# Patient Record
Sex: FEMALE | Race: WHITE | NOT HISPANIC OR LATINO | Employment: OTHER | URBAN - METROPOLITAN AREA
[De-identification: names, ages, dates, MRNs, and addresses within clinical notes are randomized per-mention and may not be internally consistent; named-entity substitution may affect disease eponyms.]

---

## 2017-01-19 DIAGNOSIS — Z23 NEED FOR PROPHYLACTIC VACCINATION WITH COMBINED DIPHTHERIA-TETANUS-PERTUSSIS (DTAP) VACCINE: Primary | ICD-10-CM

## 2017-01-20 ENCOUNTER — DOCUMENTATION ONLY (OUTPATIENT)
Dept: FAMILY MEDICINE | Facility: CLINIC | Age: 77
End: 2017-01-20

## 2017-01-20 ENCOUNTER — PATIENT MESSAGE (OUTPATIENT)
Dept: FAMILY MEDICINE | Facility: CLINIC | Age: 77
End: 2017-01-20

## 2017-01-20 ENCOUNTER — CLINICAL SUPPORT (OUTPATIENT)
Dept: INTERNAL MEDICINE | Facility: CLINIC | Age: 77
End: 2017-01-20
Payer: MEDICARE

## 2017-01-20 PROCEDURE — 90471 IMMUNIZATION ADMIN: CPT | Mod: PBBFAC,PO

## 2017-01-20 PROCEDURE — 90715 TDAP VACCINE 7 YRS/> IM: CPT | Mod: PBBFAC,PO

## 2017-01-20 NOTE — PROGRESS NOTES
Two person identification name, d.o.b with verbal feedback.  Aseptic technique used.  Administration Tdap  vaccine to the  L Deltoid IM given.  Tolerated well.  waited 15 min.  VIS 5/17/07 given./mp

## 2017-06-08 ENCOUNTER — DOCUMENTATION ONLY (OUTPATIENT)
Dept: FAMILY MEDICINE | Facility: CLINIC | Age: 77
End: 2017-06-08

## 2017-06-08 NOTE — PROGRESS NOTES
Pre-Visit Chart Review  For Appointment Scheduled on (date) 6/12/17    Health Maintenance Due   Topic Date Due    DEXA SCAN  03[birth year]1980

## 2017-06-12 ENCOUNTER — OFFICE VISIT (OUTPATIENT)
Dept: FAMILY MEDICINE | Facility: CLINIC | Age: 77
End: 2017-06-12
Payer: MEDICARE

## 2017-06-12 VITALS
HEIGHT: 66 IN | WEIGHT: 138 LBS | TEMPERATURE: 98 F | SYSTOLIC BLOOD PRESSURE: 178 MMHG | BODY MASS INDEX: 22.18 KG/M2 | HEART RATE: 75 BPM | DIASTOLIC BLOOD PRESSURE: 78 MMHG

## 2017-06-12 DIAGNOSIS — K58.0 IRRITABLE BOWEL SYNDROME WITH DIARRHEA: ICD-10-CM

## 2017-06-12 DIAGNOSIS — E78.2 MIXED HYPERLIPIDEMIA: ICD-10-CM

## 2017-06-12 DIAGNOSIS — M48.061 LUMBAR FORAMINAL STENOSIS: ICD-10-CM

## 2017-06-12 DIAGNOSIS — I10 HTN (HYPERTENSION), BENIGN: Primary | ICD-10-CM

## 2017-06-12 DIAGNOSIS — J43.1 PANLOBULAR EMPHYSEMA: ICD-10-CM

## 2017-06-12 DIAGNOSIS — E03.4 HYPOTHYROIDISM DUE TO ACQUIRED ATROPHY OF THYROID: ICD-10-CM

## 2017-06-12 PROCEDURE — 99214 OFFICE O/P EST MOD 30 MIN: CPT | Mod: PBBFAC,PO | Performed by: FAMILY MEDICINE

## 2017-06-12 PROCEDURE — 1159F MED LIST DOCD IN RCRD: CPT | Mod: ,,, | Performed by: FAMILY MEDICINE

## 2017-06-12 PROCEDURE — 99999 PR PBB SHADOW E&M-EST. PATIENT-LVL IV: CPT | Mod: PBBFAC,,, | Performed by: FAMILY MEDICINE

## 2017-06-12 PROCEDURE — 99213 OFFICE O/P EST LOW 20 MIN: CPT | Mod: S$PBB,,, | Performed by: FAMILY MEDICINE

## 2017-06-12 PROCEDURE — 1126F AMNT PAIN NOTED NONE PRSNT: CPT | Mod: ,,, | Performed by: FAMILY MEDICINE

## 2017-06-12 RX ORDER — GUAIFENESIN AND PHENYLEPHRINE HCL 400; 10 MG/1; MG/1
TABLET ORAL
COMMUNITY

## 2017-06-12 RX ORDER — MELOXICAM 7.5 MG/1
TABLET ORAL
COMMUNITY
Start: 2017-05-29 | End: 2018-08-06

## 2017-06-12 RX ORDER — FLUTICASONE PROPIONATE 50 MCG
SPRAY, SUSPENSION (ML) NASAL
COMMUNITY
Start: 2017-04-18 | End: 2018-01-18 | Stop reason: SDUPTHER

## 2017-06-12 RX ORDER — BENAZEPRIL HYDROCHLORIDE 5 MG/1
TABLET ORAL
COMMUNITY
Start: 2017-04-05 | End: 2018-08-06

## 2017-06-12 RX ORDER — PRAVASTATIN SODIUM 20 MG/1
TABLET ORAL
COMMUNITY
Start: 2017-04-17 | End: 2019-03-25 | Stop reason: SDUPTHER

## 2017-06-12 RX ORDER — METHYLPREDNISOLONE 4 MG/1
TABLET ORAL
COMMUNITY
Start: 2017-05-29 | End: 2017-06-12

## 2017-06-12 NOTE — PATIENT INSTRUCTIONS
Controlling High Blood Pressure  High blood pressure (hypertension) is often called the silent killer. This is because many people who have it dont know it. High blood pressure is defined as 140/90 mm Hg or higher. Know your blood pressure and remember to check it regularly. Doing so can save your life. Here are some things you can do to help control your blood pressure.    Choose heart-healthy foods  · Select low-salt, low-fat foods. Limit sodium intake to 2,400 mg per day or the amount suggested by your healthcare provider.  · Limit canned, dried, cured, packaged, and fast foods. These can contain a lot of salt.  · Eat 8 to 10 servings of fruits and vegetables every day.  · Choose lean meats, fish, or chicken.  · Eat whole-grain pasta, brown rice, and beans.  · Eat 2 to 3 servings of low-fat or fat-free dairy products.  · Ask your doctor about the DASH eating plan. This plan helps reduce blood pressure.  · When you go to a restaurant, ask that your meal be prepared with no added salt.  Maintain a healthy weight  · Ask your healthcare provider how many calories to eat a day. Then stick to that number.  · Ask your healthcare provider what weight range is healthiest for you. If you are overweight, a weight loss of only 3% to 5% of your body weight can help lower blood pressure. Generally, a good weight loss goal is to lose 10% of your body weight in a year.  · Limit snacks and sweets.  · Get regular exercise.  Get up and get active  · Choose activities you enjoy. Find ones you can do with friends or family. This includes bicycling, dancing, walking, and jogging.  · Park farther away from building entrances.  · Use stairs instead of the elevator.  · When you can, walk or bike instead of driving.  · [place] leaves, garden, or do household repairs.  · Be active at a moderate to vigorous level of physical activity for at least 40 minutes for a minimum of 3 to 4 days a week.   Manage stress  · Make time to relax and enjoy  life. Find time to laugh.  · Communicate your concerns with your loved ones and your healthcare provider.  · Visit with family and friends, and keep up with hobbies.  Limit alcohol and quit smoking  · Men should have no more than 2 drinks per day.  · Women should have no more than 1 drink per day.  · Talk with your healthcare provider about quitting smoking. Smoking significantly increases your risk for heart disease and stroke. Ask your healthcare provider about community smoking cessation programs and other options.  Medicines  If lifestyle changes arent enough, your healthcare provider may prescribe high blood pressure medicine. Take all medicines as prescribed. If you have any questions about your medicines, ask your healthcare provider before stopping or changing them.   Date Last Reviewed: 4/27/2016 © 2000-2016 [employer]. [address] [place] [address], [place] [address]. All rights reserved. This information is not intended as a substitute for professional medical care. Always follow your healthcare professional's instructions.        Controlling High Blood Pressure  High blood pressure (hypertension) is often called the silent killer. This is because many people who have it dont know it. High blood pressure is defined as 140/90 mm Hg or higher. Know your blood pressure and remember to check it regularly. Doing so can save your life. Here are some things you can do to help control your blood pressure.    Choose heart-healthy foods  · Select low-salt, low-fat foods. Limit sodium intake to 2,400 mg per day or the amount suggested by your healthcare provider.  · Limit canned, dried, cured, packaged, and fast foods. These can contain a lot of salt.  · Eat 8 to 10 servings of fruits and vegetables every day.  · Choose lean meats, fish, or chicken.  · Eat whole-grain pasta, brown rice, and beans.  · Eat 2 to 3 servings of low-fat or fat-free dairy products.  · Ask your doctor about the DASH eating plan.  This plan helps reduce blood pressure.  · When you go to a restaurant, ask that your meal be prepared with no added salt.  Maintain a healthy weight  · Ask your healthcare provider how many calories to eat a day. Then stick to that number.  · Ask your healthcare provider what weight range is healthiest for you. If you are overweight, a weight loss of only 3% to 5% of your body weight can help lower blood pressure. Generally, a good weight loss goal is to lose 10% of your body weight in a year.  · Limit snacks and sweets.  · Get regular exercise.  Get up and get active  · Choose activities you enjoy. Find ones you can do with friends or family. This includes bicycling, dancing, walking, and jogging.  · Park farther away from building entrances.  · Use stairs instead of the elevator.  · When you can, walk or bike instead of driving.  · [place] leaves, garden, or do household repairs.  · Be active at a moderate to vigorous level of physical activity for at least 40 minutes for a minimum of 3 to 4 days a week.   Manage stress  · Make time to relax and enjoy life. Find time to laugh.  · Communicate your concerns with your loved ones and your healthcare provider.  · Visit with family and friends, and keep up with hobbies.  Limit alcohol and quit smoking  · Men should have no more than 2 drinks per day.  · Women should have no more than 1 drink per day.  · Talk with your healthcare provider about quitting smoking. Smoking significantly increases your risk for heart disease and stroke. Ask your healthcare provider about community smoking cessation programs and other options.  Medicines  If lifestyle changes arent enough, your healthcare provider may prescribe high blood pressure medicine. Take all medicines as prescribed. If you have any questions about your medicines, ask your healthcare provider before stopping or changing them.   Date Last Reviewed: 4/27/2016  © 0169-4624 [employer]. [address] [facility],  [place] [address]. All rights reserved. This information is not intended as a substitute for professional medical care. Always follow your healthcare professional's instructions.

## 2017-06-18 PROBLEM — E78.2 MIXED HYPERLIPIDEMIA: Status: ACTIVE | Noted: 2017-06-18

## 2017-06-19 NOTE — PROGRESS NOTES
Subjective:       Patient ID: Jacqueline Mathias is a 77 y.o. female.    Chief Complaint: Hypertension; Hypothyroidism; COPD; and Irritable Bowel Syndrome    Patient presents here for six-month follow-up of hypertension, hypothyroidism, COPD, and IBS.  She relates that she had a fall in May and has had continued back pain since that time.  She initially saw her orthopedist and was prescribed Medrol Dosepak but did not get much relief.  She then had an MRI done of the lumbar spine which revealed no acute fracture or malalignment of the lumbar spine; relatively mild discogenic degenerative changes causing mild bilateral neural foramen narrowing at L4-L5 and L5-S1.  Her pain is mostly with bending or other movements, and there are no radicular pains down either leg.  She was encouraged to see pain management about possible injections or other treatment for her continued pain.  Her cardiologist also recently placed her on pravastatin for elevated cholesterol.  As far as her hypertension it is elevated here today but she did bring her log of blood pressure readings from home and these are all normal and show good control.  Her COPD is stable and she did have recent pulmonary function tests done by her pulmonologist, and her tests are stable without any significant decrease from previous.  Her IBS is also stable at this time.  She is up-to-date with all of her routine screening exams and immunizations.      Hypertension   This is a chronic problem. The problem is unchanged. The problem is controlled. Pertinent negatives include no chest pain, headaches, palpitations or shortness of breath. Risk factors for coronary artery disease include post-menopausal state. Past treatments include diuretics. The current treatment provides moderate improvement. There are no compliance problems.  There is no history of kidney disease, CAD/MI, CVA or heart failure.     Review of Systems   Constitutional: Negative for chills, fatigue, fever  and unexpected weight change.   HENT: Negative for congestion, ear pain, postnasal drip and sore throat.    Respiratory: Negative for cough and shortness of breath.    Cardiovascular: Negative for chest pain and palpitations.   Gastrointestinal: Negative for abdominal pain, diarrhea, nausea and vomiting.   Genitourinary: Negative for difficulty urinating, dysuria, flank pain and pelvic pain.   Musculoskeletal: Positive for back pain. Negative for arthralgias.   Neurological: Negative for dizziness, light-headedness and headaches.       Objective:      Physical Exam   Constitutional: She is oriented to person, place, and time. She appears well-developed and well-nourished.   HENT:   Head: Normocephalic and atraumatic.   Right Ear: External ear normal.   Left Ear: External ear normal.   Nose: Nose normal.   Mouth/Throat: Oropharynx is clear and moist.   Neck: Normal range of motion. Neck supple. No thyromegaly present.   Cardiovascular: Normal rate, regular rhythm, normal heart sounds and intact distal pulses.    No murmur heard.  Pulmonary/Chest: Effort normal and breath sounds normal. She has no wheezes. She has no rales.   Abdominal: Soft. Bowel sounds are normal. She exhibits no mass. There is no tenderness. There is no rebound.   Musculoskeletal: Normal range of motion. She exhibits no edema or tenderness.   Lymphadenopathy:     She has no cervical adenopathy.   Neurological: She is alert and oriented to person, place, and time. She has normal reflexes. No cranial nerve deficit.   Psychiatric: She has a normal mood and affect.       Assessment:       1. HTN (hypertension), benign    2. Lumbar foraminal stenosis    3. Hypothyroidism due to acquired atrophy of thyroid    4. Panlobular emphysema    5. Irritable bowel syndrome with diarrhea    6. Mixed hyperlipidemia        Plan:       1.  Continue present medication as her hypertension, hypothyroidism, COPD, and IBS are stable  2.  Continue pravastatin as recently  instituted by her cardiologist for hyperlipidemia  3.  Referral has been placed to pain management, Dr. Mensah, who presently sees the patient's   4.  Continue low-sodium, low-fat low-cholesterol diet  5.  Follow-up with me in 6 months or when necessary         Patient readiness: acceptance and barriers:none    During the course of the visit the patient was educated and counseled about the following:     Hypertension:   Dietary sodium restriction.  Regular aerobic exercise.  Follow up: 6 months and as needed.    Goals: Hypertension: Reduce Blood Pressure    Did patient meet goals/outcomes: Yes    The following self management tools provided: blood pressure log    Patient Instructions (the written plan) was given to the patient/family.     Time spent with patient: 30 minutes

## 2017-11-26 DIAGNOSIS — E03.4 HYPOTHYROIDISM DUE TO ACQUIRED ATROPHY OF THYROID: ICD-10-CM

## 2017-11-27 RX ORDER — LEVOTHYROXINE SODIUM 25 UG/1
TABLET ORAL
Qty: 90 TABLET | Refills: 3 | Status: SHIPPED | OUTPATIENT
Start: 2017-11-27 | End: 2019-03-25 | Stop reason: SDUPTHER

## 2018-01-15 DIAGNOSIS — Z78.0 ASYMPTOMATIC MENOPAUSAL STATE: Primary | ICD-10-CM

## 2018-01-16 ENCOUNTER — DOCUMENTATION ONLY (OUTPATIENT)
Dept: FAMILY MEDICINE | Facility: CLINIC | Age: 78
End: 2018-01-16

## 2018-01-16 NOTE — PROGRESS NOTES
Pre-Visit Chart Review  For Appointment Scheduled on (date) 1/18/18    Health Maintenance Due   Topic Date Due    DEXA SCAN  03/12/1980    Influenza Vaccine  08/01/2017

## 2018-01-17 ENCOUNTER — TELEPHONE (OUTPATIENT)
Dept: FAMILY MEDICINE | Facility: CLINIC | Age: 78
End: 2018-01-17

## 2018-01-17 NOTE — TELEPHONE ENCOUNTER
----- Message from Anjelica Murphy sent at 1/17/2018  1:35 PM CST -----  Contact: Patient  Patient called to speak with the nurse; she wants to confirm her and her husbands appointments for tomorrow. She can be contacted at [phone].    Thanks,  Anjelica

## 2018-01-18 ENCOUNTER — OFFICE VISIT (OUTPATIENT)
Dept: FAMILY MEDICINE | Facility: CLINIC | Age: 78
End: 2018-01-18
Payer: MEDICARE

## 2018-01-18 VITALS
WEIGHT: 140.19 LBS | HEART RATE: 77 BPM | SYSTOLIC BLOOD PRESSURE: 158 MMHG | BODY MASS INDEX: 22.53 KG/M2 | DIASTOLIC BLOOD PRESSURE: 86 MMHG | HEIGHT: 66 IN | TEMPERATURE: 98 F

## 2018-01-18 DIAGNOSIS — E78.2 MIXED HYPERLIPIDEMIA: ICD-10-CM

## 2018-01-18 DIAGNOSIS — I10 HTN (HYPERTENSION), BENIGN: Primary | ICD-10-CM

## 2018-01-18 DIAGNOSIS — J43.1 PANLOBULAR EMPHYSEMA: ICD-10-CM

## 2018-01-18 DIAGNOSIS — E03.4 HYPOTHYROIDISM DUE TO ACQUIRED ATROPHY OF THYROID: ICD-10-CM

## 2018-01-18 PROCEDURE — 99999 PR PBB SHADOW E&M-EST. PATIENT-LVL III: CPT | Mod: PBBFAC,,, | Performed by: FAMILY MEDICINE

## 2018-01-18 PROCEDURE — 99213 OFFICE O/P EST LOW 20 MIN: CPT | Mod: S$PBB,,, | Performed by: FAMILY MEDICINE

## 2018-01-18 PROCEDURE — 99213 OFFICE O/P EST LOW 20 MIN: CPT | Mod: PBBFAC,PO | Performed by: FAMILY MEDICINE

## 2018-01-18 RX ORDER — FLUTICASONE PROPIONATE 50 MCG
1 SPRAY, SUSPENSION (ML) NASAL 2 TIMES DAILY
Qty: 48 G | Refills: 3 | Status: SHIPPED | OUTPATIENT
Start: 2018-01-18 | End: 2019-03-25 | Stop reason: SDUPTHER

## 2018-01-22 NOTE — PROGRESS NOTES
Subjective:       Patient ID: Jacqueline Mathias is a 77 y.o. female.    Chief Complaint: Hypertension; Hyperlipidemia; Hypothyroidism; and COPD    Patient presents here for six-month follow-up of hypertension, hyperlipidemia, hypothyroidism, and COPD.  Her blood pressure is elevated here today but she states it has been well controlled at home.  She does tend to run an elevated blood pressure in the physician's office but is usually always normal at home.  I have asked her to monitor this and bring a log of her readings in to me.  She is compliant with her medications as well as her low-sodium diet and she is exercising regularly.  As far as her hyperlipidemia, this is well controlled on her present dose of pravastatin and her low-fat low-cholesterol diet.  Her hypothyroidism is also stable on her present dose of levothyroxine.  She did bring in copies of her most recent lab work and this shows a total cholesterol of 156, HDL 62, LDL 75, triglycerides 94; CMP is normal.  Her COPD is stable at the present time with minimal medications.  She denies any significant shortness of breath but does find that she is tired more easily.  Since I last saw her, she had epidural steroid injections in July, August, and October 2017 with fairly good results.  She had her mammogram in March 2017 and has also had a carotid ultrasound.  As far as screening, she is up-to-date with all of her recommended screenings except a flu vaccine and a DEXA scan, both of which she defers at this time.      Hypertension   This is a chronic problem. The problem is unchanged. The problem is controlled. Associated symptoms include shortness of breath. Pertinent negatives include no chest pain, headaches or palpitations. Risk factors for coronary artery disease include dyslipidemia and post-menopausal state. Past treatments include diuretics and ACE inhibitors. The current treatment provides moderate improvement. There are no compliance problems.  There  is no history of kidney disease, CAD/MI, CVA or heart failure.   Hyperlipidemia   This is a chronic problem. The problem is controlled. Recent lipid tests were reviewed and are normal. Associated symptoms include shortness of breath. Pertinent negatives include no chest pain. Current antihyperlipidemic treatment includes statins. The current treatment provides moderate improvement of lipids. There are no compliance problems.  Risk factors for coronary artery disease include dyslipidemia, hypertension and post-menopausal.     Review of Systems   Constitutional: Negative for chills, fatigue, fever and unexpected weight change.   HENT: Negative for congestion, ear pain, postnasal drip and sore throat.    Respiratory: Positive for shortness of breath. Negative for cough and wheezing.    Cardiovascular: Negative for chest pain and palpitations.   Gastrointestinal: Negative for abdominal pain, constipation, diarrhea, nausea and vomiting.   Genitourinary: Negative for difficulty urinating, dysuria, flank pain and pelvic pain.   Musculoskeletal: Negative for arthralgias and back pain.   Neurological: Negative for dizziness, light-headedness and headaches.   Psychiatric/Behavioral: Negative for sleep disturbance. The patient is not nervous/anxious.        Objective:      Physical Exam   Constitutional: She is oriented to person, place, and time. She appears well-developed and well-nourished.   HENT:   Head: Normocephalic and atraumatic.   Right Ear: External ear normal.   Left Ear: External ear normal.   Nose: Nose normal.   Mouth/Throat: Oropharynx is clear and moist.   Neck: Normal range of motion. Neck supple. No thyromegaly present.   Cardiovascular: Normal rate, regular rhythm, normal heart sounds and intact distal pulses.    No murmur heard.  Pulmonary/Chest: Effort normal and breath sounds normal. She has no wheezes. She has no rales.   Musculoskeletal: Normal range of motion. She exhibits no edema or tenderness.    Lymphadenopathy:     She has no cervical adenopathy.   Neurological: She is alert and oriented to person, place, and time. She has normal reflexes. No cranial nerve deficit.   Psychiatric: She has a normal mood and affect. Her behavior is normal.   Vitals reviewed.      Assessment:       1. HTN (hypertension), benign    2. Mixed hyperlipidemia    3. Hypothyroidism due to acquired atrophy of thyroid    4. Panlobular emphysema        Plan:       1.  Monitor blood pressure at home, keep a log of the readings, and return to me in several weeks.  She is also to bring her blood pressure log to her cardiologist's office when she sees him in several weeks  2.  Continue medications as her hypertension, hyperlipidemia, hypothyroidism, and COPD are all stable  3.  Continue low-sodium, low-fat low-cholesterol diet and exercise  4.  Patient is strongly encouraged to get her flu vaccine and DEXA scan; she defers these at this time  5.  Continue follow-up with cardiology  6.  Follow-up with me in 6 months or when necessary        Patient readiness: acceptance and barriers:none    During the course of the visit the patient was educated and counseled about the following:     Hypertension:   Dietary sodium restriction.  Regular aerobic exercise.  Follow up: 6 months and as needed.    Goals: Hypertension: Reduce Blood Pressure    Did patient meet goals/outcomes: Yes    The following self management tools provided: blood pressure log    Patient Instructions (the written plan) was given to the patient/family.     Time spent with patient: 30 minutes    Barriers to medications present (no )    Adverse reactions to current medications (no)    Over the counter medications reviewed (Yes)

## 2018-08-06 ENCOUNTER — OFFICE VISIT (OUTPATIENT)
Dept: FAMILY MEDICINE | Facility: CLINIC | Age: 78
End: 2018-08-06
Payer: MEDICARE

## 2018-08-06 VITALS
DIASTOLIC BLOOD PRESSURE: 71 MMHG | HEIGHT: 66 IN | TEMPERATURE: 98 F | SYSTOLIC BLOOD PRESSURE: 136 MMHG | HEART RATE: 78 BPM | WEIGHT: 132.94 LBS | BODY MASS INDEX: 21.36 KG/M2

## 2018-08-06 DIAGNOSIS — I10 HTN (HYPERTENSION), BENIGN: Primary | ICD-10-CM

## 2018-08-06 DIAGNOSIS — J43.1 PANLOBULAR EMPHYSEMA: ICD-10-CM

## 2018-08-06 DIAGNOSIS — E78.2 MIXED HYPERLIPIDEMIA: ICD-10-CM

## 2018-08-06 DIAGNOSIS — E03.4 HYPOTHYROIDISM DUE TO ACQUIRED ATROPHY OF THYROID: ICD-10-CM

## 2018-08-06 PROCEDURE — 99999 PR PBB SHADOW E&M-EST. PATIENT-LVL III: CPT | Mod: PBBFAC,,, | Performed by: FAMILY MEDICINE

## 2018-08-06 PROCEDURE — 99213 OFFICE O/P EST LOW 20 MIN: CPT | Mod: S$PBB,,, | Performed by: FAMILY MEDICINE

## 2018-08-06 PROCEDURE — 99213 OFFICE O/P EST LOW 20 MIN: CPT | Mod: PBBFAC,PO | Performed by: FAMILY MEDICINE

## 2018-08-06 RX ORDER — BENAZEPRIL HYDROCHLORIDE 10 MG/1
TABLET ORAL
COMMUNITY
Start: 2018-08-03 | End: 2019-03-25 | Stop reason: SDUPTHER

## 2018-08-06 RX ORDER — VITAMIN B COMPLEX
1 CAPSULE ORAL DAILY
COMMUNITY

## 2018-08-07 ENCOUNTER — TELEPHONE (OUTPATIENT)
Dept: FAMILY MEDICINE | Facility: CLINIC | Age: 78
End: 2018-08-07

## 2018-08-07 NOTE — TELEPHONE ENCOUNTER
Envelope placed in Dr. Weaver's basket.     ----- Message from Anna Langley sent at 8/7/2018  2:35 PM CDT -----  Pt dropped off envelope for Dr. Weaver . Envelope left behind   .

## 2019-01-28 LAB
CHOLESTEROL, TOTAL: 151
HDLC SERPL-MCNC: 59 MG/DL
LDLC SERPL CALC-MCNC: 64 MG/DL
TRIGLYCERIDE (LIPID PAN): 142

## 2019-03-11 ENCOUNTER — TELEPHONE (OUTPATIENT)
Dept: ADMINISTRATIVE | Facility: HOSPITAL | Age: 79
End: 2019-03-11

## 2019-03-11 ENCOUNTER — PATIENT OUTREACH (OUTPATIENT)
Dept: ADMINISTRATIVE | Facility: HOSPITAL | Age: 79
End: 2019-03-11

## 2019-03-12 ENCOUNTER — TELEPHONE (OUTPATIENT)
Dept: FAMILY MEDICINE | Facility: CLINIC | Age: 79
End: 2019-03-12

## 2019-03-12 NOTE — TELEPHONE ENCOUNTER
----- Message from Uma Marin sent at 3/12/2019 12:45 PM CDT -----  Contact: Self  Ms. Phillips left some blood work results from a different Doctor at the  for Dr. Weaver to go over.

## 2019-03-13 ENCOUNTER — TELEPHONE (OUTPATIENT)
Dept: ADMINISTRATIVE | Facility: HOSPITAL | Age: 79
End: 2019-03-13

## 2019-03-14 ENCOUNTER — TELEPHONE (OUTPATIENT)
Dept: FAMILY MEDICINE | Facility: CLINIC | Age: 79
End: 2019-03-14

## 2019-03-14 DIAGNOSIS — R73.9 HYPERGLYCEMIA: Primary | ICD-10-CM

## 2019-03-14 NOTE — TELEPHONE ENCOUNTER
Pt dropped off her lab work ordered by Dr. Robbins, done on 01/28/19. She stated that her glucose level was at 127 and advised by Dr. Martinez that he is concerned and would like the pt to talk to Dr. Weaver about it. Pt's next OV with provider is on 03/25/19. She would like to know if provider would like to order any blood test before her visit. Please advise.

## 2019-03-18 NOTE — TELEPHONE ENCOUNTER
Spoke to pt. Scheduled fasting labs. Pt verbalized understanding and confirmed time and date of fabian.

## 2019-03-22 ENCOUNTER — DOCUMENTATION ONLY (OUTPATIENT)
Dept: FAMILY MEDICINE | Facility: CLINIC | Age: 79
End: 2019-03-22

## 2019-03-22 ENCOUNTER — LAB VISIT (OUTPATIENT)
Dept: LAB | Facility: HOSPITAL | Age: 79
End: 2019-03-22
Attending: FAMILY MEDICINE
Payer: MEDICARE

## 2019-03-22 DIAGNOSIS — R73.9 HYPERGLYCEMIA: ICD-10-CM

## 2019-03-22 LAB
ESTIMATED AVG GLUCOSE: 134 MG/DL
GLUCOSE SERPL-MCNC: 121 MG/DL
HBA1C MFR BLD HPLC: 6.3 %

## 2019-03-22 PROCEDURE — 36415 COLL VENOUS BLD VENIPUNCTURE: CPT | Mod: PO

## 2019-03-22 PROCEDURE — 83036 HEMOGLOBIN GLYCOSYLATED A1C: CPT

## 2019-03-22 PROCEDURE — 82947 ASSAY GLUCOSE BLOOD QUANT: CPT

## 2019-03-22 NOTE — PROGRESS NOTES
Pre-Visit Chart Review  For Appointment Scheduled on 3/25/2019.      Health Maintenance Due   Topic Date Due    DEXA SCAN  03/12/1980

## 2019-03-25 ENCOUNTER — OFFICE VISIT (OUTPATIENT)
Dept: FAMILY MEDICINE | Facility: CLINIC | Age: 79
End: 2019-03-25
Payer: MEDICARE

## 2019-03-25 VITALS
HEIGHT: 66 IN | WEIGHT: 136.69 LBS | TEMPERATURE: 98 F | BODY MASS INDEX: 21.97 KG/M2 | DIASTOLIC BLOOD PRESSURE: 85 MMHG | HEART RATE: 87 BPM | SYSTOLIC BLOOD PRESSURE: 169 MMHG

## 2019-03-25 DIAGNOSIS — E03.4 HYPOTHYROIDISM DUE TO ACQUIRED ATROPHY OF THYROID: ICD-10-CM

## 2019-03-25 DIAGNOSIS — I10 HTN (HYPERTENSION), BENIGN: Primary | ICD-10-CM

## 2019-03-25 DIAGNOSIS — J43.1 PANLOBULAR EMPHYSEMA: ICD-10-CM

## 2019-03-25 DIAGNOSIS — E78.2 MIXED HYPERLIPIDEMIA: ICD-10-CM

## 2019-03-25 PROCEDURE — 99213 OFFICE O/P EST LOW 20 MIN: CPT | Mod: PBBFAC,PO | Performed by: FAMILY MEDICINE

## 2019-03-25 PROCEDURE — 99213 PR OFFICE/OUTPT VISIT, EST, LEVL III, 20-29 MIN: ICD-10-PCS | Mod: S$PBB,,, | Performed by: FAMILY MEDICINE

## 2019-03-25 PROCEDURE — 99999 PR PBB SHADOW E&M-EST. PATIENT-LVL III: ICD-10-PCS | Mod: PBBFAC,,, | Performed by: FAMILY MEDICINE

## 2019-03-25 PROCEDURE — 99999 PR PBB SHADOW E&M-EST. PATIENT-LVL III: CPT | Mod: PBBFAC,,, | Performed by: FAMILY MEDICINE

## 2019-03-25 PROCEDURE — 99213 OFFICE O/P EST LOW 20 MIN: CPT | Mod: S$PBB,,, | Performed by: FAMILY MEDICINE

## 2019-03-25 RX ORDER — CETIRIZINE HYDROCHLORIDE 10 MG/1
10 TABLET ORAL DAILY PRN
COMMUNITY

## 2019-03-25 RX ORDER — FLUTICASONE PROPIONATE 50 MCG
1 SPRAY, SUSPENSION (ML) NASAL 2 TIMES DAILY
Qty: 48 G | Refills: 3 | Status: SHIPPED | OUTPATIENT
Start: 2019-03-25 | End: 2020-05-06

## 2019-03-25 RX ORDER — ESTRADIOL 1 MG/1
1 TABLET ORAL DAILY
COMMUNITY

## 2019-03-25 RX ORDER — CHLORTHALIDONE 25 MG/1
25 TABLET ORAL DAILY
Qty: 90 TABLET | Refills: 3 | Status: SHIPPED | OUTPATIENT
Start: 2019-03-25 | End: 2020-01-27

## 2019-03-25 RX ORDER — PRAVASTATIN SODIUM 20 MG/1
20 TABLET ORAL DAILY
Qty: 90 TABLET | Refills: 3 | Status: SHIPPED | OUTPATIENT
Start: 2019-03-25 | End: 2020-01-27

## 2019-03-25 RX ORDER — BENAZEPRIL HYDROCHLORIDE 10 MG/1
10 TABLET ORAL DAILY
Qty: 90 TABLET | Refills: 3 | Status: SHIPPED | OUTPATIENT
Start: 2019-03-25 | End: 2021-05-30 | Stop reason: SDUPTHER

## 2019-03-25 RX ORDER — LEVOTHYROXINE SODIUM 25 UG/1
TABLET ORAL
Qty: 90 TABLET | Refills: 3 | Status: SHIPPED | OUTPATIENT
Start: 2019-03-25 | End: 2020-01-27

## 2019-03-25 RX ORDER — POTASSIUM CHLORIDE 750 MG/1
10 CAPSULE, EXTENDED RELEASE ORAL 2 TIMES DAILY
Qty: 90 CAPSULE | Refills: 3 | Status: SHIPPED | OUTPATIENT
Start: 2019-03-25 | End: 2019-10-16 | Stop reason: SDUPTHER

## 2019-03-29 NOTE — PROGRESS NOTES
Subjective:       Patient ID: Jacqueline Mathias is a 79 y.o. female.    Chief Complaint: Hypertension; Hyperlipidemia; Hypothyroidism; and COPD    Patient presents here for 6 month follow-up of hypertension, hyperlipidemia, hypothyroidism, and COPD.  Her weight has increased 4 lb over her last visit 6 months ago.  She states she has been under increased stress over the last several months since the death of her son in December of 2018.  Her hypertension has been well controlled on her present medication and she is tolerating her medication well.  She is on an ACE-inhibitor and I did discuss with her the recent study which suggested a possible link between long-term ACE-inhibitor use and lung cancer.  She feels that she probably will change from the ACE-inhibitor but she is scheduled to undergo surgery within the next several weeks and we both agree that we would wait until after the surgery to change her medication since her blood pressure is well controlled at this time.  As far as her hyperlipidemia, this is well controlled on her present dose of pravastatin and her low-fat low-cholesterol diet.  Her last lipid profile in January of 2019 will showed a total cholesterol of 151, HDL 59, LDL 64, triglycerides 142.  Her hypothyroidism is stable on her present dose of levothyroxine and her most recent TSH was in the therapeutic range.  She does have mild COPD and this is stable.  She is scheduled to undergo back surgery in several weeks for a disc that is pressing on her nerve in the L3-L4 region.  Should also be noted that she had an elevated glucose at her last appointment with her cardiologist.  Her fasting glucose was 127.  I repeated her fasting glucose and hemoglobin A1c and a fasting glucose was 121 and hemoglobin A1c was 6.3%.  I advised her that she has hyperglycemia and although she does not fit the criteria for diabetes just yet, she is trending that way and I would treated just the same.  I have advised her  on a diabetic diet to go along with her low-sodium, low-fat low-cholesterol diet.  As far as health maintenance, she is up-to-date with all of her routine screening items and immunizations.  Should be noted that she did have a DEXA scan on 03/18/2019 which showed normal bone density in the left hip as well as the lumbar spine.    Hypertension   This is a chronic problem. The current episode started more than 1 year ago. The problem is controlled. Associated symptoms include malaise/fatigue and peripheral edema. Pertinent negatives include no anxiety, blurred vision, chest pain, headaches, orthopnea, palpitations, PND, shortness of breath or sweats. Agents associated with hypertension include estrogens, NSAIDs and thyroid hormones. Risk factors for coronary artery disease include family history, dyslipidemia and post-menopausal state. Past treatments include ACE inhibitors and diuretics. The current treatment provides moderate improvement. There are no compliance problems.  There is no history of kidney disease, CAD/MI, CVA or heart failure.   Hyperlipidemia   This is a chronic problem. The problem is controlled. Recent lipid tests were reviewed and are normal. Exacerbating diseases include hypothyroidism. Factors aggravating her hyperlipidemia include thiazides. Pertinent negatives include no chest pain or shortness of breath. Current antihyperlipidemic treatment includes statins. The current treatment provides moderate improvement of lipids. There are no compliance problems.  Risk factors for coronary artery disease include dyslipidemia, hypertension and post-menopausal.     Review of Systems   Constitutional: Positive for malaise/fatigue. Negative for chills, fatigue, fever and unexpected weight change.   HENT: Negative for congestion, ear pain, postnasal drip and sore throat.    Eyes: Negative for blurred vision.   Respiratory: Negative for cough and shortness of breath.    Cardiovascular: Negative for chest  pain, palpitations, orthopnea and PND.   Gastrointestinal: Negative for abdominal pain, diarrhea, nausea and vomiting.   Genitourinary: Negative for difficulty urinating, dysuria and flank pain.   Musculoskeletal: Positive for back pain. Negative for arthralgias.   Neurological: Negative for dizziness, light-headedness and headaches.   Psychiatric/Behavioral: Negative for sleep disturbance. The patient is not nervous/anxious.        Objective:      Physical Exam   Constitutional: She is oriented to person, place, and time. She appears well-developed and well-nourished.   HENT:   Head: Normocephalic and atraumatic.   Right Ear: External ear normal.   Left Ear: External ear normal.   Nose: Nose normal.   Mouth/Throat: Oropharynx is clear and moist.   Neck: Normal range of motion. Neck supple. No thyromegaly present.   Cardiovascular: Normal rate, regular rhythm, normal heart sounds and intact distal pulses.   No murmur heard.  Pulmonary/Chest: Effort normal and breath sounds normal. She has no wheezes. She has no rales.   Musculoskeletal: Normal range of motion. She exhibits no edema or tenderness.   Lymphadenopathy:     She has no cervical adenopathy.   Neurological: She is alert and oriented to person, place, and time. She has normal reflexes. No cranial nerve deficit.   Psychiatric: She has a normal mood and affect. Her behavior is normal.   Vitals reviewed.      Assessment:       1. HTN (hypertension), benign    2. Hypothyroidism due to acquired atrophy of thyroid    3. Mixed hyperlipidemia    4. Panlobular emphysema        Plan:       1.  Continue present medication as her hypertension, hyperlipidemia, hypothyroidism, and COPD are well controlled  2.  We will wait to change her ACE-inhibitor to another antihypertensive until after her back surgery since she is well controlled now and do not want make any major changes just before surgery  3.  Continue low-sodium, low-fat low-cholesterol diet but also educated on  a diabetic diet also  4.  Patient is cleared from my standpoint for her surgery under general anesthesia but she also needs to get clearance from her cardiologist  5.  Follow up with me in 6 months or p.r.n.        Patient readiness: acceptance and barriers:none    During the course of the visit the patient was educated and counseled about the following:     Hypertension:   Dietary sodium restriction.  Regular aerobic exercise.  Follow up: 6 months and as needed.    Goals: Hypertension: Reduce Blood Pressure    Did patient meet goals/outcomes: Yes    The following self management tools provided: blood pressure log    Patient Instructions (the written plan) was given to the patient/family.     Time spent with patient: 30 minutes    Barriers to medications present (no )    Adverse reactions to current medications (no)    Over the counter medications reviewed (Yes)

## 2019-04-05 ENCOUNTER — PATIENT MESSAGE (OUTPATIENT)
Dept: FAMILY MEDICINE | Facility: CLINIC | Age: 79
End: 2019-04-05

## 2019-04-18 ENCOUNTER — TELEPHONE (OUTPATIENT)
Dept: FAMILY MEDICINE | Facility: CLINIC | Age: 79
End: 2019-04-18

## 2019-04-18 ENCOUNTER — LAB VISIT (OUTPATIENT)
Dept: LAB | Facility: HOSPITAL | Age: 79
End: 2019-04-18
Attending: FAMILY MEDICINE
Payer: MEDICARE

## 2019-04-18 DIAGNOSIS — E83.42 HYPOMAGNESEMIA: ICD-10-CM

## 2019-04-18 DIAGNOSIS — E87.6 HYPOKALEMIA: ICD-10-CM

## 2019-04-18 DIAGNOSIS — E87.6 HYPOKALEMIA: Primary | ICD-10-CM

## 2019-04-18 LAB
ANION GAP SERPL CALC-SCNC: 10 MMOL/L (ref 8–16)
BUN SERPL-MCNC: 17 MG/DL (ref 8–23)
CALCIUM SERPL-MCNC: 10.5 MG/DL (ref 8.7–10.5)
CHLORIDE SERPL-SCNC: 94 MMOL/L (ref 95–110)
CO2 SERPL-SCNC: 31 MMOL/L (ref 23–29)
CREAT SERPL-MCNC: 0.8 MG/DL (ref 0.5–1.4)
EST. GFR  (AFRICAN AMERICAN): >60 ML/MIN/1.73 M^2
EST. GFR  (NON AFRICAN AMERICAN): >60 ML/MIN/1.73 M^2
GLUCOSE SERPL-MCNC: 120 MG/DL (ref 70–110)
MAGNESIUM SERPL-MCNC: 1.9 MG/DL (ref 1.6–2.6)
POTASSIUM SERPL-SCNC: 4.5 MMOL/L (ref 3.5–5.1)
SODIUM SERPL-SCNC: 135 MMOL/L (ref 136–145)

## 2019-04-18 PROCEDURE — 83735 ASSAY OF MAGNESIUM: CPT

## 2019-04-18 PROCEDURE — 80048 BASIC METABOLIC PNL TOTAL CA: CPT

## 2019-04-18 PROCEDURE — 36415 COLL VENOUS BLD VENIPUNCTURE: CPT | Mod: PO

## 2019-04-18 NOTE — TELEPHONE ENCOUNTER
Pt presented to the clinic advised me that she has surgery done on 04/05/19; fusion of L5-S1 at [facility].  She stated her potassium and magnesium dropped during surgery. She went to her 2 weeks f/u with Dr. Bond today and was advised for further lab test will have to come from PCP. Advised Dr. Weaver, order for CMP and Magnesium was ordered. Advised pt to get labs done and we will reach out when result comes in. Understanding verbalized.

## 2019-04-18 NOTE — TELEPHONE ENCOUNTER
----- Message from Anna Langley sent at 4/18/2019 10:41 AM CDT -----  Pt wanted to inform Dr. Weaver that she had a dropped in her potassium and had to given some during a recent surgery. She stated that Dr. Bond wanted her to inform him of this to see what further has has to be done testing etc. Pt would like to be called by a nurse .

## 2019-10-02 ENCOUNTER — HOSPITAL ENCOUNTER (OUTPATIENT)
Dept: RADIOLOGY | Facility: HOSPITAL | Age: 79
Discharge: HOME | End: 2019-10-02
Attending: NEUROLOGICAL SURGERY
Payer: MEDICARE

## 2019-10-02 DIAGNOSIS — M54.16 LUMBAR RADICULOPATHY, CHRONIC: ICD-10-CM

## 2019-10-02 PROCEDURE — 72100 X-RAY EXAM L-S SPINE 2/3 VWS: CPT | Mod: TC

## 2019-10-16 DIAGNOSIS — I10 HTN (HYPERTENSION), BENIGN: ICD-10-CM

## 2019-10-16 RX ORDER — POTASSIUM CHLORIDE 750 MG/1
10 CAPSULE, EXTENDED RELEASE ORAL 2 TIMES DAILY
Qty: 90 CAPSULE | Refills: 3 | Status: SHIPPED | OUTPATIENT
Start: 2019-10-16 | End: 2020-02-27

## 2020-01-27 DIAGNOSIS — E78.2 MIXED HYPERLIPIDEMIA: ICD-10-CM

## 2020-01-27 DIAGNOSIS — I10 HTN (HYPERTENSION), BENIGN: ICD-10-CM

## 2020-01-27 DIAGNOSIS — E03.4 HYPOTHYROIDISM DUE TO ACQUIRED ATROPHY OF THYROID: ICD-10-CM

## 2020-01-27 RX ORDER — PRAVASTATIN SODIUM 20 MG/1
TABLET ORAL
Qty: 90 TABLET | Refills: 3 | Status: SHIPPED | OUTPATIENT
Start: 2020-01-27 | End: 2021-03-10

## 2020-01-27 RX ORDER — CHLORTHALIDONE 25 MG/1
TABLET ORAL
Qty: 90 TABLET | Refills: 3 | Status: SHIPPED | OUTPATIENT
Start: 2020-01-27 | End: 2021-03-10

## 2020-01-27 RX ORDER — LEVOTHYROXINE SODIUM 25 UG/1
TABLET ORAL
Qty: 90 TABLET | Refills: 3 | Status: SHIPPED | OUTPATIENT
Start: 2020-01-27 | End: 2021-03-10

## 2020-02-13 ENCOUNTER — TELEPHONE (OUTPATIENT)
Dept: FAMILY MEDICINE | Facility: CLINIC | Age: 80
End: 2020-02-13

## 2020-02-13 DIAGNOSIS — E78.2 MIXED HYPERLIPIDEMIA: ICD-10-CM

## 2020-02-13 DIAGNOSIS — I10 HTN (HYPERTENSION), BENIGN: Primary | ICD-10-CM

## 2020-02-13 DIAGNOSIS — E03.4 HYPOTHYROIDISM DUE TO ACQUIRED ATROPHY OF THYROID: ICD-10-CM

## 2020-02-13 NOTE — TELEPHONE ENCOUNTER
----- Message from Pilar Harvey sent at 2/13/2020  2:52 PM CST -----  Type: Needs Medical Advice    Who Called:  Patient  Best Call Back Number: [phone] (home)     Additional Information: Has an appointment on 3/2/20. She needs office to put in her orders for Lipid, Metabolic Panel and anything else that the doctor wants her to do. Dr. Arreola needs this done before the 26th of February. Please place orders and call to schedule.

## 2020-02-13 NOTE — TELEPHONE ENCOUNTER
Please see message below.   Please place any lab orders that are needed prior to her appt.   Thanks

## 2020-02-14 NOTE — TELEPHONE ENCOUNTER
The orders are not place. Please could you placed orders. Thank you.    NOTE for  staff : please schedule labs on 2/19. TY

## 2020-02-19 ENCOUNTER — LAB VISIT (OUTPATIENT)
Dept: LAB | Facility: HOSPITAL | Age: 80
End: 2020-02-19
Attending: FAMILY MEDICINE
Payer: MEDICARE

## 2020-02-19 DIAGNOSIS — I10 HTN (HYPERTENSION), BENIGN: ICD-10-CM

## 2020-02-19 DIAGNOSIS — E78.2 MIXED HYPERLIPIDEMIA: ICD-10-CM

## 2020-02-19 DIAGNOSIS — E03.4 HYPOTHYROIDISM DUE TO ACQUIRED ATROPHY OF THYROID: ICD-10-CM

## 2020-02-19 LAB
ALBUMIN SERPL BCP-MCNC: 3.6 G/DL (ref 3.5–5.2)
ALP SERPL-CCNC: 62 U/L (ref 55–135)
ALT SERPL W/O P-5'-P-CCNC: 15 U/L (ref 10–44)
ANION GAP SERPL CALC-SCNC: 9 MMOL/L (ref 8–16)
AST SERPL-CCNC: 18 U/L (ref 10–40)
BILIRUB SERPL-MCNC: 0.4 MG/DL (ref 0.1–1)
BUN SERPL-MCNC: 24 MG/DL (ref 8–23)
CALCIUM SERPL-MCNC: 10.3 MG/DL (ref 8.7–10.5)
CHLORIDE SERPL-SCNC: 99 MMOL/L (ref 95–110)
CHOLEST SERPL-MCNC: 136 MG/DL (ref 120–199)
CHOLEST/HDLC SERPL: 2.3 {RATIO} (ref 2–5)
CO2 SERPL-SCNC: 29 MMOL/L (ref 23–29)
CREAT SERPL-MCNC: 0.9 MG/DL (ref 0.5–1.4)
EST. GFR  (AFRICAN AMERICAN): >60 ML/MIN/1.73 M^2
EST. GFR  (NON AFRICAN AMERICAN): >60 ML/MIN/1.73 M^2
GLUCOSE SERPL-MCNC: 114 MG/DL (ref 70–110)
HDLC SERPL-MCNC: 59 MG/DL (ref 40–75)
HDLC SERPL: 43.4 % (ref 20–50)
LDLC SERPL CALC-MCNC: 50.2 MG/DL (ref 63–159)
NONHDLC SERPL-MCNC: 77 MG/DL
POTASSIUM SERPL-SCNC: 3.7 MMOL/L (ref 3.5–5.1)
PROT SERPL-MCNC: 8.1 G/DL (ref 6–8.4)
SODIUM SERPL-SCNC: 137 MMOL/L (ref 136–145)
TRIGL SERPL-MCNC: 134 MG/DL (ref 30–150)
TSH SERPL DL<=0.005 MIU/L-ACNC: 2.27 UIU/ML (ref 0.4–4)

## 2020-02-19 PROCEDURE — 80053 COMPREHEN METABOLIC PANEL: CPT

## 2020-02-19 PROCEDURE — 84443 ASSAY THYROID STIM HORMONE: CPT

## 2020-02-19 PROCEDURE — 36415 COLL VENOUS BLD VENIPUNCTURE: CPT | Mod: PO

## 2020-02-19 PROCEDURE — 80061 LIPID PANEL: CPT

## 2020-02-24 ENCOUNTER — TELEPHONE (OUTPATIENT)
Dept: FAMILY MEDICINE | Facility: CLINIC | Age: 80
End: 2020-02-24

## 2020-02-24 NOTE — TELEPHONE ENCOUNTER
----- Message from Lashay Cheema sent at 2/24/2020  2:09 PM CST -----  Contact: pt  Type:  Test Results    Who Called:  pt  Name of Test (Lab/Mammo/Etc):  Lab  Date of Test:  02/19  Ordering Provider:  Dr Weaver  Where the test was performed:  [facility]  Best Call Back Number:  [phone] (home)   Additional Information:  Pt states lab result need to be sent to Dr. Arreola, pls call if you have anymore questions

## 2020-02-27 ENCOUNTER — DOCUMENTATION ONLY (OUTPATIENT)
Dept: FAMILY MEDICINE | Facility: CLINIC | Age: 80
End: 2020-02-27

## 2020-02-27 DIAGNOSIS — I10 HTN (HYPERTENSION), BENIGN: ICD-10-CM

## 2020-02-27 RX ORDER — POTASSIUM CHLORIDE 750 MG/1
CAPSULE, EXTENDED RELEASE ORAL
Qty: 90 CAPSULE | Refills: 3 | Status: SHIPPED | OUTPATIENT
Start: 2020-02-27 | End: 2020-03-02 | Stop reason: SDUPTHER

## 2020-02-28 ENCOUNTER — DOCUMENTATION ONLY (OUTPATIENT)
Dept: FAMILY MEDICINE | Facility: CLINIC | Age: 80
End: 2020-02-28

## 2020-02-28 NOTE — PROGRESS NOTES
Pre-Visit Chart Review  For Appointment Scheduled on 3/2/2020.       There are no preventive care reminders to display for this patient.

## 2020-03-02 ENCOUNTER — HOSPITAL ENCOUNTER (OUTPATIENT)
Dept: RADIOLOGY | Facility: CLINIC | Age: 80
Discharge: HOME | End: 2020-03-02
Attending: FAMILY MEDICINE
Payer: MEDICARE

## 2020-03-02 ENCOUNTER — OFFICE VISIT (OUTPATIENT)
Dept: FAMILY MEDICINE | Facility: CLINIC | Age: 80
End: 2020-03-02
Payer: MEDICARE

## 2020-03-02 VITALS
WEIGHT: 134.5 LBS | HEART RATE: 87 BPM | DIASTOLIC BLOOD PRESSURE: 72 MMHG | BODY MASS INDEX: 21.62 KG/M2 | HEIGHT: 66 IN | TEMPERATURE: 98 F | SYSTOLIC BLOOD PRESSURE: 136 MMHG

## 2020-03-02 DIAGNOSIS — I48.19 OTHER PERSISTENT ATRIAL FIBRILLATION: ICD-10-CM

## 2020-03-02 DIAGNOSIS — E03.4 HYPOTHYROIDISM DUE TO ACQUIRED ATROPHY OF THYROID: ICD-10-CM

## 2020-03-02 DIAGNOSIS — E78.2 MIXED HYPERLIPIDEMIA: ICD-10-CM

## 2020-03-02 DIAGNOSIS — I10 HTN (HYPERTENSION), BENIGN: Primary | ICD-10-CM

## 2020-03-02 DIAGNOSIS — I25.10 CORONARY ARTERY DISEASE INVOLVING NATIVE CORONARY ARTERY OF NATIVE HEART WITHOUT ANGINA PECTORIS: ICD-10-CM

## 2020-03-02 DIAGNOSIS — J44.9 CHRONIC OBSTRUCTIVE PULMONARY DISEASE, UNSPECIFIED COPD TYPE: ICD-10-CM

## 2020-03-02 PROCEDURE — 93880 EXTRACRANIAL BILAT STUDY: CPT | Mod: TC,PO

## 2020-03-02 PROCEDURE — 93880 EXTRACRANIAL BILAT STUDY: CPT | Mod: 26,59,, | Performed by: RADIOLOGY

## 2020-03-02 PROCEDURE — 99214 OFFICE O/P EST MOD 30 MIN: CPT | Mod: S$PBB,,, | Performed by: FAMILY MEDICINE

## 2020-03-02 PROCEDURE — 76536 US EXAM OF HEAD AND NECK: CPT | Mod: 26,,, | Performed by: RADIOLOGY

## 2020-03-02 PROCEDURE — 99214 PR OFFICE/OUTPT VISIT, EST, LEVL IV, 30-39 MIN: ICD-10-PCS | Mod: S$PBB,,, | Performed by: FAMILY MEDICINE

## 2020-03-02 PROCEDURE — 93880 US CAROTID BILATERAL: ICD-10-PCS | Mod: 26,59,, | Performed by: RADIOLOGY

## 2020-03-02 PROCEDURE — 99214 OFFICE O/P EST MOD 30 MIN: CPT | Mod: PBBFAC,25,PO | Performed by: FAMILY MEDICINE

## 2020-03-02 PROCEDURE — 99999 PR PBB SHADOW E&M-EST. PATIENT-LVL IV: ICD-10-PCS | Mod: PBBFAC,,, | Performed by: FAMILY MEDICINE

## 2020-03-02 PROCEDURE — 76536 US SOFT TISSUE HEAD NECK THYROID: ICD-10-PCS | Mod: 26,,, | Performed by: RADIOLOGY

## 2020-03-02 PROCEDURE — 99999 PR PBB SHADOW E&M-EST. PATIENT-LVL IV: CPT | Mod: PBBFAC,,, | Performed by: FAMILY MEDICINE

## 2020-03-02 PROCEDURE — 76536 US EXAM OF HEAD AND NECK: CPT | Mod: TC,PO

## 2020-03-02 RX ORDER — POTASSIUM CHLORIDE 750 MG/1
CAPSULE, EXTENDED RELEASE ORAL
Qty: 180 CAPSULE | Refills: 3 | Status: SHIPPED | OUTPATIENT
Start: 2020-03-02 | End: 2021-03-10

## 2020-03-02 RX ORDER — DILTIAZEM HYDROCHLORIDE 120 MG/1
CAPSULE, EXTENDED RELEASE ORAL
COMMUNITY
Start: 2020-02-26 | End: 2020-11-05

## 2020-03-02 NOTE — PROGRESS NOTES
Subjective:       Patient ID: Jacqueline Mathias is a 79 y.o. female.    Chief Complaint: Hypertension; Hyperlipidemia; Hypothyroidism; and COPD    Patient presents here for annual follow-up of hypertension, hyperlipidemia, hypothyroidism, and COPD.  She is usually seen every 6 months but she had to cancel her last appointment in October and rescheduled for now.  She has been followed also by Dr. Martinez, cardiology, for her hypertension but she states she was recently also diagnosed with atrial fibrillation and coronary artery disease. She has been placed on Eliquis as an anticoagulant and also diltiazem to help her blood pressure but also to help decrease her heart rate.  She is tolerating the medication well and her blood pressure is well controlled and her heart rate is controlled.  She did have an echocardiogram and a stress test done in his office but I do not have the results at this time.  Also since I last saw her, she had a fusion of L5-S1 in April of 2019 and is doing well from this standpoint.  Her hypertension is well controlled with her present medication and she is tolerating her medications well.  She states she is also following her low-sodium, low-fat low-cholesterol diet as much as possible.  Her hyperlipidemia is extremely well controlled with her present dose of pravastatin.  She recently had blood work done and this was reviewed with her.  Her CMP was completely within normal limits and her TSH was therapeutic at 2.272.  Her lipid profile showed excellent control with a total cholesterol 136, HDL 59, LDL 50, triglycerides 134.  As far as her COPD, this is stable without any significant shortness of breath and she does not use oxygen.  As far as health maintenance, she is up-to-date with all of her recommended screening exams except for a shingles vaccine.  She did have a right carotid endarterectomy in 2016 and has not had evaluation of her carotid since then.  I think this is necessary since  her new diagnosis of coronary artery disease.    Hypertension   This is a chronic problem. The problem is unchanged. The problem is controlled. Associated symptoms include shortness of breath (Occasional with extreme exertion). Pertinent negatives include no chest pain, headaches or palpitations. Risk factors for coronary artery disease include dyslipidemia and post-menopausal state. Past treatments include ACE inhibitors and diuretics. The current treatment provides moderate improvement. There are no compliance problems.  Hypertensive end-organ damage includes CAD/MI. There is no history of kidney disease, CVA or heart failure.   Hyperlipidemia   This is a chronic problem. The problem is controlled. Recent lipid tests were reviewed and are normal. Associated symptoms include shortness of breath (Occasional with extreme exertion). Pertinent negatives include no chest pain. Current antihyperlipidemic treatment includes statins. The current treatment provides significant improvement of lipids. There are no compliance problems.  Risk factors for coronary artery disease include dyslipidemia, hypertension and post-menopausal.     Review of Systems   Constitutional: Negative for chills, fatigue, fever and unexpected weight change.   HENT: Negative for congestion, ear pain, postnasal drip and sore throat.    Respiratory: Positive for shortness of breath (Occasional with extreme exertion). Negative for cough.    Cardiovascular: Negative for chest pain and palpitations.   Gastrointestinal: Negative for abdominal pain, diarrhea, nausea and vomiting.   Genitourinary: Negative for difficulty urinating, dysuria and flank pain.   Musculoskeletal: Negative for arthralgias and back pain.   Neurological: Negative for dizziness, light-headedness and headaches.   Hematological: Negative for adenopathy. Does not bruise/bleed easily.   Psychiatric/Behavioral: Negative for sleep disturbance. The patient is not nervous/anxious.         Objective:      Physical Exam   Constitutional: She is oriented to person, place, and time. She appears well-developed and well-nourished.   HENT:   Right Ear: External ear normal.   Left Ear: External ear normal.   Nose: Nose normal.   Mouth/Throat: Oropharynx is clear and moist.   Neck: Normal range of motion. Neck supple. No thyromegaly present.   Cardiovascular: Normal rate, regular rhythm, normal heart sounds and intact distal pulses.   No murmur heard.  Pulmonary/Chest: Effort normal and breath sounds normal. She has no wheezes. She has no rales.   Musculoskeletal: Normal range of motion. She exhibits no edema or tenderness.   Lymphadenopathy:     She has no cervical adenopathy.   Neurological: She is alert and oriented to person, place, and time. She has normal reflexes. No cranial nerve deficit.   Psychiatric: She has a normal mood and affect. Her behavior is normal.   Vitals reviewed.      Assessment:       1. HTN (hypertension), benign    2. Mixed hyperlipidemia    3. Other persistent atrial fibrillation    4. Coronary artery disease involving native coronary artery of native heart without angina pectoris    5. Hypothyroidism due to acquired atrophy of thyroid    6. Chronic obstructive pulmonary disease, unspecified COPD type        Plan:       1.  Continue present medication as her hypertension, hyperlipidemia, atrial fibrillation, CAD, hypothyroidism, and COPD are stable and controlled  2.  Continue low-sodium, low-fat low-cholesterol diet and exercise  3.  Carotid ultrasound to evaluate carotid since she had a previous carotid endarterectomy in 2016  4.  Thyroid ultrasound due to the fact that she had a thyroid ultrasound several years ago which showed nodules and has not been evaluated again since then  5.  Continue follow-up with Cardiology  6.  Follow up with me in 6 months or p.r.n.        Patient readiness: acceptance and barriers:none    During the course of the visit the patient was educated  and counseled about the following:     Hypertension:   Dietary sodium restriction.  Regular aerobic exercise.  Follow up: 6 months and as needed.    Goals: Hypertension: Reduce Blood Pressure    Did patient meet goals/outcomes: Yes    The following self management tools provided: blood pressure log    Patient Instructions (the written plan) was given to the patient/family.     Time spent with patient: 30 minutes    Barriers to medications present (no )    Adverse reactions to current medications (no)    Over the counter medications reviewed (Yes)

## 2020-03-04 DIAGNOSIS — Z12.31 ENCOUNTER FOR SCREENING MAMMOGRAM FOR MALIGNANT NEOPLASM OF BREAST: Primary | ICD-10-CM

## 2020-05-05 ENCOUNTER — PATIENT MESSAGE (OUTPATIENT)
Dept: ADMINISTRATIVE | Facility: HOSPITAL | Age: 80
End: 2020-05-05

## 2020-05-06 RX ORDER — FLUTICASONE PROPIONATE 50 MCG
SPRAY, SUSPENSION (ML) NASAL
Qty: 48 G | Refills: 3 | Status: SHIPPED | OUTPATIENT
Start: 2020-05-06 | End: 2022-04-18

## 2020-05-12 ENCOUNTER — HOSPITAL ENCOUNTER (OUTPATIENT)
Dept: RADIOLOGY | Facility: HOSPITAL | Age: 80
Discharge: HOME | End: 2020-05-12
Attending: SPECIALIST
Payer: MEDICARE

## 2020-05-12 ENCOUNTER — HOSPITAL ENCOUNTER (OUTPATIENT)
Dept: RADIOLOGY | Facility: HOSPITAL | Age: 80
Discharge: HOME | End: 2020-05-12
Attending: NEUROLOGICAL SURGERY
Payer: MEDICARE

## 2020-05-12 VITALS — BODY MASS INDEX: 21.62 KG/M2 | HEIGHT: 66 IN | WEIGHT: 134.5 LBS

## 2020-05-12 DIAGNOSIS — Z12.31 ENCOUNTER FOR SCREENING MAMMOGRAM FOR MALIGNANT NEOPLASM OF BREAST: ICD-10-CM

## 2020-05-12 DIAGNOSIS — M54.16 LUMBAR RADICULOPATHY: ICD-10-CM

## 2020-05-12 DIAGNOSIS — M54.16 LUMBAR RADICULOPATHY: Primary | ICD-10-CM

## 2020-05-12 PROCEDURE — 77067 SCR MAMMO BI INCL CAD: CPT | Mod: TC,PO

## 2020-05-12 PROCEDURE — 72100 X-RAY EXAM L-S SPINE 2/3 VWS: CPT | Mod: TC,PO

## 2020-06-01 ENCOUNTER — OFFICE VISIT (OUTPATIENT)
Dept: PRIMARY CARE CLINIC | Facility: CLINIC | Age: 80
End: 2020-06-01
Payer: MEDICARE

## 2020-06-01 VITALS
DIASTOLIC BLOOD PRESSURE: 76 MMHG | SYSTOLIC BLOOD PRESSURE: 191 MMHG | RESPIRATION RATE: 18 BRPM | HEART RATE: 70 BPM | OXYGEN SATURATION: 99 % | TEMPERATURE: 98 F

## 2020-06-01 DIAGNOSIS — I10 HYPERTENSION, UNSPECIFIED TYPE: ICD-10-CM

## 2020-06-01 DIAGNOSIS — U07.1 COVID-19: Primary | ICD-10-CM

## 2020-06-01 PROCEDURE — 99213 PR OFFICE/OUTPT VISIT, EST, LEVL III, 20-29 MIN: ICD-10-PCS | Mod: S$GLB,,, | Performed by: EMERGENCY MEDICINE

## 2020-06-01 PROCEDURE — 99213 OFFICE O/P EST LOW 20 MIN: CPT | Mod: S$GLB,,, | Performed by: EMERGENCY MEDICINE

## 2020-06-01 PROCEDURE — U0003 INFECTIOUS AGENT DETECTION BY NUCLEIC ACID (DNA OR RNA); SEVERE ACUTE RESPIRATORY SYNDROME CORONAVIRUS 2 (SARS-COV-2) (CORONAVIRUS DISEASE [COVID-19]), AMPLIFIED PROBE TECHNIQUE, MAKING USE OF HIGH THROUGHPUT TECHNOLOGIES AS DESCRIBED BY CMS-2020-01-R: HCPCS

## 2020-06-01 NOTE — PROGRESS NOTES
Subjective:        Time seen by provider: 11:39 AM on 06/01/2020    Jacqueline Mathias is a 80 y.o. female with HTN and COPD who presents for an evaluation of possible COVID-19. She is asymptomatic and has not been exposed to sick contacts but would like to be tested. No pertinent PSHx.     Review of Systems   Constitutional: Negative for activity change, appetite change, fatigue and fever.   HENT: Negative for congestion, rhinorrhea and sore throat.    Respiratory: Negative for cough, chest tightness, shortness of breath and wheezing.    Cardiovascular: Negative for chest pain and palpitations.   Gastrointestinal: Negative for diarrhea, nausea and vomiting.   Musculoskeletal: Negative for arthralgias and myalgias.   Skin: Negative for rash.   Neurological: Negative for weakness, light-headedness, numbness and headaches.       Objective:      Physical Exam   Constitutional: She is oriented to person, place, and time. She appears well-developed and well-nourished. No distress.   HENT:   Head: Normocephalic and atraumatic.   Nose: Nose normal.   Mouth/Throat: Oropharynx is clear and moist.   Eyes: Conjunctivae are normal.   Neck: Normal range of motion.   Cardiovascular: Normal rate, regular rhythm, normal heart sounds and intact distal pulses.   No murmur heard.  Pulmonary/Chest: Breath sounds normal. No respiratory distress. She has no wheezes.   Musculoskeletal: Normal range of motion.   Neurological: She is alert and oriented to person, place, and time.   Skin: Skin is warm and dry. She is not diaphoretic.   Nursing note and vitals reviewed.      Assessment and Plan:      Diagnoses and all orders for this visit:    COVID-19  -     COVID-19 Routine Screening  - Discharge home and await results.   - Return to clinic or ED for new or worsening symptoms.   - Follow-up with PCP as needed.     Hypertension, unspecified type    - Your blood pressure is elevated. Take your blood pressure daily and follow up with your primary  care provider. For worsening symptoms, chest pain, shortness of breath, increased abdominal pain, high-grade fever, stroke, or stroke like symptoms, immediately go to the nearest Emergency Room or call 911 as soon as possible.        Scribe Attestation:   I, Yasmin Gomes, am scribing for, and in the presence of, Mariann Desai PA-C. I performed the above scribed service and the documentation accurately describes the services I performed. I attest to the accuracy of the note.    I, Mariann Desai PA-C, personally performed the services described in this documentation. All medical record entries made by the scribe were at my direction and in my presence.  I have reviewed the chart and agree that the record reflects my personal performance and is accurate and complete. Mariann Desai PA-C.  2:34 PM 06/01/2020

## 2020-06-01 NOTE — PATIENT INSTRUCTIONS
Instructions for Patients with Confirmed or Suspected COVID-19    If you are awaiting your test result, you will either be called or it will be released to the patient portal.  If you have any questions about your test, please visit [email or web address] or call our COVID-19 information line at [phone].       Stay home and stay away from family members and friends.  You can leave home after these three things have happened: 1) You have had no fever for at least 72 hours (that is three full days of no fever without the use of medicine that reduces fevers) 2) AND other symptoms have improved (for example, when your cough or shortness of breath have improved) 3) AND at least 10 days have passed since your symptoms first appeared.   Separate yourself from other people and animals in your home.   Call ahead before visiting your doctor.   Wear a facemask.   Cover your coughs and sneezes.   Wash your hands often with soap and water; hand  can be used, too.   Avoid sharing personal household items.   Wipe down surfaces used daily.   Monitor your symptoms. Seek prompt medical attention if your illness is worsening (e.g., difficulty breathing).    Before seeking care, call your healthcare provider.   If you have a medical emergency and need to call 911, notify the dispatch personnel that you have, or are being evaluated for COVID-19. If possible, put on a facemask before emergency medical services arrive.        Recommended precautions for household members, intimate partners, and caregivers in a home setting of a patient with symptomatic laboratory-confirmed COVID-19 or a patient under investigation.  Household members, intimate partners, and caregivers in the home setting awaiting tests results have close contact with a person with symptomatic, laboratory-confirmed COVID-19 or a person under investigation. Close contacts should monitor their health; they should call their provider right away  if they develop symptoms suggestive of COVID-19 (e.g., fever, cough, shortness of breath).    Close contacts should also follow these recommendations:   Make sure that you understand and can help the patient follow their provider's instructions for medication(s) and care. You should help the patient with basic needs in the home and provide support for getting groceries, prescriptions, and other personal needs.   Monitor the patient's symptoms. If the patient is getting sicker, call his or her healthcare provider and tell them that the patient has laboratory-confirmed COVID-19. If the patient has a medical emergency and you need to call 911, notify the dispatch personnel that the patient has, or is being evaluated for COVID-19.   Household members should stay in another room or be  from the patient. Household members should use a separate bedroom and bathroom, if available.   Prohibit visitors.   Household members should care for any pets in the home.   Make sure that shared spaces in the home have good air flow, such as by an air conditioner or an opened window, weather permitting.   Perform hand hygiene frequently. Wash your hands often with soap and water for at least 20 seconds or use an alcohol-based hand  (that contains > 60% alcohol) covering all surfaces of your hands and rubbing them together until they feel dry. Soap and water should be used preferentially.   Avoid touching your eyes, nose, and mouth.   The patient should wear a facemask. If the patient is not able to wear a facemask (for example, because it causes trouble breathing), caregivers should wear a mask when they are in the same room as the patient.   Wear a disposable facemask and gloves when you touch or have contact with the patient's blood, stool, or body fluids, such as saliva, sputum, nasal mucus, vomit, urine.  o Throw out disposable facemasks and gloves after using them. Do not reuse.  o When removing personal  protective equipment, first remove and dispose of gloves. Then, immediately clean your hands with soap and water or alcohol-based hand . Next, remove and dispose of facemask, and immediately clean your hands again with soap and water or alcohol-based hand .   You should not share dishes, drinking glasses, cups, eating utensils, towels, bedding, or other items with the patient. After the patient uses these items, you should wash them thoroughly (see below Wash laundry thoroughly).   Clean all high-touch surfaces, such as counters, tabletops, doorknobs, bathroom fixtures, toilets, phones, keyboards, tablets, and bedside tables, every day. Also, clean any surfaces that may have blood, stool, or body fluids on them.   Use a household cleaning spray or wipe, according to the label instructions. Labels contain instructions for safe and effective use of the cleaning product including precautions you should take when applying the product, such as wearing gloves and making sure you have good ventilation during use of the product.   Wash laundry thoroughly.  o Immediately remove and wash clothes or bedding that have blood, stool, or body fluids on them.  o Wear disposable gloves while handling soiled items and keep soiled items away from your body. Clean your hands (with soap and water or an alcohol-based hand ) immediately after removing your gloves.  o Read and follow directions on labels of laundry or clothing items and detergent. In general, using a normal laundry detergent according to washing machine instructions and dry thoroughly using the warmest temperatures recommended on the clothing label.   Place all used disposable gloves, facemasks, and other contaminated items in a lined container before disposing of them with other household waste. Clean your hands (with soap and water or an alcohol-based hand ) immediately after handling these items. Soap and water should be used  preferentially if hands are visibly dirty.   Discuss any additional questions with your state or local health department or healthcare provider. Check available hours when contacting your local health department.    For more information see CDC link below.      https://www.cdc.gov/coronavirus/2019-ncov/hcp/guidance-prevent-spread.html#precautions        Sources:  [facility], [place] Department of Health and [facility]

## 2020-06-03 LAB — SARS-COV-2 RNA RESP QL NAA+PROBE: NOT DETECTED

## 2020-06-16 ENCOUNTER — PES CALL (OUTPATIENT)
Dept: ADMINISTRATIVE | Facility: CLINIC | Age: 80
End: 2020-06-16

## 2020-07-07 ENCOUNTER — PES CALL (OUTPATIENT)
Dept: ADMINISTRATIVE | Facility: CLINIC | Age: 80
End: 2020-07-07

## 2020-09-22 ENCOUNTER — OFFICE VISIT (OUTPATIENT)
Dept: CARDIOLOGY | Facility: CLINIC | Age: 80
End: 2020-09-22
Payer: MEDICARE

## 2020-09-22 VITALS
RESPIRATION RATE: 16 BRPM | SYSTOLIC BLOOD PRESSURE: 132 MMHG | BODY MASS INDEX: 21.69 KG/M2 | OXYGEN SATURATION: 96 % | WEIGHT: 135 LBS | DIASTOLIC BLOOD PRESSURE: 76 MMHG | HEIGHT: 66 IN | HEART RATE: 88 BPM

## 2020-09-22 DIAGNOSIS — E78.2 MIXED HYPERLIPIDEMIA: ICD-10-CM

## 2020-09-22 DIAGNOSIS — I10 HTN (HYPERTENSION), BENIGN: ICD-10-CM

## 2020-09-22 DIAGNOSIS — I48.92 PAROXYSMAL ATRIAL FLUTTER: Primary | ICD-10-CM

## 2020-09-22 PROCEDURE — 99212 PR OFFICE/OUTPT VISIT, EST, LEVL II, 10-19 MIN: ICD-10-PCS | Mod: S$GLB,,, | Performed by: INTERNAL MEDICINE

## 2020-09-22 PROCEDURE — 99212 OFFICE O/P EST SF 10 MIN: CPT | Mod: S$GLB,,, | Performed by: INTERNAL MEDICINE

## 2020-09-22 NOTE — PROGRESS NOTES
Subjective:    Patient ID:  Jacqueline Mathias is a 80 y.o. female who presents for   Follow-up (no labs, no test,pt needs hand written rx for Eliquis ,added b/p been good feliciano in log )    HPI    Review of patient's allergies indicates:   Allergen Reactions    Latex, natural rubber      rash    Biaxin [clarithromycin] Diarrhea and Nausea Only    Codeine Itching    Oxycodone      Knocks pt out for 3 days       Past Medical History:   Diagnosis Date    Allergy     latex    Allergy     seasonal    Arthritis     Asthma     Blood transfusion     Cataract     removed    COPD (chronic obstructive pulmonary disease)     Hypertension     IBS (irritable bowel syndrome)     Thyroid nodule 09/29/2016    Ulcer      Past Surgical History:   Procedure Laterality Date    CAROTID ARTERY ANGIOPLASTY Right 09/30/2016    CHOLECYSTECTOMY      EYE SURGERY      cataract removal    HYSTERECTOMY       Social History     Tobacco Use    Smoking status: Never Smoker   Substance Use Topics    Alcohol use: Yes     Comment: social    Drug use: No        Review of Systems     Review of Systems   Constitution: Negative for weight gain and weight loss.   HENT: Negative for congestion, nosebleeds and sore throat.    Eyes: Negative for visual disturbance.   Cardiovascular: Negative for chest pain, dyspnea on exertion, palpitations and syncope.   Respiratory: Negative for cough, shortness of breath and wheezing.    Skin: Negative for rash.   Musculoskeletal: Negative for back pain, gout, joint pain and myalgias.   Gastrointestinal: Negative for heartburn, hematochezia and nausea.   Genitourinary: Negative for frequency, hematuria and nocturia.   Neurological: Negative for dizziness and tremors.   Psychiatric/Behavioral: Negative for memory loss.   Allergic/Immunologic: Negative for environmental allergies (Seasonal Allergies).           Objective:        Vitals:    09/22/20 1109   BP: 132/76   Pulse: 88   Resp: 16       Lab  Results   Component Value Date    WBC 6.87 02/21/2011    HGB 13.7 02/21/2011    HCT 39.3 02/21/2011     02/21/2011    CHOL 136 02/19/2020    TRIG 134 02/19/2020    HDL 59 02/19/2020    ALT 15 02/19/2020    AST 18 02/19/2020     02/19/2020    K 3.7 02/19/2020    CL 99 02/19/2020    CREATININE 0.9 02/19/2020    BUN 24 (H) 02/19/2020    CO2 29 02/19/2020    TSH 2.272 02/19/2020    GLUF 121 (H) 03/22/2019    HGBA1C 6.3 (H) 03/22/2019        ECHOCARDIOGRAM RESULTS  No results found for this or any previous visit.    CURRENT/PREVIOUS VISIT EKG  No results found for this or any previous visit.  No results found for this or any previous visit.  No results found for this or any previous visit.    PHYSICAL EXAM    Physical Exam   Constitutional: She is oriented to person, place, and time. She appears well-developed and well-nourished.   HENT:   Head: Normocephalic and atraumatic.   Neck: Normal range of motion. Neck supple.   Cardiovascular: Normal rate, regular rhythm, normal heart sounds and intact distal pulses.   Pulmonary/Chest: Effort normal and breath sounds normal.   Abdominal: Soft.   Neurological: She is alert and oriented to person, place, and time.   Skin: Skin is warm and dry.   Nursing note and vitals reviewed.       Medication List with Changes/Refills   Current Medications    APIXABAN (ELIQUIS) 5 MG TAB    Take 5 mg by mouth 2 (two) times daily.    ASCORBIC ACID (VITAMIN C) 1000 MG TABLET    Take 1,000 mg by mouth once daily.      ASPIRIN (ECOTRIN) 81 MG EC TABLET    Take 81 mg by mouth once daily.    B COMPLEX VITAMINS CAPSULE    Take 1 capsule by mouth once daily.    BENAZEPRIL (LOTENSIN) 10 MG TABLET    Take 1 tablet (10 mg total) by mouth once daily.    CETIRIZINE (ZYRTEC) 10 MG TABLET        CHLORTHALIDONE (HYGROTEN) 25 MG TAB    TAKE 1 TABLET BY MOUTH ONCE DAILY    CHOLECALCIFEROL, VITAMIN D3, (VITAMIN D) 5,000 UNIT TAB    Take 1 tablet by mouth once daily.      CINNAMON BARK (CINNAMON) 500  MG CAPSULE    Take 1,000 mg by mouth once daily.    DILTIAZEM HCL (TIAZAC) 120 MG 24 HR CAPSULE    TK 1 C PO QD    DOCOSAHEXANOIC ACID/EPA (FISH OIL ORAL)    Take 1,240 mg by mouth 2 (two) times daily.    ESTRADIOL (ESTRACE) 1 MG TABLET        FLUTICASONE PROPIONATE (FLONASE) 50 MCG/ACTUATION NASAL SPRAY    USE 1 SPRAY IN EACH NOSTRIL TWO TIMES DAILY    GLUCOSAMINE-CHONDROITIN 500-400 MG TABLET    Take 1 tablet by mouth 2 (two) times daily.    LEVOTHYROXINE (SYNTHROID) 25 MCG TABLET    TAKE 1 TABLET BY MOUTH  EVERY DAY    LOPERAMIDE HCL (IMODIUM A-D ORAL)        MELATONIN 10 MG TAB    Take by mouth.    MULTIVITAMIN CAPSULE    Chew 1 capsule by mouth once daily.      POTASSIUM CHLORIDE (MICRO-K) 10 MEQ CPSR    TAKE 1 CAPSULE BY MOUTH TWO TIMES DAILY    PRAVASTATIN (PRAVACHOL) 20 MG TABLET    TAKE 1 TABLET BY MOUTH ONCE DAILY    TURMERIC ROOT EXTRACT 500 MG CAP    Take by mouth.    VIT A/VIT C/VIT E/ZINC/COPPER (ICAPS AREDS ORAL)    Take by mouth.           Assessment:       1. Paroxysmal atrial flutter    2. HTN (hypertension), benign    3. Mixed hyperlipidemia         Plan:  Hypertension is well controlled.  Symptom wise she is not feeling any atrial arrhythmias.  Her lipid profile is under control.  She was unable to tolerate diltiazem.  She will remain on benazepril.  Will see her back for follow-up in 6 months will check CMP lipid profile prior to next visit       Problem List Items Addressed This Visit        Cardiac/Vascular    HTN (hypertension), benign    Relevant Orders    Comprehensive metabolic panel    Mixed hyperlipidemia    Relevant Orders    Lipid Panel      Other Visit Diagnoses     Paroxysmal atrial flutter    -  Primary    Relevant Orders    CBC Without Differential           Follow up in about 4 months (around 1/22/2021) for Routine follow up with labs.

## 2020-09-25 ENCOUNTER — TELEPHONE (OUTPATIENT)
Dept: CARDIOLOGY | Facility: CLINIC | Age: 80
End: 2020-09-25

## 2020-09-25 NOTE — TELEPHONE ENCOUNTER
----- Message from Leila Huitron sent at 9/24/2020 11:00 AM CDT -----  Regarding: Pharmacy information  Patient said misplaced the phone number  and email address to order medication. Please call patient at  [phone]

## 2020-09-29 ENCOUNTER — TELEPHONE (OUTPATIENT)
Dept: CARDIOLOGY | Facility: CLINIC | Age: 80
End: 2020-09-29

## 2020-09-29 ENCOUNTER — PATIENT MESSAGE (OUTPATIENT)
Dept: OTHER | Facility: OTHER | Age: 80
End: 2020-09-29

## 2020-09-29 NOTE — TELEPHONE ENCOUNTER
----- Message from Alexa Mukherjee sent at 9/29/2020 11:02 AM CDT -----  Regarding: Refill  [facility] is calling because they faxed over a  request for 5 mg Eliquis.   Pharmacy [phone] and Fax [phone]

## 2020-11-05 ENCOUNTER — OFFICE VISIT (OUTPATIENT)
Dept: FAMILY MEDICINE | Facility: CLINIC | Age: 80
End: 2020-11-05
Payer: MEDICARE

## 2020-11-05 VITALS
BODY MASS INDEX: 22.04 KG/M2 | DIASTOLIC BLOOD PRESSURE: 72 MMHG | HEART RATE: 72 BPM | SYSTOLIC BLOOD PRESSURE: 136 MMHG | OXYGEN SATURATION: 97 % | TEMPERATURE: 98 F | HEIGHT: 66 IN | WEIGHT: 137.13 LBS | RESPIRATION RATE: 16 BRPM

## 2020-11-05 DIAGNOSIS — E03.4 HYPOTHYROIDISM DUE TO ACQUIRED ATROPHY OF THYROID: ICD-10-CM

## 2020-11-05 DIAGNOSIS — J43.1 PANLOBULAR EMPHYSEMA: ICD-10-CM

## 2020-11-05 DIAGNOSIS — I48.19 OTHER PERSISTENT ATRIAL FIBRILLATION: ICD-10-CM

## 2020-11-05 DIAGNOSIS — I10 HTN (HYPERTENSION), BENIGN: Primary | ICD-10-CM

## 2020-11-05 DIAGNOSIS — I25.10 CORONARY ARTERY DISEASE INVOLVING NATIVE CORONARY ARTERY OF NATIVE HEART WITHOUT ANGINA PECTORIS: ICD-10-CM

## 2020-11-05 DIAGNOSIS — E78.2 MIXED HYPERLIPIDEMIA: ICD-10-CM

## 2020-11-05 PROCEDURE — 99999 PR PBB SHADOW E&M-EST. PATIENT-LVL V: CPT | Mod: PBBFAC,,, | Performed by: FAMILY MEDICINE

## 2020-11-05 PROCEDURE — 99999 PR PBB SHADOW E&M-EST. PATIENT-LVL V: ICD-10-PCS | Mod: PBBFAC,,, | Performed by: FAMILY MEDICINE

## 2020-11-05 PROCEDURE — 99214 OFFICE O/P EST MOD 30 MIN: CPT | Mod: S$PBB,,, | Performed by: FAMILY MEDICINE

## 2020-11-05 PROCEDURE — 99215 OFFICE O/P EST HI 40 MIN: CPT | Mod: PBBFAC,PO | Performed by: FAMILY MEDICINE

## 2020-11-05 PROCEDURE — 99214 PR OFFICE/OUTPT VISIT, EST, LEVL IV, 30-39 MIN: ICD-10-PCS | Mod: S$PBB,,, | Performed by: FAMILY MEDICINE

## 2020-11-08 PROBLEM — I25.10 CORONARY ARTERY DISEASE INVOLVING NATIVE CORONARY ARTERY OF NATIVE HEART WITHOUT ANGINA PECTORIS: Status: ACTIVE | Noted: 2020-11-08

## 2020-11-08 NOTE — PROGRESS NOTES
Subjective:       Patient ID: Jacqueline Mathias is a 80 y.o. female.    Chief Complaint: Hypertension (f/u), Hyperlipidemia, Hypothyroidism, Coronary Artery Disease, COPD, and Atrial Fibrillation    Patient presents here for 6 month follow-up of hypertension, hyperlipidemia, hypothyroidism, CAD, atrial fibrillation, and COPD.  Her hypertension is well controlled on her present medication and she is tolerating her medications well.  Her hyperlipidemia is extremely well controlled with her present dose of pravastatin 20 mg daily and her low-fat low-cholesterol diet.  Her hypothyroidism is stable with her Synthroid 25 mcg daily as evidence by a TSH of 2.272.  She is followed by Cardiology for her coronary artery disease and atrial fibrillation.  She denies any chest pains or palpitations.  She is on anticoagulation in the form of Eliquis for her atrial fibrillation and denies any significant bleeding or bruising.  As far as her COPD, this remains fairly stable with her present use of inhalers as needed.  She is complaining of fatigue but she is taking care of her  who recently had hip surgery.  He has to be helped out of bed to go to the bathroom at night and he has BPH and urinates every 2 hr.  I feel this is the cause of her fatigue and no further workup at this point.  As far as health maintenance, she is up-to-date with all of her recommended screening exams except for shingles vaccine.  She did have her flu vaccine earlier this year at [facility].    Hypertension  This is a chronic problem. The problem is unchanged. The problem is controlled. Pertinent negatives include no chest pain, headaches, palpitations or shortness of breath. Risk factors for coronary artery disease include dyslipidemia and post-menopausal state. Past treatments include diuretics and ACE inhibitors. The current treatment provides moderate improvement. There are no compliance problems.  Hypertensive end-organ damage includes  CAD/MI. There is no history of kidney disease, CVA or heart failure. There is no history of chronic renal disease.   Hyperlipidemia  This is a chronic problem. The problem is controlled. Recent lipid tests were reviewed and are normal. Exacerbating diseases include hypothyroidism. She has no history of chronic renal disease. Factors aggravating her hyperlipidemia include thiazides. Pertinent negatives include no chest pain or shortness of breath. Current antihyperlipidemic treatment includes statins. The current treatment provides significant improvement of lipids. There are no compliance problems.  Risk factors for coronary artery disease include dyslipidemia, hypertension and post-menopausal.     Review of Systems   Constitutional: Negative for chills, fatigue, fever and unexpected weight change.   HENT: Negative for nasal congestion, ear pain, postnasal drip and sore throat.    Respiratory: Negative for cough, shortness of breath and wheezing.    Cardiovascular: Negative for chest pain and palpitations.   Gastrointestinal: Negative for abdominal pain, diarrhea, nausea and vomiting.   Genitourinary: Negative for difficulty urinating, dysuria, flank pain and pelvic pain.   Musculoskeletal: Positive for arthralgias. Negative for back pain.   Neurological: Negative for dizziness, light-headedness and headaches.   Hematological: Negative for adenopathy. Bruises/bleeds easily.   Psychiatric/Behavioral: Positive for sleep disturbance. The patient is not nervous/anxious.          Objective:      Physical Exam  Vitals signs reviewed.   Constitutional:       General: She is not in acute distress.     Appearance: Normal appearance. She is well-developed.   HENT:      Right Ear: Tympanic membrane and external ear normal.      Left Ear: Tympanic membrane and external ear normal.      Mouth/Throat:      Pharynx: Oropharynx is clear. No posterior oropharyngeal erythema.   Neck:      Musculoskeletal: Normal range of motion and  neck supple.      Thyroid: No thyromegaly.   Cardiovascular:      Rate and Rhythm: Normal rate. Rhythm irregular.      Heart sounds: Normal heart sounds. No murmur.   Pulmonary:      Effort: Pulmonary effort is normal.      Breath sounds: Normal breath sounds. No wheezing or rales.   Musculoskeletal: Normal range of motion.         General: No tenderness.      Right lower leg: No edema.      Left lower leg: No edema.   Lymphadenopathy:      Cervical: No cervical adenopathy.   Neurological:      General: No focal deficit present.      Mental Status: She is alert and oriented to person, place, and time.      Cranial Nerves: No cranial nerve deficit.      Deep Tendon Reflexes: Reflexes are normal and symmetric.   Psychiatric:         Mood and Affect: Mood normal.         Behavior: Behavior normal.         Assessment:       1. HTN (hypertension), benign    2. Mixed hyperlipidemia    3. Hypothyroidism due to acquired atrophy of thyroid    4. Coronary artery disease involving native coronary artery of native heart without angina pectoris    5. Other persistent atrial fibrillation    6. Panlobular emphysema        Plan:       1.  Continue present medication as her hypertension, hyperlipidemia, hypothyroidism, CAD, atrial fibrillation, and COPD are stable and controlled  2.  Continue low-sodium, low-fat low-cholesterol diet as well as exercise  3.  Encourage patient to make sure that she gets enough rest to try to help with her fatigue  4.  Continue follow-up with cardiology as scheduled  5.  Follow up with me in 6 months or p.r.n.        Patient readiness: acceptance and barriers:none    During the course of the visit the patient was educated and counseled about the following:     Hypertension:   Dietary sodium restriction.  Regular aerobic exercise.  Follow up: 6 months and as needed.    Goals: Hypertension: Reduce Blood Pressure    Did patient meet goals/outcomes: Yes    The following self management tools provided:  blood pressure log    Patient Instructions (the written plan) was given to the patient/family.     Time spent with patient: 30 minutes    Barriers to medications present (no )    Adverse reactions to current medications (no)    Over the counter medications reviewed (Yes)

## 2020-12-11 ENCOUNTER — PATIENT MESSAGE (OUTPATIENT)
Dept: OTHER | Facility: OTHER | Age: 80
End: 2020-12-11

## 2021-01-02 ENCOUNTER — PATIENT MESSAGE (OUTPATIENT)
Dept: FAMILY MEDICINE | Facility: CLINIC | Age: 81
End: 2021-01-02

## 2021-01-18 ENCOUNTER — PATIENT MESSAGE (OUTPATIENT)
Dept: CARDIOLOGY | Facility: CLINIC | Age: 81
End: 2021-01-18

## 2021-01-22 ENCOUNTER — LAB VISIT (OUTPATIENT)
Dept: LAB | Facility: HOSPITAL | Age: 81
End: 2021-01-22
Attending: INTERNAL MEDICINE
Payer: MEDICARE

## 2021-01-22 DIAGNOSIS — E78.2 MIXED HYPERLIPIDEMIA: ICD-10-CM

## 2021-01-22 DIAGNOSIS — I48.92 PAROXYSMAL ATRIAL FLUTTER: ICD-10-CM

## 2021-01-22 DIAGNOSIS — I10 HTN (HYPERTENSION), BENIGN: ICD-10-CM

## 2021-01-22 LAB
ALBUMIN SERPL BCP-MCNC: 4 G/DL (ref 3.5–5.2)
ALP SERPL-CCNC: 55 U/L (ref 55–135)
ALT SERPL W/O P-5'-P-CCNC: 24 U/L (ref 10–44)
ANION GAP SERPL CALC-SCNC: 9 MMOL/L (ref 8–16)
AST SERPL-CCNC: 23 U/L (ref 10–40)
BILIRUB SERPL-MCNC: 0.9 MG/DL (ref 0.1–1)
BUN SERPL-MCNC: 22 MG/DL (ref 8–23)
CALCIUM SERPL-MCNC: 9.5 MG/DL (ref 8.7–10.5)
CHLORIDE SERPL-SCNC: 101 MMOL/L (ref 95–110)
CHOLEST SERPL-MCNC: 162 MG/DL (ref 120–199)
CHOLEST/HDLC SERPL: 2.5 {RATIO} (ref 2–5)
CO2 SERPL-SCNC: 29 MMOL/L (ref 23–29)
CREAT SERPL-MCNC: 0.7 MG/DL (ref 0.5–1.4)
ERYTHROCYTE [DISTWIDTH] IN BLOOD BY AUTOMATED COUNT: 14 % (ref 11.5–14.5)
EST. GFR  (AFRICAN AMERICAN): >60 ML/MIN/1.73 M^2
EST. GFR  (NON AFRICAN AMERICAN): >60 ML/MIN/1.73 M^2
GLUCOSE SERPL-MCNC: 120 MG/DL (ref 70–110)
HCT VFR BLD AUTO: 41 % (ref 37–48.5)
HDLC SERPL-MCNC: 64 MG/DL (ref 40–75)
HDLC SERPL: 39.5 % (ref 20–50)
HGB BLD-MCNC: 13.2 G/DL (ref 12–16)
LDLC SERPL CALC-MCNC: 71.6 MG/DL (ref 63–159)
MCH RBC QN AUTO: 28.7 PG (ref 27–31)
MCHC RBC AUTO-ENTMCNC: 32.2 G/DL (ref 32–36)
MCV RBC AUTO: 89 FL (ref 82–98)
NONHDLC SERPL-MCNC: 98 MG/DL
PLATELET # BLD AUTO: 165 K/UL (ref 150–350)
PMV BLD AUTO: 11.6 FL (ref 9.2–12.9)
POTASSIUM SERPL-SCNC: 4 MMOL/L (ref 3.5–5.1)
PROT SERPL-MCNC: 7.8 G/DL (ref 6–8.4)
RBC # BLD AUTO: 4.6 M/UL (ref 4–5.4)
SODIUM SERPL-SCNC: 139 MMOL/L (ref 136–145)
TRIGL SERPL-MCNC: 132 MG/DL (ref 30–150)
WBC # BLD AUTO: 6.51 K/UL (ref 3.9–12.7)

## 2021-01-22 PROCEDURE — 85027 COMPLETE CBC AUTOMATED: CPT

## 2021-01-22 PROCEDURE — 36415 COLL VENOUS BLD VENIPUNCTURE: CPT

## 2021-01-22 PROCEDURE — 80053 COMPREHEN METABOLIC PANEL: CPT

## 2021-01-22 PROCEDURE — 80061 LIPID PANEL: CPT

## 2021-01-26 ENCOUNTER — OFFICE VISIT (OUTPATIENT)
Dept: CARDIOLOGY | Facility: CLINIC | Age: 81
End: 2021-01-26
Payer: MEDICARE

## 2021-01-26 VITALS
RESPIRATION RATE: 18 BRPM | BODY MASS INDEX: 22.34 KG/M2 | DIASTOLIC BLOOD PRESSURE: 82 MMHG | HEIGHT: 66 IN | WEIGHT: 139 LBS | HEART RATE: 75 BPM | OXYGEN SATURATION: 97 % | SYSTOLIC BLOOD PRESSURE: 128 MMHG

## 2021-01-26 DIAGNOSIS — I10 HTN (HYPERTENSION), BENIGN: ICD-10-CM

## 2021-01-26 DIAGNOSIS — I48.0 PAF (PAROXYSMAL ATRIAL FIBRILLATION): Primary | ICD-10-CM

## 2021-01-26 DIAGNOSIS — Z98.890 S/P CAROTID ENDARTERECTOMY: ICD-10-CM

## 2021-01-26 DIAGNOSIS — E03.4 HYPOTHYROIDISM DUE TO ACQUIRED ATROPHY OF THYROID: ICD-10-CM

## 2021-01-26 DIAGNOSIS — E78.2 MIXED HYPERLIPIDEMIA: ICD-10-CM

## 2021-01-26 PROBLEM — I25.10 CORONARY ARTERY DISEASE INVOLVING NATIVE CORONARY ARTERY OF NATIVE HEART WITHOUT ANGINA PECTORIS: Status: RESOLVED | Noted: 2020-11-08 | Resolved: 2021-01-26

## 2021-01-26 PROCEDURE — 93000 ELECTROCARDIOGRAM COMPLETE: CPT | Mod: S$GLB,,, | Performed by: INTERNAL MEDICINE

## 2021-01-26 PROCEDURE — 99499 NO LOS: ICD-10-PCS | Mod: S$GLB,,, | Performed by: INTERNAL MEDICINE

## 2021-01-26 PROCEDURE — 93000 EKG 12-LEAD: ICD-10-PCS | Mod: S$GLB,,, | Performed by: INTERNAL MEDICINE

## 2021-01-26 PROCEDURE — 99499 UNLISTED E&M SERVICE: CPT | Mod: S$GLB,,, | Performed by: INTERNAL MEDICINE

## 2021-01-26 RX ORDER — CARVEDILOL 3.12 MG/1
3.12 TABLET ORAL 2 TIMES DAILY WITH MEALS
Qty: 180 TABLET | Refills: 3 | Status: SHIPPED | OUTPATIENT
Start: 2021-01-26 | End: 2021-07-09 | Stop reason: DRUGHIGH

## 2021-02-05 ENCOUNTER — IMMUNIZATION (OUTPATIENT)
Dept: FAMILY MEDICINE | Facility: CLINIC | Age: 81
End: 2021-02-05
Payer: MEDICARE

## 2021-02-05 DIAGNOSIS — Z23 NEED FOR VACCINATION: Primary | ICD-10-CM

## 2021-02-05 PROCEDURE — 91300 COVID-19, MRNA, LNP-S, PF, 30 MCG/0.3 ML DOSE VACCINE: CPT | Mod: ,,, | Performed by: FAMILY MEDICINE

## 2021-02-05 PROCEDURE — 0001A COVID-19, MRNA, LNP-S, PF, 30 MCG/0.3 ML DOSE VACCINE: CPT | Mod: CV19,,, | Performed by: FAMILY MEDICINE

## 2021-02-05 PROCEDURE — 0001A COVID-19, MRNA, LNP-S, PF, 30 MCG/0.3 ML DOSE VACCINE: ICD-10-PCS | Mod: CV19,,, | Performed by: FAMILY MEDICINE

## 2021-02-05 PROCEDURE — 91300 COVID-19, MRNA, LNP-S, PF, 30 MCG/0.3 ML DOSE VACCINE: ICD-10-PCS | Mod: ,,, | Performed by: FAMILY MEDICINE

## 2021-02-26 ENCOUNTER — IMMUNIZATION (OUTPATIENT)
Dept: FAMILY MEDICINE | Facility: CLINIC | Age: 81
End: 2021-02-26
Payer: MEDICARE

## 2021-02-26 DIAGNOSIS — Z23 NEED FOR VACCINATION: Primary | ICD-10-CM

## 2021-02-26 PROCEDURE — 0002A COVID-19, MRNA, LNP-S, PF, 30 MCG/0.3 ML DOSE VACCINE: CPT | Mod: CV19,,, | Performed by: FAMILY MEDICINE

## 2021-02-26 PROCEDURE — 91300 COVID-19, MRNA, LNP-S, PF, 30 MCG/0.3 ML DOSE VACCINE: CPT | Mod: ,,, | Performed by: FAMILY MEDICINE

## 2021-02-26 PROCEDURE — 91300 COVID-19, MRNA, LNP-S, PF, 30 MCG/0.3 ML DOSE VACCINE: ICD-10-PCS | Mod: ,,, | Performed by: FAMILY MEDICINE

## 2021-02-26 PROCEDURE — 0002A COVID-19, MRNA, LNP-S, PF, 30 MCG/0.3 ML DOSE VACCINE: ICD-10-PCS | Mod: CV19,,, | Performed by: FAMILY MEDICINE

## 2021-03-10 DIAGNOSIS — E03.4 HYPOTHYROIDISM DUE TO ACQUIRED ATROPHY OF THYROID: ICD-10-CM

## 2021-03-10 DIAGNOSIS — I10 HTN (HYPERTENSION), BENIGN: ICD-10-CM

## 2021-03-10 DIAGNOSIS — E78.2 MIXED HYPERLIPIDEMIA: ICD-10-CM

## 2021-03-10 RX ORDER — POTASSIUM CHLORIDE 750 MG/1
CAPSULE, EXTENDED RELEASE ORAL
Qty: 180 CAPSULE | Refills: 3 | Status: SHIPPED | OUTPATIENT
Start: 2021-03-10 | End: 2021-07-28 | Stop reason: SDUPTHER

## 2021-03-10 RX ORDER — CHLORTHALIDONE 25 MG/1
TABLET ORAL
Qty: 90 TABLET | Refills: 3 | Status: SHIPPED | OUTPATIENT
Start: 2021-03-10 | End: 2022-04-27

## 2021-03-10 RX ORDER — LEVOTHYROXINE SODIUM 25 UG/1
TABLET ORAL
Qty: 90 TABLET | Refills: 3 | Status: SHIPPED | OUTPATIENT
Start: 2021-03-10 | End: 2022-04-18

## 2021-03-10 RX ORDER — PRAVASTATIN SODIUM 20 MG/1
TABLET ORAL
Qty: 90 TABLET | Refills: 3 | Status: SHIPPED | OUTPATIENT
Start: 2021-03-10 | End: 2022-02-17

## 2021-03-16 ENCOUNTER — TELEPHONE (OUTPATIENT)
Dept: CARDIOLOGY | Facility: CLINIC | Age: 81
End: 2021-03-16

## 2021-03-22 ENCOUNTER — PATIENT MESSAGE (OUTPATIENT)
Dept: CARDIOLOGY | Facility: CLINIC | Age: 81
End: 2021-03-22

## 2021-05-11 DIAGNOSIS — Z12.31 ENCOUNTER FOR SCREENING MAMMOGRAM FOR MALIGNANT NEOPLASM OF BREAST: Primary | ICD-10-CM

## 2021-05-17 ENCOUNTER — TELEPHONE (OUTPATIENT)
Dept: FAMILY MEDICINE | Facility: CLINIC | Age: 81
End: 2021-05-17

## 2021-05-17 ENCOUNTER — OFFICE VISIT (OUTPATIENT)
Dept: FAMILY MEDICINE | Facility: CLINIC | Age: 81
End: 2021-05-17
Payer: MEDICARE

## 2021-05-17 VITALS
TEMPERATURE: 98 F | HEART RATE: 74 BPM | BODY MASS INDEX: 22.53 KG/M2 | DIASTOLIC BLOOD PRESSURE: 78 MMHG | HEIGHT: 66 IN | SYSTOLIC BLOOD PRESSURE: 130 MMHG | WEIGHT: 140.19 LBS | OXYGEN SATURATION: 93 %

## 2021-05-17 DIAGNOSIS — I48.19 OTHER PERSISTENT ATRIAL FIBRILLATION: ICD-10-CM

## 2021-05-17 DIAGNOSIS — J43.1 PANLOBULAR EMPHYSEMA: ICD-10-CM

## 2021-05-17 DIAGNOSIS — I10 HTN (HYPERTENSION), BENIGN: Primary | ICD-10-CM

## 2021-05-17 DIAGNOSIS — E03.4 HYPOTHYROIDISM DUE TO ACQUIRED ATROPHY OF THYROID: ICD-10-CM

## 2021-05-17 DIAGNOSIS — I25.10 CORONARY ARTERY DISEASE INVOLVING NATIVE CORONARY ARTERY OF NATIVE HEART WITHOUT ANGINA PECTORIS: ICD-10-CM

## 2021-05-17 DIAGNOSIS — E78.2 MIXED HYPERLIPIDEMIA: ICD-10-CM

## 2021-05-17 PROCEDURE — 99214 OFFICE O/P EST MOD 30 MIN: CPT | Mod: S$PBB,,, | Performed by: FAMILY MEDICINE

## 2021-05-17 PROCEDURE — 99214 PR OFFICE/OUTPT VISIT, EST, LEVL IV, 30-39 MIN: ICD-10-PCS | Mod: S$PBB,,, | Performed by: FAMILY MEDICINE

## 2021-05-17 PROCEDURE — 99215 OFFICE O/P EST HI 40 MIN: CPT | Mod: PBBFAC,PN | Performed by: FAMILY MEDICINE

## 2021-05-17 PROCEDURE — 99999 PR PBB SHADOW E&M-EST. PATIENT-LVL V: CPT | Mod: PBBFAC,,, | Performed by: FAMILY MEDICINE

## 2021-05-17 PROCEDURE — 99999 PR PBB SHADOW E&M-EST. PATIENT-LVL V: ICD-10-PCS | Mod: PBBFAC,,, | Performed by: FAMILY MEDICINE

## 2021-05-18 ENCOUNTER — HOSPITAL ENCOUNTER (OUTPATIENT)
Dept: RADIOLOGY | Facility: HOSPITAL | Age: 81
Discharge: HOME | End: 2021-05-18
Attending: SPECIALIST
Payer: MEDICARE

## 2021-05-18 DIAGNOSIS — Z12.31 ENCOUNTER FOR SCREENING MAMMOGRAM FOR MALIGNANT NEOPLASM OF BREAST: ICD-10-CM

## 2021-05-18 PROCEDURE — 77067 SCR MAMMO BI INCL CAD: CPT | Mod: TC,PO

## 2021-05-20 ENCOUNTER — LAB VISIT (OUTPATIENT)
Dept: LAB | Facility: HOSPITAL | Age: 81
End: 2021-05-20
Attending: FAMILY MEDICINE
Payer: MEDICARE

## 2021-05-20 DIAGNOSIS — E03.4 HYPOTHYROIDISM DUE TO ACQUIRED ATROPHY OF THYROID: ICD-10-CM

## 2021-05-20 LAB — TSH SERPL DL<=0.005 MIU/L-ACNC: 3.16 UIU/ML (ref 0.34–5.6)

## 2021-05-20 PROCEDURE — 84443 ASSAY THYROID STIM HORMONE: CPT | Performed by: FAMILY MEDICINE

## 2021-05-20 PROCEDURE — 36415 COLL VENOUS BLD VENIPUNCTURE: CPT | Performed by: FAMILY MEDICINE

## 2021-05-25 ENCOUNTER — OFFICE VISIT (OUTPATIENT)
Dept: CARDIOLOGY | Facility: CLINIC | Age: 81
End: 2021-05-25
Payer: MEDICARE

## 2021-05-25 VITALS
OXYGEN SATURATION: 98 % | RESPIRATION RATE: 16 BRPM | SYSTOLIC BLOOD PRESSURE: 146 MMHG | HEIGHT: 66 IN | BODY MASS INDEX: 22.82 KG/M2 | DIASTOLIC BLOOD PRESSURE: 82 MMHG | HEART RATE: 84 BPM | WEIGHT: 142 LBS

## 2021-05-25 DIAGNOSIS — E78.2 MIXED HYPERLIPIDEMIA: ICD-10-CM

## 2021-05-25 DIAGNOSIS — I48.19 PERSISTENT ATRIAL FIBRILLATION: Primary | ICD-10-CM

## 2021-05-25 DIAGNOSIS — R53.83 FATIGUE, UNSPECIFIED TYPE: ICD-10-CM

## 2021-05-25 DIAGNOSIS — M79.89 SYMPTOM OF LEG SWELLING: ICD-10-CM

## 2021-05-25 DIAGNOSIS — R06.09 DYSPNEA ON EXERTION: ICD-10-CM

## 2021-05-25 DIAGNOSIS — I48.92 PAROXYSMAL ATRIAL FLUTTER: ICD-10-CM

## 2021-05-25 DIAGNOSIS — R00.2 PALPITATIONS: ICD-10-CM

## 2021-05-25 DIAGNOSIS — E03.4 HYPOTHYROIDISM DUE TO ACQUIRED ATROPHY OF THYROID: ICD-10-CM

## 2021-05-25 DIAGNOSIS — I10 HTN (HYPERTENSION), BENIGN: ICD-10-CM

## 2021-05-25 PROCEDURE — 99214 PR OFFICE/OUTPT VISIT, EST, LEVL IV, 30-39 MIN: ICD-10-PCS | Mod: S$GLB,,, | Performed by: INTERNAL MEDICINE

## 2021-05-25 PROCEDURE — 99214 OFFICE O/P EST MOD 30 MIN: CPT | Mod: S$GLB,,, | Performed by: INTERNAL MEDICINE

## 2021-05-25 RX ORDER — BENAZEPRIL HYDROCHLORIDE 10 MG/1
10 TABLET ORAL 2 TIMES DAILY
COMMUNITY
End: 2021-08-12 | Stop reason: SDUPTHER

## 2021-05-25 RX ORDER — CARVEDILOL 6.25 MG/1
6.25 TABLET ORAL 2 TIMES DAILY WITH MEALS
COMMUNITY
End: 2021-05-25 | Stop reason: SDUPTHER

## 2021-05-28 ENCOUNTER — HOSPITAL ENCOUNTER (OUTPATIENT)
Dept: CARDIOLOGY | Facility: CLINIC | Age: 81
Discharge: HOME | End: 2021-05-28
Attending: INTERNAL MEDICINE
Payer: MEDICARE

## 2021-05-28 DIAGNOSIS — E03.4 HYPOTHYROIDISM DUE TO ACQUIRED ATROPHY OF THYROID: ICD-10-CM

## 2021-05-28 DIAGNOSIS — I48.19 PERSISTENT ATRIAL FIBRILLATION: ICD-10-CM

## 2021-05-28 DIAGNOSIS — R53.83 FATIGUE, UNSPECIFIED TYPE: ICD-10-CM

## 2021-05-28 DIAGNOSIS — I10 HTN (HYPERTENSION), BENIGN: ICD-10-CM

## 2021-05-28 DIAGNOSIS — R00.2 PALPITATIONS: ICD-10-CM

## 2021-05-28 DIAGNOSIS — R06.09 DYSPNEA ON EXERTION: ICD-10-CM

## 2021-05-28 PROCEDURE — 93224 HOLTER MONITOR - 24 HOUR (CUPID ONLY): ICD-10-PCS | Mod: S$GLB,,, | Performed by: INTERNAL MEDICINE

## 2021-05-28 PROCEDURE — 93224 XTRNL ECG REC UP TO 48 HRS: CPT | Mod: S$GLB,,, | Performed by: INTERNAL MEDICINE

## 2021-05-31 RX ORDER — CARVEDILOL 6.25 MG/1
6.25 TABLET ORAL 2 TIMES DAILY WITH MEALS
Qty: 180 TABLET | Refills: 3 | Status: SHIPPED | OUTPATIENT
Start: 2021-05-31 | End: 2021-06-29 | Stop reason: SDUPTHER

## 2021-06-28 LAB
OHS CV EVENT MONITOR DAY: 0
OHS CV HOLTER LENGTH DECIMAL HOURS: 24
OHS CV HOLTER LENGTH HOURS: 24
OHS CV HOLTER LENGTH MINUTES: 0

## 2021-06-29 ENCOUNTER — PATIENT MESSAGE (OUTPATIENT)
Dept: FAMILY MEDICINE | Facility: CLINIC | Age: 81
End: 2021-06-29

## 2021-06-29 DIAGNOSIS — R00.2 PALPITATIONS: ICD-10-CM

## 2021-06-29 DIAGNOSIS — R53.83 FATIGUE, UNSPECIFIED TYPE: ICD-10-CM

## 2021-06-29 DIAGNOSIS — R06.09 DYSPNEA ON EXERTION: ICD-10-CM

## 2021-06-29 DIAGNOSIS — I48.19 PERSISTENT ATRIAL FIBRILLATION: ICD-10-CM

## 2021-06-29 DIAGNOSIS — M79.89 SYMPTOM OF LEG SWELLING: ICD-10-CM

## 2021-06-29 RX ORDER — CARVEDILOL 6.25 MG/1
6.25 TABLET ORAL 2 TIMES DAILY WITH MEALS
Qty: 180 TABLET | Refills: 3 | Status: SHIPPED | OUTPATIENT
Start: 2021-06-29 | End: 2021-07-09 | Stop reason: SDUPTHER

## 2021-07-09 ENCOUNTER — PATIENT MESSAGE (OUTPATIENT)
Dept: FAMILY MEDICINE | Facility: CLINIC | Age: 81
End: 2021-07-09

## 2021-07-09 DIAGNOSIS — M79.89 SYMPTOM OF LEG SWELLING: ICD-10-CM

## 2021-07-09 DIAGNOSIS — R06.09 DYSPNEA ON EXERTION: ICD-10-CM

## 2021-07-09 DIAGNOSIS — I48.19 PERSISTENT ATRIAL FIBRILLATION: ICD-10-CM

## 2021-07-09 DIAGNOSIS — R53.83 FATIGUE, UNSPECIFIED TYPE: ICD-10-CM

## 2021-07-09 DIAGNOSIS — R00.2 PALPITATIONS: ICD-10-CM

## 2021-07-09 RX ORDER — CARVEDILOL 6.25 MG/1
6.25 TABLET ORAL 2 TIMES DAILY WITH MEALS
Qty: 180 TABLET | Refills: 3 | Status: SHIPPED | OUTPATIENT
Start: 2021-07-09 | End: 2022-06-08

## 2021-07-09 RX ORDER — FUROSEMIDE 20 MG/1
20 TABLET ORAL DAILY
Qty: 5 TABLET | Refills: 0 | Status: SHIPPED | OUTPATIENT
Start: 2021-07-09 | End: 2021-07-28 | Stop reason: SDUPTHER

## 2021-07-14 ENCOUNTER — TELEPHONE (OUTPATIENT)
Dept: FAMILY MEDICINE | Facility: CLINIC | Age: 81
End: 2021-07-14

## 2021-07-28 DIAGNOSIS — I10 HTN (HYPERTENSION), BENIGN: ICD-10-CM

## 2021-07-28 RX ORDER — FUROSEMIDE 20 MG/1
20 TABLET ORAL DAILY
Qty: 90 TABLET | Refills: 0 | Status: SHIPPED | OUTPATIENT
Start: 2021-07-28 | End: 2021-07-29 | Stop reason: SDUPTHER

## 2021-07-28 RX ORDER — POTASSIUM CHLORIDE 750 MG/1
CAPSULE, EXTENDED RELEASE ORAL
Qty: 90 CAPSULE | Refills: 3 | Status: SHIPPED | OUTPATIENT
Start: 2021-07-28 | End: 2021-11-08 | Stop reason: SDUPTHER

## 2021-08-02 RX ORDER — FUROSEMIDE 20 MG/1
20 TABLET ORAL DAILY
Qty: 90 TABLET | Refills: 3 | Status: SHIPPED | OUTPATIENT
Start: 2021-08-02 | End: 2021-11-22 | Stop reason: SDUPTHER

## 2021-08-12 RX ORDER — BENAZEPRIL HYDROCHLORIDE 10 MG/1
10 TABLET ORAL 2 TIMES DAILY
Qty: 180 TABLET | Refills: 3 | Status: SHIPPED | OUTPATIENT
Start: 2021-08-12 | End: 2022-06-08

## 2021-08-17 ENCOUNTER — IMMUNIZATION (OUTPATIENT)
Dept: PRIMARY CARE CLINIC | Facility: CLINIC | Age: 81
End: 2021-08-17

## 2021-08-17 DIAGNOSIS — Z23 NEED FOR VACCINATION: Primary | ICD-10-CM

## 2021-08-17 PROCEDURE — 91300 COVID-19, MRNA, LNP-S, PF, 30 MCG/0.3 ML DOSE VACCINE: ICD-10-PCS | Mod: S$GLB,,, | Performed by: FAMILY MEDICINE

## 2021-08-17 PROCEDURE — 0003A COVID-19, MRNA, LNP-S, PF, 30 MCG/0.3 ML DOSE VACCINE: CPT | Mod: CV19,S$GLB,, | Performed by: FAMILY MEDICINE

## 2021-08-17 PROCEDURE — 91300 COVID-19, MRNA, LNP-S, PF, 30 MCG/0.3 ML DOSE VACCINE: CPT | Mod: S$GLB,,, | Performed by: FAMILY MEDICINE

## 2021-08-17 PROCEDURE — 0003A COVID-19, MRNA, LNP-S, PF, 30 MCG/0.3 ML DOSE VACCINE: ICD-10-PCS | Mod: CV19,S$GLB,, | Performed by: FAMILY MEDICINE

## 2021-09-27 ENCOUNTER — HOSPITAL ENCOUNTER (OUTPATIENT)
Dept: RADIOLOGY | Facility: HOSPITAL | Age: 81
Discharge: HOME | End: 2021-09-27
Attending: INTERNAL MEDICINE
Payer: MEDICARE

## 2021-09-27 ENCOUNTER — OFFICE VISIT (OUTPATIENT)
Dept: PULMONOLOGY | Facility: CLINIC | Age: 81
End: 2021-09-27
Payer: MEDICARE

## 2021-09-27 VITALS
HEIGHT: 66 IN | DIASTOLIC BLOOD PRESSURE: 90 MMHG | OXYGEN SATURATION: 97 % | BODY MASS INDEX: 23.14 KG/M2 | HEART RATE: 77 BPM | WEIGHT: 144 LBS | SYSTOLIC BLOOD PRESSURE: 163 MMHG

## 2021-09-27 DIAGNOSIS — R06.02 SHORTNESS OF BREATH: Primary | ICD-10-CM

## 2021-09-27 DIAGNOSIS — R06.02 SHORTNESS OF BREATH: ICD-10-CM

## 2021-09-27 PROCEDURE — 71046 X-RAY EXAM CHEST 2 VIEWS: CPT | Mod: TC

## 2021-09-27 PROCEDURE — 99204 PR OFFICE/OUTPT VISIT, NEW, LEVL IV, 45-59 MIN: ICD-10-PCS | Mod: S$GLB,,, | Performed by: INTERNAL MEDICINE

## 2021-09-27 PROCEDURE — 99204 OFFICE O/P NEW MOD 45 MIN: CPT | Mod: S$GLB,,, | Performed by: INTERNAL MEDICINE

## 2021-10-11 ENCOUNTER — HOSPITAL ENCOUNTER (OUTPATIENT)
Dept: PULMONOLOGY | Facility: HOSPITAL | Age: 81
Discharge: HOME | End: 2021-10-11
Attending: INTERNAL MEDICINE
Payer: MEDICARE

## 2021-10-11 VITALS — HEIGHT: 66 IN | WEIGHT: 144 LBS | BODY MASS INDEX: 23.14 KG/M2

## 2021-10-11 DIAGNOSIS — R06.02 SHORTNESS OF BREATH: ICD-10-CM

## 2021-10-11 PROCEDURE — 94727 GAS DIL/WSHOT DETER LNG VOL: CPT

## 2021-10-11 PROCEDURE — 94060 EVALUATION OF WHEEZING: CPT

## 2021-10-11 PROCEDURE — 94010 BREATHING CAPACITY TEST: CPT | Mod: XB

## 2021-10-11 PROCEDURE — 94618 PULMONARY STRESS TESTING: CPT

## 2021-10-11 PROCEDURE — 94729 DIFFUSING CAPACITY: CPT

## 2021-10-12 ENCOUNTER — TELEPHONE (OUTPATIENT)
Dept: PULMONOLOGY | Facility: CLINIC | Age: 81
End: 2021-10-12

## 2021-11-04 ENCOUNTER — OFFICE VISIT (OUTPATIENT)
Dept: PULMONOLOGY | Facility: CLINIC | Age: 81
End: 2021-11-04
Payer: MEDICARE

## 2021-11-04 VITALS
WEIGHT: 146 LBS | OXYGEN SATURATION: 98 % | BODY MASS INDEX: 23.46 KG/M2 | HEIGHT: 66 IN | DIASTOLIC BLOOD PRESSURE: 85 MMHG | HEART RATE: 81 BPM | SYSTOLIC BLOOD PRESSURE: 174 MMHG

## 2021-11-04 DIAGNOSIS — R06.02 SHORTNESS OF BREATH: Primary | ICD-10-CM

## 2021-11-04 PROCEDURE — 99214 OFFICE O/P EST MOD 30 MIN: CPT | Mod: S$GLB,,, | Performed by: INTERNAL MEDICINE

## 2021-11-04 PROCEDURE — 99214 PR OFFICE/OUTPT VISIT, EST, LEVL IV, 30-39 MIN: ICD-10-PCS | Mod: S$GLB,,, | Performed by: INTERNAL MEDICINE

## 2021-11-05 ENCOUNTER — TELEPHONE (OUTPATIENT)
Dept: CARDIOLOGY | Facility: CLINIC | Age: 81
End: 2021-11-05
Payer: MEDICARE

## 2021-11-08 DIAGNOSIS — I10 HTN (HYPERTENSION), BENIGN: ICD-10-CM

## 2021-11-08 RX ORDER — POTASSIUM CHLORIDE 750 MG/1
CAPSULE, EXTENDED RELEASE ORAL
Qty: 90 CAPSULE | Refills: 3 | Status: SHIPPED | OUTPATIENT
Start: 2021-11-08 | End: 2022-02-17 | Stop reason: SDUPTHER

## 2021-11-10 ENCOUNTER — TELEPHONE (OUTPATIENT)
Dept: CARDIOLOGY | Facility: CLINIC | Age: 81
End: 2021-11-10

## 2021-11-10 ENCOUNTER — OFFICE VISIT (OUTPATIENT)
Dept: CARDIOLOGY | Facility: CLINIC | Age: 81
End: 2021-11-10
Payer: MEDICARE

## 2021-11-10 VITALS
SYSTOLIC BLOOD PRESSURE: 146 MMHG | HEIGHT: 66 IN | DIASTOLIC BLOOD PRESSURE: 78 MMHG | OXYGEN SATURATION: 97 % | WEIGHT: 144 LBS | BODY MASS INDEX: 23.14 KG/M2 | HEART RATE: 76 BPM

## 2021-11-10 DIAGNOSIS — E03.9 ACQUIRED HYPOTHYROIDISM: ICD-10-CM

## 2021-11-10 DIAGNOSIS — R07.9 CHEST PAIN, UNSPECIFIED TYPE: ICD-10-CM

## 2021-11-10 DIAGNOSIS — I10 ESSENTIAL HYPERTENSION: ICD-10-CM

## 2021-11-10 DIAGNOSIS — R00.2 PALPITATIONS: ICD-10-CM

## 2021-11-10 DIAGNOSIS — I48.0 PAROXYSMAL ATRIAL FIBRILLATION: ICD-10-CM

## 2021-11-10 DIAGNOSIS — I10 WHITE COAT SYNDROME WITH DIAGNOSIS OF HYPERTENSION: ICD-10-CM

## 2021-11-10 DIAGNOSIS — R06.09 DYSPNEA ON EXERTION: Primary | ICD-10-CM

## 2021-11-10 DIAGNOSIS — M79.18 MUSCULOSKELETAL PAIN: ICD-10-CM

## 2021-11-10 DIAGNOSIS — E78.2 MIXED HYPERLIPIDEMIA: ICD-10-CM

## 2021-11-10 DIAGNOSIS — R94.31 NONSPECIFIC ABNORMAL ELECTROCARDIOGRAM (ECG) (EKG): ICD-10-CM

## 2021-11-10 DIAGNOSIS — I48.0 PAROXYSMAL ATRIAL FIBRILLATION: Primary | ICD-10-CM

## 2021-11-10 DIAGNOSIS — R94.31 ABNORMAL ELECTROCARDIOGRAM (ECG) (EKG): ICD-10-CM

## 2021-11-10 PROCEDURE — 93000 EKG 12-LEAD: ICD-10-PCS | Mod: S$GLB,,, | Performed by: INTERNAL MEDICINE

## 2021-11-10 PROCEDURE — 93000 ELECTROCARDIOGRAM COMPLETE: CPT | Mod: S$GLB,,, | Performed by: INTERNAL MEDICINE

## 2021-11-10 PROCEDURE — 99214 PR OFFICE/OUTPT VISIT, EST, LEVL IV, 30-39 MIN: ICD-10-PCS | Mod: S$GLB,,, | Performed by: INTERNAL MEDICINE

## 2021-11-10 PROCEDURE — 99214 OFFICE O/P EST MOD 30 MIN: CPT | Mod: S$GLB,,, | Performed by: INTERNAL MEDICINE

## 2021-11-11 ENCOUNTER — TELEPHONE (OUTPATIENT)
Dept: PULMONOLOGY | Facility: HOSPITAL | Age: 81
End: 2021-11-11
Payer: MEDICARE

## 2021-11-11 DIAGNOSIS — G47.34 NOCTURNAL HYPOXEMIA: Primary | ICD-10-CM

## 2021-11-12 ENCOUNTER — PATIENT MESSAGE (OUTPATIENT)
Dept: PULMONOLOGY | Facility: CLINIC | Age: 81
End: 2021-11-12
Payer: MEDICARE

## 2021-11-15 ENCOUNTER — TELEPHONE (OUTPATIENT)
Dept: PULMONOLOGY | Facility: CLINIC | Age: 81
End: 2021-11-15
Payer: MEDICARE

## 2021-11-15 DIAGNOSIS — Z01.818 OTHER SPECIFIED PRE-OPERATIVE EXAMINATION: Primary | ICD-10-CM

## 2021-11-18 ENCOUNTER — PATIENT MESSAGE (OUTPATIENT)
Dept: PULMONOLOGY | Facility: CLINIC | Age: 81
End: 2021-11-18
Payer: MEDICARE

## 2021-11-18 ENCOUNTER — TELEPHONE (OUTPATIENT)
Dept: CARDIOLOGY | Facility: HOSPITAL | Age: 81
End: 2021-11-18
Payer: MEDICARE

## 2021-11-18 ENCOUNTER — HOSPITAL ENCOUNTER (OUTPATIENT)
Dept: RADIOLOGY | Facility: HOSPITAL | Age: 81
Discharge: HOME | End: 2021-11-18
Attending: INTERNAL MEDICINE
Payer: MEDICARE

## 2021-11-18 ENCOUNTER — TELEPHONE (OUTPATIENT)
Dept: PULMONOLOGY | Facility: CLINIC | Age: 81
End: 2021-11-18
Payer: MEDICARE

## 2021-11-18 DIAGNOSIS — G47.34 NOCTURNAL HYPOXEMIA: Primary | ICD-10-CM

## 2021-11-18 DIAGNOSIS — R06.02 SHORTNESS OF BREATH: ICD-10-CM

## 2021-11-18 LAB
CREAT SERPL-MCNC: 0.8 MG/DL (ref 0.5–1.4)
SAMPLE: NORMAL

## 2021-11-18 PROCEDURE — 71260 CT THORAX DX C+: CPT | Mod: TC,PO

## 2021-11-18 PROCEDURE — 25500020 PHARM REV CODE 255: Mod: PO | Performed by: INTERNAL MEDICINE

## 2021-11-18 PROCEDURE — 82565 ASSAY OF CREATININE: CPT | Mod: PO

## 2021-11-18 RX ADMIN — IOHEXOL 100 ML: 350 INJECTION, SOLUTION INTRAVENOUS at 08:11

## 2021-11-19 ENCOUNTER — PATIENT MESSAGE (OUTPATIENT)
Dept: CARDIOLOGY | Facility: CLINIC | Age: 81
End: 2021-11-19
Payer: MEDICARE

## 2021-11-19 ENCOUNTER — HOSPITAL ENCOUNTER (OUTPATIENT)
Dept: RADIOLOGY | Facility: HOSPITAL | Age: 81
Discharge: HOME | End: 2021-11-19
Attending: INTERNAL MEDICINE
Payer: MEDICARE

## 2021-11-19 ENCOUNTER — CLINICAL SUPPORT (OUTPATIENT)
Dept: CARDIOLOGY | Facility: HOSPITAL | Age: 81
End: 2021-11-19
Attending: INTERNAL MEDICINE
Payer: MEDICARE

## 2021-11-19 DIAGNOSIS — I48.0 PAROXYSMAL ATRIAL FIBRILLATION: ICD-10-CM

## 2021-11-19 DIAGNOSIS — R07.9 CHEST PAIN, UNSPECIFIED TYPE: ICD-10-CM

## 2021-11-19 DIAGNOSIS — R94.31 ABNORMAL ELECTROCARDIOGRAM (ECG) (EKG): ICD-10-CM

## 2021-11-19 LAB
CV PHARM DOSE: 0.4 MG
CV STRESS BASE HR: 69 BPM
DIASTOLIC BLOOD PRESSURE: 82 MMHG
OHS CV CPX 1 MINUTE RECOVERY HEART RATE: 103 BPM
OHS CV CPX 85 PERCENT MAX PREDICTED HEART RATE MALE: 115
OHS CV CPX MAX PREDICTED HEART RATE: 135
OHS CV CPX PATIENT IS FEMALE: 1
OHS CV CPX PATIENT IS MALE: 0
OHS CV CPX PEAK DIASTOLIC BLOOD PRESSURE: 69 MMHG
OHS CV CPX PEAK HEAR RATE: 104 BPM
OHS CV CPX PEAK RATE PRESSURE PRODUCT: NORMAL
OHS CV CPX PEAK SYSTOLIC BLOOD PRESSURE: 204 MMHG
OHS CV CPX PERCENT MAX PREDICTED HEART RATE ACHIEVED: 77
OHS CV CPX RATE PRESSURE PRODUCT PRESENTING: NORMAL
SYSTOLIC BLOOD PRESSURE: 194 MMHG

## 2021-11-19 PROCEDURE — 93018 NUCLEAR STRESS TEST (CUPID ONLY): ICD-10-PCS | Mod: ,,, | Performed by: INTERNAL MEDICINE

## 2021-11-19 PROCEDURE — A9502 TC99M TETROFOSMIN: HCPCS

## 2021-11-19 PROCEDURE — 93016 NUCLEAR STRESS TEST (CUPID ONLY): ICD-10-PCS | Mod: ,,, | Performed by: INTERNAL MEDICINE

## 2021-11-19 PROCEDURE — 93017 CV STRESS TEST TRACING ONLY: CPT

## 2021-11-19 PROCEDURE — 63600175 PHARM REV CODE 636 W HCPCS: Performed by: INTERNAL MEDICINE

## 2021-11-19 PROCEDURE — 93016 CV STRESS TEST SUPVJ ONLY: CPT | Mod: ,,, | Performed by: INTERNAL MEDICINE

## 2021-11-19 PROCEDURE — 93018 CV STRESS TEST I&R ONLY: CPT | Mod: ,,, | Performed by: INTERNAL MEDICINE

## 2021-11-19 PROCEDURE — 78452 HT MUSCLE IMAGE SPECT MULT: CPT | Mod: TC

## 2021-11-19 RX ORDER — REGADENOSON 0.08 MG/ML
0.4 INJECTION, SOLUTION INTRAVENOUS ONCE
Status: COMPLETED | OUTPATIENT
Start: 2021-11-19 | End: 2021-11-19

## 2021-11-19 RX ADMIN — REGADENOSON 0.4 MG: 0.08 INJECTION, SOLUTION INTRAVENOUS at 09:11

## 2021-11-22 ENCOUNTER — TELEPHONE (OUTPATIENT)
Dept: PULMONOLOGY | Facility: HOSPITAL | Age: 81
End: 2021-11-22
Payer: MEDICARE

## 2021-11-22 ENCOUNTER — OFFICE VISIT (OUTPATIENT)
Dept: CARDIOLOGY | Facility: CLINIC | Age: 81
End: 2021-11-22
Payer: MEDICARE

## 2021-11-22 ENCOUNTER — PATIENT MESSAGE (OUTPATIENT)
Dept: PULMONOLOGY | Facility: CLINIC | Age: 81
End: 2021-11-22
Payer: MEDICARE

## 2021-11-22 ENCOUNTER — PATIENT MESSAGE (OUTPATIENT)
Dept: CARDIOLOGY | Facility: CLINIC | Age: 81
End: 2021-11-22

## 2021-11-22 VITALS
DIASTOLIC BLOOD PRESSURE: 80 MMHG | BODY MASS INDEX: 23.3 KG/M2 | HEIGHT: 66 IN | WEIGHT: 145 LBS | SYSTOLIC BLOOD PRESSURE: 140 MMHG | HEART RATE: 72 BPM

## 2021-11-22 DIAGNOSIS — I48.0 PAROXYSMAL ATRIAL FIBRILLATION: ICD-10-CM

## 2021-11-22 DIAGNOSIS — R79.1 ABNORMAL COAGULATION PROFILE: ICD-10-CM

## 2021-11-22 DIAGNOSIS — R93.1 ABNORMAL NUCLEAR CARDIAC IMAGING TEST: Primary | ICD-10-CM

## 2021-11-22 DIAGNOSIS — R94.5 ABNORMAL RESULTS OF LIVER FUNCTION STUDIES: ICD-10-CM

## 2021-11-22 DIAGNOSIS — J43.1 PANLOBULAR EMPHYSEMA: ICD-10-CM

## 2021-11-22 DIAGNOSIS — I10 HTN (HYPERTENSION), BENIGN: ICD-10-CM

## 2021-11-22 DIAGNOSIS — I25.10 CORONARY ARTERY DISEASE INVOLVING NATIVE CORONARY ARTERY OF NATIVE HEART WITHOUT ANGINA PECTORIS: ICD-10-CM

## 2021-11-22 DIAGNOSIS — E03.9 ACQUIRED HYPOTHYROIDISM: ICD-10-CM

## 2021-11-22 DIAGNOSIS — I10 WHITE COAT SYNDROME WITH DIAGNOSIS OF HYPERTENSION: ICD-10-CM

## 2021-11-22 DIAGNOSIS — E78.2 MIXED HYPERLIPIDEMIA: ICD-10-CM

## 2021-11-22 PROCEDURE — 99214 OFFICE O/P EST MOD 30 MIN: CPT | Mod: S$GLB,,, | Performed by: NURSE PRACTITIONER

## 2021-11-22 PROCEDURE — 99214 PR OFFICE/OUTPT VISIT, EST, LEVL IV, 30-39 MIN: ICD-10-PCS | Mod: S$GLB,,, | Performed by: NURSE PRACTITIONER

## 2021-11-22 RX ORDER — FUROSEMIDE 20 MG/1
20 TABLET ORAL DAILY
Qty: 90 TABLET | Refills: 3 | Status: SHIPPED | OUTPATIENT
Start: 2021-11-22 | End: 2023-01-04

## 2021-11-22 RX ORDER — AMLODIPINE BESYLATE 5 MG/1
5 TABLET ORAL ONCE
Qty: 1 TABLET | Refills: 0 | Status: SHIPPED | OUTPATIENT
Start: 2021-11-22 | End: 2021-12-10 | Stop reason: CLARIF

## 2021-11-23 ENCOUNTER — CLINICAL SUPPORT (OUTPATIENT)
Dept: CARDIOLOGY | Facility: HOSPITAL | Age: 81
End: 2021-11-23
Attending: INTERNAL MEDICINE
Payer: MEDICARE

## 2021-11-23 DIAGNOSIS — E03.9 ACQUIRED HYPOTHYROIDISM: ICD-10-CM

## 2021-11-23 DIAGNOSIS — R94.31 NONSPECIFIC ABNORMAL ELECTROCARDIOGRAM (ECG) (EKG): ICD-10-CM

## 2021-11-23 DIAGNOSIS — I10 ESSENTIAL HYPERTENSION: ICD-10-CM

## 2021-11-23 DIAGNOSIS — E78.2 MIXED HYPERLIPIDEMIA: ICD-10-CM

## 2021-11-23 DIAGNOSIS — M79.18 MUSCULOSKELETAL PAIN: ICD-10-CM

## 2021-11-23 DIAGNOSIS — I48.0 PAROXYSMAL ATRIAL FIBRILLATION: ICD-10-CM

## 2021-11-23 DIAGNOSIS — R00.2 PALPITATIONS: ICD-10-CM

## 2021-11-23 DIAGNOSIS — I10 WHITE COAT SYNDROME WITH DIAGNOSIS OF HYPERTENSION: ICD-10-CM

## 2021-11-23 DIAGNOSIS — R07.9 CHEST PAIN, UNSPECIFIED TYPE: ICD-10-CM

## 2021-11-23 DIAGNOSIS — R06.09 DYSPNEA ON EXERTION: ICD-10-CM

## 2021-11-23 LAB
AORTIC ROOT ANNULUS: 2.6 CM
AORTIC VALVE CUSP SEPERATION: 1.37 CM
AV INDEX (PROSTH): 0.72
AV MEAN GRADIENT: 6 MMHG
AV PEAK GRADIENT: 10 MMHG
AV VALVE AREA: 2.13 CM2
AV VELOCITY RATIO: 65.18
CV ECHO LV RWT: 0.38 CM
DOP CALC AO PEAK VEL: 1.59 M/S
DOP CALC AO VTI: 33.16 CM
DOP CALC LVOT AREA: 3 CM2
DOP CALC LVOT DIAMETER: 1.94 CM
DOP CALC LVOT PEAK VEL: 103.64 M/S
DOP CALC LVOT STROKE VOLUME: 70.49 CM3
DOP CALCLVOT PEAK VEL VTI: 23.86 CM
E WAVE DECELERATION TIME: 219.47 MSEC
E/A RATIO: 1.55
E/E' RATIO: 11.33 M/S
ECHO LV POSTERIOR WALL: 0.97 CM (ref 0.6–1.1)
EJECTION FRACTION: 60 %
FRACTIONAL SHORTENING: 43 % (ref 28–44)
INTERVENTRICULAR SEPTUM: 0.97 CM (ref 0.6–1.1)
LEFT ATRIUM SIZE: 2.9 CM
LEFT INTERNAL DIMENSION IN SYSTOLE: 2.9 CM (ref 2.1–4)
LEFT VENTRICULAR INTERNAL DIMENSION IN DIASTOLE: 5.07 CM (ref 3.5–6)
LEFT VENTRICULAR MASS: 178.77 G
LV LATERAL E/E' RATIO: 9.27 M/S
LV SEPTAL E/E' RATIO: 14.57 M/S
MV PEAK A VEL: 0.66 M/S
MV PEAK E VEL: 1.02 M/S
PISA TR MAX VEL: 3.51 M/S
PV PEAK VELOCITY: 103.64 CM/S
RA PRESSURE: 3 MMHG
TDI LATERAL: 0.11 M/S
TDI SEPTAL: 0.07 M/S
TDI: 0.09 M/S
TR MAX PG: 49 MMHG
TV REST PULMONARY ARTERY PRESSURE: 52 MMHG

## 2021-11-23 PROCEDURE — 93306 ECHO (CUPID ONLY): ICD-10-PCS | Mod: 26,,, | Performed by: INTERNAL MEDICINE

## 2021-11-23 PROCEDURE — 93306 TTE W/DOPPLER COMPLETE: CPT | Mod: 26,,, | Performed by: INTERNAL MEDICINE

## 2021-11-23 PROCEDURE — 93306 TTE W/DOPPLER COMPLETE: CPT

## 2021-11-24 ENCOUNTER — PATIENT MESSAGE (OUTPATIENT)
Dept: CARDIOLOGY | Facility: CLINIC | Age: 81
End: 2021-11-24
Payer: MEDICARE

## 2021-11-26 RX ORDER — SODIUM CHLORIDE 450 MG/100ML
INJECTION, SOLUTION INTRAVENOUS CONTINUOUS
Status: CANCELLED | OUTPATIENT
Start: 2021-11-26

## 2021-11-26 RX ORDER — DIPHENHYDRAMINE HCL 25 MG
50 CAPSULE ORAL
Status: CANCELLED | OUTPATIENT
Start: 2021-11-26

## 2021-11-28 ENCOUNTER — PROCEDURE VISIT (OUTPATIENT)
Dept: SLEEP MEDICINE | Facility: HOSPITAL | Age: 81
End: 2021-11-28
Attending: INTERNAL MEDICINE
Payer: MEDICARE

## 2021-11-28 DIAGNOSIS — G47.34 NOCTURNAL HYPOXEMIA: ICD-10-CM

## 2021-11-28 PROCEDURE — 95810 POLYSOM 6/> YRS 4/> PARAM: CPT

## 2021-11-30 ENCOUNTER — PATIENT MESSAGE (OUTPATIENT)
Dept: CARDIOLOGY | Facility: CLINIC | Age: 81
End: 2021-11-30
Payer: MEDICARE

## 2021-11-30 ENCOUNTER — TELEPHONE (OUTPATIENT)
Dept: CARDIOLOGY | Facility: CLINIC | Age: 81
End: 2021-11-30
Payer: MEDICARE

## 2021-12-01 ENCOUNTER — HOSPITAL ENCOUNTER (EMERGENCY)
Facility: HOSPITAL | Age: 81
Discharge: HOME | End: 2021-12-01
Attending: EMERGENCY MEDICINE
Payer: MEDICARE

## 2021-12-01 ENCOUNTER — OFFICE VISIT (OUTPATIENT)
Dept: URGENT CARE | Facility: CLINIC | Age: 81
End: 2021-12-01
Payer: MEDICARE

## 2021-12-01 ENCOUNTER — TELEPHONE (OUTPATIENT)
Dept: PULMONOLOGY | Facility: HOSPITAL | Age: 81
End: 2021-12-01
Payer: MEDICARE

## 2021-12-01 VITALS
TEMPERATURE: 98 F | BODY MASS INDEX: 23.3 KG/M2 | HEIGHT: 66 IN | SYSTOLIC BLOOD PRESSURE: 168 MMHG | WEIGHT: 145 LBS | DIASTOLIC BLOOD PRESSURE: 72 MMHG | HEART RATE: 67 BPM | OXYGEN SATURATION: 95 % | RESPIRATION RATE: 20 BRPM

## 2021-12-01 VITALS
HEART RATE: 70 BPM | DIASTOLIC BLOOD PRESSURE: 84 MMHG | SYSTOLIC BLOOD PRESSURE: 192 MMHG | RESPIRATION RATE: 16 BRPM | WEIGHT: 145 LBS | OXYGEN SATURATION: 98 % | HEIGHT: 72 IN | TEMPERATURE: 97 F | BODY MASS INDEX: 19.64 KG/M2

## 2021-12-01 DIAGNOSIS — N30.01 ACUTE CYSTITIS WITH HEMATURIA: ICD-10-CM

## 2021-12-01 DIAGNOSIS — R31.9 HEMATURIA, UNSPECIFIED TYPE: Primary | ICD-10-CM

## 2021-12-01 DIAGNOSIS — G47.33 OSA (OBSTRUCTIVE SLEEP APNEA): Primary | ICD-10-CM

## 2021-12-01 LAB
ALBUMIN SERPL BCP-MCNC: 4.3 G/DL (ref 3.5–5.2)
ALP SERPL-CCNC: 50 U/L (ref 55–135)
ALT SERPL W/O P-5'-P-CCNC: 19 U/L (ref 10–44)
ANION GAP SERPL CALC-SCNC: 13 MMOL/L (ref 8–16)
AST SERPL-CCNC: 22 U/L (ref 10–40)
BACTERIA #/AREA URNS HPF: NEGATIVE /HPF
BASOPHILS # BLD AUTO: 0.03 K/UL (ref 0–0.2)
BASOPHILS NFR BLD: 0.4 % (ref 0–1.9)
BILIRUB SERPL-MCNC: 0.9 MG/DL (ref 0.1–1)
BILIRUB UR QL STRIP: NEGATIVE
BILIRUB UR QL STRIP: NEGATIVE
BUN SERPL-MCNC: 22 MG/DL (ref 8–23)
CALCIUM SERPL-MCNC: 9.3 MG/DL (ref 8.7–10.5)
CHLORIDE SERPL-SCNC: 97 MMOL/L (ref 95–110)
CLARITY UR: CLEAR
CO2 SERPL-SCNC: 27 MMOL/L (ref 23–29)
COLOR UR: COLORLESS
CREAT SERPL-MCNC: 0.8 MG/DL (ref 0.5–1.4)
DIFFERENTIAL METHOD: NORMAL
EOSINOPHIL # BLD AUTO: 0.2 K/UL (ref 0–0.5)
EOSINOPHIL NFR BLD: 2 % (ref 0–8)
ERYTHROCYTE [DISTWIDTH] IN BLOOD BY AUTOMATED COUNT: 13.6 % (ref 11.5–14.5)
EST. GFR  (AFRICAN AMERICAN): >60 ML/MIN/1.73 M^2
EST. GFR  (NON AFRICAN AMERICAN): >60 ML/MIN/1.73 M^2
GLUCOSE SERPL-MCNC: 118 MG/DL (ref 70–110)
GLUCOSE UR QL STRIP: NEGATIVE
GLUCOSE UR QL STRIP: NEGATIVE
HCT VFR BLD AUTO: 37.6 % (ref 37–48.5)
HGB BLD-MCNC: 12.2 G/DL (ref 12–16)
HGB UR QL STRIP: ABNORMAL
HYALINE CASTS #/AREA URNS LPF: 0 /LPF
IMM GRANULOCYTES # BLD AUTO: 0.02 K/UL (ref 0–0.04)
IMM GRANULOCYTES NFR BLD AUTO: 0.3 % (ref 0–0.5)
INR PPP: 1.3
KETONES UR QL STRIP: NEGATIVE
KETONES UR QL STRIP: NEGATIVE
LEUKOCYTE ESTERASE UR QL STRIP: ABNORMAL
LEUKOCYTE ESTERASE UR QL STRIP: NEGATIVE
LYMPHOCYTES # BLD AUTO: 2 K/UL (ref 1–4.8)
LYMPHOCYTES NFR BLD: 26.3 % (ref 18–48)
MCH RBC QN AUTO: 28 PG (ref 27–31)
MCHC RBC AUTO-ENTMCNC: 32.4 G/DL (ref 32–36)
MCV RBC AUTO: 86 FL (ref 82–98)
MICROSCOPIC COMMENT: ABNORMAL
MONOCYTES # BLD AUTO: 0.7 K/UL (ref 0.3–1)
MONOCYTES NFR BLD: 8.9 % (ref 4–15)
NEUTROPHILS # BLD AUTO: 4.7 K/UL (ref 1.8–7.7)
NEUTROPHILS NFR BLD: 62.1 % (ref 38–73)
NITRITE UR QL STRIP: NEGATIVE
NRBC BLD-RTO: 0 /100 WBC
PH UR STRIP: 6 [PH] (ref 5–8)
PH, POC UA: 5.5
PLATELET # BLD AUTO: 170 K/UL (ref 150–450)
PMV BLD AUTO: 11.7 FL (ref 9.2–12.9)
POC BLOOD, URINE: POSITIVE
POC NITRATES, URINE: NEGATIVE
POTASSIUM SERPL-SCNC: 3.3 MMOL/L (ref 3.5–5.1)
PROT SERPL-MCNC: 8.3 G/DL (ref 6–8.4)
PROT UR QL STRIP: NEGATIVE
PROT UR QL STRIP: NEGATIVE
PROTHROMBIN TIME: 14.9 SEC (ref 11.4–13.7)
RBC # BLD AUTO: 4.35 M/UL (ref 4–5.4)
RBC #/AREA URNS HPF: 1 /HPF (ref 0–4)
SODIUM SERPL-SCNC: 137 MMOL/L (ref 136–145)
SP GR UR STRIP: 1 (ref 1–1.03)
SP GR UR STRIP: 1 (ref 1–1.03)
SQUAMOUS #/AREA URNS HPF: 2 /HPF
URN SPEC COLLECT METH UR: ABNORMAL
UROBILINOGEN UR STRIP-ACNC: ABNORMAL (ref 0.1–1.1)
UROBILINOGEN UR STRIP-ACNC: NEGATIVE EU/DL
WBC # BLD AUTO: 7.56 K/UL (ref 3.9–12.7)
WBC #/AREA URNS HPF: 13 /HPF (ref 0–5)

## 2021-12-01 PROCEDURE — 36415 COLL VENOUS BLD VENIPUNCTURE: CPT | Performed by: NURSE PRACTITIONER

## 2021-12-01 PROCEDURE — 99283 EMERGENCY DEPT VISIT LOW MDM: CPT

## 2021-12-01 PROCEDURE — 80053 COMPREHEN METABOLIC PANEL: CPT | Performed by: NURSE PRACTITIONER

## 2021-12-01 PROCEDURE — 85025 COMPLETE CBC W/AUTO DIFF WBC: CPT | Performed by: NURSE PRACTITIONER

## 2021-12-01 PROCEDURE — 85610 PROTHROMBIN TIME: CPT | Performed by: NURSE PRACTITIONER

## 2021-12-01 PROCEDURE — 25000003 PHARM REV CODE 250: Performed by: STUDENT IN AN ORGANIZED HEALTH CARE EDUCATION/TRAINING PROGRAM

## 2021-12-01 PROCEDURE — 99499 NO LOS: ICD-10-PCS | Mod: S$GLB,,, | Performed by: NURSE PRACTITIONER

## 2021-12-01 PROCEDURE — 99499 UNLISTED E&M SERVICE: CPT | Mod: S$GLB,,, | Performed by: NURSE PRACTITIONER

## 2021-12-01 PROCEDURE — 81003 URINALYSIS AUTO W/O SCOPE: CPT | Mod: QW,S$GLB,, | Performed by: NURSE PRACTITIONER

## 2021-12-01 PROCEDURE — 81001 URINALYSIS AUTO W/SCOPE: CPT | Performed by: NURSE PRACTITIONER

## 2021-12-01 PROCEDURE — 81003 POCT URINALYSIS, DIPSTICK, AUTOMATED, W/O SCOPE: ICD-10-PCS | Mod: QW,S$GLB,, | Performed by: NURSE PRACTITIONER

## 2021-12-01 PROCEDURE — 87086 URINE CULTURE/COLONY COUNT: CPT | Performed by: NURSE PRACTITIONER

## 2021-12-01 RX ORDER — NITROFURANTOIN 25; 75 MG/1; MG/1
100 CAPSULE ORAL 2 TIMES DAILY
Qty: 10 CAPSULE | Refills: 0 | Status: SHIPPED | OUTPATIENT
Start: 2021-12-01 | End: 2021-12-06

## 2021-12-01 RX ORDER — CARVEDILOL 6.25 MG/1
6.25 TABLET ORAL ONCE
Status: COMPLETED | OUTPATIENT
Start: 2021-12-01 | End: 2021-12-01

## 2021-12-01 RX ADMIN — CARVEDILOL 6.25 MG: 6.25 TABLET, FILM COATED ORAL at 09:12

## 2021-12-01 RX ADMIN — POTASSIUM BICARBONATE 40 MEQ: 391 TABLET, EFFERVESCENT ORAL at 10:12

## 2021-12-02 ENCOUNTER — PATIENT MESSAGE (OUTPATIENT)
Dept: CARDIOLOGY | Facility: CLINIC | Age: 81
End: 2021-12-02
Payer: MEDICARE

## 2021-12-02 RX ORDER — CIPROFLOXACIN 500 MG/1
500 TABLET ORAL 2 TIMES DAILY
Qty: 10 TABLET | Refills: 0 | Status: SHIPPED | OUTPATIENT
Start: 2021-12-02 | End: 2021-12-10 | Stop reason: CLARIF

## 2021-12-02 RX ORDER — CIPROFLOXACIN 500 MG/1
500 TABLET ORAL 2 TIMES DAILY
COMMUNITY
End: 2021-12-02 | Stop reason: SDUPTHER

## 2021-12-03 LAB
BACTERIA UR CULT: NORMAL
BACTERIA UR CULT: NORMAL

## 2021-12-06 ENCOUNTER — TELEPHONE (OUTPATIENT)
Dept: CARDIOLOGY | Facility: CLINIC | Age: 81
End: 2021-12-06
Payer: MEDICARE

## 2021-12-06 ENCOUNTER — PATIENT MESSAGE (OUTPATIENT)
Dept: CARDIOLOGY | Facility: HOSPITAL | Age: 81
End: 2021-12-06
Payer: MEDICARE

## 2021-12-07 ENCOUNTER — TELEPHONE (OUTPATIENT)
Dept: FAMILY MEDICINE | Facility: CLINIC | Age: 81
End: 2021-12-07
Payer: MEDICARE

## 2021-12-07 ENCOUNTER — PATIENT MESSAGE (OUTPATIENT)
Dept: FAMILY MEDICINE | Facility: CLINIC | Age: 81
End: 2021-12-07
Payer: MEDICARE

## 2021-12-10 ENCOUNTER — HOSPITAL ENCOUNTER (OUTPATIENT)
Dept: PREADMISSION TESTING | Facility: HOSPITAL | Age: 81
Discharge: HOME | End: 2021-12-10
Attending: INTERNAL MEDICINE
Payer: MEDICARE

## 2021-12-10 VITALS — BODY MASS INDEX: 23.3 KG/M2 | HEIGHT: 66 IN | WEIGHT: 145 LBS

## 2021-12-10 DIAGNOSIS — R79.1 ABNORMAL COAGULATION PROFILE: ICD-10-CM

## 2021-12-10 DIAGNOSIS — R94.5 ABNORMAL RESULTS OF LIVER FUNCTION STUDIES: ICD-10-CM

## 2021-12-10 DIAGNOSIS — R93.1 ABNORMAL NUCLEAR CARDIAC IMAGING TEST: ICD-10-CM

## 2021-12-10 LAB
ALBUMIN SERPL BCP-MCNC: 4.2 G/DL (ref 3.5–5.2)
ALP SERPL-CCNC: 55 U/L (ref 55–135)
ALT SERPL W/O P-5'-P-CCNC: 18 U/L (ref 10–44)
ANION GAP SERPL CALC-SCNC: 13 MMOL/L (ref 8–16)
APTT PPP: 32.8 SEC (ref 23.3–35.1)
AST SERPL-CCNC: 23 U/L (ref 10–40)
BASOPHILS # BLD AUTO: 0.02 K/UL (ref 0–0.2)
BASOPHILS NFR BLD: 0.3 % (ref 0–1.9)
BILIRUB SERPL-MCNC: 0.8 MG/DL (ref 0.1–1)
BUN SERPL-MCNC: 22 MG/DL (ref 8–23)
CALCIUM SERPL-MCNC: 9.6 MG/DL (ref 8.7–10.5)
CHLORIDE SERPL-SCNC: 93 MMOL/L (ref 95–110)
CO2 SERPL-SCNC: 27 MMOL/L (ref 23–29)
CREAT SERPL-MCNC: 0.8 MG/DL (ref 0.5–1.4)
DIFFERENTIAL METHOD: ABNORMAL
EOSINOPHIL # BLD AUTO: 0.1 K/UL (ref 0–0.5)
EOSINOPHIL NFR BLD: 1.2 % (ref 0–8)
ERYTHROCYTE [DISTWIDTH] IN BLOOD BY AUTOMATED COUNT: 13.6 % (ref 11.5–14.5)
EST. GFR  (AFRICAN AMERICAN): >60 ML/MIN/1.73 M^2
EST. GFR  (NON AFRICAN AMERICAN): >60 ML/MIN/1.73 M^2
GLUCOSE SERPL-MCNC: 127 MG/DL (ref 70–110)
HCT VFR BLD AUTO: 37.8 % (ref 37–48.5)
HGB BLD-MCNC: 12 G/DL (ref 12–16)
IMM GRANULOCYTES # BLD AUTO: 0.03 K/UL (ref 0–0.04)
IMM GRANULOCYTES NFR BLD AUTO: 0.4 % (ref 0–0.5)
INR PPP: 1.4
LYMPHOCYTES # BLD AUTO: 1.4 K/UL (ref 1–4.8)
LYMPHOCYTES NFR BLD: 18.6 % (ref 18–48)
MCH RBC QN AUTO: 27.5 PG (ref 27–31)
MCHC RBC AUTO-ENTMCNC: 31.7 G/DL (ref 32–36)
MCV RBC AUTO: 87 FL (ref 82–98)
MONOCYTES # BLD AUTO: 0.6 K/UL (ref 0.3–1)
MONOCYTES NFR BLD: 8.5 % (ref 4–15)
NEUTROPHILS # BLD AUTO: 5.3 K/UL (ref 1.8–7.7)
NEUTROPHILS NFR BLD: 71 % (ref 38–73)
NRBC BLD-RTO: 0 /100 WBC
PLATELET # BLD AUTO: 155 K/UL (ref 150–450)
PMV BLD AUTO: 12.2 FL (ref 9.2–12.9)
POTASSIUM SERPL-SCNC: 3.7 MMOL/L (ref 3.5–5.1)
PROT SERPL-MCNC: 8 G/DL (ref 6–8.4)
PROTHROMBIN TIME: 15.8 SEC (ref 11.4–13.7)
RBC # BLD AUTO: 4.36 M/UL (ref 4–5.4)
SODIUM SERPL-SCNC: 133 MMOL/L (ref 136–145)
WBC # BLD AUTO: 7.49 K/UL (ref 3.9–12.7)

## 2021-12-10 PROCEDURE — 85610 PROTHROMBIN TIME: CPT | Performed by: NURSE PRACTITIONER

## 2021-12-10 PROCEDURE — 36415 COLL VENOUS BLD VENIPUNCTURE: CPT | Performed by: NURSE PRACTITIONER

## 2021-12-10 PROCEDURE — 93010 ELECTROCARDIOGRAM REPORT: CPT | Mod: ,,, | Performed by: INTERNAL MEDICINE

## 2021-12-10 PROCEDURE — 93010 EKG 12-LEAD: ICD-10-PCS | Mod: ,,, | Performed by: INTERNAL MEDICINE

## 2021-12-10 PROCEDURE — 85730 THROMBOPLASTIN TIME PARTIAL: CPT | Performed by: NURSE PRACTITIONER

## 2021-12-10 PROCEDURE — 93005 ELECTROCARDIOGRAM TRACING: CPT | Performed by: INTERNAL MEDICINE

## 2021-12-10 PROCEDURE — 80053 COMPREHEN METABOLIC PANEL: CPT | Performed by: NURSE PRACTITIONER

## 2021-12-10 PROCEDURE — 85025 COMPLETE CBC W/AUTO DIFF WBC: CPT | Performed by: NURSE PRACTITIONER

## 2021-12-14 ENCOUNTER — HOSPITAL ENCOUNTER (OUTPATIENT)
Facility: HOSPITAL | Age: 81
Discharge: HOME | End: 2021-12-14
Attending: INTERNAL MEDICINE | Admitting: INTERNAL MEDICINE
Payer: MEDICARE

## 2021-12-14 VITALS
SYSTOLIC BLOOD PRESSURE: 125 MMHG | RESPIRATION RATE: 20 BRPM | OXYGEN SATURATION: 96 % | HEART RATE: 60 BPM | DIASTOLIC BLOOD PRESSURE: 61 MMHG

## 2021-12-14 DIAGNOSIS — R93.1 ABNORMAL NUCLEAR CARDIAC IMAGING TEST: ICD-10-CM

## 2021-12-14 DIAGNOSIS — Z01.818 PRE-OP TESTING: ICD-10-CM

## 2021-12-14 DIAGNOSIS — R07.9 CHEST PAIN: Primary | ICD-10-CM

## 2021-12-14 LAB — SARS-COV-2 RDRP RESP QL NAA+PROBE: NEGATIVE

## 2021-12-14 PROCEDURE — U0002 COVID-19 LAB TEST NON-CDC: HCPCS | Performed by: INTERNAL MEDICINE

## 2021-12-14 PROCEDURE — 25000003 PHARM REV CODE 250: Performed by: NURSE PRACTITIONER

## 2021-12-14 PROCEDURE — C1894 INTRO/SHEATH, NON-LASER: HCPCS | Performed by: INTERNAL MEDICINE

## 2021-12-14 PROCEDURE — 93458 L HRT ARTERY/VENTRICLE ANGIO: CPT | Mod: 26,,, | Performed by: INTERNAL MEDICINE

## 2021-12-14 PROCEDURE — 25500020 PHARM REV CODE 255: Performed by: INTERNAL MEDICINE

## 2021-12-14 PROCEDURE — 93458 L HRT ARTERY/VENTRICLE ANGIO: CPT | Performed by: INTERNAL MEDICINE

## 2021-12-14 PROCEDURE — 63600175 PHARM REV CODE 636 W HCPCS: Performed by: INTERNAL MEDICINE

## 2021-12-14 PROCEDURE — C1760 CLOSURE DEV, VASC: HCPCS | Performed by: INTERNAL MEDICINE

## 2021-12-14 PROCEDURE — 99152 PR MOD CONSCIOUS SEDATION, SAME PHYS, 5+ YRS, FIRST 15 MIN: ICD-10-PCS | Mod: ,,, | Performed by: INTERNAL MEDICINE

## 2021-12-14 PROCEDURE — C1887 CATHETER, GUIDING: HCPCS | Performed by: INTERNAL MEDICINE

## 2021-12-14 PROCEDURE — 99152 MOD SED SAME PHYS/QHP 5/>YRS: CPT | Performed by: INTERNAL MEDICINE

## 2021-12-14 PROCEDURE — 93458 PR CATH PLACE/CORON ANGIO, IMG SUPER/INTERP,W LEFT HEART VENTRICULOGRAPHY: ICD-10-PCS | Mod: 26,,, | Performed by: INTERNAL MEDICINE

## 2021-12-14 PROCEDURE — C1769 GUIDE WIRE: HCPCS | Performed by: INTERNAL MEDICINE

## 2021-12-14 PROCEDURE — 99152 MOD SED SAME PHYS/QHP 5/>YRS: CPT | Mod: ,,, | Performed by: INTERNAL MEDICINE

## 2021-12-14 PROCEDURE — 25000003 PHARM REV CODE 250: Performed by: INTERNAL MEDICINE

## 2021-12-14 DEVICE — DEVICE CLOSURE VASCADE 5FR: Type: IMPLANTABLE DEVICE | Site: GROIN | Status: FUNCTIONAL

## 2021-12-14 RX ORDER — FENTANYL CITRATE 50 UG/ML
INJECTION, SOLUTION INTRAMUSCULAR; INTRAVENOUS
Status: DISCONTINUED | OUTPATIENT
Start: 2021-12-14 | End: 2021-12-14 | Stop reason: HOSPADM

## 2021-12-14 RX ORDER — AMLODIPINE BESYLATE 5 MG/1
5 TABLET ORAL ONCE
Status: DISCONTINUED | OUTPATIENT
Start: 2021-12-14 | End: 2021-12-14 | Stop reason: HOSPADM

## 2021-12-14 RX ORDER — DIPHENHYDRAMINE HCL 25 MG
50 CAPSULE ORAL
Status: DISCONTINUED | OUTPATIENT
Start: 2021-12-14 | End: 2021-12-14 | Stop reason: HOSPADM

## 2021-12-14 RX ORDER — LIDOCAINE HYDROCHLORIDE 10 MG/ML
INJECTION INFILTRATION; PERINEURAL
Status: DISCONTINUED | OUTPATIENT
Start: 2021-12-14 | End: 2021-12-14 | Stop reason: HOSPADM

## 2021-12-14 RX ORDER — ACETAMINOPHEN 325 MG/1
650 TABLET ORAL ONCE
Status: COMPLETED | OUTPATIENT
Start: 2021-12-14 | End: 2021-12-14

## 2021-12-14 RX ORDER — MIDAZOLAM HYDROCHLORIDE 1 MG/ML
INJECTION INTRAMUSCULAR; INTRAVENOUS
Status: DISCONTINUED | OUTPATIENT
Start: 2021-12-14 | End: 2021-12-14 | Stop reason: HOSPADM

## 2021-12-14 RX ORDER — SODIUM CHLORIDE 450 MG/100ML
INJECTION, SOLUTION INTRAVENOUS CONTINUOUS
Status: DISCONTINUED | OUTPATIENT
Start: 2021-12-14 | End: 2021-12-14 | Stop reason: HOSPADM

## 2021-12-14 RX ADMIN — ACETAMINOPHEN 650 MG: 325 TABLET, FILM COATED ORAL at 02:12

## 2021-12-14 RX ADMIN — SODIUM CHLORIDE: 0.45 INJECTION, SOLUTION INTRAVENOUS at 07:12

## 2021-12-14 RX ADMIN — DIPHENHYDRAMINE HYDROCHLORIDE 50 MG: 25 CAPSULE ORAL at 07:12

## 2021-12-15 LAB — CATH EF QUANTITATIVE: 60 %

## 2021-12-22 ENCOUNTER — OFFICE VISIT (OUTPATIENT)
Dept: PULMONOLOGY | Facility: CLINIC | Age: 81
End: 2021-12-22
Payer: MEDICARE

## 2021-12-22 VITALS
HEART RATE: 78 BPM | HEIGHT: 66 IN | BODY MASS INDEX: 22.98 KG/M2 | WEIGHT: 143 LBS | OXYGEN SATURATION: 95 % | DIASTOLIC BLOOD PRESSURE: 85 MMHG | SYSTOLIC BLOOD PRESSURE: 181 MMHG

## 2021-12-22 DIAGNOSIS — R06.02 SHORTNESS OF BREATH: ICD-10-CM

## 2021-12-22 DIAGNOSIS — G47.34 NOCTURNAL HYPOXEMIA: ICD-10-CM

## 2021-12-22 DIAGNOSIS — G47.33 OSA (OBSTRUCTIVE SLEEP APNEA): Primary | ICD-10-CM

## 2021-12-22 PROCEDURE — G0008 FLU VACCINE - QUADRIVALENT - HIGH DOSE (65+) PRESERVATIVE FREE IM: ICD-10-PCS | Mod: S$GLB,,, | Performed by: INTERNAL MEDICINE

## 2021-12-22 PROCEDURE — 90662 FLU VACCINE - QUADRIVALENT - HIGH DOSE (65+) PRESERVATIVE FREE IM: ICD-10-PCS | Mod: S$GLB,,, | Performed by: INTERNAL MEDICINE

## 2021-12-22 PROCEDURE — 99214 PR OFFICE/OUTPT VISIT, EST, LEVL IV, 30-39 MIN: ICD-10-PCS | Mod: 25,S$GLB,, | Performed by: INTERNAL MEDICINE

## 2021-12-22 PROCEDURE — 99214 OFFICE O/P EST MOD 30 MIN: CPT | Mod: 25,S$GLB,, | Performed by: INTERNAL MEDICINE

## 2021-12-22 PROCEDURE — 90662 IIV NO PRSV INCREASED AG IM: CPT | Mod: S$GLB,,, | Performed by: INTERNAL MEDICINE

## 2021-12-22 PROCEDURE — G0008 ADMIN INFLUENZA VIRUS VAC: HCPCS | Mod: S$GLB,,, | Performed by: INTERNAL MEDICINE

## 2021-12-28 ENCOUNTER — OFFICE VISIT (OUTPATIENT)
Dept: CARDIOLOGY | Facility: CLINIC | Age: 81
End: 2021-12-28
Payer: MEDICARE

## 2021-12-28 VITALS
SYSTOLIC BLOOD PRESSURE: 120 MMHG | HEART RATE: 73 BPM | WEIGHT: 142.81 LBS | DIASTOLIC BLOOD PRESSURE: 80 MMHG | RESPIRATION RATE: 16 BRPM | BODY MASS INDEX: 22.95 KG/M2 | HEIGHT: 66 IN | OXYGEN SATURATION: 96 %

## 2021-12-28 DIAGNOSIS — I25.10 CORONARY ARTERY DISEASE INVOLVING NATIVE CORONARY ARTERY OF NATIVE HEART WITHOUT ANGINA PECTORIS: Primary | ICD-10-CM

## 2021-12-28 DIAGNOSIS — G47.33 OSA (OBSTRUCTIVE SLEEP APNEA): ICD-10-CM

## 2021-12-28 DIAGNOSIS — E78.2 MIXED HYPERLIPIDEMIA: ICD-10-CM

## 2021-12-28 DIAGNOSIS — I48.0 PAROXYSMAL ATRIAL FIBRILLATION: ICD-10-CM

## 2021-12-28 DIAGNOSIS — I10 WHITE COAT SYNDROME WITH DIAGNOSIS OF HYPERTENSION: ICD-10-CM

## 2021-12-28 DIAGNOSIS — E03.9 ACQUIRED HYPOTHYROIDISM: ICD-10-CM

## 2021-12-28 DIAGNOSIS — R93.1 ABNORMAL NUCLEAR CARDIAC IMAGING TEST: ICD-10-CM

## 2021-12-28 DIAGNOSIS — I10 ESSENTIAL HYPERTENSION: ICD-10-CM

## 2021-12-28 DIAGNOSIS — K27.9 PEPTIC ULCER DISEASE: ICD-10-CM

## 2021-12-28 DIAGNOSIS — J43.1 PANLOBULAR EMPHYSEMA: ICD-10-CM

## 2021-12-28 PROCEDURE — 99214 PR OFFICE/OUTPT VISIT, EST, LEVL IV, 30-39 MIN: ICD-10-PCS | Mod: S$GLB,,, | Performed by: NURSE PRACTITIONER

## 2021-12-28 PROCEDURE — 99214 OFFICE O/P EST MOD 30 MIN: CPT | Mod: S$GLB,,, | Performed by: NURSE PRACTITIONER

## 2021-12-28 RX ORDER — AMLODIPINE BESYLATE 2.5 MG/1
2.5 TABLET ORAL DAILY
Qty: 30 TABLET | Refills: 11 | Status: SHIPPED | OUTPATIENT
Start: 2021-12-28 | End: 2022-05-25

## 2022-01-04 ENCOUNTER — HOSPITAL ENCOUNTER (OUTPATIENT)
Dept: PREADMISSION TESTING | Facility: HOSPITAL | Age: 82
Discharge: HOME | End: 2022-01-04
Attending: NURSE PRACTITIONER
Payer: MEDICARE

## 2022-01-04 DIAGNOSIS — Z01.818 OTHER SPECIFIED PRE-OPERATIVE EXAMINATION: ICD-10-CM

## 2022-01-04 LAB — SARS-COV-2 RDRP RESP QL NAA+PROBE: NEGATIVE

## 2022-01-04 PROCEDURE — U0002 COVID-19 LAB TEST NON-CDC: HCPCS | Performed by: NURSE PRACTITIONER

## 2022-01-05 ENCOUNTER — PROCEDURE VISIT (OUTPATIENT)
Dept: SLEEP MEDICINE | Facility: HOSPITAL | Age: 82
End: 2022-01-05
Attending: INTERNAL MEDICINE
Payer: MEDICARE

## 2022-01-05 DIAGNOSIS — G47.33 OSA (OBSTRUCTIVE SLEEP APNEA): ICD-10-CM

## 2022-01-05 PROCEDURE — 95811 POLYSOM 6/>YRS CPAP 4/> PARM: CPT

## 2022-01-06 ENCOUNTER — TELEPHONE (OUTPATIENT)
Dept: PULMONOLOGY | Facility: HOSPITAL | Age: 82
End: 2022-01-06
Payer: MEDICARE

## 2022-01-06 DIAGNOSIS — G47.33 OSA (OBSTRUCTIVE SLEEP APNEA): Primary | ICD-10-CM

## 2022-01-06 NOTE — TELEPHONE ENCOUNTER
Patient needs 6cm CPAP with 1 liter of oxygen bled in with an Airfit N30i mask.  Told her and ordered it.

## 2022-01-10 ENCOUNTER — OFFICE VISIT (OUTPATIENT)
Dept: FAMILY MEDICINE | Facility: CLINIC | Age: 82
End: 2022-01-10
Payer: MEDICARE

## 2022-01-10 VITALS
WEIGHT: 144.63 LBS | DIASTOLIC BLOOD PRESSURE: 60 MMHG | SYSTOLIC BLOOD PRESSURE: 130 MMHG | HEART RATE: 64 BPM | TEMPERATURE: 98 F | HEIGHT: 66 IN | BODY MASS INDEX: 23.24 KG/M2 | OXYGEN SATURATION: 98 %

## 2022-01-10 DIAGNOSIS — G47.33 OSA (OBSTRUCTIVE SLEEP APNEA): ICD-10-CM

## 2022-01-10 DIAGNOSIS — I25.10 CORONARY ARTERY DISEASE INVOLVING NATIVE CORONARY ARTERY OF NATIVE HEART WITHOUT ANGINA PECTORIS: ICD-10-CM

## 2022-01-10 DIAGNOSIS — E03.4 HYPOTHYROIDISM DUE TO ACQUIRED ATROPHY OF THYROID: ICD-10-CM

## 2022-01-10 DIAGNOSIS — R73.9 HYPERGLYCEMIA: ICD-10-CM

## 2022-01-10 DIAGNOSIS — I48.19 OTHER PERSISTENT ATRIAL FIBRILLATION: ICD-10-CM

## 2022-01-10 DIAGNOSIS — E78.2 MIXED HYPERLIPIDEMIA: ICD-10-CM

## 2022-01-10 DIAGNOSIS — J43.1 PANLOBULAR EMPHYSEMA: ICD-10-CM

## 2022-01-10 DIAGNOSIS — I10 HTN (HYPERTENSION), BENIGN: Primary | ICD-10-CM

## 2022-01-10 DIAGNOSIS — I27.20 PULMONARY HYPERTENSION: ICD-10-CM

## 2022-01-10 DIAGNOSIS — I34.0 NONRHEUMATIC MITRAL VALVE REGURGITATION: ICD-10-CM

## 2022-01-10 PROCEDURE — 99214 PR OFFICE/OUTPT VISIT, EST, LEVL IV, 30-39 MIN: ICD-10-PCS | Mod: S$PBB,,, | Performed by: FAMILY MEDICINE

## 2022-01-10 PROCEDURE — 99999 PR PBB SHADOW E&M-EST. PATIENT-LVL IV: ICD-10-PCS | Mod: PBBFAC,,, | Performed by: FAMILY MEDICINE

## 2022-01-10 PROCEDURE — 99214 OFFICE O/P EST MOD 30 MIN: CPT | Mod: S$PBB,,, | Performed by: FAMILY MEDICINE

## 2022-01-10 PROCEDURE — 99999 PR PBB SHADOW E&M-EST. PATIENT-LVL IV: CPT | Mod: PBBFAC,,, | Performed by: FAMILY MEDICINE

## 2022-01-10 PROCEDURE — 99214 OFFICE O/P EST MOD 30 MIN: CPT | Mod: PBBFAC,PN | Performed by: FAMILY MEDICINE

## 2022-01-13 PROBLEM — I27.20 PULMONARY HYPERTENSION: Status: ACTIVE | Noted: 2022-01-13

## 2022-01-13 PROBLEM — I34.0 NONRHEUMATIC MITRAL VALVE REGURGITATION: Status: ACTIVE | Noted: 2022-01-13

## 2022-01-13 NOTE — PROGRESS NOTES
Subjective:       Patient ID: Jacqueline Mathias is a 81 y.o. female.    Chief Complaint: Hypertension, Hyperlipidemia, Hypothyroidism, Atrial Fibrillation, Coronary Artery Disease, and COPD    Patient presents here for 6 month follow-up of hypertension, hyperlipidemia, hypothyroidism, atrial fibrillation, CAD, and COPD.  She was recently diagnosed with severe obstructive sleep apnea and need CPAP.  She was having shortness of breath and she was evaluated from both cardiac and pulmonary standpoint.  On the cardiac side, she underwent echocardiogram which showed pulmonary hypertension, ejection fraction of 60%, moderate to severe mitral regurgitation, and moderate tricuspid regurgitation.  She also underwent cardiac catheterization which was reviewed in the chart.  She had several nonobstructing lesions in the 35-40% range but none that were causing her symptoms.  Her hypertension is well controlled with her present medication as well as her low-sodium diet.  Her hyperlipidemia is also well controlled with her present use of pravastatin and her low-fat low-cholesterol diet.  Her hypothyroidism is being treated with levothyroxine 25 mcg daily and her most recent TSH was on 05/21/2020 and was 3.16.  Her atrial fibrillation shows a controlled heart rate and she is presently taking Eliquis as an anticoagulant.  She is not having any significant bruising or bleeding with the Eliquis.  Pulmonary hypertension is also being treated with her present blood pressure medications which includes an ACE-inhibitor.  Her coronary artery disease is nonobstructive as noted above and she is not having any chest pains or palpitations.  She does have COPD and this is also stable and she is followed by Pulmonary.  As far as health maintenance, she is up-to-date with all of her recommended screening exams and immunizations with exception of shingles vaccine.    Hypertension  This is a chronic problem. The problem is unchanged. The problem is  controlled. Associated symptoms include shortness of breath. Pertinent negatives include no chest pain, headaches or palpitations. Risk factors for coronary artery disease include dyslipidemia and post-menopausal state. Past treatments include calcium channel blockers, ACE inhibitors, beta blockers and diuretics. The current treatment provides moderate improvement. Compliance problems include exercise.  Hypertensive end-organ damage includes CAD/MI. There is no history of kidney disease, CVA or heart failure.   Hyperlipidemia  This is a chronic problem. The problem is controlled. Recent lipid tests were reviewed and are normal. Exacerbating diseases include hypothyroidism. Factors aggravating her hyperlipidemia include thiazides and beta blockers. Associated symptoms include shortness of breath. Pertinent negatives include no chest pain. Current antihyperlipidemic treatment includes statins. The current treatment provides significant improvement of lipids. Compliance problems include adherence to exercise.  Risk factors for coronary artery disease include dyslipidemia, hypertension and post-menopausal.     Review of Systems   Constitutional: Positive for fatigue (Due to JACQUELINE). Negative for chills, fever and unexpected weight change.   HENT: Negative for nasal congestion, ear pain, postnasal drip and sore throat.    Respiratory: Positive for shortness of breath. Negative for cough.    Cardiovascular: Negative for chest pain and palpitations.   Gastrointestinal: Negative for abdominal pain, diarrhea, nausea and vomiting.   Musculoskeletal: Negative for arthralgias and back pain.   Neurological: Negative for dizziness, light-headedness and headaches.   Hematological: Negative for adenopathy. Does not bruise/bleed easily.   Psychiatric/Behavioral: Negative for sleep disturbance. The patient is not nervous/anxious.          Objective:      Physical Exam  Vitals reviewed.   Constitutional:       General: She is not in  acute distress.     Appearance: Normal appearance. She is well-developed and well-nourished.   HENT:      Right Ear: Tympanic membrane and external ear normal.      Left Ear: Tympanic membrane and external ear normal.      Mouth/Throat:      Mouth: Oropharynx is clear and moist.      Pharynx: Oropharynx is clear. No posterior oropharyngeal erythema.   Eyes:      Extraocular Movements: EOM normal.   Neck:      Thyroid: No thyromegaly.   Cardiovascular:      Rate and Rhythm: Normal rate. Rhythm irregular.      Pulses: Normal pulses and intact distal pulses.      Heart sounds: Normal heart sounds. No murmur heard.      Pulmonary:      Effort: Pulmonary effort is normal.      Breath sounds: Normal breath sounds. No wheezing or rales.   Musculoskeletal:         General: No tenderness or edema. Normal range of motion.      Cervical back: Normal range of motion and neck supple.      Right lower leg: No edema.      Left lower leg: No edema.   Lymphadenopathy:      Cervical: No cervical adenopathy.   Neurological:      General: No focal deficit present.      Mental Status: She is alert and oriented to person, place, and time.      Cranial Nerves: No cranial nerve deficit.      Deep Tendon Reflexes: Reflexes are normal and symmetric.   Psychiatric:         Mood and Affect: Mood and affect normal.         Assessment:       Problem List Items Addressed This Visit     HTN (hypertension), benign - Primary    Relevant Orders    Hypertension [facility] ([facility]) Enrollment Order (Completed)    Hypertension [facility] ([facility]): Assign Onboarding Questionnaires (Completed)    COPD (chronic obstructive pulmonary disease)    Hypothyroidism due to acquired atrophy of thyroid    Relevant Orders    TSH    Mixed hyperlipidemia    Relevant Orders    Lipid Panel    Other persistent atrial fibrillation    Relevant Medications    apixaban (ELIQUIS) 5 mg Tab    Coronary artery disease involving native coronary artery of native heart  without angina pectoris    JACQUELINE (obstructive sleep apnea)    Pulmonary hypertension    Nonrheumatic mitral valve regurgitation      Other Visit Diagnoses     Hyperglycemia        Relevant Orders    Hemoglobin A1C          Plan:       1. Continue present medication as her hypertension, hyperlipidemia, hypothyroidism, atrial fibrillation, CAD, and COPD are all stable and controlled  2. Continue low-sodium, low-fat low-cholesterol diet.  Her BMI is 23.34  3. Begin use of CPAP when she gets this and follow-up with Pulmonary as needed  4. Follow up with Cardiology as scheduled  5. CMP, lipid profile, TSH, CBC, hemoglobin A1c in March of 2022 (prior to her Cardiology appointment)  6. Follow up with me in 6 months or p.r.n.

## 2022-01-18 NOTE — TELEPHONE ENCOUNTER
Spoke to pt this morning and her grand daughter is sending her a CPAP machine which will arrive 1/20/22.  So I called and canceled her cpap order with [facility] and send the orders to [facility] to adjust the settings on pts machine which she will bring in to them and to supply her with all supplies needed.

## 2022-02-17 DIAGNOSIS — I10 HTN (HYPERTENSION), BENIGN: ICD-10-CM

## 2022-02-17 DIAGNOSIS — E78.2 MIXED HYPERLIPIDEMIA: ICD-10-CM

## 2022-02-17 RX ORDER — PRAVASTATIN SODIUM 20 MG/1
20 TABLET ORAL NIGHTLY
Qty: 90 TABLET | Refills: 3 | Status: SHIPPED | OUTPATIENT
Start: 2022-02-17 | End: 2022-12-29

## 2022-02-17 RX ORDER — POTASSIUM CHLORIDE 750 MG/1
CAPSULE, EXTENDED RELEASE ORAL
Qty: 90 CAPSULE | Refills: 3 | Status: SHIPPED | OUTPATIENT
Start: 2022-02-17 | End: 2022-06-07

## 2022-02-17 NOTE — TELEPHONE ENCOUNTER
Care Due:                  Date            Visit Type   Department     Provider  --------------------------------------------------------------------------------                                EP -                              PRIMARY      [facility]  Last Visit: 01-      CARE ([facility])   PRACTICE       Brice Weaver                              EP -                              PRIMARY      [facility]  Next Visit: 07-      CARE ([facility])   PRACTICE       Brice Weaver                                                            Last  Test          Frequency    Reason                     Performed    Due Date  --------------------------------------------------------------------------------    Lipid Panel.  12 months..  pravastatin..............  01- 01-    Powered by [email or web address]. Reference number: [id number].   2/17/2022 1:06:19 PM CST

## 2022-03-16 ENCOUNTER — OFFICE VISIT (OUTPATIENT)
Dept: PULMONOLOGY | Facility: CLINIC | Age: 82
End: 2022-03-16
Payer: MEDICARE

## 2022-03-16 VITALS
SYSTOLIC BLOOD PRESSURE: 140 MMHG | DIASTOLIC BLOOD PRESSURE: 82 MMHG | WEIGHT: 146 LBS | OXYGEN SATURATION: 98 % | BODY MASS INDEX: 23.57 KG/M2 | HEART RATE: 70 BPM

## 2022-03-16 DIAGNOSIS — G47.33 OSA (OBSTRUCTIVE SLEEP APNEA): Primary | ICD-10-CM

## 2022-03-16 DIAGNOSIS — R06.02 SHORTNESS OF BREATH: ICD-10-CM

## 2022-03-16 DIAGNOSIS — Q76.49: ICD-10-CM

## 2022-03-16 DIAGNOSIS — G47.34 NOCTURNAL HYPOXEMIA: ICD-10-CM

## 2022-03-16 PROCEDURE — 99214 PR OFFICE/OUTPT VISIT, EST, LEVL IV, 30-39 MIN: ICD-10-PCS | Mod: S$GLB,,, | Performed by: INTERNAL MEDICINE

## 2022-03-16 PROCEDURE — 99214 OFFICE O/P EST MOD 30 MIN: CPT | Mod: S$GLB,,, | Performed by: INTERNAL MEDICINE

## 2022-03-16 RX ORDER — ASPIRIN 81 MG/1
81 TABLET ORAL
Status: CANCELLED | OUTPATIENT
Start: 2022-03-16

## 2022-03-16 NOTE — PROGRESS NOTES
SUBJECTIVE:    Patient ID: Jacqueline Mathias is a 82 y.o. female.    Chief Complaint: Apnea (Follow up sarah )    HPI   The patient is here with her CPAP and oxygen to sleep  She is still feeling exhausted. She is still not used to her machine.  She is very compliant with 28/30 days and an average of 7 hrs, 30 minutes.  AHI is 0.8.  She is asking about Inspire.  She feels very constricted with the nocturnal oxygen and CPAP.  She feels she cannot travel.    Past Medical History:   Diagnosis Date    Allergy     latex    Allergy     seasonal    Arthritis     Asthma     Blood transfusion     Cataract     removed    COPD (chronic obstructive pulmonary disease)     Hypertension     IBS (irritable bowel syndrome)     Thyroid nodule 09/29/2016    Ulcer      Past Surgical History:   Procedure Laterality Date    ANGIOGRAM, CORONARY, WITH LEFT HEART CATHETERIZATION N/A 12/14/2021    Procedure: Angiogram, Coronary, with Left Heart Cath;  Surgeon: Napoleon Dumas MD;  Location: [facility] CATH/EP LAB;  Service: Cardiology;  Laterality: N/A;    CAROTID ARTERY ANGIOPLASTY Right 09/30/2016    CHOLECYSTECTOMY      EYE SURGERY      cataract removal    HYSTERECTOMY       Family History   Problem Relation Age of Onset    Stroke Mother     Heart disease Mother     Heart disease Sister     Alzheimer's disease Sister     Cancer Brother     Cancer Brother     Kidney disease Brother     Breast cancer Maternal Aunt         Social History:   Marital Status:   Occupation: Data Unavailable  Alcohol History:  reports current alcohol use.  Tobacco History:  reports that she has never smoked. She has never used smokeless tobacco.  Drug History:  reports no history of drug use.    Review of patient's allergies indicates:   Allergen Reactions    Latex, natural rubber      rash    Biaxin [clarithromycin] Diarrhea and Nausea Only    Codeine Itching    Oxycodone      Knocks pt out for 3 days       Current Outpatient  Medications   Medication Sig Dispense Refill    amLODIPine (NORVASC) 2.5 MG tablet Take 1 tablet (2.5 mg total) by mouth once daily. 30 tablet 11    apixaban (ELIQUIS) 5 mg Tab Take 1 tablet (5 mg total) by mouth 2 (two) times daily. 180 tablet 3    ascorbic acid, vitamin C, (VITAMIN C) 1000 MG tablet Take 1,000 mg by mouth once daily.      aspirin (ECOTRIN) 81 MG EC tablet Take 81 mg by mouth. Every other day      b complex vitamins capsule Take 1 capsule by mouth once daily.      benazepriL (LOTENSIN) 10 MG tablet Take 1 tablet (10 mg total) by mouth 2 (two) times daily. 180 tablet 3    carvediloL (COREG) 6.25 MG tablet Take 1 tablet (6.25 mg total) by mouth 2 (two) times daily with meals. 180 tablet 3    cetirizine (ZYRTEC) 10 MG tablet Take 10 mg by mouth daily as needed. As needed      chlorthalidone (HYGROTEN) 25 MG Tab TAKE 1 TABLET BY MOUTH ONCE DAILY (Patient taking differently: Take 25 mg by mouth once daily.) 90 tablet 3    cholecalciferol, vitamin D3, 125 mcg (5,000 unit) Tab Take 1 tablet by mouth once daily.      cinnamon bark 500 mg capsule Take 1,000 mg by mouth once daily.      DOCOSAHEXANOIC ACID/EPA (FISH OIL ORAL) Take 1,240 mg by mouth 2 (two) times daily.      estradiol (ESTRACE) 1 MG tablet Take 1 mg by mouth once daily.      fluticasone propionate (FLONASE) 50 mcg/actuation nasal spray USE 1 SPRAY IN EACH NOSTRIL TWO TIMES DAILY 48 g 3    furosemide (LASIX) 20 MG tablet Take 1 tablet (20 mg total) by mouth once daily. (Patient taking differently: Take 20 mg by mouth daily as needed (fluid).) 90 tablet 3    glucosamine-chondroitin 500-400 mg tablet Take 1 tablet by mouth 2 (two) times daily.      levothyroxine (SYNTHROID) 25 MCG tablet TAKE 1 TABLET BY MOUTH  EVERY DAY (Patient taking differently: Take 25 mcg by mouth before breakfast. TAKE 1 TABLET BY MOUTH  EVERY DAY) 90 tablet 3    loperamide HCl (IMODIUM A-D ORAL) As needed      melatonin 10 mg Tab Take by mouth. As  needed      multivitamin capsule Take 1 capsule by mouth once daily.      potassium chloride (MICRO-K) 10 MEQ CpSR TAKE 1 CAPSULE BY MOUTH  TWICE DAILY 90 capsule 3    pravastatin (PRAVACHOL) 20 MG tablet Take 1 tablet (20 mg total) by mouth every evening. 90 tablet 3    turmeric root extract 500 mg Cap Take by mouth.      vit A/vit C/vit E/zinc/copper (ICAPS AREDS ORAL) Take by mouth.       No current facility-administered medications for this visit.       Alpha-1 Antitrypsin:  Last PFT: 10/11/21 restriction and a diffusion defect, (straight back)  Last CT: 11/18/21   No acute cardiac or pulmonary process, with additional, and incidental findings as noted above.    Review of Systems  General: Feeling like she fatigues quickly.  Eyes: Vision is good.  ENT:  Seasonal allergies.  Heart:: Occasional palpatations  Lungs: shortness of breath with exertion  GI: IBS sometimes  : No dysuria, hesitancy, or nocturia.  Musculoskeletal: occasional back ache  Skin: No lesions or rashes.  Neuro: No headaches or neuropathy.  Lymph: ankles swell a lot  Psych: worries about everything  Endo: weight gain with Covid    OBJECTIVE:      BP (!) 140/82 (BP Location: Right arm, Patient Position: Sitting)   Pulse 70   Wt 66.2 kg (146 lb)   SpO2 98%   BMI 23.57 kg/m²     Physical Exam  GENERAL: Older patient in no distress.  HEENT: Pupils equal and reactive. Extraocular movements intact. Nose intact.  Pharynx moist.  NECK: Supple.   HEART: Regular rate and rhythm. No murmur or gallop auscultated.  LUNGS: Clear to auscultation and percussion. Lung excursion symmetrical. No change in fremitus. No adventitial noises.  ABDOMEN: Bowel sounds present. Non-tender, no masses palpated.  EXTREMITIES: Normal muscle tone and joint movement, no cyanosis or clubbing.   LYMPHATICS: No adenopathy palpated, no edema.  SKIN: Dry, intact, no lesions.   NEURO: Cranial nerves II-XII intact. Motor strength 5/5 bilaterally, upper and lower  extremities.  PSYCH: Appropriate affect.    Assessment:       1. JACQUELINE (obstructive sleep apnea)    2. Nocturnal hypoxemia    3. Shortness of breath    4. Straight back syndrome       Discussed the possibility that her ongoing fatigue and exhaustion may be a function of her age as well as her comorbidities.  Plan:       JACQUELINE (obstructive sleep apnea)    Nocturnal hypoxemia  -     PULSE OXIMETRY OVERNIGHT; Future; Expected date: 03/16/2022    Shortness of breath  Comments:  Secondary to restrictive lung disease secondary to straight back    Straight back syndrome         Follow up in about 6 months (around 9/16/2022).  Given information for Inspire  Continue CPAP with oxygen  Do overnight pulse ox on CPAP but without oxygen to see if the CPAP will be sufficient alone.

## 2022-03-18 ENCOUNTER — TELEPHONE (OUTPATIENT)
Dept: PULMONOLOGY | Facility: CLINIC | Age: 82
End: 2022-03-18
Payer: MEDICARE

## 2022-03-21 ENCOUNTER — LAB VISIT (OUTPATIENT)
Dept: LAB | Facility: HOSPITAL | Age: 82
End: 2022-03-21
Attending: FAMILY MEDICINE
Payer: MEDICARE

## 2022-03-21 DIAGNOSIS — R73.9 HYPERGLYCEMIA: ICD-10-CM

## 2022-03-21 DIAGNOSIS — E78.2 MIXED HYPERLIPIDEMIA: ICD-10-CM

## 2022-03-21 DIAGNOSIS — E03.4 HYPOTHYROIDISM DUE TO ACQUIRED ATROPHY OF THYROID: ICD-10-CM

## 2022-03-21 LAB
CHOLEST SERPL-MCNC: 139 MG/DL (ref 120–199)
CHOLEST/HDLC SERPL: 2.1 {RATIO} (ref 2–5)
ESTIMATED AVG GLUCOSE: 131 MG/DL (ref 68–131)
HBA1C MFR BLD: 6.2 % (ref 4.5–6.2)
HDLC SERPL-MCNC: 66 MG/DL (ref 40–75)
HDLC SERPL: 47.5 % (ref 20–50)
LDLC SERPL CALC-MCNC: 47.4 MG/DL (ref 63–159)
NONHDLC SERPL-MCNC: 73 MG/DL
TRIGL SERPL-MCNC: 128 MG/DL (ref 30–150)
TSH SERPL DL<=0.005 MIU/L-ACNC: 3.54 UIU/ML (ref 0.34–5.6)

## 2022-03-21 PROCEDURE — 84443 ASSAY THYROID STIM HORMONE: CPT | Performed by: FAMILY MEDICINE

## 2022-03-21 PROCEDURE — 36415 COLL VENOUS BLD VENIPUNCTURE: CPT | Performed by: FAMILY MEDICINE

## 2022-03-21 PROCEDURE — 83036 HEMOGLOBIN GLYCOSYLATED A1C: CPT | Performed by: FAMILY MEDICINE

## 2022-03-21 PROCEDURE — 80061 LIPID PANEL: CPT | Performed by: FAMILY MEDICINE

## 2022-03-22 PROBLEM — K57.30 DIVERTICULAR DISEASE OF COLON: Status: ACTIVE | Noted: 2022-03-22

## 2022-03-28 ENCOUNTER — PATIENT MESSAGE (OUTPATIENT)
Dept: PULMONOLOGY | Facility: CLINIC | Age: 82
End: 2022-03-28

## 2022-03-28 ENCOUNTER — OFFICE VISIT (OUTPATIENT)
Dept: CARDIOLOGY | Facility: CLINIC | Age: 82
End: 2022-03-28
Payer: MEDICARE

## 2022-03-28 VITALS
HEART RATE: 65 BPM | DIASTOLIC BLOOD PRESSURE: 82 MMHG | BODY MASS INDEX: 23.46 KG/M2 | SYSTOLIC BLOOD PRESSURE: 142 MMHG | OXYGEN SATURATION: 97 % | WEIGHT: 146 LBS | HEIGHT: 66 IN

## 2022-03-28 DIAGNOSIS — I25.10 CORONARY ARTERY DISEASE INVOLVING NATIVE CORONARY ARTERY OF NATIVE HEART WITHOUT ANGINA PECTORIS: ICD-10-CM

## 2022-03-28 DIAGNOSIS — R00.2 PALPITATIONS: ICD-10-CM

## 2022-03-28 DIAGNOSIS — I34.0 NONRHEUMATIC MITRAL VALVE REGURGITATION: Primary | ICD-10-CM

## 2022-03-28 DIAGNOSIS — E78.2 MIXED HYPERLIPIDEMIA: ICD-10-CM

## 2022-03-28 DIAGNOSIS — I10 WHITE COAT SYNDROME WITH DIAGNOSIS OF HYPERTENSION: ICD-10-CM

## 2022-03-28 DIAGNOSIS — I27.20 PULMONARY HYPERTENSION: ICD-10-CM

## 2022-03-28 DIAGNOSIS — I34.0 MR (MITRAL REGURGITATION): ICD-10-CM

## 2022-03-28 DIAGNOSIS — J43.1 PANLOBULAR EMPHYSEMA: ICD-10-CM

## 2022-03-28 DIAGNOSIS — R06.09 DYSPNEA ON EXERTION: ICD-10-CM

## 2022-03-28 DIAGNOSIS — I10 ESSENTIAL HYPERTENSION: ICD-10-CM

## 2022-03-28 PROCEDURE — 99214 PR OFFICE/OUTPT VISIT, EST, LEVL IV, 30-39 MIN: ICD-10-PCS | Mod: S$GLB,,, | Performed by: NURSE PRACTITIONER

## 2022-03-28 PROCEDURE — 99214 OFFICE O/P EST MOD 30 MIN: CPT | Mod: S$GLB,,, | Performed by: NURSE PRACTITIONER

## 2022-03-28 RX ORDER — SODIUM CHLORIDE 9 MG/ML
INJECTION, SOLUTION INTRAVENOUS CONTINUOUS
Status: CANCELLED | OUTPATIENT
Start: 2022-03-28

## 2022-03-28 NOTE — PROGRESS NOTES
Subjective:    Patient ID:  Jacqueline Mathias is a 82 y.o. female   Chief Complaint   Patient presents with    Coronary Artery Disease    Fatigue    Foot Swelling       HPI:  Patient presents today for follow-up appointment.  She continues to have shortness of breath with exertion as well as some lower extremity edema.  She admits she has not been taking her furosemide on a daily basis due to being busy and helping to care for others.  She states that the compression stockings do not work well for her because it pushes the fluid up and causes an indentation.      Review of patient's allergies indicates:   Allergen Reactions    Latex, natural rubber      rash    Biaxin [clarithromycin] Diarrhea and Nausea Only    Codeine Itching    Oxycodone      Knocks pt out for 3 days       Past Medical History:   Diagnosis Date    Allergy     latex    Allergy     seasonal    Arthritis     Asthma     Blood transfusion     Cataract     removed    COPD (chronic obstructive pulmonary disease)     Hypertension     IBS (irritable bowel syndrome)     Thyroid nodule 09/29/2016    Ulcer      Past Surgical History:   Procedure Laterality Date    ANGIOGRAM, CORONARY, WITH LEFT HEART CATHETERIZATION N/A 12/14/2021    Procedure: Angiogram, Coronary, with Left Heart Cath;  Surgeon: Napoleon Dumas MD;  Location: [facility] CATH/EP LAB;  Service: Cardiology;  Laterality: N/A;    CAROTID ARTERY ANGIOPLASTY Right 09/30/2016    CHOLECYSTECTOMY      EYE SURGERY      cataract removal    HYSTERECTOMY       Social History     Tobacco Use    Smoking status: Never Smoker    Smokeless tobacco: Never Used   Substance Use Topics    Alcohol use: Yes     Comment: social    Drug use: No     Family History   Problem Relation Age of Onset    Stroke Mother     Heart disease Mother     Heart disease Sister     Alzheimer's disease Sister     Cancer Brother     Cancer Brother     Kidney disease Brother     Breast cancer Maternal Aunt          Review of Systems:   Constitution: Negative for diaphoresis and fever.   HEENT: Negative for nosebleeds.    Cardiovascular: Negative for chest pain      +dyspnea on exertion       No leg swelling        + palpitations  Respiratory: Negative for shortness of breath and wheezing.    Hematologic/Lymphatic: Negative for bleeding problem. Does not bruise/bleed easily.   Skin: Negative for color change and rash.   Musculoskeletal: Negative for falls and myalgias.   Gastrointestinal: Negative for hematemesis and hematochezia.   Genitourinary: Negative for hematuria.   Neurological: Negative for dizziness and light-headedness.   Psychiatric/Behavioral: Negative for altered mental status and memory loss.          Objective:        Vitals:    03/28/22 1251   BP: (!) 142/82   Pulse: 65       Lab Results   Component Value Date    WBC 7.49 12/10/2021    HGB 12.0 12/10/2021    HCT 37.8 12/10/2021     12/10/2021    CHOL 139 03/21/2022    TRIG 128 03/21/2022    HDL 66 03/21/2022    ALT 18 12/10/2021    AST 23 12/10/2021     (L) 12/10/2021    K 3.7 12/10/2021    CL 93 (L) 12/10/2021    CREATININE 0.8 12/10/2021    BUN 22 12/10/2021    CO2 27 12/10/2021    TSH 3.540 03/21/2022    INR 1.4 12/10/2021    GLUF 121 (H) 03/22/2019    HGBA1C 6.2 03/21/2022        ECHOCARDIOGRAM RESULTS  Results for orders placed in visit on 11/23/21    Echo    Interpretation Summary  · The left ventricle is normal in size with eccentric hypertrophy and normal systolic function.  · The estimated PA systolic pressure is 52 mmHg.  · There is moderate pulmonary hypertension.  · The estimated ejection fraction is 60%.  · Normal left ventricular diastolic function.  · Normal right ventricular size with normal right ventricular systolic function.  · Moderate-to-severe mitral regurgitation.  · Moderate tricuspid regurgitation.  · Mild pulmonic regurgitation.        CURRENT/PREVIOUS VISIT EKG  Results for orders placed or performed during the  hospital encounter of 12/10/21   EKG 12-lead    Collection Time: 12/10/21 12:17 PM    Narrative    Test Reason : R93.1,    Vent. Rate : 069 BPM     Atrial Rate : 069 BPM     P-R Int : 130 ms          QRS Dur : 112 ms      QT Int : 432 ms       P-R-T Axes : 071 057 036 degrees     QTc Int : 462 ms    Normal sinus rhythm  Incomplete right bundle branch block  Borderline Abnormal ECG  When compared with ECG of 10-NOV-2021 14:47,  No significant change was found  Confirmed by Mario Dumas MD ([id number]) on 12/12/2021 8:57:09 PM    Referred By:             Confirmed By:Mario Dumas MD     No valid procedures specified.   Results for orders placed in visit on 11/19/21    Nuclear Stress Test    Interpretation Summary    The EKG portion of this study is negative for ischemia.    The patient reported no chest pain during the stress test.    The nuclear portion of this study will be reported separately.      Physical Exam:  CONSTITUTIONAL: No fever, no chills  HEENT: Normocephalic, atraumatic,pupils reactive to light                 NECK:  No JVD no carotid bruit  CVS: S1S2+, RRR  LUNGS: Clear  ABDOMEN: Soft, NT, BS+  EXTREMITIES: No cyanosis, 1+ bilateral ankle edema  : No vargas catheter  NEURO: AAO X 3  PSY: Normal affect      Medication List with Changes/Refills   Current Medications    AMLODIPINE (NORVASC) 2.5 MG TABLET    Take 1 tablet (2.5 mg total) by mouth once daily.    APIXABAN (ELIQUIS) 5 MG TAB    Take 1 tablet (5 mg total) by mouth 2 (two) times daily.    ASCORBIC ACID, VITAMIN C, (VITAMIN C) 1000 MG TABLET    Take 1,000 mg by mouth once daily.    ASPIRIN (ECOTRIN) 81 MG EC TABLET    Take 81 mg by mouth. Every other day    B COMPLEX VITAMINS CAPSULE    Take 1 capsule by mouth once daily.    BENAZEPRIL (LOTENSIN) 10 MG TABLET    Take 1 tablet (10 mg total) by mouth 2 (two) times daily.    CARVEDILOL (COREG) 6.25 MG TABLET    Take 1 tablet (6.25 mg total) by mouth 2 (two) times daily with meals.     CETIRIZINE (ZYRTEC) 10 MG TABLET    Take 10 mg by mouth daily as needed. As needed    CHLORTHALIDONE (HYGROTEN) 25 MG TAB    TAKE 1 TABLET BY MOUTH ONCE DAILY    CHOLECALCIFEROL, VITAMIN D3, 125 MCG (5,000 UNIT) TAB    Take 1 tablet by mouth once daily.    CINNAMON BARK 500 MG CAPSULE    Take 1,000 mg by mouth once daily.    DOCOSAHEXANOIC ACID/EPA (FISH OIL ORAL)    Take 1,240 mg by mouth 2 (two) times daily.    ESTRADIOL (ESTRACE) 1 MG TABLET    Take 1 mg by mouth once daily.    FLUTICASONE PROPIONATE (FLONASE) 50 MCG/ACTUATION NASAL SPRAY    USE 1 SPRAY IN EACH NOSTRIL TWO TIMES DAILY    FUROSEMIDE (LASIX) 20 MG TABLET    Take 1 tablet (20 mg total) by mouth once daily.    GLUCOSAMINE-CHONDROITIN 500-400 MG TABLET    Take 1 tablet by mouth 2 (two) times daily.    LEVOTHYROXINE (SYNTHROID) 25 MCG TABLET    TAKE 1 TABLET BY MOUTH  EVERY DAY    LOPERAMIDE HCL (IMODIUM A-D ORAL)    As needed    MELATONIN 10 MG TAB    Take by mouth. As needed    MULTIVITAMIN CAPSULE    Take 1 capsule by mouth once daily.    POTASSIUM CHLORIDE (MICRO-K) 10 MEQ CPSR    TAKE 1 CAPSULE BY MOUTH  TWICE DAILY    PRAVASTATIN (PRAVACHOL) 20 MG TABLET    Take 1 tablet (20 mg total) by mouth every evening.    TURMERIC ROOT EXTRACT 500 MG CAP    Take by mouth.    VIT A/VIT C/VIT E/ZINC/COPPER (ICAPS AREDS ORAL)    Take by mouth.             Assessment:       1. Nonrheumatic mitral valve regurgitation    2. Coronary artery disease involving native coronary artery of native heart without angina pectoris    3. Essential hypertension    4. Mixed hyperlipidemia    5. Palpitations    6. White coat syndrome with diagnosis of hypertension    7. Pulmonary hypertension    8. Panlobular emphysema    9. Dyspnea on exertion         Plan:     1. Of note the patient did have moderate to severe MR on her echocardiogram from November.  Would like to do a PAT to evaluate the mitral regurgitation.  Had a discussion with the patient regarding this.  2. Advised her  to take her furosemide daily in the morning as early as possible in order to keep the fluid off.  3. Advised her on the risk of the PAT including bleeding, reaction to the anesthesia, and dental trauma.  4. Would like the patient to come back to see Dr. Dumas for her follow-up to discuss further plans and options in light of the mitral regurgitation.    Problem List Items Addressed This Visit        Unprioritized    COPD (chronic obstructive pulmonary disease)    Mixed hyperlipidemia    Coronary artery disease involving native coronary artery of native heart without angina pectoris    Palpitations    White coat syndrome with diagnosis of hypertension    Essential hypertension    Dyspnea on exertion    Pulmonary hypertension    Nonrheumatic mitral valve regurgitation - Primary          No follow-ups on file.

## 2022-03-29 ENCOUNTER — TELEPHONE (OUTPATIENT)
Dept: PULMONOLOGY | Facility: CLINIC | Age: 82
End: 2022-03-29
Payer: MEDICARE

## 2022-03-29 NOTE — TELEPHONE ENCOUNTER
----- Message from Uhce Frost MA sent at 3/28/2022 12:00 PM CDT -----  Regarding: overnight pox results  Pt called looking for overnight pox resuls

## 2022-03-29 NOTE — TELEPHONE ENCOUNTER
Called and LVM for pt to sleep with 2LPM of o2 bled into her cpap and to call or message us with any questions.

## 2022-03-29 NOTE — TELEPHONE ENCOUNTER
Overnight pulse ox on APAP showed sats less then 88% for 0.6 mins however she was less then 89% for 18 minutes. Per group 2 she should bleed oxygen into the APAP

## 2022-03-30 ENCOUNTER — PATIENT MESSAGE (OUTPATIENT)
Dept: CARDIOLOGY | Facility: CLINIC | Age: 82
End: 2022-03-30
Payer: MEDICARE

## 2022-04-12 ENCOUNTER — PES CALL (OUTPATIENT)
Dept: ADMINISTRATIVE | Facility: CLINIC | Age: 82
End: 2022-04-12
Payer: MEDICARE

## 2022-04-21 ENCOUNTER — PATIENT MESSAGE (OUTPATIENT)
Dept: CARDIOLOGY | Facility: HOSPITAL | Age: 82
End: 2022-04-21

## 2022-04-21 ENCOUNTER — PATIENT MESSAGE (OUTPATIENT)
Dept: CARDIOLOGY | Facility: CLINIC | Age: 82
End: 2022-04-21
Payer: MEDICARE

## 2022-04-25 ENCOUNTER — HOSPITAL ENCOUNTER (OUTPATIENT)
Dept: PREADMISSION TESTING | Facility: HOSPITAL | Age: 82
Discharge: HOME | End: 2022-04-25
Attending: INTERNAL MEDICINE
Payer: MEDICARE

## 2022-04-25 ENCOUNTER — HOSPITAL ENCOUNTER (OUTPATIENT)
Dept: RADIOLOGY | Facility: HOSPITAL | Age: 82
Discharge: HOME | End: 2022-04-25
Attending: NURSE PRACTITIONER
Payer: MEDICARE

## 2022-04-25 VITALS — WEIGHT: 142 LBS | HEIGHT: 66 IN | BODY MASS INDEX: 22.82 KG/M2

## 2022-04-25 DIAGNOSIS — I48.92 ATRIAL FIBRILLATION AND FLUTTER: ICD-10-CM

## 2022-04-25 DIAGNOSIS — I48.91 ATRIAL FIBRILLATION AND FLUTTER: ICD-10-CM

## 2022-04-25 DIAGNOSIS — I34.0 NONRHEUMATIC MITRAL VALVE REGURGITATION: ICD-10-CM

## 2022-04-25 LAB
ANION GAP SERPL CALC-SCNC: 17 MMOL/L (ref 8–16)
BASOPHILS # BLD AUTO: 0.02 K/UL (ref 0–0.2)
BASOPHILS NFR BLD: 0.3 % (ref 0–1.9)
BUN SERPL-MCNC: 24 MG/DL (ref 8–23)
CALCIUM SERPL-MCNC: 10.1 MG/DL (ref 8.7–10.5)
CHLORIDE SERPL-SCNC: 99 MMOL/L (ref 95–110)
CO2 SERPL-SCNC: 25 MMOL/L (ref 23–29)
CREAT SERPL-MCNC: 0.8 MG/DL (ref 0.5–1.4)
DIFFERENTIAL METHOD: ABNORMAL
EOSINOPHIL # BLD AUTO: 0.1 K/UL (ref 0–0.5)
EOSINOPHIL NFR BLD: 1.7 % (ref 0–8)
ERYTHROCYTE [DISTWIDTH] IN BLOOD BY AUTOMATED COUNT: 14.6 % (ref 11.5–14.5)
EST. GFR  (AFRICAN AMERICAN): >60 ML/MIN/1.73 M^2
EST. GFR  (NON AFRICAN AMERICAN): >60 ML/MIN/1.73 M^2
GLUCOSE SERPL-MCNC: 130 MG/DL (ref 70–110)
HCT VFR BLD AUTO: 39.5 % (ref 37–48.5)
HGB BLD-MCNC: 12.4 G/DL (ref 12–16)
IMM GRANULOCYTES # BLD AUTO: 0.05 K/UL (ref 0–0.04)
IMM GRANULOCYTES NFR BLD AUTO: 0.7 % (ref 0–0.5)
LYMPHOCYTES # BLD AUTO: 1.6 K/UL (ref 1–4.8)
LYMPHOCYTES NFR BLD: 23.1 % (ref 18–48)
MAGNESIUM SERPL-MCNC: 1.8 MG/DL (ref 1.6–2.6)
MCH RBC QN AUTO: 27.4 PG (ref 27–31)
MCHC RBC AUTO-ENTMCNC: 31.4 G/DL (ref 32–36)
MCV RBC AUTO: 87 FL (ref 82–98)
MONOCYTES # BLD AUTO: 0.7 K/UL (ref 0.3–1)
MONOCYTES NFR BLD: 9.5 % (ref 4–15)
NEUTROPHILS # BLD AUTO: 4.5 K/UL (ref 1.8–7.7)
NEUTROPHILS NFR BLD: 64.7 % (ref 38–73)
NRBC BLD-RTO: 0 /100 WBC
PLATELET # BLD AUTO: 167 K/UL (ref 150–450)
PMV BLD AUTO: 11.8 FL (ref 9.2–12.9)
POTASSIUM SERPL-SCNC: 3.8 MMOL/L (ref 3.5–5.1)
RBC # BLD AUTO: 4.52 M/UL (ref 4–5.4)
SODIUM SERPL-SCNC: 141 MMOL/L (ref 136–145)
WBC # BLD AUTO: 6.92 K/UL (ref 3.9–12.7)

## 2022-04-25 PROCEDURE — 93010 ELECTROCARDIOGRAM REPORT: CPT | Mod: ,,, | Performed by: GENERAL PRACTICE

## 2022-04-25 PROCEDURE — 85025 COMPLETE CBC W/AUTO DIFF WBC: CPT | Performed by: NURSE PRACTITIONER

## 2022-04-25 PROCEDURE — 93005 ELECTROCARDIOGRAM TRACING: CPT | Performed by: GENERAL PRACTICE

## 2022-04-25 PROCEDURE — 93010 EKG 12-LEAD: ICD-10-PCS | Mod: ,,, | Performed by: GENERAL PRACTICE

## 2022-04-25 PROCEDURE — 83735 ASSAY OF MAGNESIUM: CPT | Performed by: NURSE PRACTITIONER

## 2022-04-25 PROCEDURE — 71046 X-RAY EXAM CHEST 2 VIEWS: CPT | Mod: TC

## 2022-04-25 PROCEDURE — 80048 BASIC METABOLIC PNL TOTAL CA: CPT | Performed by: NURSE PRACTITIONER

## 2022-04-25 PROCEDURE — 36415 COLL VENOUS BLD VENIPUNCTURE: CPT | Performed by: NURSE PRACTITIONER

## 2022-04-26 DIAGNOSIS — I10 HTN (HYPERTENSION), BENIGN: ICD-10-CM

## 2022-04-26 NOTE — PROGRESS NOTES
Subjective:       Patient ID: Jacqueline Mathias is a 78 y.o. female.    Chief Complaint: Hypertension; Hyperlipidemia; Hypothyroidism; and COPD    Patient presents here for 6 month follow-up of hypertension, hyperlipidemia, hypothyroidism, and COPD.  Her weight has decreased 8 lb since her last visit and she states she is following her diet well.  Her hypertension is well controlled on her present medications as well as her low-sodium diet and she is tolerating her medications well.  Her hyperlipidemia is also well controlled on her present dose of pravastatin as well as her low-fat low-cholesterol diet.  Her hypothyroidism has been stable on her present dose of levothyroxine.  As far as her COPD, she is on no inhalers at the present time due to the fact that several inhalers were tried and these did not benefit her at all.  She is not on oxygen at this time.  She recently had lab work done by her cardiologist and I will obtain a copy of these lab results.  She had her mammogram in March of 2018.  She is up-to-date with all of her recommended screening exams.      Hypertension   This is a chronic problem. The problem is unchanged. The problem is controlled. Pertinent negatives include no chest pain, headaches, palpitations or shortness of breath. Risk factors for coronary artery disease include dyslipidemia and post-menopausal state. Past treatments include ACE inhibitors and diuretics. The current treatment provides moderate improvement. There are no compliance problems.  There is no history of kidney disease, CAD/MI, CVA or heart failure.   Hyperlipidemia   This is a chronic problem. The problem is controlled. Recent lipid tests were reviewed and are normal. Pertinent negatives include no chest pain or shortness of breath. Current antihyperlipidemic treatment includes statins. The current treatment provides moderate improvement of lipids. There are no compliance problems.  Risk factors for coronary artery disease  include dyslipidemia, hypertension and post-menopausal.     Review of Systems   Constitutional: Negative for chills, fatigue, fever and unexpected weight change.        Weight is decreased 8 lb in 6 months   HENT: Negative for congestion, ear pain, postnasal drip and sore throat.    Respiratory: Negative for cough and shortness of breath.    Cardiovascular: Negative for chest pain and palpitations.   Gastrointestinal: Negative for abdominal pain, diarrhea, nausea and vomiting.   Genitourinary: Negative for difficulty urinating, dysuria and flank pain.   Musculoskeletal: Negative for arthralgias and back pain.   Neurological: Negative for dizziness, light-headedness and headaches.   Psychiatric/Behavioral: Negative for sleep disturbance. The patient is not nervous/anxious.        Objective:      Physical Exam   Constitutional: She is oriented to person, place, and time. She appears well-developed and well-nourished.   HENT:   Head: Normocephalic and atraumatic.   Right Ear: External ear normal.   Left Ear: External ear normal.   Nose: Nose normal.   Mouth/Throat: Oropharynx is clear and moist.   Neck: Normal range of motion. Neck supple. No thyromegaly present.   Cardiovascular: Normal rate, regular rhythm, normal heart sounds and intact distal pulses.    No murmur heard.  Pulmonary/Chest: Effort normal and breath sounds normal. She has no wheezes. She has no rales.   Musculoskeletal: Normal range of motion. She exhibits edema (Trace edema bilaterally). She exhibits no tenderness.   Lymphadenopathy:     She has no cervical adenopathy.   Neurological: She is alert and oriented to person, place, and time. She has normal reflexes. No cranial nerve deficit.   Psychiatric: She has a normal mood and affect. Her behavior is normal.   Vitals reviewed.      Assessment:       1. HTN (hypertension), benign    2. Mixed hyperlipidemia    3. Hypothyroidism due to acquired atrophy of thyroid    4. Panlobular emphysema        Plan:        1.  Continue present medications as her hypertension, hyperlipidemia, hypothyroidism, and COPD are all stable and controlled  2.  Continue low-sodium, low-fat low-cholesterol diet and regular exercise  3.  Continue follow-up with Cardiology and pulmonology as per their instructions  4.  Follow up with me in 6 months or p.r.n.        Patient readiness: acceptance and barriers:none    During the course of the visit the patient was educated and counseled about the following:     Hypertension:   Dietary sodium restriction.  Regular aerobic exercise.  Follow up: 6 months and as needed.    Goals: Hypertension: Reduce Blood Pressure    Did patient meet goals/outcomes: Yes    The following self management tools provided: blood pressure log    Patient Instructions (the written plan) was given to the patient/family.     Time spent with patient: 30 minutes    Barriers to medications present (no )    Adverse reactions to current medications (no)    Over the counter medications reviewed (Yes)           Posterior Auricular Interpolation Flap Division And Inset Text: Division and inset of the posterior auricular interpolation flap was performed to achieve optimal aesthetic result, restore normal anatomic appearance and avoid distortion of normal anatomy, expedite and facilitate wound healing, achieve optimal functional result and because linear closure either not possible or would produce suboptimal result. The patient was prepped and draped in the usual manner. The pedicle was infiltrated with local anesthesia. The pedicle was sectioned with a #15 blade. The pedicle was de-bulked and trimmed to match the shape of the defect. Hemostasis was achieved. The flap donor site and free margin of the flap were secured with deep buried sutures and the wound edges were re-approximated.

## 2022-04-27 ENCOUNTER — CLINICAL SUPPORT (OUTPATIENT)
Dept: CARDIOLOGY | Facility: HOSPITAL | Age: 82
End: 2022-04-27
Attending: INTERNAL MEDICINE
Payer: MEDICARE

## 2022-04-27 ENCOUNTER — ANESTHESIA EVENT (OUTPATIENT)
Dept: CARDIOLOGY | Facility: HOSPITAL | Age: 82
End: 2022-04-27
Payer: MEDICARE

## 2022-04-27 ENCOUNTER — ANESTHESIA (OUTPATIENT)
Dept: CARDIOLOGY | Facility: HOSPITAL | Age: 82
End: 2022-04-27
Payer: MEDICARE

## 2022-04-27 ENCOUNTER — HOSPITAL ENCOUNTER (OUTPATIENT)
Facility: HOSPITAL | Age: 82
Discharge: HOME | End: 2022-04-27
Attending: INTERNAL MEDICINE | Admitting: INTERNAL MEDICINE
Payer: MEDICARE

## 2022-04-27 VITALS
SYSTOLIC BLOOD PRESSURE: 145 MMHG | OXYGEN SATURATION: 93 % | HEART RATE: 61 BPM | RESPIRATION RATE: 20 BRPM | DIASTOLIC BLOOD PRESSURE: 67 MMHG

## 2022-04-27 DIAGNOSIS — I34.0 MR (MITRAL REGURGITATION): ICD-10-CM

## 2022-04-27 DIAGNOSIS — I34.0 NONRHEUMATIC MITRAL VALVE REGURGITATION: ICD-10-CM

## 2022-04-27 PROCEDURE — 63600175 PHARM REV CODE 636 W HCPCS: Performed by: NURSE ANESTHETIST, CERTIFIED REGISTERED

## 2022-04-27 PROCEDURE — 93312 ECHO TRANSESOPHAGEAL: CPT

## 2022-04-27 PROCEDURE — 93321 TRANSESOPHAGEAL ECHO (TEE) (CUPID ONLY): ICD-10-PCS | Mod: 26,,, | Performed by: INTERNAL MEDICINE

## 2022-04-27 PROCEDURE — 93321 DOPPLER ECHO F-UP/LMTD STD: CPT | Mod: 26,,, | Performed by: INTERNAL MEDICINE

## 2022-04-27 PROCEDURE — 37000008 HC ANESTHESIA 1ST 15 MINUTES: Performed by: INTERNAL MEDICINE

## 2022-04-27 PROCEDURE — 25000003 PHARM REV CODE 250: Performed by: NURSE ANESTHETIST, CERTIFIED REGISTERED

## 2022-04-27 PROCEDURE — 93325 TRANSESOPHAGEAL ECHO (TEE) (CUPID ONLY): ICD-10-PCS | Mod: 26,,, | Performed by: INTERNAL MEDICINE

## 2022-04-27 PROCEDURE — 93312 TRANSESOPHAGEAL ECHO (TEE) (CUPID ONLY): ICD-10-PCS | Mod: 26,,, | Performed by: INTERNAL MEDICINE

## 2022-04-27 PROCEDURE — 93325 DOPPLER ECHO COLOR FLOW MAPG: CPT

## 2022-04-27 PROCEDURE — 93312 ECHO TRANSESOPHAGEAL: CPT | Mod: 26,,, | Performed by: INTERNAL MEDICINE

## 2022-04-27 PROCEDURE — 93325 DOPPLER ECHO COLOR FLOW MAPG: CPT | Mod: 26,,, | Performed by: INTERNAL MEDICINE

## 2022-04-27 RX ORDER — CHLORTHALIDONE 25 MG/1
TABLET ORAL
Qty: 90 TABLET | Refills: 3 | Status: SHIPPED | OUTPATIENT
Start: 2022-04-27 | End: 2023-02-24

## 2022-04-27 RX ORDER — SODIUM CHLORIDE 9 MG/ML
INJECTION, SOLUTION INTRAVENOUS CONTINUOUS
Status: DISCONTINUED | OUTPATIENT
Start: 2022-04-27 | End: 2022-04-27 | Stop reason: HOSPADM

## 2022-04-27 RX ORDER — PROPOFOL 10 MG/ML
VIAL (ML) INTRAVENOUS
Status: DISCONTINUED | OUTPATIENT
Start: 2022-04-27 | End: 2022-04-27

## 2022-04-27 RX ADMIN — SODIUM CHLORIDE: 0.9 INJECTION, SOLUTION INTRAVENOUS at 10:04

## 2022-04-27 RX ADMIN — PROPOFOL 130 MG: 10 INJECTION, EMULSION INTRAVENOUS at 10:04

## 2022-04-27 NOTE — ANESTHESIA POSTPROCEDURE EVALUATION
Anesthesia Post Evaluation    Patient: Jacqueline Mathias    Procedure(s) Performed: Procedure(s) (LRB):  Transesophageal echo (PAT) intra-procedure log documentation (N/A)    Final Anesthesia Type: MAC      Patient location during evaluation: Cath Lab  Patient participation: Yes- Able to Participate  Level of consciousness: awake and alert and oriented  Post-procedure vital signs: reviewed and stable  Pain management: adequate  Airway patency: patent    PONV status at discharge: No PONV  Anesthetic complications: no      Cardiovascular status: blood pressure returned to baseline and hemodynamically stable  Respiratory status: unassisted, spontaneous ventilation and room air  Hydration status: euvolemic  Follow-up not needed.          Vitals Value Taken Time   /85 04/27/22 1101   Temp 36.4 04/27/22 1104   Pulse 78 04/27/22 1102   Resp 36 04/27/22 1102   SpO2 94 % 04/27/22 1102   Vitals shown include unvalidated device data.      No case tracking events are documented in the log.      Pain/Aliyah Score: Aliyah Score: 10 (4/27/2022 10:41 AM)

## 2022-04-27 NOTE — DISCHARGE INSTRUCTIONS
PAT Discharge Instructions:  Start with soft foods, once you can tolerate soft foods easily, you may begin eating solid foods.   You can not drive for 24 hours    Call your doctor immediately if:  You have fever or chills  You taste blood in your mouth  You have severe sore throat  You have difficulty swallowing  You have questions or concerns about your condition or care.

## 2022-04-27 NOTE — TELEPHONE ENCOUNTER
No new care gaps identified.  Powered by [employer] Reference number: [id number].   4/26/2022 8:05:23 PM CDT

## 2022-04-27 NOTE — TELEPHONE ENCOUNTER
Refill Routing Note   Medication(s) are not appropriate for processing by [facility] for the following reason(s):      - Required vitals are abnormal    [facility] action(s):  Defer          Medication reconciliation completed: No     Appointments  past 12m or future 3m with PCP    Date Provider   Last Visit   1/10/2022 Brice Weaver Jr., MD   Next Visit   7/18/2022 Brice Weaver Jr., MD   ED visits in past 90 days: 0        Note composed:9:49 AM 04/27/2022

## 2022-04-27 NOTE — TRANSFER OF CARE
Anesthesia Transfer of Care Note    Patient: Jacqueline Mathias    Procedure(s) Performed: Procedure(s) (LRB):  Transesophageal echo (PAT) intra-procedure log documentation (N/A)    Patient location: Other: heart center    Anesthesia Type: MAC    Transport from OR: Transported from OR on 6-10 L/min O2 by face mask with adequate spontaneous ventilation    Post pain: adequate analgesia    Post assessment: no apparent anesthetic complications    Post vital signs: stable    Level of consciousness: awake    Nausea/Vomiting: no nausea/vomiting    Complications: none    Transfer of care protocol was followed      Last vitals:   Visit Vitals  BP (!) 194/84   Pulse 74   Resp 18   SpO2 96%

## 2022-04-27 NOTE — ANESTHESIA PREPROCEDURE EVALUATION
04/27/2022  Jacqueline Mathias is a 82 y.o., female.      Patient Active Problem List   Diagnosis    IBS (irritable bowel syndrome)    HTN (hypertension), benign    Peptic ulcer disease    Asthma    ALLERGIC RHINITIS    Arthritis    COPD (chronic obstructive pulmonary disease)    Hypothyroidism due to acquired atrophy of thyroid    Mixed hyperlipidemia    Other persistent atrial fibrillation    Coronary artery disease involving native coronary artery of native heart without angina pectoris    Musculoskeletal pain    Palpitations    White coat syndrome with diagnosis of hypertension    Essential hypertension    Chest pain    Dyspnea on exertion    Abnormal nuclear cardiac imaging test    JACQUELINE (obstructive sleep apnea)    Pulmonary hypertension    Nonrheumatic mitral valve regurgitation    Straight back syndrome    Diverticular disease of colon       Past Surgical History:   Procedure Laterality Date    ANGIOGRAM, CORONARY, WITH LEFT HEART CATHETERIZATION N/A 12/14/2021    Procedure: Angiogram, Coronary, with Left Heart Cath;  Surgeon: Napoleon Dumas MD;  Location: [facility] CATH/EP LAB;  Service: Cardiology;  Laterality: N/A;    CAROTID ARTERY ANGIOPLASTY Right 09/30/2016    CHOLECYSTECTOMY      EYE SURGERY      cataract removal    HYSTERECTOMY          Tobacco Use:  The patient  reports that she has never smoked. She has never used smokeless tobacco.     Results for orders placed or performed during the hospital encounter of 04/25/22   EKG 12-LEAD    Collection Time: 04/25/22  9:48 AM    Narrative    Test Reason : I34.0,    Vent. Rate : 058 BPM     Atrial Rate : 058 BPM     P-R Int : 134 ms          QRS Dur : 116 ms      QT Int : 474 ms       P-R-T Axes : 041 036 017 degrees     QTc Int : 465 ms    Sinus bradycardia  Incomplete right bundle branch block  Borderline Abnormal ECG  When  compared with ECG of 10-DEC-2021 12:17,  T wave inversion now evident in Anterior leads  Confirmed by Shandra PERRY, Gio POPE ([id number]) on 4/25/2022 1:44:01 PM    Referred By:             Confirmed By:Gio Devi MD             Lab Results   Component Value Date    WBC 6.92 04/25/2022    HGB 12.4 04/25/2022    HCT 39.5 04/25/2022    MCV 87 04/25/2022     04/25/2022     BMP  Lab Results   Component Value Date     04/25/2022    K 3.8 04/25/2022    CL 99 04/25/2022    CO2 25 04/25/2022    BUN 24 (H) 04/25/2022    CREATININE 0.8 04/25/2022    CALCIUM 10.1 04/25/2022    ANIONGAP 17 (H) 04/25/2022     (H) 04/25/2022     (H) 12/10/2021     (H) 12/01/2021       Results for orders placed in visit on 11/23/21    Echo    Interpretation Summary  · The left ventricle is normal in size with eccentric hypertrophy and normal systolic function.  · The estimated PA systolic pressure is 52 mmHg.  · There is moderate pulmonary hypertension.  · The estimated ejection fraction is 60%.  · Normal left ventricular diastolic function.  · Normal right ventricular size with normal right ventricular systolic function.  · Moderate-to-severe mitral regurgitation.  · Moderate tricuspid regurgitation.  · Mild pulmonic regurgitation.        Pre-op Assessment    I have reviewed the Patient Summary Reports.     I have reviewed the Nursing Notes. I have reviewed the NPO Status.   I have reviewed the Medications.     Review of Systems  Anesthesia Hx:  No problems with previous Anesthesia Denies Hx of Anesthetic complications  Denies Family Hx of Anesthesia complications.   Denies Personal Hx of Anesthesia complications.   Social:  Alcohol Use, Non-Smoker    Hematology/Oncology:  Hematology Normal   Oncology Normal     EENT/Dental:   chronic allergic rhinitis Eyes: Visual Impairment Has Bilateral and S/P Extraction - Bilateral Catarract    Cardiovascular:   Hypertension, poorly controlled Valvular problems/Murmurs, MR CAD  asymptomatic Dysrhythmias atrial fibrillation hyperlipidemia ECG has been reviewed. Pulmonary hypertension  Nonrheumatic mitral valve regurgitation     Pulmonary:   COPD, moderate Asthma moderate Shortness of breath Sleep Apnea, CPAP Patient without inhaler use  Home oxygen use at night 2 L  Patient followed with Dr. García seen 6 months ago   Education provided regarding risk of obstructive sleep apnea     Renal/:  Renal/ Normal     Hepatic/GI:   PUD, Patient with no active nausea vomiting at this time  Patient with no intestinal obstruction at this time   Musculoskeletal:   Arthritis  Musculoskeletal pain  Straight back syndrome Spine Disorders:    Neurological:  Neurology Normal    Endocrine:  Endocrine Normal Thyroid nodule   Dermatological:  Skin Normal    Psych:  Psychiatric Normal           Physical Exam  General: Cooperative, Well nourished, Alert and Oriented    Airway:  Mallampati: III   Mouth Opening: Normal  TM Distance: Normal  Tongue: Normal  Neck ROM: Normal ROM    Dental:  Dentures    Chest/Lungs:  Clear to auscultation, Normal Respiratory Rate    Heart:  Rate: Normal  Rhythm: Regular Rhythm  Sounds: Normal    Abdomen:  Normal, Soft, Nontender        Anesthesia Plan  Type of Anesthesia, risks & benefits discussed:    Anesthesia Type: MAC  Intra-op Monitoring Plan: Standard ASA Monitors  Induction:  IV  Informed Consent: Informed consent signed with the Patient and all parties understand the risks and agree with anesthesia plan.  All questions answered.   ASA Score: 3  Anesthesia Plan Notes:       MAC with Propofol  POM Mask    Ready For Surgery From Anesthesia Perspective.     .

## 2022-04-28 ENCOUNTER — PATIENT MESSAGE (OUTPATIENT)
Dept: CARDIOLOGY | Facility: HOSPITAL | Age: 82
End: 2022-04-28

## 2022-04-29 LAB — EJECTION FRACTION: 60 %

## 2022-05-02 ENCOUNTER — TELEPHONE (OUTPATIENT)
Dept: CARDIOLOGY | Facility: CLINIC | Age: 82
End: 2022-05-02

## 2022-05-02 NOTE — TELEPHONE ENCOUNTER
----- Message from Lori Whitley sent at 5/2/2022 12:00 PM CDT -----  Regarding: appt request  Contact: pt  Type:   Appointment Request      Name of Caller:  pt  When is the first available appointment?  N/a  Symptoms:  n/a  Best Call Back Number:  [phone]    Additional Information:  need to schedule follow up appt, please call        Principal Discharge DX:	Tongue laceration, initial encounter

## 2022-05-09 DIAGNOSIS — Z12.31 ENCOUNTER FOR SCREENING MAMMOGRAM FOR BREAST CANCER: Primary | ICD-10-CM

## 2022-05-24 ENCOUNTER — HOSPITAL ENCOUNTER (OUTPATIENT)
Dept: RADIOLOGY | Facility: HOSPITAL | Age: 82
Discharge: HOME | End: 2022-05-24
Attending: SPECIALIST
Payer: MEDICARE

## 2022-05-24 VITALS — HEIGHT: 66 IN | BODY MASS INDEX: 22.82 KG/M2 | WEIGHT: 142 LBS

## 2022-05-24 DIAGNOSIS — Z12.31 ENCOUNTER FOR SCREENING MAMMOGRAM FOR BREAST CANCER: ICD-10-CM

## 2022-05-24 PROCEDURE — 77063 BREAST TOMOSYNTHESIS BI: CPT | Mod: TC,PO

## 2022-05-25 ENCOUNTER — LAB VISIT (OUTPATIENT)
Dept: LAB | Facility: HOSPITAL | Age: 82
End: 2022-05-25
Attending: NURSE PRACTITIONER
Payer: MEDICARE

## 2022-05-25 ENCOUNTER — OFFICE VISIT (OUTPATIENT)
Dept: CARDIOLOGY | Facility: CLINIC | Age: 82
End: 2022-05-25
Payer: MEDICARE

## 2022-05-25 VITALS
DIASTOLIC BLOOD PRESSURE: 70 MMHG | BODY MASS INDEX: 23.3 KG/M2 | WEIGHT: 145 LBS | SYSTOLIC BLOOD PRESSURE: 134 MMHG | OXYGEN SATURATION: 97 % | HEIGHT: 66 IN | HEART RATE: 67 BPM

## 2022-05-25 DIAGNOSIS — G47.33 OSA (OBSTRUCTIVE SLEEP APNEA): ICD-10-CM

## 2022-05-25 DIAGNOSIS — E78.2 MIXED HYPERLIPIDEMIA: ICD-10-CM

## 2022-05-25 DIAGNOSIS — I10 WHITE COAT SYNDROME WITH DIAGNOSIS OF HYPERTENSION: ICD-10-CM

## 2022-05-25 DIAGNOSIS — I48.19 OTHER PERSISTENT ATRIAL FIBRILLATION: ICD-10-CM

## 2022-05-25 DIAGNOSIS — Q24.5 MYOCARDIAL BRIDGE: ICD-10-CM

## 2022-05-25 DIAGNOSIS — J43.1 PANLOBULAR EMPHYSEMA: ICD-10-CM

## 2022-05-25 DIAGNOSIS — R06.02 SOB (SHORTNESS OF BREATH) ON EXERTION: ICD-10-CM

## 2022-05-25 DIAGNOSIS — R07.89 ATYPICAL CHEST PAIN: ICD-10-CM

## 2022-05-25 DIAGNOSIS — I27.20 PULMONARY HYPERTENSION: ICD-10-CM

## 2022-05-25 DIAGNOSIS — I25.10 CORONARY ARTERY DISEASE INVOLVING NATIVE CORONARY ARTERY OF NATIVE HEART WITHOUT ANGINA PECTORIS: ICD-10-CM

## 2022-05-25 DIAGNOSIS — I10 HTN (HYPERTENSION), BENIGN: Primary | ICD-10-CM

## 2022-05-25 DIAGNOSIS — R53.83 FATIGUE, UNSPECIFIED TYPE: ICD-10-CM

## 2022-05-25 DIAGNOSIS — F41.9 ANXIETY: ICD-10-CM

## 2022-05-25 DIAGNOSIS — I34.0 NONRHEUMATIC MITRAL VALVE REGURGITATION: ICD-10-CM

## 2022-05-25 LAB
BNP SERPL-MCNC: 157 PG/ML (ref 0–99)
T4 FREE SERPL-MCNC: 1.05 NG/DL (ref 0.71–1.51)

## 2022-05-25 PROCEDURE — 36415 COLL VENOUS BLD VENIPUNCTURE: CPT | Performed by: NURSE PRACTITIONER

## 2022-05-25 PROCEDURE — 84439 ASSAY OF FREE THYROXINE: CPT | Performed by: NURSE PRACTITIONER

## 2022-05-25 PROCEDURE — 99214 OFFICE O/P EST MOD 30 MIN: CPT | Mod: S$GLB,,, | Performed by: NURSE PRACTITIONER

## 2022-05-25 PROCEDURE — 83880 ASSAY OF NATRIURETIC PEPTIDE: CPT | Performed by: NURSE PRACTITIONER

## 2022-05-25 PROCEDURE — 84480 ASSAY TRIIODOTHYRONINE (T3): CPT | Performed by: NURSE PRACTITIONER

## 2022-05-25 PROCEDURE — 99214 PR OFFICE/OUTPT VISIT, EST, LEVL IV, 30-39 MIN: ICD-10-PCS | Mod: S$GLB,,, | Performed by: NURSE PRACTITIONER

## 2022-05-25 RX ORDER — ISOSORBIDE MONONITRATE 30 MG/1
30 TABLET, EXTENDED RELEASE ORAL DAILY
Qty: 30 TABLET | Refills: 11 | Status: SHIPPED | OUTPATIENT
Start: 2022-05-25 | End: 2022-06-07

## 2022-05-25 NOTE — PROGRESS NOTES
Subjective:    Patient ID:  Jacqueline Mathias is a 82 y.o. female   HPI:  Patient presents today for follow-up post PAT.  Patient reports that she has been very anxious and concerned about her health because she continues to feel fatigued at times as well as have some shortness of breath and heaviness on her chest intermittently.  Patient also has swelling in her lower extremities and feels some palpitations at times.  She admittedly is quite anxious about the state of her health.    Review of patient's allergies indicates:   Allergen Reactions    Latex, natural rubber      rash    Biaxin [clarithromycin] Diarrhea and Nausea Only    Codeine Itching    Oxycodone      Knocks pt out for 3 days       Past Medical History:   Diagnosis Date    Allergy     latex    Allergy     seasonal    Arthritis     Asthma     Blood transfusion     Cataract     removed    COPD (chronic obstructive pulmonary disease)     Hypertension     IBS (irritable bowel syndrome)     Obstructive sleep apnea     Thyroid nodule 09/29/2016    Ulcer      Past Surgical History:   Procedure Laterality Date    ANGIOGRAM, CORONARY, WITH LEFT HEART CATHETERIZATION N/A 12/14/2021    Procedure: Angiogram, Coronary, with Left Heart Cath;  Surgeon: Napoleon Dumas MD;  Location: [facility] CATH/EP LAB;  Service: Cardiology;  Laterality: N/A;    CAROTID ARTERY ANGIOPLASTY Right 09/30/2016    CHOLECYSTECTOMY      EYE SURGERY      cataract removal    HYSTERECTOMY       Social History     Tobacco Use    Smoking status: Never Smoker    Smokeless tobacco: Never Used   Substance Use Topics    Alcohol use: Yes     Comment: social    Drug use: No     Family History   Problem Relation Age of Onset    Stroke Mother     Heart disease Mother     Heart disease Sister     Alzheimer's disease Sister     Cancer Brother     Cancer Brother     Kidney disease Brother     Breast cancer Maternal Aunt         Review of Systems:   Constitution: Negative for  diaphoresis and fever.  Positive for easily fatigued  HEENT: Negative for nosebleeds.    Cardiovascular:  Positive for chest pain       Positive dyspnea on exertion       Positive leg swelling       Positive palpitations  Respiratory: Negative for shortness of breath and wheezing.    Hematologic/Lymphatic: Negative for bleeding problem. Does not bruise/bleed easily.   Skin: Negative for color change and rash.   Musculoskeletal: Negative for falls and myalgias.   Gastrointestinal: Negative for hematemesis and hematochezia.   Genitourinary: Negative for hematuria.   Neurological: Negative for dizziness and light-headedness.   Psychiatric/Behavioral: Negative for altered mental status and memory loss.          Objective:        Vitals:    05/25/22 1303   BP: 134/70   Pulse: 67       Lab Results   Component Value Date    WBC 6.92 04/25/2022    HGB 12.4 04/25/2022    HCT 39.5 04/25/2022     04/25/2022    CHOL 139 03/21/2022    TRIG 128 03/21/2022    HDL 66 03/21/2022    ALT 18 12/10/2021    AST 23 12/10/2021     04/25/2022    K 3.8 04/25/2022    CL 99 04/25/2022    CREATININE 0.8 04/25/2022    BUN 24 (H) 04/25/2022    CO2 25 04/25/2022    TSH 3.540 03/21/2022    INR 1.4 12/10/2021    GLUF 121 (H) 03/22/2019    HGBA1C 6.2 03/21/2022        ECHOCARDIOGRAM RESULTS  Results for orders placed during the hospital encounter of 04/27/22    Transesophageal echo (PAT)    Interpretation Summary  · The left ventricle is normal in size with normal systolic function.  · The estimated ejection fraction is 60%.  · Normal left ventricular diastolic function.  · Normal right ventricular size with normal right ventricular systolic function.  · No interatrial septal defect present.  · No ASD or PFO closure device in interatrial septum.  · Normal appearing left atrial appendage. No thrombus is present in the appendage. Normal appendage velocities.  · Mild-to-moderate mitral regurgitation.        CURRENT/PREVIOUS VISIT  EKG  Results for orders placed or performed during the hospital encounter of 04/25/22   EKG 12-LEAD    Collection Time: 04/25/22  9:48 AM    Narrative    Test Reason : I34.0,    Vent. Rate : 058 BPM     Atrial Rate : 058 BPM     P-R Int : 134 ms          QRS Dur : 116 ms      QT Int : 474 ms       P-R-T Axes : 041 036 017 degrees     QTc Int : 465 ms    Sinus bradycardia  Incomplete right bundle branch block  Borderline Abnormal ECG  When compared with ECG of 10-DEC-2021 12:17,  T wave inversion now evident in Anterior leads  Confirmed by Shandra PERRY, Gio POPE ([id number]) on 4/25/2022 1:44:01 PM    Referred By:             Confirmed By:Gio Devi MD     No valid procedures specified.   Results for orders placed in visit on 11/19/21    Nuclear Stress Test    Interpretation Summary    The EKG portion of this study is negative for ischemia.    The patient reported no chest pain during the stress test.    The nuclear portion of this study will be reported separately.      Physical Exam:  CONSTITUTIONAL: No fever, no chills  HEENT: Normocephalic, atraumatic,pupils reactive to light                 NECK:  No JVD no carotid bruit  CVS: S1S2+, RRR   LUNGS: Clear  ABDOMEN: Soft, NT, BS+  EXTREMITIES: No cyanosis, trace BLE edema  : No vargas catheter  NEURO: AAO X 3  PSY: Normal affect      Medication List with Changes/Refills   New Medications    ISOSORBIDE MONONITRATE (IMDUR) 30 MG 24 HR TABLET    Take 1 tablet (30 mg total) by mouth once daily.   Current Medications    APIXABAN (ELIQUIS) 5 MG TAB    Take 1 tablet (5 mg total) by mouth 2 (two) times daily.    ASCORBIC ACID, VITAMIN C, (VITAMIN C) 1000 MG TABLET    Take 1,000 mg by mouth once daily.    ASPIRIN (ECOTRIN) 81 MG EC TABLET    Take 81 mg by mouth. Every other day    B COMPLEX VITAMINS CAPSULE    Take 1 capsule by mouth once daily.    BENAZEPRIL (LOTENSIN) 10 MG TABLET    Take 1 tablet (10 mg total) by mouth 2 (two) times daily.    CARVEDILOL (COREG) 6.25 MG  TABLET    Take 1 tablet (6.25 mg total) by mouth 2 (two) times daily with meals.    CETIRIZINE (ZYRTEC) 10 MG TABLET    Take 10 mg by mouth daily as needed. As needed    CHLORTHALIDONE (HYGROTEN) 25 MG TAB    TAKE 1 TABLET BY MOUTH ONCE DAILY    CHOLECALCIFEROL, VITAMIN D3, 125 MCG (5,000 UNIT) TAB    Take 1 tablet by mouth once daily.    CINNAMON BARK 500 MG CAPSULE    Take 1,000 mg by mouth once daily.    DOCOSAHEXANOIC ACID/EPA (FISH OIL ORAL)    Take 1,240 mg by mouth 2 (two) times daily.    ESTRADIOL (ESTRACE) 1 MG TABLET    Take 1 mg by mouth once daily.    FLUTICASONE PROPIONATE (FLONASE) 50 MCG/ACTUATION NASAL SPRAY    USE 1 SPRAY IN BOTH  NOSTRILS TWICE DAILY    FUROSEMIDE (LASIX) 20 MG TABLET    Take 1 tablet (20 mg total) by mouth once daily.    GLUCOSAMINE-CHONDROITIN 500-400 MG TABLET    Take 1 tablet by mouth 2 (two) times daily.    LEVOTHYROXINE (SYNTHROID) 25 MCG TABLET    TAKE 1 TABLET BY MOUTH  DAILY    LOPERAMIDE HCL (IMODIUM A-D ORAL)    As needed    MELATONIN 10 MG TAB    Take by mouth. As needed    MULTIVITAMIN CAPSULE    Take 1 capsule by mouth once daily.    POTASSIUM CHLORIDE (MICRO-K) 10 MEQ CPSR    TAKE 1 CAPSULE BY MOUTH  TWICE DAILY    PRAVASTATIN (PRAVACHOL) 20 MG TABLET    Take 1 tablet (20 mg total) by mouth every evening.    TURMERIC ROOT EXTRACT 500 MG CAP    Take by mouth.    VIT A/VIT C/VIT E/ZINC/COPPER (ICAPS AREDS ORAL)    Take by mouth.   Discontinued Medications    AMLODIPINE (NORVASC) 2.5 MG TABLET    Take 1 tablet (2.5 mg total) by mouth once daily.             Assessment:       1. HTN (hypertension), benign    2. White coat syndrome with diagnosis of hypertension    3. Anxiety    4. Other persistent atrial fibrillation    5. Coronary artery disease involving native coronary artery of native heart without angina pectoris    6. Mixed hyperlipidemia    7. Panlobular emphysema    8. JACQUELINE (obstructive sleep apnea)    9. Pulmonary hypertension    10. Nonrheumatic mitral valve  regurgitation    11. Myocardial bridge    12. Fatigue, unspecified type    13. Atypical chest pain    14. SOB (shortness of breath) on exertion         Plan:     1. Had a lengthy discussion and discussed in detail the results of the patient's transthoracic echocardiogram, transesophageal echocardiogram, and left heart catheterization.  Also discussed in detail her medications in the purposes of them in regards to her heart.  2. The patient appears to be quite anxious and I believe that the anxiety is driving some of her symptoms at times.  However of note she did have a myocardial bridge with about 35% narrowing.  Will start her on Imdur 30 mg p.o. daily.  3. Due to the lower extremity edema, will stop the amlodipine in hopes that this will improve.  Continue with chlorthalidone and furosemide.  4. Will also check a BNP and T3 and T4.  Her TSH had slightly trended up but was still within normal range on recent labs.  5. The patient also has some pulmonary hypertension as well as obstructive sleep apnea.  She is being treated with a CPAP.  Unclear how much these issues are also contributing to her symptoms.  6. The PAT revealed mild to moderate mitral regurgitation.  At the present time intervention is not recommended.  7.  Recent labs reviewed. LDL was around 47. Continue pravastatin 20 mg po daily.   Recommend low fat low cholesterol diet and regular exercise.   8. Will see her back for follow-up appointment in about 3 months.  May call or return sooner if problems arise.  Problem List Items Addressed This Visit        Unprioritized    HTN (hypertension), benign - Primary    COPD (chronic obstructive pulmonary disease)    Mixed hyperlipidemia    Other persistent atrial fibrillation    Coronary artery disease involving native coronary artery of native heart without angina pectoris    White coat syndrome with diagnosis of hypertension    JACQUELINE (obstructive sleep apnea)    Pulmonary hypertension    Nonrheumatic mitral  valve regurgitation    Myocardial bridge      Other Visit Diagnoses     Anxiety        Fatigue, unspecified type        Relevant Orders    BNP    T4, Free (Completed)    T3    Atypical chest pain        SOB (shortness of breath) on exertion        Relevant Orders    BNP    T4, Free (Completed)    T3          Follow up in about 3 months (around 8/25/2022).

## 2022-05-26 LAB — T3 SERPL-MCNC: 113 NG/DL (ref 71–180)

## 2022-05-30 ENCOUNTER — TELEPHONE (OUTPATIENT)
Dept: CARDIOLOGY | Facility: CLINIC | Age: 82
End: 2022-05-30
Payer: MEDICARE

## 2022-05-30 NOTE — TELEPHONE ENCOUNTER
----- Message from Olga Sesay NP sent at 5/25/2022  3:46 PM CDT -----  Very mild elevation of BNP. Can increase furosemide to twice daily for 3 days.   Limit salt intake.   Continue with other changes made in today's visit.

## 2022-06-06 ENCOUNTER — PATIENT MESSAGE (OUTPATIENT)
Dept: CARDIOLOGY | Facility: CLINIC | Age: 82
End: 2022-06-06
Payer: MEDICARE

## 2022-06-06 DIAGNOSIS — I10 HTN (HYPERTENSION), BENIGN: ICD-10-CM

## 2022-06-07 RX ORDER — ISOSORBIDE MONONITRATE 10 MG/1
10 TABLET ORAL 2 TIMES DAILY
Qty: 60 TABLET | Refills: 11 | Status: SHIPPED | OUTPATIENT
Start: 2022-06-07 | End: 2023-02-16 | Stop reason: SDUPTHER

## 2022-06-07 RX ORDER — POTASSIUM CHLORIDE 750 MG/1
CAPSULE, EXTENDED RELEASE ORAL
Qty: 90 CAPSULE | Refills: 7 | Status: SHIPPED | OUTPATIENT
Start: 2022-06-07 | End: 2023-03-30

## 2022-06-07 NOTE — TELEPHONE ENCOUNTER
Refill Routing Note   Medication(s) are not appropriate for processing by [facility] for the following reason(s):      - Patient has been seen in the ED/Hospital since the last PCP visit    [facility] action(s):  Defer          Medication reconciliation completed: No     Appointments  past 12m or future 3m with PCP    Date Provider   Last Visit   1/10/2022 Brice Weaver Jr., MD   Next Visit   7/18/2022 Brice Weaver Jr., MD   ED visits in past 90 days: 0        Note composed:10:14 AM 06/07/2022

## 2022-06-07 NOTE — TELEPHONE ENCOUNTER
No new care gaps identified.  [facility] Embedded Care Gaps. Reference number: [id number]. 6/06/2022   8:02:28 PM CDT

## 2022-07-07 ENCOUNTER — TELEPHONE (OUTPATIENT)
Dept: CARDIOLOGY | Facility: CLINIC | Age: 82
End: 2022-07-07
Payer: MEDICARE

## 2022-07-07 NOTE — TELEPHONE ENCOUNTER
----- Message from Leena Calles sent at 7/7/2022 10:19 AM CDT -----  Please call with lab results

## 2022-07-12 ENCOUNTER — TELEPHONE (OUTPATIENT)
Dept: CARDIOLOGY | Facility: CLINIC | Age: 82
End: 2022-07-12
Payer: MEDICARE

## 2022-07-13 ENCOUNTER — PATIENT MESSAGE (OUTPATIENT)
Dept: FAMILY MEDICINE | Facility: CLINIC | Age: 82
End: 2022-07-13
Payer: MEDICARE

## 2022-07-18 ENCOUNTER — OFFICE VISIT (OUTPATIENT)
Dept: FAMILY MEDICINE | Facility: CLINIC | Age: 82
End: 2022-07-18
Payer: MEDICARE

## 2022-07-18 VITALS
SYSTOLIC BLOOD PRESSURE: 120 MMHG | WEIGHT: 146.63 LBS | TEMPERATURE: 98 F | BODY MASS INDEX: 23.57 KG/M2 | HEART RATE: 63 BPM | DIASTOLIC BLOOD PRESSURE: 60 MMHG | OXYGEN SATURATION: 96 % | HEIGHT: 66 IN

## 2022-07-18 DIAGNOSIS — I25.10 CORONARY ARTERY DISEASE INVOLVING NATIVE CORONARY ARTERY OF NATIVE HEART WITHOUT ANGINA PECTORIS: ICD-10-CM

## 2022-07-18 DIAGNOSIS — E03.4 HYPOTHYROIDISM DUE TO ACQUIRED ATROPHY OF THYROID: ICD-10-CM

## 2022-07-18 DIAGNOSIS — E78.2 MIXED HYPERLIPIDEMIA: ICD-10-CM

## 2022-07-18 DIAGNOSIS — J43.1 PANLOBULAR EMPHYSEMA: ICD-10-CM

## 2022-07-18 DIAGNOSIS — I27.20 PULMONARY HYPERTENSION: ICD-10-CM

## 2022-07-18 DIAGNOSIS — I10 ESSENTIAL HYPERTENSION: Primary | ICD-10-CM

## 2022-07-18 DIAGNOSIS — I48.19 OTHER PERSISTENT ATRIAL FIBRILLATION: ICD-10-CM

## 2022-07-18 PROCEDURE — 99999 PR PBB SHADOW E&M-EST. PATIENT-LVL III: CPT | Mod: PBBFAC,,, | Performed by: FAMILY MEDICINE

## 2022-07-18 PROCEDURE — 99214 PR OFFICE/OUTPT VISIT, EST, LEVL IV, 30-39 MIN: ICD-10-PCS | Mod: S$PBB,,, | Performed by: FAMILY MEDICINE

## 2022-07-18 PROCEDURE — 99999 PR PBB SHADOW E&M-EST. PATIENT-LVL III: ICD-10-PCS | Mod: PBBFAC,,, | Performed by: FAMILY MEDICINE

## 2022-07-18 PROCEDURE — 99214 OFFICE O/P EST MOD 30 MIN: CPT | Mod: S$PBB,,, | Performed by: FAMILY MEDICINE

## 2022-07-18 PROCEDURE — 99213 OFFICE O/P EST LOW 20 MIN: CPT | Mod: PBBFAC,PN | Performed by: FAMILY MEDICINE

## 2022-07-24 NOTE — PROGRESS NOTES
Subjective:       Patient ID: Jacqueline Mathias is a 82 y.o. female.    Chief Complaint: Hypertension, Hyperlipidemia, Hypothyroidism, Atrial Fibrillation, Coronary Artery Disease, and COPD    Patient presents here for 6 month follow-up of hypertension, hyperlipidemia, hypothyroidism, atrial fibrillation, CAD, and COPD.  Her weight is stable over the previous 6 months.  She states she is following her low-sodium, low-fat low-cholesterol diet but does not get a whole lot of regular exercise.  Her main complaint is continued problems with shortness of breath which is due to her COPD.  She has been worked up for other causes an although she does have nonobstructing CAD, this is not causing her symptoms.  She is on oxygen at night.  She is compliant with her inhalers and is followed by Pulmonary.  Her hypertension is well controlled with her present medication and she is tolerating her medications well.  Her hyperlipidemia is also very well controlled with her present use of pravastatin 20 mg daily.  Her hypothyroidism is also stable with her present use of levothyroxine 25 mcg daily.  She does have atrial fibrillation but denies any palpitations at this time and no chest pain.  She is on medication both her rate control as well as anticoagulation with Eliquis.  As mentioned above, she does have coronary artery disease but denies any chest pains.  She is followed on a regular basis by Cardiology also.  As far as health maintenance, she is up-to-date with all of her recommended screening exams and immunizations with exception of shingles vaccine, 2nd COVID booster, and DEXA scan.  Her last DEXA scan was 3 years ago in 2019 and at that time it was completely normal so her follow-up is 5 years instead of 3 years.    Hypertension  This is a chronic problem. The problem is unchanged. The problem is controlled. Associated symptoms include shortness of breath. Pertinent negatives include no chest pain, headaches or palpitations.  Risk factors for coronary artery disease include dyslipidemia and post-menopausal state. Past treatments include diuretics, ACE inhibitors and beta blockers. The current treatment provides significant improvement. Compliance problems include exercise.  Hypertensive end-organ damage includes CAD/MI. There is no history of kidney disease, CVA or heart failure.   Hyperlipidemia  This is a chronic problem. The problem is controlled. Recent lipid tests were reviewed and are normal. Exacerbating diseases include hypothyroidism. Factors aggravating her hyperlipidemia include thiazides, beta blockers and estrogen. Associated symptoms include shortness of breath. Pertinent negatives include no chest pain. Current antihyperlipidemic treatment includes statins. The current treatment provides significant improvement of lipids. Compliance problems include adherence to exercise.  Risk factors for coronary artery disease include dyslipidemia, hypertension and post-menopausal.     Review of Systems   Constitutional: Positive for fatigue. Negative for chills, fever and unexpected weight change.   HENT: Negative for nasal congestion, postnasal drip and sore throat.    Respiratory: Positive for shortness of breath. Negative for cough and wheezing.    Cardiovascular: Positive for leg swelling (Occasional mild swelling). Negative for chest pain and palpitations.   Gastrointestinal: Negative for abdominal pain, diarrhea, nausea and vomiting.   Musculoskeletal: Negative for arthralgias and back pain.   Neurological: Negative for dizziness, light-headedness and headaches.   Psychiatric/Behavioral: Negative for sleep disturbance. The patient is not nervous/anxious.          Objective:      Physical Exam  Vitals reviewed.   Constitutional:       General: She is not in acute distress.     Appearance: Normal appearance. She is well-developed.   HENT:      Right Ear: Tympanic membrane and external ear normal.      Left Ear: Tympanic membrane  and external ear normal.      Mouth/Throat:      Pharynx: Oropharynx is clear. No posterior oropharyngeal erythema.   Neck:      Thyroid: No thyromegaly.   Cardiovascular:      Rate and Rhythm: Normal rate and regular rhythm.      Pulses: Normal pulses.      Heart sounds: Normal heart sounds. No murmur heard.  Pulmonary:      Effort: Pulmonary effort is normal.      Breath sounds: Normal breath sounds. No wheezing or rales.   Musculoskeletal:         General: No tenderness. Normal range of motion.      Cervical back: Normal range of motion and neck supple.      Right lower leg: No edema.      Left lower leg: No edema.   Lymphadenopathy:      Cervical: No cervical adenopathy.   Neurological:      General: No focal deficit present.      Mental Status: She is alert and oriented to person, place, and time.      Cranial Nerves: No cranial nerve deficit.      Deep Tendon Reflexes: Reflexes are normal and symmetric.         Assessment:       Problem List Items Addressed This Visit     COPD (chronic obstructive pulmonary disease)    Hypothyroidism due to acquired atrophy of thyroid    Mixed hyperlipidemia    Other persistent atrial fibrillation    Coronary artery disease involving native coronary artery of native heart without angina pectoris    Essential hypertension - Primary    Pulmonary hypertension          Plan:       1.  Continue present medication as her hypertension, hyperlipidemia, hypothyroidism, atrial fibrillation, CAD, and COPD are stable and controlled  2. Continue low-sodium, low-fat low-cholesterol diet but try to get some aerobic exercise.  BMI today is 23.66  3. Continue oxygen at night  4. Continue follow-up with Cardiology and Pulmonary  5. Follow up with me in 6 months or p.r.n.

## 2022-07-26 ENCOUNTER — TELEPHONE (OUTPATIENT)
Dept: PULMONOLOGY | Facility: CLINIC | Age: 82
End: 2022-07-26

## 2022-07-26 NOTE — TELEPHONE ENCOUNTER
The patient's download shows that she used her CPAP 90/90 days for an average of over 7 hr.  Her median pressure is 9.1, her maximum is 12.9, her AHI is 0.3.

## 2022-07-27 ENCOUNTER — TELEPHONE (OUTPATIENT)
Dept: PULMONOLOGY | Facility: CLINIC | Age: 82
End: 2022-07-27

## 2022-07-27 ENCOUNTER — PATIENT MESSAGE (OUTPATIENT)
Dept: PULMONOLOGY | Facility: CLINIC | Age: 82
End: 2022-07-27

## 2022-07-27 NOTE — TELEPHONE ENCOUNTER
The patient states she is waking up short of breath and panting for hours on her oxygen and CPAP.  She feels it is because we lower the oxygen concentrator to 2 L from 4 L. this is contrary to what our notes a but she is fairly certain that this is the case.  I have suggested a repeat CPAP titration in the lab.  She would prefer to try increasing her oxygen to 4 L to bleed into the CPAP and see if that fix is things.  She had a recent PAT which showed mild to moderate mitral regurg.  The patient will increase the oxygen she bleeds into her CPAP and let me know in a few days if she is doing better.

## 2022-08-31 DIAGNOSIS — Z78.0 MENOPAUSE: ICD-10-CM

## 2022-09-13 ENCOUNTER — TELEPHONE (OUTPATIENT)
Dept: PULMONOLOGY | Facility: CLINIC | Age: 82
End: 2022-09-13

## 2022-09-14 ENCOUNTER — OFFICE VISIT (OUTPATIENT)
Dept: CARDIOLOGY | Facility: CLINIC | Age: 82
End: 2022-09-14
Payer: MEDICARE

## 2022-09-14 ENCOUNTER — OFFICE VISIT (OUTPATIENT)
Dept: PULMONOLOGY | Facility: CLINIC | Age: 82
End: 2022-09-14
Payer: MEDICARE

## 2022-09-14 VITALS
WEIGHT: 143 LBS | DIASTOLIC BLOOD PRESSURE: 70 MMHG | HEART RATE: 66 BPM | OXYGEN SATURATION: 96 % | HEIGHT: 66 IN | BODY MASS INDEX: 22.98 KG/M2 | SYSTOLIC BLOOD PRESSURE: 130 MMHG

## 2022-09-14 VITALS
OXYGEN SATURATION: 98 % | HEART RATE: 59 BPM | SYSTOLIC BLOOD PRESSURE: 132 MMHG | WEIGHT: 142 LBS | BODY MASS INDEX: 22.82 KG/M2 | DIASTOLIC BLOOD PRESSURE: 74 MMHG | RESPIRATION RATE: 16 BRPM | HEIGHT: 66 IN

## 2022-09-14 DIAGNOSIS — G47.33 OSA (OBSTRUCTIVE SLEEP APNEA): ICD-10-CM

## 2022-09-14 DIAGNOSIS — R06.02 SOB (SHORTNESS OF BREATH) ON EXERTION: ICD-10-CM

## 2022-09-14 DIAGNOSIS — I25.10 CORONARY ARTERY DISEASE INVOLVING NATIVE CORONARY ARTERY OF NATIVE HEART WITHOUT ANGINA PECTORIS: ICD-10-CM

## 2022-09-14 DIAGNOSIS — I10 ESSENTIAL HYPERTENSION: ICD-10-CM

## 2022-09-14 DIAGNOSIS — R06.02 SHORTNESS OF BREATH: Primary | ICD-10-CM

## 2022-09-14 DIAGNOSIS — J43.1 PANLOBULAR EMPHYSEMA: ICD-10-CM

## 2022-09-14 DIAGNOSIS — Q76.49: ICD-10-CM

## 2022-09-14 DIAGNOSIS — R94.2 DECREASED DIFFUSION CAPACITY: ICD-10-CM

## 2022-09-14 DIAGNOSIS — I34.0 NONRHEUMATIC MITRAL VALVE REGURGITATION: ICD-10-CM

## 2022-09-14 DIAGNOSIS — I27.20 PULMONARY HYPERTENSION: Primary | ICD-10-CM

## 2022-09-14 DIAGNOSIS — E78.2 MIXED HYPERLIPIDEMIA: ICD-10-CM

## 2022-09-14 DIAGNOSIS — I10 WHITE COAT SYNDROME WITH DIAGNOSIS OF HYPERTENSION: ICD-10-CM

## 2022-09-14 DIAGNOSIS — I10 HTN (HYPERTENSION), BENIGN: ICD-10-CM

## 2022-09-14 PROCEDURE — 99214 PR OFFICE/OUTPT VISIT, EST, LEVL IV, 30-39 MIN: ICD-10-PCS | Mod: S$GLB,,, | Performed by: INTERNAL MEDICINE

## 2022-09-14 PROCEDURE — 93000 ELECTROCARDIOGRAM COMPLETE: CPT | Mod: S$GLB,,, | Performed by: INTERNAL MEDICINE

## 2022-09-14 PROCEDURE — 93000 EKG 12-LEAD: ICD-10-PCS | Mod: S$GLB,,, | Performed by: INTERNAL MEDICINE

## 2022-09-14 PROCEDURE — 99214 OFFICE O/P EST MOD 30 MIN: CPT | Mod: S$GLB,,, | Performed by: INTERNAL MEDICINE

## 2022-09-14 NOTE — PROGRESS NOTES
SUBJECTIVE:    Patient ID: Jacqueline Mathias is a 82 y.o. female.    Chief Complaint: Apnea    Apnea     The patient is here with excellent compliance on her CPAP in an AHI of 0.3.  She still feels very tired.  She still gets short of breath with exertion.  Her last PFTs were last October which showed moderate restriction and a moderate diffusion defect.  The 6 minute walk at that time showed sats dropping to 89%.  The patient had a recent cardiac catheterization and was told her heart is doing fine.  She still gets pedal edema.  The only cause for her restriction appears to be a straight back.  We have worked this up before.    Past Medical History:   Diagnosis Date    Allergy     latex    Allergy     seasonal    Arthritis     Asthma     Blood transfusion     Cataract     removed    COPD (chronic obstructive pulmonary disease)     Hypertension     IBS (irritable bowel syndrome)     Obstructive sleep apnea     Thyroid nodule 09/29/2016    Ulcer      Past Surgical History:   Procedure Laterality Date    ANGIOGRAM, CORONARY, WITH LEFT HEART CATHETERIZATION N/A 12/14/2021    Procedure: Angiogram, Coronary, with Left Heart Cath;  Surgeon: Napoleon Dumas MD;  Location: [facility] CATH/EP LAB;  Service: Cardiology;  Laterality: N/A;    CAROTID ARTERY ANGIOPLASTY Right 09/30/2016    CHOLECYSTECTOMY      EYE SURGERY      cataract removal    HYSTERECTOMY       Family History   Problem Relation Age of Onset    Stroke Mother     Heart disease Mother     Heart disease Sister     Alzheimer's disease Sister     Cancer Brother     Cancer Brother     Kidney disease Brother     Breast cancer Maternal Aunt         Social History:   Marital Status:   Occupation: Data Unavailable  Alcohol History:  reports current alcohol use.  Tobacco History:  reports that she has never smoked. She has never used smokeless tobacco.  Drug History:  reports no history of drug use.    Review of patient's allergies indicates:   Allergen Reactions     Latex, natural rubber      rash    Biaxin [clarithromycin] Diarrhea and Nausea Only    Codeine Itching    Oxycodone      Knocks pt out for 3 days       Current Outpatient Medications   Medication Sig Dispense Refill    apixaban (ELIQUIS) 5 mg Tab Take 1 tablet (5 mg total) by mouth 2 (two) times daily. 180 tablet 3    ascorbic acid, vitamin C, (VITAMIN C) 1000 MG tablet Take 1,000 mg by mouth once daily.      aspirin (ECOTRIN) 81 MG EC tablet Take 81 mg by mouth. Every other day      b complex vitamins capsule Take 1 capsule by mouth once daily.      benazepriL (LOTENSIN) 10 MG tablet TAKE 1 TABLET BY MOUTH  TWICE DAILY 180 tablet 3    carvediloL (COREG) 6.25 MG tablet TAKE 1 TABLET BY MOUTH  TWICE DAILY WITH MEALS 180 tablet 3    cetirizine (ZYRTEC) 10 MG tablet Take 10 mg by mouth daily as needed. As needed      chlorthalidone (HYGROTEN) 25 MG Tab TAKE 1 TABLET BY MOUTH ONCE DAILY 90 tablet 3    cholecalciferol, vitamin D3, 125 mcg (5,000 unit) Tab Take 1 tablet by mouth once daily.      cinnamon bark 500 mg capsule Take 1,000 mg by mouth once daily.      DOCOSAHEXANOIC ACID/EPA (FISH OIL ORAL) Take 1,240 mg by mouth 2 (two) times daily.      estradiol (ESTRACE) 1 MG tablet Take 1 mg by mouth once daily.      fluticasone propionate (FLONASE) 50 mcg/actuation nasal spray USE 1 SPRAY IN BOTH  NOSTRILS TWICE DAILY 48 g 2    furosemide (LASIX) 20 MG tablet Take 1 tablet (20 mg total) by mouth once daily. (Patient taking differently: Take 20 mg by mouth daily as needed (fluid).) 90 tablet 3    glucosamine-chondroitin 500-400 mg tablet Take 1 tablet by mouth 2 (two) times daily.      isosorbide mononitrate (ISMO,MONOKET) 10 mg tablet Take 1 tablet (10 mg total) by mouth 2 (two) times daily. 60 tablet 11    levothyroxine (SYNTHROID) 25 MCG tablet TAKE 1 TABLET BY MOUTH  DAILY 90 tablet 2    melatonin 10 mg Tab Take by mouth. As needed      multivitamin capsule Take 1 capsule by mouth once daily.      potassium chloride  "(MICRO-K) 10 MEQ CpSR TAKE 1 CAPSULE BY MOUTH  TWICE DAILY 90 capsule 7    pravastatin (PRAVACHOL) 20 MG tablet Take 1 tablet (20 mg total) by mouth every evening. 90 tablet 3    turmeric root extract 500 mg Cap Take by mouth.      vit A/vit C/vit E/zinc/copper (ICAPS AREDS ORAL) Take by mouth.      loperamide HCl (IMODIUM A-D ORAL) As needed       No current facility-administered medications for this visit.       Alpha-1 Antitrypsin:  Last PFT: 10/11/21 restriction and a diffusion defect, (straight back)  Last CT: 11/18/21   No acute cardiac or pulmonary process, with additional, and incidental findings as noted above.    Her overnight pulse ox with CPAP no oxygen showed significant drops during the night without the oxygen.    Review of Systems  General: Feeling like she fatigues quickly.  Eyes: Vision is good.  ENT:  Intermittent epistaxis  Heart:: Occasional palpatations  Lungs: shortness of breath with exertion  GI: IBS sometimes  : No dysuria, hesitancy, or nocturia.  Musculoskeletal: occasional back ache  Skin: No lesions or rashes.  Neuro: No headaches or neuropathy.  Lymph: ankles swell a lot  Psych: worries about everything  Endo: weight stable    OBJECTIVE:      /70 (BP Location: Left arm, Patient Position: Sitting, BP Method: Medium (Manual))   Pulse 66   Ht 5' 6" (1.676 m)   Wt 64.9 kg (143 lb)   SpO2 96%   BMI 23.08 kg/m²     Physical Exam  GENERAL: Older patient in no distress.  HEENT: Pupils equal and reactive. Extraocular movements intact. Nose intact.  Pharynx moist.  NECK: Supple.   HEART: Regular rate and rhythm. No murmur or gallop auscultated.  LUNGS: Clear to auscultation and percussion. Lung excursion symmetrical. No change in fremitus. No adventitial noises.  ABDOMEN: Bowel sounds present. Non-tender, no masses palpated.  EXTREMITIES: Normal muscle tone and joint movement, no cyanosis or clubbing.   LYMPHATICS: No adenopathy palpated, no edema.  SKIN: Dry, intact, no lesions. "   NEURO: Cranial nerves II-XII intact. Motor strength 5/5 bilaterally, upper and lower extremities.  PSYCH: Appropriate affect.    Assessment:       No diagnosis found.     Discussed the possibility that her ongoing fatigue and exhaustion may be a function of her age as well as her comorbidities.  Some epistaxis with her CPAP.  She had stopped using her Ayr gel.  Plan:       There are no diagnoses linked to this encounter.       No follow-ups on file.    Continue CPAP with oxygen  Resume using Ayr gel  PFT's  6 minute walk

## 2022-09-14 NOTE — PROGRESS NOTES
Subjective:    Patient ID:  Jacqueline Mathias is a 82 y.o. female     Chief Complaint   Patient presents with    Atrial Fibrillation       HPI:  Ms Jacqueline Mathias is a 82 y.o. female is here for follow-up.    Patient has been doing well except that her feet are more swollen intermittently.  Especially when she is standing for periods of time.    She is currently on CPAP and she is also on concentrator pain  She is scheduled to see her pulmonologist today.    Denies any chest pain or tightness or heaviness and breathing is been getting more winded she does have increased shortness of breath on exertion.    Review of patient's allergies indicates:   Allergen Reactions    Latex, natural rubber      rash    Biaxin [clarithromycin] Diarrhea and Nausea Only    Codeine Itching    Oxycodone      Knocks pt out for 3 days       Past Medical History:   Diagnosis Date    Allergy     latex    Allergy     seasonal    Arthritis     Asthma     Blood transfusion     Cataract     removed    COPD (chronic obstructive pulmonary disease)     Hypertension     IBS (irritable bowel syndrome)     Obstructive sleep apnea     Thyroid nodule 09/29/2016    Ulcer      Past Surgical History:   Procedure Laterality Date    ANGIOGRAM, CORONARY, WITH LEFT HEART CATHETERIZATION N/A 12/14/2021    Procedure: Angiogram, Coronary, with Left Heart Cath;  Surgeon: Napoleon Dumas MD;  Location: [facility] CATH/EP LAB;  Service: Cardiology;  Laterality: N/A;    CAROTID ARTERY ANGIOPLASTY Right 09/30/2016    CHOLECYSTECTOMY      EYE SURGERY      cataract removal    HYSTERECTOMY       Social History     Tobacco Use    Smoking status: Never    Smokeless tobacco: Never   Substance Use Topics    Alcohol use: Yes     Comment: social    Drug use: No     Family History   Problem Relation Age of Onset    Stroke Mother     Heart disease Mother     Heart disease Sister     Alzheimer's disease Sister     Cancer Brother     Cancer Brother     Kidney disease Brother      Breast cancer Maternal Aunt         Review of Systems:   Constitution: Negative for diaphoresis and fever.   HEENT: Negative for nosebleeds.    Cardiovascular: Negative for chest pain       Progressive dyspnea on exertion       Bilateral leg swelling        No palpitations  Respiratory:  Positive for shortness of breath and wheezing.    Hematologic/Lymphatic: Negative for bleeding problem. Does not bruise/bleed easily.   Skin: Negative for color change and rash.   Musculoskeletal: Negative for falls and myalgias.   Gastrointestinal: Negative for hematemesis and hematochezia.   Genitourinary: Negative for hematuria.   Neurological: Negative for dizziness and light-headedness.   Psychiatric/Behavioral: Negative for altered mental status and memory loss.          Objective:        Vitals:    09/14/22 0936   BP: 132/74   Pulse: (!) 59   Resp: 16       Lab Results   Component Value Date    WBC 6.92 04/25/2022    HGB 12.4 04/25/2022    HCT 39.5 04/25/2022     04/25/2022    CHOL 139 03/21/2022    TRIG 128 03/21/2022    HDL 66 03/21/2022    ALT 18 12/10/2021    AST 23 12/10/2021     04/25/2022    K 3.8 04/25/2022    CL 99 04/25/2022    CREATININE 0.8 04/25/2022    BUN 24 (H) 04/25/2022    CO2 25 04/25/2022    TSH 3.540 03/21/2022    INR 1.4 12/10/2021    GLUF 121 (H) 03/22/2019    HGBA1C 6.2 03/21/2022        ECHOCARDIOGRAM RESULTS  Results for orders placed during the hospital encounter of 04/27/22    Transesophageal echo (PAT)    Interpretation Summary  · The left ventricle is normal in size with normal systolic function.  · The estimated ejection fraction is 60%.  · Normal left ventricular diastolic function.  · Normal right ventricular size with normal right ventricular systolic function.  · No interatrial septal defect present.  · No ASD or PFO closure device in interatrial septum.  · Normal appearing left atrial appendage. No thrombus is present in the appendage. Normal appendage velocities.  ·  Mild-to-moderate mitral regurgitation.        CURRENT/PREVIOUS VISIT EKG  Results for orders placed or performed during the hospital encounter of 04/25/22   EKG 12-LEAD    Collection Time: 04/25/22  9:48 AM    Narrative    Test Reason : I34.0,    Vent. Rate : 058 BPM     Atrial Rate : 058 BPM     P-R Int : 134 ms          QRS Dur : 116 ms      QT Int : 474 ms       P-R-T Axes : 041 036 017 degrees     QTc Int : 465 ms    Sinus bradycardia  Incomplete right bundle branch block  Borderline Abnormal ECG  When compared with ECG of 10-DEC-2021 12:17,  T wave inversion now evident in Anterior leads  Confirmed by Shandra PERRY, Gio POPE ([id number]) on 4/25/2022 1:44:01 PM    Referred By:             Confirmed By:Gio Devi MD     No valid procedures specified.   Results for orders placed in visit on 11/19/21    Nuclear Stress Test    Interpretation Summary    The EKG portion of this study is negative for ischemia.    The patient reported no chest pain during the stress test.    The nuclear portion of this study will be reported separately.      Physical Exam:  CONSTITUTIONAL: No fever, no chills  HEENT: Normocephalic, atraumatic,pupils reactive to light                 NECK:  No JVD no carotid bruit  CVS: S1S2+, RRR, systolic murmurs,   LUNGS: Clear  ABDOMEN: Soft, NT, BS+  EXTREMITIES: No cyanosis, bilateral ankle edema  : No vargas catheter  NEURO: AAO X 3  PSY: Normal affect      Medication List with Changes/Refills   Current Medications    APIXABAN (ELIQUIS) 5 MG TAB    Take 1 tablet (5 mg total) by mouth 2 (two) times daily.    ASCORBIC ACID, VITAMIN C, (VITAMIN C) 1000 MG TABLET    Take 1,000 mg by mouth once daily.    ASPIRIN (ECOTRIN) 81 MG EC TABLET    Take 81 mg by mouth. Every other day    B COMPLEX VITAMINS CAPSULE    Take 1 capsule by mouth once daily.    BENAZEPRIL (LOTENSIN) 10 MG TABLET    TAKE 1 TABLET BY MOUTH  TWICE DAILY    CARVEDILOL (COREG) 6.25 MG TABLET    TAKE 1 TABLET BY MOUTH  TWICE DAILY WITH  MEALS    CETIRIZINE (ZYRTEC) 10 MG TABLET    Take 10 mg by mouth daily as needed. As needed    CHLORTHALIDONE (HYGROTEN) 25 MG TAB    TAKE 1 TABLET BY MOUTH ONCE DAILY    CHOLECALCIFEROL, VITAMIN D3, 125 MCG (5,000 UNIT) TAB    Take 1 tablet by mouth once daily.    CINNAMON BARK 500 MG CAPSULE    Take 1,000 mg by mouth once daily.    DOCOSAHEXANOIC ACID/EPA (FISH OIL ORAL)    Take 1,240 mg by mouth 2 (two) times daily.    ESTRADIOL (ESTRACE) 1 MG TABLET    Take 1 mg by mouth once daily.    FLUTICASONE PROPIONATE (FLONASE) 50 MCG/ACTUATION NASAL SPRAY    USE 1 SPRAY IN BOTH  NOSTRILS TWICE DAILY    FUROSEMIDE (LASIX) 20 MG TABLET    Take 1 tablet (20 mg total) by mouth once daily.    GLUCOSAMINE-CHONDROITIN 500-400 MG TABLET    Take 1 tablet by mouth 2 (two) times daily.    ISOSORBIDE MONONITRATE (ISMO,MONOKET) 10 MG TABLET    Take 1 tablet (10 mg total) by mouth 2 (two) times daily.    LEVOTHYROXINE (SYNTHROID) 25 MCG TABLET    TAKE 1 TABLET BY MOUTH  DAILY    LOPERAMIDE HCL (IMODIUM A-D ORAL)    As needed    MELATONIN 10 MG TAB    Take by mouth. As needed    MULTIVITAMIN CAPSULE    Take 1 capsule by mouth once daily.    POTASSIUM CHLORIDE (MICRO-K) 10 MEQ CPSR    TAKE 1 CAPSULE BY MOUTH  TWICE DAILY    PRAVASTATIN (PRAVACHOL) 20 MG TABLET    Take 1 tablet (20 mg total) by mouth every evening.    TURMERIC ROOT EXTRACT 500 MG CAP    Take by mouth.    VIT A/VIT C/VIT E/ZINC/COPPER (ICAPS AREDS ORAL)    Take by mouth.             Assessment:       1. Pulmonary hypertension    2. Coronary artery disease involving native coronary artery of native heart without angina pectoris    3. JACQUELINE (obstructive sleep apnea)    4. HTN (hypertension), benign    5. White coat syndrome with diagnosis of hypertension    6. Panlobular emphysema    7. Nonrheumatic mitral valve regurgitation    8. Mixed hyperlipidemia    9. SOB (shortness of breath) on exertion    10. Essential hypertension         Plan:   1. Pulmonary hypertension   Patient  has mild-to-moderate pulmonary hypertension with right ventricle systolic pressure at 52 mmHg.  And some of it is also causing her shortness of breath and dyspnea on exertion.    2. Lower extremity edema   She has bilateral ankle edema discussed with patient in regards with lower extremity edema and multiple factorial causes including pulmonary hypertension as well as venous stasis and venous reflux in the lower extremities.  3. Obstructive sleep apnea   Patient is currently on CPAP she is also using a concentrator to help keep her oxygen well into the night.    4. Panlobular emphysema   Patient has panlobular and for Zulma and she does follow-up with the pulmonologist and she is scheduled to see his soon.  And she has been on concentrator to keep her oxygen levels up during the night.  5. Essential hypertension   Her blood pressure is adequately controlled is 132/70 and she is currently on chlorthalidone 25 mg p.o. q.day carvedilol 25 mg p.o. b.i.d.  She is also on benazepril 10 mg p.o. q.day continue the same.  She is also on isosorbide mononitrate 10 mg p.o. b.i.d. continue the same.  6. Mixed hyperlipidemia   She is currently on Pravachol 20 mg p.o. q.h.s. continue the same and a total cholesterol is 139 HDL is 66 LDL is 47 and triglycerides of 128.  7. EKG  Reviewed EKG independently patient is in sinus bradycardia sinus rhythm at rate of 59 beats per minute with right bundle branch block and no acute ST T-wave changes.  Borderline QT with QT corrected QT interval of 477 milliseconds.    8. Discussed with patient at length and detail in regards with swelling of her ankles and the feet.  She has multifactorial causes including pulmonary hypertension essential hypertension and venous reflux.  And also part of diastolic dysfunction due to mitral regurgitation.    9. Continue current management and I will see her back in the office in 6 months time.      Problem List Items Addressed This Visit          Pulmonary     COPD (chronic obstructive pulmonary disease)    Pulmonary hypertension - Primary       Cardiac/Vascular    Mixed hyperlipidemia    Coronary artery disease involving native coronary artery of native heart without angina pectoris    Essential hypertension    Nonrheumatic mitral valve regurgitation       Other    JACQUELINE (obstructive sleep apnea)     Other Visit Diagnoses       HTN (hypertension), benign        White coat syndrome with diagnosis of hypertension        SOB (shortness of breath) on exertion                No follow-ups on file.

## 2022-09-15 ENCOUNTER — TELEPHONE (OUTPATIENT)
Dept: CARDIOLOGY | Facility: CLINIC | Age: 82
End: 2022-09-15
Payer: MEDICARE

## 2022-09-15 DIAGNOSIS — I27.20 PULMONARY HYPERTENSION: Primary | ICD-10-CM

## 2022-09-15 DIAGNOSIS — I34.0 NONRHEUMATIC MITRAL VALVE REGURGITATION: ICD-10-CM

## 2022-09-21 ENCOUNTER — HOSPITAL ENCOUNTER (OUTPATIENT)
Dept: PULMONOLOGY | Facility: HOSPITAL | Age: 82
Discharge: HOME | End: 2022-09-21
Attending: INTERNAL MEDICINE
Payer: MEDICARE

## 2022-09-21 VITALS — BODY MASS INDEX: 22.98 KG/M2 | HEIGHT: 66 IN | WEIGHT: 143 LBS

## 2022-09-21 DIAGNOSIS — R06.02 SHORTNESS OF BREATH: ICD-10-CM

## 2022-09-21 PROCEDURE — 94618 PULMONARY STRESS TESTING: CPT

## 2022-09-21 PROCEDURE — 94010 BREATHING CAPACITY TEST: CPT

## 2022-09-21 PROCEDURE — 94729 DIFFUSING CAPACITY: CPT

## 2022-09-21 PROCEDURE — 94727 GAS DIL/WSHOT DETER LNG VOL: CPT

## 2022-09-21 NOTE — CARE UPDATE
"   09/21/22 1110   6MW Test   Ordering Provider Ashia García MD   Diagnosis Shortness of Breath   Height 5' 6" (1.676 m)   Weight 64.9 kg (143 lb)   BMI (Calculated) 23.1   Predicted Distance Meters (Calculated) 398.14 meters   Patient Race    6MWT Status completed without stopping   Patient Reported No complaints   6MW Distance walked (feet) 1600 feet   Distance walked (meters) 487.68 meters   Type of assistive device(s) used? no assistive devices   Is extra documentation required for this patient? Yes   Pre-Exercise   Oxygen Saturation 96 %   Supplemental Oxygen Room Air   Heart Rate 89 bpm   Adela Dyspnea Rating  nothing at all   Post Exercise   Oxygen Saturation 94 %   Supplemental Oxygen Room Air   Heart Rate 101 bpm   Adela Dyspnea Rating  very, very light (just noticeable)   Recovery   Oxygen Saturation 95 %   Supplemental Oxygen Room Air   Heart Rate 98 bpm   Adela Dyspnea Rating  very, very light (just noticeable)   Interpretation   Is procedure ready for interpretation? Yes   Did the patient stop or pause? No   Total Laps Walked 8   Final Partial Lap Distance (feet) 0 feet   Total Distance Feet (Calculated) 1600 feet   Total Distance Meters (Calculated) 487.68 meters   Percentage of Predicted (Calculated) 122.49   Peak VO2 (Calculated) 18.61   Mets 5.32   Comments This is a Non-Hypoxemic 6 min. walk.     "

## 2022-09-22 ENCOUNTER — TELEPHONE (OUTPATIENT)
Dept: PULMONOLOGY | Facility: CLINIC | Age: 82
End: 2022-09-22

## 2022-10-03 ENCOUNTER — TELEPHONE (OUTPATIENT)
Dept: PULMONOLOGY | Facility: CLINIC | Age: 82
End: 2022-10-03

## 2022-10-03 ENCOUNTER — PATIENT MESSAGE (OUTPATIENT)
Dept: PULMONOLOGY | Facility: CLINIC | Age: 82
End: 2022-10-03

## 2022-10-03 ENCOUNTER — OFFICE VISIT (OUTPATIENT)
Dept: PODIATRY | Facility: CLINIC | Age: 82
End: 2022-10-03
Payer: MEDICARE

## 2022-10-03 ENCOUNTER — HOSPITAL ENCOUNTER (OUTPATIENT)
Dept: RADIOLOGY | Facility: CLINIC | Age: 82
Discharge: HOME | End: 2022-10-03
Attending: PODIATRIST
Payer: MEDICARE

## 2022-10-03 VITALS — BODY MASS INDEX: 23.3 KG/M2 | HEIGHT: 66 IN | WEIGHT: 145 LBS

## 2022-10-03 DIAGNOSIS — M79.671 RIGHT FOOT PAIN: ICD-10-CM

## 2022-10-03 DIAGNOSIS — M79.671 PAIN OF RIGHT HEEL: ICD-10-CM

## 2022-10-03 DIAGNOSIS — M72.2 PLANTAR FASCIITIS: Primary | ICD-10-CM

## 2022-10-03 PROCEDURE — 73630 XR FOOT COMPLETE 3 VIEW RIGHT: ICD-10-PCS | Mod: RT,S$GLB,, | Performed by: RADIOLOGY

## 2022-10-03 PROCEDURE — 20550 NJX 1 TENDON SHEATH/LIGAMENT: CPT | Mod: RT,S$GLB,, | Performed by: PODIATRIST

## 2022-10-03 PROCEDURE — 99203 OFFICE O/P NEW LOW 30 MIN: CPT | Mod: 25,S$GLB,, | Performed by: PODIATRIST

## 2022-10-03 PROCEDURE — 20550 PR INJECT TENDON SHEATH/LIGAMENT: ICD-10-PCS | Mod: RT,S$GLB,, | Performed by: PODIATRIST

## 2022-10-03 PROCEDURE — 73630 X-RAY EXAM OF FOOT: CPT | Mod: RT,S$GLB,, | Performed by: RADIOLOGY

## 2022-10-03 PROCEDURE — 99203 PR OFFICE/OUTPT VISIT, NEW, LEVL III, 30-44 MIN: ICD-10-PCS | Mod: 25,S$GLB,, | Performed by: PODIATRIST

## 2022-10-03 RX ORDER — BUPIVACAINE HYDROCHLORIDE 5 MG/ML
1.5 INJECTION, SOLUTION PERINEURAL
Status: COMPLETED | OUTPATIENT
Start: 2022-10-03 | End: 2022-10-03

## 2022-10-03 RX ORDER — DEXAMETHASONE SODIUM PHOSPHATE 4 MG/ML
4 INJECTION, SOLUTION INTRA-ARTICULAR; INTRALESIONAL; INTRAMUSCULAR; INTRAVENOUS; SOFT TISSUE
Status: COMPLETED | OUTPATIENT
Start: 2022-10-03 | End: 2022-10-03

## 2022-10-03 RX ORDER — METHYLPREDNISOLONE ACETATE 40 MG/ML
20 INJECTION, SUSPENSION INTRA-ARTICULAR; INTRALESIONAL; INTRAMUSCULAR; SOFT TISSUE
Status: COMPLETED | OUTPATIENT
Start: 2022-10-03 | End: 2022-10-03

## 2022-10-03 RX ADMIN — METHYLPREDNISOLONE ACETATE 20 MG: 40 INJECTION, SUSPENSION INTRA-ARTICULAR; INTRALESIONAL; INTRAMUSCULAR; SOFT TISSUE at 04:10

## 2022-10-03 RX ADMIN — BUPIVACAINE HYDROCHLORIDE 7.5 MG: 5 INJECTION, SOLUTION PERINEURAL at 04:10

## 2022-10-03 RX ADMIN — DEXAMETHASONE SODIUM PHOSPHATE 4 MG: 4 INJECTION, SOLUTION INTRA-ARTICULAR; INTRALESIONAL; INTRAMUSCULAR; INTRAVENOUS; SOFT TISSUE at 04:10

## 2022-10-03 NOTE — PATIENT INSTRUCTIONS

## 2022-10-03 NOTE — TELEPHONE ENCOUNTER
Call the patient about her PFTs which are improved from last year.  Her 6 minute walk is non hypoxemic.  Told her it was time to get her flu shot.

## 2022-10-03 NOTE — PROGRESS NOTES
"  [address] [facility] [address]  [place] [address]  Phone: [phone]   [phone]    Patient's PCP:Brice Weaver Jr, MD  Referring Provider: Aaareferral Self    Subjective:      Chief Complaint:: Heel Pain (Right heel pain)    HPI  Jacqueline Mathias is a 82 y.o. female who presents today with a complaint of right heel pain lasting for about six weeks and reports no trauma.  Current symptoms include pain when walking and standing for long periods of time. Patient rates pain 9/10 at the end of the day. Symptoms have progressed. Treatment to date have included Tylenol and elevation.      Vitals:    10/03/22 1546   Weight: 65.8 kg (145 lb)   Height: 5' 6" (1.676 m)   PainSc:   7      Shoe Size: 8.5    Past Surgical History:   Procedure Laterality Date    ANGIOGRAM, CORONARY, WITH LEFT HEART CATHETERIZATION N/A 12/14/2021    Procedure: Angiogram, Coronary, with Left Heart Cath;  Surgeon: Napoleon Dumas MD;  Location: [facility] CATH/EP LAB;  Service: Cardiology;  Laterality: N/A;    CAROTID ARTERY ANGIOPLASTY Right 09/30/2016    CHOLECYSTECTOMY      EYE SURGERY      cataract removal    HYSTERECTOMY       Past Medical History:   Diagnosis Date    Allergy     latex    Allergy     seasonal    Arthritis     Asthma     Blood transfusion     Cataract     removed    COPD (chronic obstructive pulmonary disease)     Hypertension     IBS (irritable bowel syndrome)     Obstructive sleep apnea     Thyroid nodule 09/29/2016    Ulcer      Family History   Problem Relation Age of Onset    Stroke Mother     Heart disease Mother     Heart disease Sister     Alzheimer's disease Sister     Cancer Brother     Cancer Brother     Kidney disease Brother     Breast cancer Maternal Aunt         Social History:   Marital Status:   Alcohol History:  reports current alcohol use.  Tobacco History:  reports that she has never smoked. She has never used smokeless tobacco.  Drug History:  reports no history of drug use.    Review of " patient's allergies indicates:   Allergen Reactions    Latex, natural rubber      rash    Biaxin [clarithromycin] Diarrhea and Nausea Only    Codeine Itching    Oxycodone      Knocks pt out for 3 days       Current Outpatient Medications   Medication Sig Dispense Refill    apixaban (ELIQUIS) 5 mg Tab Take 1 tablet (5 mg total) by mouth 2 (two) times daily. 180 tablet 3    ascorbic acid, vitamin C, (VITAMIN C) 1000 MG tablet Take 1,000 mg by mouth once daily.      aspirin (ECOTRIN) 81 MG EC tablet Take 81 mg by mouth. Every other day      b complex vitamins capsule Take 1 capsule by mouth once daily.      benazepriL (LOTENSIN) 10 MG tablet TAKE 1 TABLET BY MOUTH  TWICE DAILY 180 tablet 3    carvediloL (COREG) 6.25 MG tablet TAKE 1 TABLET BY MOUTH  TWICE DAILY WITH MEALS 180 tablet 3    cetirizine (ZYRTEC) 10 MG tablet Take 10 mg by mouth daily as needed. As needed      chlorthalidone (HYGROTEN) 25 MG Tab TAKE 1 TABLET BY MOUTH ONCE DAILY 90 tablet 3    cholecalciferol, vitamin D3, 125 mcg (5,000 unit) Tab Take 1 tablet by mouth once daily.      cinnamon bark 500 mg capsule Take 1,000 mg by mouth once daily.      DOCOSAHEXANOIC ACID/EPA (FISH OIL ORAL) Take 1,240 mg by mouth 2 (two) times daily.      estradiol (ESTRACE) 1 MG tablet Take 1 mg by mouth once daily.      fluticasone propionate (FLONASE) 50 mcg/actuation nasal spray USE 1 SPRAY IN BOTH  NOSTRILS TWICE DAILY 48 g 2    furosemide (LASIX) 20 MG tablet Take 1 tablet (20 mg total) by mouth once daily. (Patient taking differently: Take 20 mg by mouth daily as needed (fluid).) 90 tablet 3    glucosamine-chondroitin 500-400 mg tablet Take 1 tablet by mouth 2 (two) times daily.      isosorbide mononitrate (ISMO,MONOKET) 10 mg tablet Take 1 tablet (10 mg total) by mouth 2 (two) times daily. 60 tablet 11    levothyroxine (SYNTHROID) 25 MCG tablet TAKE 1 TABLET BY MOUTH  DAILY 90 tablet 2    loperamide HCl (IMODIUM A-D ORAL) As needed      melatonin 10 mg Tab Take by  mouth. As needed      multivitamin capsule Take 1 capsule by mouth once daily.      potassium chloride (MICRO-K) 10 MEQ CpSR TAKE 1 CAPSULE BY MOUTH  TWICE DAILY 90 capsule 7    pravastatin (PRAVACHOL) 20 MG tablet Take 1 tablet (20 mg total) by mouth every evening. 90 tablet 3    turmeric root extract 500 mg Cap Take by mouth.      vit A/vit C/vit E/zinc/copper (ICAPS AREDS ORAL) Take by mouth.       No current facility-administered medications for this visit.       Review of Systems   Constitutional: Negative.    HENT: Negative.     Eyes: Negative.    Respiratory: Negative.     Cardiovascular:  Positive for leg swelling.   Gastrointestinal: Negative.    Endocrine: Negative.    Genitourinary: Negative.    Musculoskeletal: Negative.    Skin: Negative.    Allergic/Immunologic: Negative.    Neurological: Negative.    Hematological: Negative.    Psychiatric/Behavioral: Negative.         Objective:        Physical Exam:   Foot Exam    General  General Appearance: appears stated age and healthy   Orientation: alert and oriented to person, place, and time   Affect: appropriate   Gait: unimpaired       Right Foot/Ankle     Inspection and Palpation  Ecchymosis: none  Tenderness: plantar fascia   Swelling: (Mild lower extremity edema)  Arch: normal  Hallux valgus: yes  Skin Exam: skin intact; no drainage, no ulcer and no erythema   Neurovascular  Dorsalis pedis: 2+  Posterior tibial: 2+  Capillary Refill: 2+  Varicose veins: present  Saphenous nerve sensation: normal  Tibial nerve sensation: normal  Superficial peroneal nerve sensation: normal  Deep peroneal nerve sensation: normal  Sural nerve sensation: normal    Edema  Type of edema: non-pitting    Muscle Strength  Ankle dorsiflexion: 5  Ankle plantar flexion: 5  Ankle inversion: 5  Ankle eversion: 5  Great toe extension: 5  Great toe flexion: 5    Range of Motion    Normal right ankle ROM    Tests  Anterior drawer: negative   Talar tilt: negative   PT Tinel's sign:  negative    Paresthesia: negative  Comments  Pain on palpation of the infeior medial heel and central plantar heel. No pain present  with side to side compression of the calcaneus. Negative tinnel's sign  at the tarsal tunnel. Negative Alarcon's nerve pain. Negative Calcaneal nerve pain. No soft tissue masses. Pain absent  with dorsiflexion of the ankle. No edema, erythema, or ecchymosis noted.         Physical Exam  Cardiovascular:      Pulses:           Dorsalis pedis pulses are 2+ on the right side.        Posterior tibial pulses are 2+ on the right side.   Musculoskeletal:      Right foot: Bunion present.   Feet:      Right foot:      Skin integrity: No ulcer or erythema.             Right Ankle/Foot Exam     Range of Motion   The patient has normal right ankle ROM.    Comments:  Pain on palpation of the infeior medial heel and central plantar heel. No pain present  with side to side compression of the calcaneus. Negative tinnel's sign  at the tarsal tunnel. Negative Alarcon's nerve pain. Negative Calcaneal nerve pain. No soft tissue masses. Pain absent  with dorsiflexion of the ankle. No edema, erythema, or ecchymosis noted.           Muscle Strength   Right Lower Extremity   Ankle Dorsiflexion:  5   Plantar flexion:  5/5    Vascular Exam     Right Pulses  Dorsalis Pedis:      2+  Posterior Tibial:      2+         Imaging: X-Ray Foot Complete Right  Narrative: EXAMINATION:  XR FOOT COMPLETE 3 VIEW RIGHT    CLINICAL HISTORY:  . Pain in right foot    TECHNIQUE:  AP, lateral, and oblique views of the right foot were performed.    COMPARISON:  None    FINDINGS:  No fracture or dislocation.  There is moderate 1st metatarsal bunion formation.  Small dorsal and plantar heel spurs are present.  Impression: As above    Electronically signed by: Mino Dutton MD  Date:    10/03/2022  Time:    16:07             Assessment:       1. Plantar fasciitis    2. Pain of right heel    3. Right foot pain      Plan:   Plantar  fasciitis    Pain of right heel  -     X-Ray Foot Complete Right  -     methylPREDNISolone acetate injection 20 mg  -     BUPivacaine injection 7.5 mg  -     dexamethasone injection 4 mg    Right foot pain    Follow up if symptoms worsen or fail to improve.    Procedures Informed consent was obtained.  Time-out was called.  The area was prepped with alcohol, and a steroid injection was performed at right plantar fascia using 1.5 cc of 0.5% Marcaine w/out epi, 1 cc Dexamethasone, 0.5 cc Methylprednisolone. Patient tolerated the procedure well.           Plantar Fasciitis Level I Treatment:   Anti-inflammatory  No bare feet  Over the counter insert  Restrict activity level   Use running shoes at full time  No impact exercise and stretch gastrocnemius muscle      Counseling:     I provided patient education verbally regarding:   Patient diagnosis, treatment options, as well as alternatives, risks, and benefits.     Discussed different treatment options for heel pain. I gave written and verbal instructions on heel cord stretching and this was demonstrated for the patient. Patient expressed understanding. Discussed wearing appropriate shoe gear and avoiding flats, slippers, sandals, barefoot, and sockfeet. Recommended arch supports. My recommendation for OTC supports is Spenco polysorb replacement insoles or patient may elect more aggressive treatment with prescription arch supports. We also discussed cortisone injections and NSAID therapy.       This note was created using Dragon voice recognition software that occasionally misinterpreted phrases or words.

## 2022-10-06 ENCOUNTER — CLINICAL SUPPORT (OUTPATIENT)
Dept: PULMONOLOGY | Facility: CLINIC | Age: 82
End: 2022-10-06
Payer: MEDICARE

## 2022-10-06 ENCOUNTER — TELEPHONE (OUTPATIENT)
Dept: PULMONOLOGY | Facility: CLINIC | Age: 82
End: 2022-10-06

## 2022-10-06 PROCEDURE — G0008 FLU VACCINE - QUADRIVALENT - HIGH DOSE (65+) PRESERVATIVE FREE IM: ICD-10-PCS | Mod: S$GLB,,, | Performed by: NURSE PRACTITIONER

## 2022-10-06 PROCEDURE — G0008 ADMIN INFLUENZA VIRUS VAC: HCPCS | Mod: S$GLB,,, | Performed by: NURSE PRACTITIONER

## 2022-10-06 PROCEDURE — 90662 FLU VACCINE - QUADRIVALENT - HIGH DOSE (65+) PRESERVATIVE FREE IM: ICD-10-PCS | Mod: S$GLB,,, | Performed by: NURSE PRACTITIONER

## 2022-10-06 PROCEDURE — 90662 IIV NO PRSV INCREASED AG IM: CPT | Mod: S$GLB,,, | Performed by: NURSE PRACTITIONER

## 2022-12-29 DIAGNOSIS — E78.2 MIXED HYPERLIPIDEMIA: ICD-10-CM

## 2022-12-29 RX ORDER — PRAVASTATIN SODIUM 20 MG/1
TABLET ORAL
Qty: 90 TABLET | Refills: 3 | Status: SHIPPED | OUTPATIENT
Start: 2022-12-29 | End: 2023-11-11

## 2022-12-30 NOTE — TELEPHONE ENCOUNTER
Refill Routing Note   Medication(s) are not appropriate for processing by [facility] for the following reason(s):      - Required laboratory values are outdated    [facility] action(s):  Defer Medication-related problems identified: Requires labs        Medication reconciliation completed: No     Appointments  past 12m or future 3m with PCP    Date Provider   Last Visit   7/18/2022 Brice Weaver Jr., MD   Next Visit   2/16/2023 Brice Weaver Jr., MD   ED visits in past 90 days: 0        Note composed:11:13 PM 12/29/2022

## 2022-12-30 NOTE — TELEPHONE ENCOUNTER
Care Due:                  Date            Visit Type   Department     Provider  --------------------------------------------------------------------------------                                EP -                              PRIMARY      [facility]  Last Visit: 07-      CARE ([facility])   PRACTICE       Brice Weaver                              EP -                              PRIMARY      [facility]  Next Visit: 02-      CARE ([facility])   PRACTICE       rBice Weaver                                                            Last  Test          Frequency    Reason                     Performed    Due Date  --------------------------------------------------------------------------------    Lipid Panel.  12 months..  pravastatin..............  03- 03-    [facility] Embedded Care Gaps. Reference number: [id number]. 12/29/2022   10:36:06 PM CST

## 2023-01-11 ENCOUNTER — TELEPHONE (OUTPATIENT)
Dept: CARDIOLOGY | Facility: CLINIC | Age: 83
End: 2023-01-11
Payer: MEDICARE

## 2023-01-11 NOTE — TELEPHONE ENCOUNTER
----- Message from Monica Narvaez sent at 1/11/2023 10:15 AM CST -----  Contact: Edward  Type: Needs Medical Advice  Who Called: Pt  Edward   Symptoms (please be specific):   How long has patient had these symptoms:    Pharmacy name and phone #:    Best Call Back Number: [phone]  Additional Information: Requesting a call back for scheduling appt, nothing showed available.

## 2023-01-23 ENCOUNTER — TELEPHONE (OUTPATIENT)
Dept: CARDIOLOGY | Facility: CLINIC | Age: 83
End: 2023-01-23
Payer: MEDICARE

## 2023-01-23 DIAGNOSIS — I48.19 OTHER PERSISTENT ATRIAL FIBRILLATION: ICD-10-CM

## 2023-01-23 DIAGNOSIS — E78.2 MIXED HYPERLIPIDEMIA: ICD-10-CM

## 2023-01-23 DIAGNOSIS — I10 ESSENTIAL HYPERTENSION: Primary | ICD-10-CM

## 2023-01-23 DIAGNOSIS — I27.20 PULMONARY HYPERTENSION: ICD-10-CM

## 2023-01-23 NOTE — TELEPHONE ENCOUNTER
----- Message from Claus Schofield sent at 1/23/2023  8:27 AM CST -----  Type: Needs Medical Advice  Who Called:  pt  Symptoms (please be specific):  pt said she need orders for her blood work--said she went to have them done and they was not there--said she there now and she fasting--please have to the orders put in so she can have them done this morning while she there--please call and advise  Best Call Back Number: [phone] (home)     Additional Information: thank you

## 2023-01-24 ENCOUNTER — LAB VISIT (OUTPATIENT)
Dept: LAB | Facility: HOSPITAL | Age: 83
End: 2023-01-24
Attending: FAMILY MEDICINE
Payer: MEDICARE

## 2023-01-24 DIAGNOSIS — I48.19 OTHER PERSISTENT ATRIAL FIBRILLATION: ICD-10-CM

## 2023-01-24 DIAGNOSIS — I10 ESSENTIAL HYPERTENSION: ICD-10-CM

## 2023-01-24 DIAGNOSIS — E78.2 MIXED HYPERLIPIDEMIA: ICD-10-CM

## 2023-01-24 DIAGNOSIS — I27.20 PULMONARY HYPERTENSION: ICD-10-CM

## 2023-01-24 LAB
ANION GAP SERPL CALC-SCNC: 10 MMOL/L (ref 8–16)
BASOPHILS # BLD AUTO: 0.02 K/UL (ref 0–0.2)
BASOPHILS NFR BLD: 0.4 % (ref 0–1.9)
BUN SERPL-MCNC: 22 MG/DL (ref 8–23)
CALCIUM SERPL-MCNC: 10.2 MG/DL (ref 8.7–10.5)
CHLORIDE SERPL-SCNC: 99 MMOL/L (ref 95–110)
CO2 SERPL-SCNC: 30 MMOL/L (ref 23–29)
CREAT SERPL-MCNC: 0.9 MG/DL (ref 0.5–1.4)
DIFFERENTIAL METHOD: ABNORMAL
EOSINOPHIL # BLD AUTO: 0.1 K/UL (ref 0–0.5)
EOSINOPHIL NFR BLD: 1.4 % (ref 0–8)
ERYTHROCYTE [DISTWIDTH] IN BLOOD BY AUTOMATED COUNT: 14.7 % (ref 11.5–14.5)
EST. GFR  (NO RACE VARIABLE): >60 ML/MIN/1.73 M^2
GLUCOSE SERPL-MCNC: 130 MG/DL (ref 70–110)
HCT VFR BLD AUTO: 35.2 % (ref 37–48.5)
HGB BLD-MCNC: 11.3 G/DL (ref 12–16)
IMM GRANULOCYTES # BLD AUTO: 0.03 K/UL (ref 0–0.04)
IMM GRANULOCYTES NFR BLD AUTO: 0.5 % (ref 0–0.5)
LYMPHOCYTES # BLD AUTO: 1.4 K/UL (ref 1–4.8)
LYMPHOCYTES NFR BLD: 24.8 % (ref 18–48)
MAGNESIUM SERPL-MCNC: 1.8 MG/DL (ref 1.6–2.6)
MCH RBC QN AUTO: 28.1 PG (ref 27–31)
MCHC RBC AUTO-ENTMCNC: 32.1 G/DL (ref 32–36)
MCV RBC AUTO: 88 FL (ref 82–98)
MONOCYTES # BLD AUTO: 0.6 K/UL (ref 0.3–1)
MONOCYTES NFR BLD: 10.5 % (ref 4–15)
NEUTROPHILS # BLD AUTO: 3.5 K/UL (ref 1.8–7.7)
NEUTROPHILS NFR BLD: 62.4 % (ref 38–73)
NRBC BLD-RTO: 0 /100 WBC
PLATELET # BLD AUTO: 174 K/UL (ref 150–450)
PMV BLD AUTO: 12.9 FL (ref 9.2–12.9)
POTASSIUM SERPL-SCNC: 3.8 MMOL/L (ref 3.5–5.1)
RBC # BLD AUTO: 4.02 M/UL (ref 4–5.4)
SODIUM SERPL-SCNC: 139 MMOL/L (ref 136–145)
WBC # BLD AUTO: 5.64 K/UL (ref 3.9–12.7)

## 2023-01-24 PROCEDURE — 83880 ASSAY OF NATRIURETIC PEPTIDE: CPT | Performed by: NURSE PRACTITIONER

## 2023-01-24 PROCEDURE — 36415 COLL VENOUS BLD VENIPUNCTURE: CPT | Mod: PO | Performed by: NURSE PRACTITIONER

## 2023-01-24 PROCEDURE — 80048 BASIC METABOLIC PNL TOTAL CA: CPT | Performed by: NURSE PRACTITIONER

## 2023-01-24 PROCEDURE — 85025 COMPLETE CBC W/AUTO DIFF WBC: CPT | Performed by: NURSE PRACTITIONER

## 2023-01-24 PROCEDURE — 83735 ASSAY OF MAGNESIUM: CPT | Performed by: NURSE PRACTITIONER

## 2023-01-25 ENCOUNTER — PATIENT MESSAGE (OUTPATIENT)
Dept: CARDIOLOGY | Facility: CLINIC | Age: 83
End: 2023-01-25
Payer: MEDICARE

## 2023-01-25 ENCOUNTER — TELEPHONE (OUTPATIENT)
Dept: CARDIOLOGY | Facility: CLINIC | Age: 83
End: 2023-01-25
Payer: MEDICARE

## 2023-01-25 LAB — BNP SERPL-MCNC: 176 PG/ML (ref 0–99)

## 2023-01-25 NOTE — TELEPHONE ENCOUNTER
----- Message from Claus Schofield sent at 1/25/2023  3:28 PM CST -----  Type:  Test Results    Who Called:  pt  Name of Test (Lab/Mammo/Etc):  Labs  Date of Test:  1/24  Ordering Provider:  Shama  Where the test was performed:  [facility]  Best Call Back Number [phone] (home)       Additional Information:  please call and advise--thank you

## 2023-01-25 NOTE — TELEPHONE ENCOUNTER
----- Message from Junior Horton sent at 1/25/2023  3:50 PM CST -----  Contact: pt  Type: Requesting to speak with nurse        Who Called: PT  Regarding: discuss results   Would the patient rather a call back or a response via [facility]? Call back  Best Call Back Number: [phone]  Additional Information:

## 2023-01-26 ENCOUNTER — PATIENT MESSAGE (OUTPATIENT)
Dept: CARDIOLOGY | Facility: CLINIC | Age: 83
End: 2023-01-26
Payer: MEDICARE

## 2023-01-30 ENCOUNTER — OFFICE VISIT (OUTPATIENT)
Dept: CARDIOLOGY | Facility: CLINIC | Age: 83
End: 2023-01-30
Payer: MEDICARE

## 2023-01-30 VITALS
OXYGEN SATURATION: 98 % | HEART RATE: 68 BPM | DIASTOLIC BLOOD PRESSURE: 78 MMHG | BODY MASS INDEX: 22.98 KG/M2 | RESPIRATION RATE: 16 BRPM | SYSTOLIC BLOOD PRESSURE: 138 MMHG | HEIGHT: 66 IN | WEIGHT: 143 LBS

## 2023-01-30 DIAGNOSIS — I27.20 PULMONARY HYPERTENSION: ICD-10-CM

## 2023-01-30 DIAGNOSIS — I25.10 CORONARY ARTERY DISEASE INVOLVING NATIVE CORONARY ARTERY OF NATIVE HEART WITHOUT ANGINA PECTORIS: ICD-10-CM

## 2023-01-30 DIAGNOSIS — E03.4 HYPOTHYROIDISM DUE TO ACQUIRED ATROPHY OF THYROID: ICD-10-CM

## 2023-01-30 DIAGNOSIS — E78.2 MIXED HYPERLIPIDEMIA: ICD-10-CM

## 2023-01-30 DIAGNOSIS — I10 WHITE COAT SYNDROME WITH DIAGNOSIS OF HYPERTENSION: ICD-10-CM

## 2023-01-30 DIAGNOSIS — R06.09 DYSPNEA ON EXERTION: ICD-10-CM

## 2023-01-30 DIAGNOSIS — I10 ESSENTIAL HYPERTENSION: ICD-10-CM

## 2023-01-30 DIAGNOSIS — G47.33 OSA (OBSTRUCTIVE SLEEP APNEA): ICD-10-CM

## 2023-01-30 DIAGNOSIS — I48.0 PAROXYSMAL ATRIAL FIBRILLATION: Primary | ICD-10-CM

## 2023-01-30 PROCEDURE — 99999 PR PBB SHADOW E&M-EST. PATIENT-LVL V: CPT | Mod: PBBFAC,,, | Performed by: INTERNAL MEDICINE

## 2023-01-30 PROCEDURE — 93010 EKG 12-LEAD: ICD-10-PCS | Mod: S$PBB,,, | Performed by: INTERNAL MEDICINE

## 2023-01-30 PROCEDURE — 99214 OFFICE O/P EST MOD 30 MIN: CPT | Mod: S$PBB,,, | Performed by: INTERNAL MEDICINE

## 2023-01-30 PROCEDURE — 99214 PR OFFICE/OUTPT VISIT, EST, LEVL IV, 30-39 MIN: ICD-10-PCS | Mod: S$PBB,,, | Performed by: INTERNAL MEDICINE

## 2023-01-30 PROCEDURE — 99999 PR PBB SHADOW E&M-EST. PATIENT-LVL V: ICD-10-PCS | Mod: PBBFAC,,, | Performed by: INTERNAL MEDICINE

## 2023-01-30 PROCEDURE — 99215 OFFICE O/P EST HI 40 MIN: CPT | Mod: PBBFAC,PN | Performed by: INTERNAL MEDICINE

## 2023-01-30 PROCEDURE — 93005 ELECTROCARDIOGRAM TRACING: CPT | Mod: PBBFAC,PN | Performed by: INTERNAL MEDICINE

## 2023-01-30 PROCEDURE — 93010 ELECTROCARDIOGRAM REPORT: CPT | Mod: S$PBB,,, | Performed by: INTERNAL MEDICINE

## 2023-01-30 NOTE — PROGRESS NOTES
Subjective:    Patient ID:  Jacqueline Mathias is a 82 y.o. female     Chief Complaint   Patient presents with    Hyperlipidemia    Hypertension    Atrial Fibrillation       HPI:  Ms Jacqueline Mathias is a 82 y.o. female is here for follow-up.    Patient has been doing well she has been concerned about anemia.  She is been having intermittent bloody nose with the CPAP and the concentrated that she has.  And she is also using the CPAP because she was recently diagnosed with obstructive sleep apnea.    Denies any chest pain or tightness or heaviness denies any loss of consciousness falls or head injury.        Review of patient's allergies indicates:   Allergen Reactions    Latex, natural rubber      rash    Biaxin [clarithromycin] Diarrhea and Nausea Only    Codeine Itching    Oxycodone      Knocks pt out for 3 days       Past Medical History:   Diagnosis Date    Allergy     latex    Allergy     seasonal    Arthritis     Asthma     Blood transfusion     Cataract     removed    COPD (chronic obstructive pulmonary disease)     Hypertension     IBS (irritable bowel syndrome)     Obstructive sleep apnea     Thyroid nodule 09/29/2016    Ulcer      Past Surgical History:   Procedure Laterality Date    ANGIOGRAM, CORONARY, WITH LEFT HEART CATHETERIZATION N/A 12/14/2021    Procedure: Angiogram, Coronary, with Left Heart Cath;  Surgeon: Napoleon Dumas MD;  Location: [facility] CATH/EP LAB;  Service: Cardiology;  Laterality: N/A;    CAROTID ARTERY ANGIOPLASTY Right 09/30/2016    CHOLECYSTECTOMY      EYE SURGERY      cataract removal    HYSTERECTOMY       Social History     Tobacco Use    Smoking status: Never    Smokeless tobacco: Never   Substance Use Topics    Alcohol use: Yes     Comment: social    Drug use: No     Family History   Problem Relation Age of Onset    Stroke Mother     Heart disease Mother     Heart disease Sister     Alzheimer's disease Sister     Cancer Brother     Cancer Brother     Kidney disease Brother     Breast  cancer Maternal Aunt         Review of Systems:   Constitution: Negative for diaphoresis and fever.   HEENT:  Positive for nosebleeds.    Cardiovascular: Negative for chest pain       No dyspnea on exertion       No leg swelling        No palpitations  Respiratory: Negative for shortness of breath and wheezing.    Hematologic/Lymphatic: Negative for bleeding problem. Does not bruise/bleed easily.   Skin: Negative for color change and rash.   Musculoskeletal: Negative for falls and myalgias.   Gastrointestinal: Negative for hematemesis and hematochezia.   Genitourinary: Negative for hematuria.   Neurological: Negative for dizziness and light-headedness.   Psychiatric/Behavioral: Negative for altered mental status and memory loss.          Objective:        Vitals:    01/30/23 1153   BP: 138/78   Pulse: 68   Resp: 16       Lab Results   Component Value Date    WBC 5.64 01/24/2023    HGB 11.3 (L) 01/24/2023    HCT 35.2 (L) 01/24/2023     01/24/2023    CHOL 139 03/21/2022    TRIG 128 03/21/2022    HDL 66 03/21/2022    ALT 18 12/10/2021    AST 23 12/10/2021     01/24/2023    K 3.8 01/24/2023    CL 99 01/24/2023    CREATININE 0.9 01/24/2023    BUN 22 01/24/2023    CO2 30 (H) 01/24/2023    TSH 3.540 03/21/2022    INR 1.4 12/10/2021    GLUF 121 (H) 03/22/2019    HGBA1C 6.2 03/21/2022        ECHOCARDIOGRAM RESULTS  Results for orders placed during the hospital encounter of 04/27/22    Transesophageal echo (PAT)    Interpretation Summary  · The left ventricle is normal in size with normal systolic function.  · The estimated ejection fraction is 60%.  · Normal left ventricular diastolic function.  · Normal right ventricular size with normal right ventricular systolic function.  · No interatrial septal defect present.  · No ASD or PFO closure device in interatrial septum.  · Normal appearing left atrial appendage. No thrombus is present in the appendage. Normal appendage velocities.  · Mild-to-moderate mitral  regurgitation.        CURRENT/PREVIOUS VISIT EKG  Results for orders placed or performed in visit on 09/14/22   IN OFFICE EKG 12-LEAD (to [place])    Collection Time: 09/14/22  9:42 AM    Narrative    Test Reason : I27.20,I34.0,    Vent. Rate : 059 BPM     Atrial Rate : 059 BPM     P-R Int : 136 ms          QRS Dur : 122 ms      QT Int : 482 ms       P-R-T Axes : 056 009 016 degrees     QTc Int : 477 ms    Sinus bradycardia  Right bundle branch block  Abnormal ECG  When compared with ECG of 25-APR-2022 09:48,  No significant change was found  Confirmed by Napoleon Dumas MD ([id number]) on 9/27/2022 6:36:52 PM    Referred By:             Confirmed By:Napoleon Dumas MD     No valid procedures specified.   Results for orders placed in visit on 11/19/21    Nuclear Stress Test    Interpretation Summary    The EKG portion of this study is negative for ischemia.    The patient reported no chest pain during the stress test.    The nuclear portion of this study will be reported separately.      Physical Exam:  CONSTITUTIONAL: No fever, no chills  HEENT: Normocephalic, atraumatic,pupils reactive to light                 NECK:  No JVD no carotid bruit  CVS: S1S2+, RRR, systolic murmurs,   LUNGS: Clear  ABDOMEN: Soft, NT, BS+  EXTREMITIES: No cyanosis, edema  : No vargas catheter  NEURO: AAO X 3  PSY: Normal affect      Medication List with Changes/Refills   Current Medications    APIXABAN (ELIQUIS) 5 MG TAB    Take 1 tablet (5 mg total) by mouth 2 (two) times daily.    ASCORBIC ACID, VITAMIN C, (VITAMIN C) 1000 MG TABLET    Take 1,000 mg by mouth once daily.    ASPIRIN (ECOTRIN) 81 MG EC TABLET    Take 81 mg by mouth. Every other day    B COMPLEX VITAMINS CAPSULE    Take 1 capsule by mouth once daily.    BENAZEPRIL (LOTENSIN) 10 MG TABLET    TAKE 1 TABLET BY MOUTH  TWICE DAILY    CARVEDILOL (COREG) 6.25 MG TABLET    TAKE 1 TABLET BY MOUTH  TWICE DAILY WITH MEALS    CETIRIZINE (ZYRTEC) 10 MG TABLET    Take 10 mg by mouth daily as  needed. As needed    CHLORTHALIDONE (HYGROTEN) 25 MG TAB    TAKE 1 TABLET BY MOUTH ONCE DAILY    CHOLECALCIFEROL, VITAMIN D3, 125 MCG (5,000 UNIT) TAB    Take 1 tablet by mouth once daily.    CINNAMON BARK 500 MG CAPSULE    Take 1,000 mg by mouth once daily.    DOCOSAHEXANOIC ACID/EPA (FISH OIL ORAL)    Take 1,240 mg by mouth 2 (two) times daily.    ESTRADIOL (ESTRACE) 1 MG TABLET    Take 1 mg by mouth once daily.    FLUTICASONE PROPIONATE (FLONASE) 50 MCG/ACTUATION NASAL SPRAY    USE 1 SPRAY IN BOTH  NOSTRILS TWICE DAILY    FUROSEMIDE (LASIX) 20 MG TABLET    Take 1 tablet (20 mg total) by mouth daily as needed (fluid).    GLUCOSAMINE-CHONDROITIN 500-400 MG TABLET    Take 1 tablet by mouth 2 (two) times daily.    ISOSORBIDE MONONITRATE (ISMO,MONOKET) 10 MG TABLET    Take 1 tablet (10 mg total) by mouth 2 (two) times daily.    LEVOTHYROXINE (SYNTHROID) 25 MCG TABLET    TAKE 1 TABLET BY MOUTH  DAILY    LOPERAMIDE HCL (IMODIUM A-D ORAL)    As needed    MELATONIN 10 MG TAB    Take by mouth. As needed    MULTIVITAMIN CAPSULE    Take 1 capsule by mouth once daily.    POTASSIUM CHLORIDE (MICRO-K) 10 MEQ CPSR    TAKE 1 CAPSULE BY MOUTH  TWICE DAILY    PRAVASTATIN (PRAVACHOL) 20 MG TABLET    TAKE 1 TABLET BY MOUTH IN  THE EVENING    TURMERIC ROOT EXTRACT 500 MG CAP    Take by mouth.    VIT A/VIT C/VIT E/ZINC/COPPER (ICAPS AREDS ORAL)    Take by mouth.     ummary       The left ventricular systolic function was normal.  The left ventricular end diastolic pressure was normal.  The post-procedure left ventricular end diastolic pressure was 12.  The ejection fraction was calculated to be 60%.  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  Proximal and mid RCA has luminal narrowing about 35% to 40%.     The procedure log was documented by Documenter: RT Madison and verified by Napoleon Dumas MD.        Assessment:       1. Paroxysmal atrial  fibrillation    2. Dyspnea on exertion    3. Pulmonary hypertension    4. Hypothyroidism due to acquired atrophy of thyroid    5. JACQUELINE (obstructive sleep apnea)    6. Essential hypertension    7. Mixed hyperlipidemia    8. Coronary artery disease involving native coronary artery of native heart without angina pectoris    9. White coat syndrome with diagnosis of hypertension         Plan:   1. Paroxysmal atrial fibrillation   Patient is currently on Eliquis 5 mg p.o. b.i.d. continue the same.  She is on aspirin 81 mg every other day discussed with patient to cut back on aspirin to twice a week.  And if it still causes bleeding she probably can do it once a week.  2. Epistaxis   Patient has sleep apnea and uses CPAP which causes dry mouth and dry nose.  Along with that she has an oxygen concentrator and discussed with patient to continue to use her nasal spray and keep a watch on it.  3. Coronary artery disease   Patient has mild coronary artery disease continue medical management at the present time.  4. Essential hypertension   Patient's blood pressure is stable at 130 8/78 she is currently on furosemide 20 mg p.o. daily continue the same and she is also on isosorbide mononitrate 10 mg twice daily and that is controlling her blood pressure well she is also on benazepril 10 mg p.o. daily.  Continue the same.    5. Mixed hyperlipidemia   She is currently on pravastatin 20 mg p.o. daily continue the same on her last blood work her total cholesterol was 139 HDL was 66 LDL was 47 and triglycerides of 128.    6. EKG  Reviewed EKG independently patient is in normal sinus rhythm sinus bradycardia with a heart rate of 57 beats per minute right bundle branch block left axis deviation bifascicular block.  No significant change from the previous EKG.    7. Patient to continue current management and I will see her back in the office in 6 months time.          Problem List Items Addressed This Visit          Pulmonary    Pulmonary  hypertension       Cardiac/Vascular    Mixed hyperlipidemia    Other persistent atrial fibrillation - Primary    Coronary artery disease involving native coronary artery of native heart without angina pectoris    Essential hypertension    Dyspnea on exertion       Endocrine    Hypothyroidism due to acquired atrophy of thyroid       Other    JACQUELINE (obstructive sleep apnea)     Other Visit Diagnoses       White coat syndrome with diagnosis of hypertension                No follow-ups on file.

## 2023-02-03 ENCOUNTER — HOSPITAL ENCOUNTER (OUTPATIENT)
Dept: RADIOLOGY | Facility: CLINIC | Age: 83
Discharge: HOME | End: 2023-02-03
Attending: FAMILY MEDICINE
Payer: MEDICARE

## 2023-02-03 DIAGNOSIS — Z78.0 MENOPAUSE: ICD-10-CM

## 2023-02-03 PROCEDURE — 77080 DXA BONE DENSITY AXIAL: CPT | Mod: 26,,, | Performed by: RADIOLOGY

## 2023-02-03 PROCEDURE — 77080 DEXA BONE DENSITY SPINE HIP: ICD-10-PCS | Mod: 26,,, | Performed by: RADIOLOGY

## 2023-02-03 PROCEDURE — 77080 DXA BONE DENSITY AXIAL: CPT | Mod: TC,PO

## 2023-02-16 ENCOUNTER — OFFICE VISIT (OUTPATIENT)
Dept: FAMILY MEDICINE | Facility: CLINIC | Age: 83
End: 2023-02-16
Payer: MEDICARE

## 2023-02-16 VITALS
HEART RATE: 62 BPM | WEIGHT: 143.63 LBS | DIASTOLIC BLOOD PRESSURE: 60 MMHG | TEMPERATURE: 98 F | HEIGHT: 66 IN | BODY MASS INDEX: 23.08 KG/M2 | SYSTOLIC BLOOD PRESSURE: 126 MMHG

## 2023-02-16 DIAGNOSIS — I27.20 PULMONARY HYPERTENSION: ICD-10-CM

## 2023-02-16 DIAGNOSIS — I10 ESSENTIAL HYPERTENSION: Primary | ICD-10-CM

## 2023-02-16 DIAGNOSIS — I48.19 OTHER PERSISTENT ATRIAL FIBRILLATION: ICD-10-CM

## 2023-02-16 DIAGNOSIS — I70.0 AORTIC ATHEROSCLEROSIS: ICD-10-CM

## 2023-02-16 DIAGNOSIS — J43.1 PANLOBULAR EMPHYSEMA: ICD-10-CM

## 2023-02-16 DIAGNOSIS — E03.4 HYPOTHYROIDISM DUE TO ACQUIRED ATROPHY OF THYROID: ICD-10-CM

## 2023-02-16 DIAGNOSIS — R73.9 HYPERGLYCEMIA: ICD-10-CM

## 2023-02-16 DIAGNOSIS — I25.10 CORONARY ARTERY DISEASE INVOLVING NATIVE CORONARY ARTERY OF NATIVE HEART WITHOUT ANGINA PECTORIS: ICD-10-CM

## 2023-02-16 DIAGNOSIS — E78.2 MIXED HYPERLIPIDEMIA: ICD-10-CM

## 2023-02-16 DIAGNOSIS — G47.33 OSA (OBSTRUCTIVE SLEEP APNEA): ICD-10-CM

## 2023-02-16 PROCEDURE — 99214 OFFICE O/P EST MOD 30 MIN: CPT | Mod: S$PBB,,, | Performed by: FAMILY MEDICINE

## 2023-02-16 PROCEDURE — 99214 PR OFFICE/OUTPT VISIT, EST, LEVL IV, 30-39 MIN: ICD-10-PCS | Mod: S$PBB,,, | Performed by: FAMILY MEDICINE

## 2023-02-16 PROCEDURE — 99999 PR PBB SHADOW E&M-EST. PATIENT-LVL III: ICD-10-PCS | Mod: PBBFAC,,, | Performed by: FAMILY MEDICINE

## 2023-02-16 PROCEDURE — 99213 OFFICE O/P EST LOW 20 MIN: CPT | Mod: PBBFAC,PN | Performed by: FAMILY MEDICINE

## 2023-02-16 PROCEDURE — 99999 PR PBB SHADOW E&M-EST. PATIENT-LVL III: CPT | Mod: PBBFAC,,, | Performed by: FAMILY MEDICINE

## 2023-02-16 RX ORDER — ISOSORBIDE MONONITRATE 10 MG/1
10 TABLET ORAL 2 TIMES DAILY
Qty: 180 TABLET | Refills: 3 | Status: SHIPPED | OUTPATIENT
Start: 2023-02-16 | End: 2023-11-21

## 2023-02-16 RX ORDER — FUROSEMIDE 20 MG/1
20 TABLET ORAL DAILY PRN
Qty: 90 TABLET | Refills: 3 | Status: SHIPPED | OUTPATIENT
Start: 2023-02-16 | End: 2023-11-21

## 2023-02-17 PROBLEM — I70.0 AORTIC ATHEROSCLEROSIS: Status: ACTIVE | Noted: 2023-02-17

## 2023-02-18 NOTE — PROGRESS NOTES
Subjective:       Patient ID: Jacqueline Mathias is a 82 y.o. female.    Chief Complaint: Hypertension, Hyperlipidemia, Hypothyroidism, Coronary Artery Disease, Atrial Fibrillation, and COPD    Patient presents here for 6 month follow-up of hypertension, hyperlipidemia, hypothyroidism, CAD, atrial fibrillation, and COPD.  She has been doing fairly well since her last visit and her weight has decreased 3 lb in the last 6 months.  Her BMI today is 23.18.  She recently saw her cardiologist and was concerned about some of the things that she understood from him about the seriousness of her diseases.  I did reassure her that she does have multiple medical issues but all of these are under fairly good control at this time.  Her hypertension is well controlled with her present medication and her low-sodium diet.  Her hyperlipidemia is also very well controlled with her present low-fat low-cholesterol diet as well as use of pravastatin 20 mg daily.  Her hypothyroidism is also stable with her present use of levothyroxine 25 mcg daily.  Her TSH has been in the therapeutic range.  She does have coronary artery disease but denies any chest pains or palpitations.  She also does have atrial fibrillation but her rate is well controlled with carvedilol and she is on Eliquis as an anticoagulant.  She denies any significant bruising or bleeding.  She does have COPD and uses oxygen at night when she sleeps.  She also has obstructive sleep apnea and uses CPAP.  On her most recent labs, she did have an elevated blood sugar of 130 and she does need a hemoglobin A1c to rule out diabetes.  She has decided to wait on that blood test at this time but I emphasized that we need to get this done no later than her next visit.  As far as health maintenance, she is up-to-date with all of her recommended screening exams and immunizations with the exception of shingles vaccine and 2nd COVID booster.    Review of Systems   Constitutional:  Negative for  chills, fatigue, fever and unexpected weight change.   HENT:  Negative for nasal congestion, postnasal drip and sore throat.    Respiratory:  Negative for cough and shortness of breath.    Cardiovascular:  Positive for leg swelling (Mild pedal edema occasionally). Negative for chest pain and palpitations.   Gastrointestinal:  Negative for abdominal pain, diarrhea and nausea.   Genitourinary:  Negative for difficulty urinating, dysuria and flank pain.   Musculoskeletal:  Negative for arthralgias and back pain.   Neurological:  Negative for dizziness, light-headedness and headaches.   Hematological:  Negative for adenopathy. Bruises/bleeds easily.   Psychiatric/Behavioral:  Negative for sleep disturbance. The patient is not nervous/anxious.        Objective:      Physical Exam  Vitals reviewed.   Constitutional:       General: She is not in acute distress.     Appearance: Normal appearance. She is well-developed and normal weight.   HENT:      Right Ear: Tympanic membrane and external ear normal.      Left Ear: Tympanic membrane and external ear normal.      Mouth/Throat:      Pharynx: Oropharynx is clear. No posterior oropharyngeal erythema.   Neck:      Thyroid: No thyromegaly.      Vascular: No carotid bruit.   Cardiovascular:      Rate and Rhythm: Normal rate. Rhythm irregular.      Pulses: Normal pulses.      Heart sounds: Normal heart sounds. No murmur heard.  Pulmonary:      Effort: Pulmonary effort is normal.      Breath sounds: Normal breath sounds. No wheezing or rales.   Musculoskeletal:         General: No tenderness. Normal range of motion.      Cervical back: Normal range of motion and neck supple.      Right lower leg: No edema.      Left lower leg: No edema.   Lymphadenopathy:      Cervical: No cervical adenopathy.   Skin:     General: Skin is dry.   Neurological:      General: No focal deficit present.      Mental Status: She is alert and oriented to person, place, and time.      Cranial Nerves: No  cranial nerve deficit.      Deep Tendon Reflexes: Reflexes are normal and symmetric.       Assessment:       Problem List Items Addressed This Visit       COPD (chronic obstructive pulmonary disease)    Hypothyroidism due to acquired atrophy of thyroid    Mixed hyperlipidemia    Other persistent atrial fibrillation    Coronary artery disease involving native coronary artery of native heart without angina pectoris    Essential hypertension - Primary    Relevant Medications    furosemide (LASIX) 20 MG tablet    isosorbide mononitrate (ISMO,MONOKET) 10 mg tablet    JACQUELINE (obstructive sleep apnea)    Pulmonary hypertension    Aortic atherosclerosis     Other Visit Diagnoses       Hyperglycemia        Relevant Orders    Hemoglobin A1C              Plan:       1. Continue present medication as her hypertension, hyperlipidemia, hypothyroidism, CAD, atrial fibrillation, COPD, and obstructive sleep apnea are stable and controlled  2.  Continue low-sodium, low-fat low-cholesterol diet and exercise.  BMI is in the normal range at 23.18   3. She does need a hemoglobin A1c in the near future.  She is putting this off for now   4. Continue follow-up with cardiology and pulmonology as scheduled  5. Follow up with me in 6 months or p.r.n.

## 2023-02-20 ENCOUNTER — OFFICE VISIT (OUTPATIENT)
Dept: PULMONOLOGY | Facility: CLINIC | Age: 83
End: 2023-02-20
Payer: MEDICARE

## 2023-02-20 VITALS
DIASTOLIC BLOOD PRESSURE: 72 MMHG | WEIGHT: 142 LBS | HEART RATE: 65 BPM | BODY MASS INDEX: 22.92 KG/M2 | SYSTOLIC BLOOD PRESSURE: 128 MMHG | OXYGEN SATURATION: 97 %

## 2023-02-20 DIAGNOSIS — G47.33 OSA (OBSTRUCTIVE SLEEP APNEA): Primary | ICD-10-CM

## 2023-02-20 PROCEDURE — 99213 OFFICE O/P EST LOW 20 MIN: CPT | Mod: S$GLB,,, | Performed by: NURSE PRACTITIONER

## 2023-02-20 PROCEDURE — 99213 PR OFFICE/OUTPT VISIT, EST, LEVL III, 20-29 MIN: ICD-10-PCS | Mod: S$GLB,,, | Performed by: NURSE PRACTITIONER

## 2023-02-20 NOTE — PROGRESS NOTES
SUBJECTIVE:    Patient ID: Jacqueline Mathias is a 82 y.o. female.    Chief Complaint: Follow-up, Shortness of Breath, and Apnea (6 month follow up JACQUELINE/Patient has SOB on exertion)      Patient here today feeling well. She is sleeping on her autopap every night with 4 liters of oxygen bled in.  Her compliance report shows that she is 100% compliant sleeps on her autopap 7 hours a night. Her AHI is 0.3.  She is in need of new supplies. She was with [facility] [employer] but they have been bought out by [employer] and no one has contacted her about new supplies yet.  Her  PFTs which are improved from last year.  Her 6 minute walk is non hypoxemic.    Past Medical History:   Diagnosis Date    Allergy     latex    Allergy     seasonal    Arthritis     Asthma     Blood transfusion     Cataract     removed    COPD (chronic obstructive pulmonary disease)     Hypertension     IBS (irritable bowel syndrome)     Obstructive sleep apnea     Thyroid nodule 09/29/2016    Ulcer      Past Surgical History:   Procedure Laterality Date    ANGIOGRAM, CORONARY, WITH LEFT HEART CATHETERIZATION N/A 12/14/2021    Procedure: Angiogram, Coronary, with Left Heart Cath;  Surgeon: Napoleon Dumas MD;  Location: [facility] CATH/EP LAB;  Service: Cardiology;  Laterality: N/A;    CAROTID ARTERY ANGIOPLASTY Right 09/30/2016    CHOLECYSTECTOMY      EYE SURGERY      cataract removal    HYSTERECTOMY       Family History   Problem Relation Age of Onset    Stroke Mother     Heart disease Mother     Heart disease Sister     Alzheimer's disease Sister     Cancer Brother     Cancer Brother     Kidney disease Brother     Breast cancer Maternal Aunt         Social History:   Marital Status:   Occupation: Data Unavailable  Alcohol History:  reports current alcohol use.  Tobacco History:  reports that she has never smoked. She has never used smokeless tobacco.  Drug History:  reports no history of drug use.    Review of patient's allergies indicates:   Allergen Reactions     Latex, natural rubber      rash    Biaxin [clarithromycin] Diarrhea and Nausea Only    Codeine Itching    Oxycodone      Knocks pt out for 3 days       Current Outpatient Medications   Medication Sig Dispense Refill    apixaban (ELIQUIS) 5 mg Tab Take 1 tablet (5 mg total) by mouth 2 (two) times daily. 180 tablet 3    ascorbic acid, vitamin C, (VITAMIN C) 1000 MG tablet Take 1,000 mg by mouth once daily.      aspirin (ECOTRIN) 81 MG EC tablet Take 81 mg by mouth. Every other day      b complex vitamins capsule Take 1 capsule by mouth once daily.      benazepriL (LOTENSIN) 10 MG tablet TAKE 1 TABLET BY MOUTH  TWICE DAILY 180 tablet 3    carvediloL (COREG) 6.25 MG tablet TAKE 1 TABLET BY MOUTH  TWICE DAILY WITH MEALS 180 tablet 3    cetirizine (ZYRTEC) 10 MG tablet Take 10 mg by mouth daily as needed. As needed      chlorthalidone (HYGROTEN) 25 MG Tab TAKE 1 TABLET BY MOUTH ONCE DAILY 90 tablet 3    cholecalciferol, vitamin D3, 125 mcg (5,000 unit) Tab Take 1 tablet by mouth once daily.      cinnamon bark 500 mg capsule Take 1,000 mg by mouth once daily.      DOCOSAHEXANOIC ACID/EPA (FISH OIL ORAL) Take 1,240 mg by mouth 2 (two) times daily.      estradiol (ESTRACE) 1 MG tablet Take 1 mg by mouth once daily.      fluticasone propionate (FLONASE) 50 mcg/actuation nasal spray USE 1 SPRAY IN BOTH  NOSTRILS TWICE DAILY 48 g 2    furosemide (LASIX) 20 MG tablet Take 1 tablet (20 mg total) by mouth daily as needed (fluid). 90 tablet 3    glucosamine-chondroitin 500-400 mg tablet Take 1 tablet by mouth 2 (two) times daily.      isosorbide mononitrate (ISMO,MONOKET) 10 mg tablet Take 1 tablet (10 mg total) by mouth 2 (two) times daily. 180 tablet 3    levothyroxine (SYNTHROID) 25 MCG tablet TAKE 1 TABLET BY MOUTH  DAILY 90 tablet 1    loperamide HCl (IMODIUM A-D ORAL) As needed      melatonin 10 mg Tab Take by mouth. As needed      multivitamin capsule Take 1 capsule by mouth once daily.      potassium chloride (MICRO-K)  10 MEQ CpSR TAKE 1 CAPSULE BY MOUTH  TWICE DAILY 90 capsule 7    pravastatin (PRAVACHOL) 20 MG tablet TAKE 1 TABLET BY MOUTH IN  THE EVENING 90 tablet 3    turmeric root extract 500 mg Cap Take by mouth.      vit A/vit C/vit E/zinc/copper (ICAPS AREDS ORAL) Take by mouth.       No current facility-administered medications for this visit.       Last PFT: No obstruction, mild restriction, moderate diffusion defect.   6 minute walk non hypoxemic   Last CT: 11/18/21   No acute cardiac or pulmonary process, with additional, and incidental findings as noted above.    Her overnight pulse ox with CPAP no oxygen showed significant drops during the night without the oxygen.      General: Feeling well  Eyes: Vision is good.  ENT:  Intermittent epistaxis  Heart:: Occasional palpatations  Lungs: shortness of breath with exertion  GI: IBS sometimes  : No dysuria, hesitancy, or nocturia.  Musculoskeletal: occasional back ache  Skin: No lesions or rashes.  Neuro: No headaches or neuropathy.  Lymph: ankles swell a lot  Psych: worries about everything  Endo: weight stable    OBJECTIVE:      /72 (BP Location: Left arm, Patient Position: Sitting, BP Method: Medium (Manual))   Pulse 65   Wt 64.4 kg (142 lb)   SpO2 97%   BMI 22.92 kg/m²     Physical Exam  GENERAL: Older patient in no distress.  HEENT: Pupils equal and reactive. Extraocular movements intact. Nose intact.  Pharynx moist.  NECK: Supple.   HEART: Regular rate and rhythm. No murmur or gallop auscultated.  LUNGS: Clear to auscultation and percussion. Lung excursion symmetrical. No change in fremitus. No adventitial noises.  ABDOMEN: Bowel sounds present. Non-tender, no masses palpated.  EXTREMITIES: Normal muscle tone and joint movement, no cyanosis or clubbing.   LYMPHATICS: No adenopathy palpated, no edema.  SKIN: Dry, intact, no lesions.   NEURO: Cranial nerves II-XII intact. Motor strength 5/5 bilaterally, upper and lower extremities.  PSYCH: Appropriate  affect.    Assessment:       1. JACQUELINE (obstructive sleep apnea)           Plan:       JACQUELINE (obstructive sleep apnea)  -     CPAP/BIPAP SUPPLIES           Follow up in about 6 months (around 8/20/2023).    Keep sleeping on your autopap with oxygen bled in  Needs new CPAP supplies from [employer] N30 nasal cushing small

## 2023-02-23 DIAGNOSIS — I10 HTN (HYPERTENSION), BENIGN: ICD-10-CM

## 2023-02-24 RX ORDER — CHLORTHALIDONE 25 MG/1
TABLET ORAL
Qty: 90 TABLET | Refills: 3 | Status: SHIPPED | OUTPATIENT
Start: 2023-02-24 | End: 2024-01-16

## 2023-02-24 NOTE — TELEPHONE ENCOUNTER
Care Due:                  Date            Visit Type   Department     Provider  --------------------------------------------------------------------------------                                EP -                              PRIMARY      [facility]  Last Visit: 02-      CARE ([facility])   PRACTICE       Brice Weaver                              EP -                              PRIMARY      [facility]  Next Visit: 08-      CARE ([facility])   PRACTICE       Brice Weaver                                                            Last  Test          Frequency    Reason                     Performed    Due Date  --------------------------------------------------------------------------------    CMP.........  12 months..  pravastatin..............  12-   12-    [facility] Embedded Care Gaps. Reference number: [id number]. 2/23/2023   9:38:04 PM CST

## 2023-02-24 NOTE — TELEPHONE ENCOUNTER
Refill Routing Note   Medication(s) are not appropriate for processing by [facility] for the following reason(s):       Drug-drug interaction  Duplicate Therapy: chlorthalidone, furosemide    [facility] action(s):  Defer   Requires labs : Yes         Appointments  past 12m or future 3m with PCP    Date Provider   Last Visit   2/16/2023 Brice Weaver Jr., MD   Next Visit   8/21/2023 Brice Weaver Jr., MD   ED visits in past 90 days: 0        Note composed:11:06 PM 02/23/2023

## 2023-04-04 ENCOUNTER — LAB VISIT (OUTPATIENT)
Dept: LAB | Facility: HOSPITAL | Age: 83
End: 2023-04-04
Attending: FAMILY MEDICINE
Payer: MEDICARE

## 2023-04-04 DIAGNOSIS — E11.9 TYPE 2 DIABETES MELLITUS WITHOUT COMPLICATION, WITHOUT LONG-TERM CURRENT USE OF INSULIN: ICD-10-CM

## 2023-04-04 DIAGNOSIS — R73.9 HYPERGLYCEMIA: ICD-10-CM

## 2023-04-04 LAB
ESTIMATED AVG GLUCOSE: 148 MG/DL (ref 68–131)
HBA1C MFR BLD: 6.8 % (ref 4–5.6)

## 2023-04-04 PROCEDURE — 83036 HEMOGLOBIN GLYCOSYLATED A1C: CPT | Performed by: FAMILY MEDICINE

## 2023-04-04 PROCEDURE — 36415 COLL VENOUS BLD VENIPUNCTURE: CPT | Mod: PO | Performed by: FAMILY MEDICINE

## 2023-04-04 RX ORDER — METFORMIN HYDROCHLORIDE 500 MG/1
500 TABLET, EXTENDED RELEASE ORAL
Qty: 90 TABLET | Refills: 3 | Status: SHIPPED | OUTPATIENT
Start: 2023-04-04 | End: 2023-05-08

## 2023-04-13 ENCOUNTER — TELEPHONE (OUTPATIENT)
Dept: FAMILY MEDICINE | Facility: CLINIC | Age: 83
End: 2023-04-13
Payer: MEDICARE

## 2023-04-13 ENCOUNTER — PATIENT MESSAGE (OUTPATIENT)
Dept: FAMILY MEDICINE | Facility: CLINIC | Age: 83
End: 2023-04-13
Payer: MEDICARE

## 2023-04-13 DIAGNOSIS — E11.9 TYPE 2 DIABETES MELLITUS WITHOUT COMPLICATION, WITHOUT LONG-TERM CURRENT USE OF INSULIN: Primary | ICD-10-CM

## 2023-04-13 NOTE — TELEPHONE ENCOUNTER
I would suggest that we stop the metformin and not add anything else at this time. I worry about other meds dropping her blood sugar too much at this time. Metformin does not cause hypoglycemia, and I started it to help prevent the progression of the diabetes. I would hold off on any medications unless the A1C gets to 7.5% or higher.

## 2023-04-13 NOTE — TELEPHONE ENCOUNTER
Spoke to patient instruction given Will stop the Metformin and repeat A1C on 3-4 months.   Instructed to watch her carbohydrate in take   Verbalizes understanding

## 2023-04-13 NOTE — TELEPHONE ENCOUNTER
----- Message from Jessica Joiner sent at 4/13/2023 10:28 AM CDT -----  Contact: self  Type: Needs Medical Advice  Who Called:  pt  Symptoms (please be specific):  severe diarrhea gas pains  How long has patient had these symptoms:  4/5  Pharmacy name and phone #:    [id number] - [place], [place] - [address] [facility] [address]  [address]  [place] [address]  Phone: [phone] Fax: [phone]        Best Call Back Number: [phone]    Additional Information: pt states she is having side effects to metFORMIN (GLUCOPHAGE-XR) 500 MG ER 24hr tablet  Thank you

## 2023-04-13 NOTE — TELEPHONE ENCOUNTER
Spoke with patient she states that the metformin is causing severe diarrhea.  She is taking the metformin after eating.  She states that she is also having bad gas pains.  She states that sometimes she can't make it to the bathroom the diarrhea is so bad.  Wants to be changed to a different medication.

## 2023-04-14 ENCOUNTER — PATIENT OUTREACH (OUTPATIENT)
Dept: DIABETES | Facility: CLINIC | Age: 83
End: 2023-04-14
Payer: MEDICARE

## 2023-04-14 ENCOUNTER — PATIENT MESSAGE (OUTPATIENT)
Dept: FAMILY MEDICINE | Facility: CLINIC | Age: 83
End: 2023-04-14
Payer: MEDICARE

## 2023-04-14 DIAGNOSIS — E11.9 DIABETES MELLITUS, NEW ONSET: Primary | ICD-10-CM

## 2023-04-14 NOTE — PATIENT INSTRUCTIONS
Called pt about appointment for DM Education.  Pt is a newly diagnosed DM.  Would prefer appointment on a Mon or Wed.  Made appointment for 4/19/23 at 3:30.  Provided pt Educator's direct phone number and location of appt.

## 2023-04-19 ENCOUNTER — CLINICAL SUPPORT (OUTPATIENT)
Dept: DIABETES | Facility: CLINIC | Age: 83
End: 2023-04-19
Payer: MEDICARE

## 2023-04-19 DIAGNOSIS — E11.9 TYPE 2 DIABETES MELLITUS WITHOUT COMPLICATION, WITHOUT LONG-TERM CURRENT USE OF INSULIN: ICD-10-CM

## 2023-04-19 PROCEDURE — 99999 PR PBB SHADOW E&M-EST. PATIENT-LVL III: ICD-10-PCS | Mod: PBBFAC,,, | Performed by: NUTRITIONIST

## 2023-04-19 PROCEDURE — 99213 OFFICE O/P EST LOW 20 MIN: CPT | Mod: PBBFAC,PO | Performed by: NUTRITIONIST

## 2023-04-19 PROCEDURE — G0108 DIAB MANAGE TRN  PER INDIV: HCPCS | Mod: PBBFAC,PO | Performed by: NUTRITIONIST

## 2023-04-19 PROCEDURE — 99999 PR PBB SHADOW E&M-EST. PATIENT-LVL III: CPT | Mod: PBBFAC,,, | Performed by: NUTRITIONIST

## 2023-04-19 NOTE — PROGRESS NOTES
Diabetes Care Specialist Progress Note  Author: Lucia Garcia RD  Date: 4/19/2023    Referral:  4/14/23    Program Intake  Reason for Diabetes Program Visit:: Initial Diabetes Assessment  Current diabetes risk level:: low  Permission to speak with others about care:: no    Lab Results   Component Value Date    HGBA1C 6.8 (H) 04/04/2023       Clinical    Patient Health Rating  Compared to other people your age, how would you rate your health?: Good    Problem Review  Reviewed Problem List with Patient: yes  Active comorbidities affecting diabetes self-care.: yes  Comorbidities: Hypertension, Cardiovascular Disease, Other (comment)  Reviewed health maintenance: yes    Clinical Assessment  Current Diabetes Treatment:  (No DM meds)         Labs  Do you have regular lab work to monitor your medications?: Yes  Type of Regular Lab Work: A1c, Cholesterol, CBC  Where do you get your labs drawn?: [facility]  Lab Compliance Barriers: No    Nutritional Status  Diet: Regular  Meal Plan 24 Hour Recall: Breakfast, Lunch, Dinner, Snack  Meal Plan 24 Hour Recall - Breakfast:  (Activia yogurt OR Premeir PRO; 8 oz nonfat milk; hot tea w/Stevia or Truvia)  Meal Plan 24 Hour Recall - Lunch:  (sandwich on white wheat--turjey/ham/tuna, light maher; Barq[employer] Zero)  Meal Plan 24 Hour Recall - Dinner:  (Beef stew (potatoes, carrots, onions); brown rice, green beans; 8 oz milk)  Meal Plan 24 Hour Recall - Snack:  (mid-afternoon:  banana nut bread   HS: not often; cheese and pickles)  Change in appetite?: No  Dentation:: Wears Dentures (top dentures do not interfere w/oral intake)  Recent Changes in Weight: No Recent Weight Change  Current nutritional status an area of need that is impacting patient's ability to self-manage diabetes?: No    Additional Social History    Support  Does anyone support you with your diabetes care?: yes  Who supports you?: spouse, self  Who takes you to your medical appointments?: self  Does the current support meet the  patient's needs?: Yes  Is Support an area impacting ability to self-manage diabetes?: No    Access to Mass Media & Technology  Does the patient have access to any of the following devices or technologies?: Smart phone  Media or technology needs impacting ability to self-manage diabetes?: No    Cognitive/Behavioral Health  Alert and Oriented: Yes  Difficulty Thinking: No  Requires Prompting: No  Requires assistance for routine expression?: No  Cognitive or behavioral barriers impacting ability to self-manage diabetes?: No    Culture/Muslim  Culture or Adventist beliefs that may impact ability to access healthcare: No    Communication  Language preference: English  Hearing Problems: No  Vision Problems: No  Communication needs impacting ability to self-manage diabetes?: No    Health Literacy  Preferred Learning Method: Face to Face  How often do you need to have someone help you read instructions, pamphlets, or written material from your doctor or pharmacy?: Never  Health literacy needs impacting ability to self-manage diabetes?: No      Diabetes Self-Management Skills Assessment    Diabetes Disease Process/Treatment Options  Patient/caregiver able to state what happens when someone has diabetes.: yes  Patient/caregiver knows what type of diabetes they have.: yes  Diabetes Type : Type II  Patient/caregiver able to identify at least three signs and symptoms of diabetes.: yes  Identified signs and symptoms:: frequent urination, increased thirst, frequent infections, fatigue, blurred vision  Patient able to identify at least three risk factors for diabetes.: yes  Identified risk factors:: age over 40, family history, reduced activity, being overweight  Diabetes Disease Process/Treatment Options: Skills Assessment Completed: Yes  Assessment indicates:: Adequate understanding  Area of need?: No    Nutrition/Healthy Eating  Challenges to healthy eating:: portion control  Method of carbohydrate measurement:: no  method  Patient can identify foods that impact blood sugar.: yes  Patient-identified foods:: starchy vegetables (corn, peas, beans), starches (bread, pasta, rice, cereal), sweets, soda, fruit/fruit juice  Nutrition/Healthy Eating Skills Assessment Completed:: Yes  Assessment indicates:: Instruction Needed  Area of need?: Yes    Physical Activity/Exercise  Patient's daily activity level:: lightly active (busy doing chores at home or shopping; does not sit down often)  Patient formally exercises outside of work.: no  Patient can identify forms of physical activity.: yes  Stated forms of physical activity:: any movement performed by muscles that uses energy  Patient can identify reasons why exercise/physical activity is important in diabetes management.: yes  Identified reasons:: strengthens heart, muscles, and bones, tones muscles, lowers blood glucose, blood pressure, and cholesterol, lowers risk of heart disease and stroke, relieves stress  Physical Activity/Exercise Skills Assessment Completed: : Yes  Assessment indicates:: Adequate understanding  Area of need?: No         Home Blood Glucose Monitoring  Patient states that blood sugar is checked at home daily.: no  Reasons for not monitoring:: new diabetes diagnosis  Home Blood Glucose Monitoring Skills Assessment Completed: : Yes  Assessment indicates:: Adequate understanding  Area of need?: No    Acute Complications  Acute Complications Skills Assessment Completed: : No  Deffered due to:: Time  Area of need?: No    Chronic Complications  Patient can identify major chronic complications of diabetes.: yes  Stated chronic complications:: heart disease/heart attack, kidney disease, neuropathy/nerve damage, retinopathy, stroke  Patient can identify ways to prevent or delay diabetes complications.: yes  Stated ways to prevent complications:: healthy eating and regular activity, controlling blood sugar  Patient is aware that having diabetes increases risk of heart  disease?: Yes  Patient is aware that heart disease is the leading cause of death and disability in people with diabetes?: Yes  Patient able to state risk factors for heart disease?: Yes  Patient stated risk factors for heart disease:: High blood pressure, High cholesterol, Diet, Limited activity, Medication non-adherance, Having diabetes  Patient is taking statin?: Yes  Chronic Complications Skills Assessment Completed: : Yes  Assessment indicates:: Adequate understanding  Area of need?: No    Psychosocial/Coping  Patient can identify ways of coping with chronic disease.: yes  Patient-stated ways of coping with chronic disease:: spiritual/Uatsdin practices, support from loved ones  Psychosocial/Coping Skills Assessment Completed: : Yes  Assessment indicates:: Adequate understanding  Area of need?: No      Diabetes Self Support Plan    Assessment Summary and Plan    Based on today's diabetes care assessment, the following areas of need were identified:      Social 4/19/2023   Support No   Access to Mass Media/Tech No   Cognitive/Behavioral Health No   Culture/Lutheran No   Communication No   Health Literacy No        Clinical 4/19/2023   Lab Compliance No   Nutritional Status No        Diabetes Self-Management Skills 4/19/2023   Diabetes Disease Process/Treatment Options No   Nutrition/Healthy Eating Yes--see Care Plan   Physical Activity/Exercise No   Home Blood Glucose Monitoring No   Acute Complications No   Chronic Complications No   Psychosocial/Coping No          Today's interventions were provided through individual discussion, instruction, and written materials were provided.      Patient verbalized understanding of instruction and written materials.  Pt was able to return back demonstration of instructions today. Patient understood key points, needs reinforcement and further instruction.     Diabetes Self-Management Care Plan:    Today's Diabetes Self-Management Care Plan was developed with Jacqueline's input.  Jacqueline has agreed to work toward the following goal(s) to improve his/her overall diabetes control.      Care Plan: Diabetes Management   Updates made since 3/20/2023 12:00 AM        Problem: Healthy Eating         Goal: Eat 3 meals daily with 30 g/2 servings of Carbohydrate per meal.    Start Date: 4/19/2023   Expected End Date: 7/20/2023   Priority: High   Barriers: No Barriers Identified   Note:    Pt states her HA1C is elevated due to holiday eating.  Her biggest issue is having people over to eat weekly when she prepares meal and has dessert; and wanting a little bit of chocolate which is around the house mostly for her .  REC [employer] Carb Smart Ice Cream; pt wrote info down.  Reviewed all aspects of diet, giving pt different ways to determine appropriate portions of carbs; emphasizing importance of combining carbs w/PRO; and increasing non-starchy veggies.  Discussed how to eat out healthier since she has lunch with friends every week.  Pt will look at labels for her yogurt; will check portions for cereal and milk.        Task: Reviewed the sources and role of Carbohydrate, Protein, and Fat and how each nutrient impacts blood sugar. Completed 4/19/2023        Task: Provided visual examples using dry measuring cups, food models, and other familiar objects such as computer mouse, deck or cards, tennis ball etc. to help with visualization of portions. Completed 4/19/2023        Task: Discussed strategies for choosing healthier menu options when dining out. Completed 4/19/2023        Task: Review the importance of balancing carbohydrates with each meal using portion control techniques to count servings of carbohydrate and label reading to identify serving size and amount of total carbs per serving. Completed 4/19/2023        Task: Provided Sample plate method and reviewed the use of the plate to estimate amounts of carbohydrate per meal. Completed 4/19/2023          Follow Up Plan     Follow up Pt knows how  to reach Educator by phone or My Chart.    Today's care plan and follow up schedule was discussed with patient.  Jacqueline verbalized understanding of the care plan, goals, and agrees to follow up plan.        The patient was encouraged to communicate with his/her health care provider/physician and care team regarding his/her condition(s) and treatment.  I provided the patient with my contact information today and encouraged to contact me via phone or [facility] as needed.     Length of Visit   Total Time: 60 Minutes

## 2023-04-30 DIAGNOSIS — E03.4 HYPOTHYROIDISM DUE TO ACQUIRED ATROPHY OF THYROID: ICD-10-CM

## 2023-05-01 RX ORDER — LEVOTHYROXINE SODIUM 25 UG/1
TABLET ORAL
Qty: 90 TABLET | Refills: 3 | Status: SHIPPED | OUTPATIENT
Start: 2023-05-01 | End: 2023-08-29 | Stop reason: DRUGHIGH

## 2023-05-01 NOTE — TELEPHONE ENCOUNTER
Care Due:                  Date            Visit Type   Department     Provider  --------------------------------------------------------------------------------                                EP -                              PRIMARY      [facility]  Last Visit: 02-      CARE ([facility])   PRACTICE       Brice Weaver                              EP -                              PRIMARY      [facility]  Next Visit: 08-      CARE ([facility])   PRACTICE       Brice Weaver                                                            Last  Test          Frequency    Reason                     Performed    Due Date  --------------------------------------------------------------------------------    CMP.........  12 months..  pravastatin..............  12-   12-    [facility] Embedded Care Due Messages. Reference number: [id number].   4/30/2023 10:57:44 PM CDT  
Refill Routing Note   Medication(s) are not appropriate for processing by [facility] for the following reason(s):      Required labs outdated    [facility] action(s):  Defer Labs due        Medication reconciliation completed: No     Appointments  past 12m or future 3m with PCP    Date Provider   Last Visit   2/16/2023 Brice Weaver Jr., MD   Next Visit   8/21/2023 Brice Weaver Jr., MD   ED visits in past 90 days: 0        Note composed:1:04 PM 05/01/2023           
Abdomen soft, non-tender, no guarding.

## 2023-05-02 NOTE — PATIENT INSTRUCTIONS
Understanding Plantar Fasciitis    Plantar fasciitis is a condition that causes foot and heel pain. The plantar fascia is a tough band of tissue that runs across the bottom of the foot from the heel to the toes. This tissue pulls on the heel bone. It supports the arch of the foot as it pushes off the ground. If the tissue becomes irritated or red and swollen (inflamed), it is called plantar fasciitis.  How to say it  PLAN-tuhr fa-see-IY-tis     What causes plantar fasciitis?  Plantar fasciitis most often occurs from overusing the plantar fascia. The tissue may become damaged from activities that put repeated stress on the heel and foot. Or it may wear down over time with age and ankle stiffness. You are more likely to have plantar fasciitis if you:  Do activities that require a lot of running, jumping, or dancing  Have a job that requires being on your feet for long periods  Are overweight or obese  Have certain foot problems, such as a tight Achilles tendon, flat feet, or high arches  Often wear poorly fitting shoes    Symptoms of plantar fasciitis  The condition most often causes pain in the heel and the bottom of the foot. The pain may occur when you take your first steps in the morning. It may get better as you walk throughout the day. But as you continue to put weight on the foot, the pain often returns. Pain may also occur after standing or sitting for long periods.    Treating plantar fasciitis  Treatments for plantar fasciitis include:  Resting the foot. This involves limiting movements that make your foot hurt. You may also need to avoid certain sports and types of work for a time.  Using cold packs. Put an ice pack on the heel and foot to help reduce pain and swelling.  Taking pain medicines. Prescription and over-the-counter pain medicines can help relieve pain and swelling.  Using heel cups or foot inserts (orthotics). These are placed in the shoes to help support the heel or arch and cushion the heel.  You may also be told to buy proper-fitting shoes with good arch support and cushioned soles.  Taping the foot. This supports the arch and limits the movement of the plantar fascia to help relieve symptoms.  Wearing a night splint. This stretches the plantar fascia and leg muscles while you sleep. This may help relieve pain.  Doing exercises and physical therapy. These stretch and strengthen the plantar fascia and the muscles in the leg that support the heel and foot.  Getting shots of medicine into the foot. These may help relieve symptoms for a time.  Having surgery. This may be needed if other treatments fail to relieve symptoms. During surgery, the surgeon may partially cut the plantar fascia to release tension.    Possible complications of plantar fasciitis  Without proper care and treatment, healing may take longer than normal. Also, symptoms may continue or get worse. Over time, the plantar fascia may be damaged. This can make it hard to walk or even stand without pain.    When to call your healthcare provider  Call your healthcare provider right away if you have any of these:  Fever of 100.4°F (38°C) or higher, or as directed  Symptoms that dont get better with treatment, or get worse  New symptoms, such as numbness, tingling, or weakness in the foot     Date Last Reviewed: 3/10/2016  © 6107-2870 [employer]. [address] [place] [address], [place] [address]. All rights reserved. This information is not intended as a substitute for professional medical care. Always follow your healthcare professional's instructions.       Your Diabetes Foot Care Program    Every day you depend on your feet to keep you moving. But when you have diabetes, your feet need special care. Even a small foot problem can become very serious. So dont take your feet for granted. By working with your diabetes healthcare team, you can learn how to protect your feet and keep them healthy.  Evaluating your feet  An evaluation helps  your healthcare provider check the condition of your feet. The evaluation includes a review of your diabetes history and overall health. It may also include a foot exam, X-rays, or other tests. These can help show problems beneath the skin that you cant see or feel.  Medical history  You will be asked about your overall health and any history of foot problems. Youll also discuss your diabetes history, such as whether your blood sugar level has changed over time. It also includes questions about sensations of pain, tingling, pins and needles, or numbness. Your healthcare provider will also want to know if you have high blood pressure and heart disease, or if you smoke. Be sure to mention any medicines (including over-the-counter), supplements, or herbal remedies you take.  Foot exam  A foot exam checks the condition of different parts of your foot. First, your skin and nails are examined for any signs of infection. Blood flow is checked by feeling for the pulses in each foot. You may also have tests to study the nerves in the foot. These include using a small filament (wire) to see how sensitive your feet are. In certain cases, you will be asked to walk a short distance to check for bone, joint, and muscle problems.  Diagnostic tests  If needed, your healthcare provider will suggest certain tests to learn more about your feet. These include:  Doppler tests to measure blood flow in the feet and lower leg.  X-rays, which can show bone or joint problems.  Other imaging tests, such as an MRI (magnetic resonance imaging), bone scan, and CT (computed tomography) scan. These can help show bone infections.  Other tests, such as vascular tests, which study the blood flow in your feet and legs. You may also have nerve studies to learn how sensitive your feet are.  Creating a foot care program  Based on the evaluation, your healthcare provider will create a foot care program for you. Your program may be as simple as starting  a daily self-care routine and changing the types of shoes your wear. It may also involve treating minor foot problems, such as a corn or blister. In some cases, surgery will be needed to treat an infection or mechanical problems, such as hammer toes.  Preventing problems  When you have diabetes, its easier to prevent problems than to treat them later on. So see your healthcare team for regular checkups and foot care. Your healthcare team can also help you learn more about caring for your feet at home. For example, you may be told to avoid walking barefoot. Or you may be told that special footwear is needed to protect your feet.  Have regular checkups  Foot problems can develop quickly. So be sure to follow your healthcare teams schedule for regular checkups. During office visits, take off your shoes and socks as soon as you get in the exam room. Ask your healthcare provider to examine your feet for problems. This will make it easier to find and treat small skin irritations before they get worse. Regular checkups can also help keep track of the blood flow and feeling in your feet. If you have neuropathy (lack of feeling in your feet), you will need to have checkups more often.  Learn about self-care  The more you know about diabetes and your feet, the easier it will be to prevent problems. Members of your healthcare team can teach you how to inspect your feet and teach you to look for warning signs. They can also give you other foot care tips. During office visits, be sure to ask any questions you have.  Date Last Reviewed: 7/1/2016  © 0600-6666 [employer]. [address] [place] [address], [place] [address]. All rights reserved. This information is not intended as a substitute for professional medical care. Always follow your healthcare professional's instructions.

## 2023-05-08 ENCOUNTER — OFFICE VISIT (OUTPATIENT)
Dept: PODIATRY | Facility: CLINIC | Age: 83
End: 2023-05-08
Payer: MEDICARE

## 2023-05-08 VITALS — BODY MASS INDEX: 22.82 KG/M2 | HEIGHT: 66 IN | WEIGHT: 142 LBS

## 2023-05-08 DIAGNOSIS — M79.671 PAIN OF RIGHT HEEL: ICD-10-CM

## 2023-05-08 DIAGNOSIS — M72.2 PLANTAR FASCIITIS: Primary | ICD-10-CM

## 2023-05-08 DIAGNOSIS — M79.671 RIGHT FOOT PAIN: ICD-10-CM

## 2023-05-08 PROCEDURE — 99213 OFFICE O/P EST LOW 20 MIN: CPT | Mod: S$PBB,25,, | Performed by: PODIATRIST

## 2023-05-08 PROCEDURE — 20550 PR INJECT TENDON SHEATH/LIGAMENT: ICD-10-PCS | Mod: S$PBB,RT,, | Performed by: PODIATRIST

## 2023-05-08 PROCEDURE — 20550 NJX 1 TENDON SHEATH/LIGAMENT: CPT | Mod: S$PBB,RT,, | Performed by: PODIATRIST

## 2023-05-08 PROCEDURE — 20550 NJX 1 TENDON SHEATH/LIGAMENT: CPT | Mod: PBBFAC,PN | Performed by: PODIATRIST

## 2023-05-08 PROCEDURE — 99213 PR OFFICE/OUTPT VISIT, EST, LEVL III, 20-29 MIN: ICD-10-PCS | Mod: S$PBB,25,, | Performed by: PODIATRIST

## 2023-05-08 RX ORDER — DEXAMETHASONE SODIUM PHOSPHATE 4 MG/ML
4 INJECTION, SOLUTION INTRA-ARTICULAR; INTRALESIONAL; INTRAMUSCULAR; INTRAVENOUS; SOFT TISSUE
Status: COMPLETED | OUTPATIENT
Start: 2023-05-08 | End: 2023-05-08

## 2023-05-08 RX ORDER — BUPIVACAINE HYDROCHLORIDE 5 MG/ML
1.5 INJECTION, SOLUTION PERINEURAL
Status: COMPLETED | OUTPATIENT
Start: 2023-05-08 | End: 2023-05-08

## 2023-05-08 RX ORDER — METHYLPREDNISOLONE ACETATE 40 MG/ML
20 INJECTION, SUSPENSION INTRA-ARTICULAR; INTRALESIONAL; INTRAMUSCULAR; SOFT TISSUE
Status: COMPLETED | OUTPATIENT
Start: 2023-05-08 | End: 2023-05-08

## 2023-05-08 RX ADMIN — METHYLPREDNISOLONE ACETATE 20 MG: 40 INJECTION, SUSPENSION INTRA-ARTICULAR; INTRALESIONAL; INTRAMUSCULAR; INTRASYNOVIAL; SOFT TISSUE at 01:05

## 2023-05-08 RX ADMIN — BUPIVACAINE HYDROCHLORIDE 7.5 MG: 5 INJECTION, SOLUTION PERINEURAL at 01:05

## 2023-05-08 RX ADMIN — DEXAMETHASONE SODIUM PHOSPHATE 4 MG: 4 INJECTION INTRA-ARTICULAR; INTRALESIONAL; INTRAMUSCULAR; INTRAVENOUS; SOFT TISSUE at 01:05

## 2023-05-08 NOTE — PROGRESS NOTES
"  [address] [facility] [address]  [place] [address]  Phone: [phone]   [phone]    Patient's PCP:Brice Weaver Jr, MD  Referring Provider: No ref. provider found    Subjective:      Chief Complaint:: Heel Pain (Right heel, wore a high-wedged shoe x3 weeks ago)    ADAM Mathias is a 83 y.o. female who presents today with a complaint of right heel pain lasting for about 3 weeks. Onset of symptoms patient wore a pair of high-wedged shoes and reports no trauma.  Current symptoms include pain.  Aggravating factors are prolong walking/standing. Symptoms have varied. Treatment to date have included soaking, ice, elevation, different shoes.    Systemic Doctor: Dr. Brice Weaver  Date Last Seen: 02/16/2023  Blood Sugar: does not have to check  Hemoglobin A1c: 6.8 (04/04/2023)    Vitals:    05/08/23 1112   Weight: 64.4 kg (142 lb)   Height: 5' 6" (1.676 m)   PainSc:   5      Shoe Size: 8.5 - 9    Past Surgical History:   Procedure Laterality Date    ANGIOGRAM, CORONARY, WITH LEFT HEART CATHETERIZATION N/A 12/14/2021    Procedure: Angiogram, Coronary, with Left Heart Cath;  Surgeon: Napoleon Dumas MD;  Location: [facility] CATH/EP LAB;  Service: Cardiology;  Laterality: N/A;    CAROTID ARTERY ANGIOPLASTY Right 09/30/2016    CHOLECYSTECTOMY      EYE SURGERY      cataract removal    HYSTERECTOMY       Past Medical History:   Diagnosis Date    Allergy     latex    Allergy     seasonal    Arthritis     Blood transfusion     Cataract     removed    Hypertension     IBS (irritable bowel syndrome)     Obstructive sleep apnea     Thyroid nodule 09/29/2016    Ulcer      Family History   Problem Relation Age of Onset    Stroke Mother     Heart disease Mother     Heart disease Sister     Alzheimer's disease Sister     Cancer Brother     Cancer Brother     Kidney disease Brother     Breast cancer Maternal Aunt         Social History:   Marital Status:   Alcohol History:  reports current alcohol use.  Tobacco " History:  reports that she has never smoked. She has never used smokeless tobacco.  Drug History:  reports no history of drug use.    Review of patient's allergies indicates:   Allergen Reactions    Latex, natural rubber      rash    Biaxin [clarithromycin] Diarrhea and Nausea Only    Codeine Itching    Oxycodone      Knocks pt out for 3 days    Metformin Diarrhea       Current Outpatient Medications   Medication Sig Dispense Refill    apixaban (ELIQUIS) 5 mg Tab Take 1 tablet (5 mg total) by mouth 2 (two) times daily. 180 tablet 3    ascorbic acid, vitamin C, (VITAMIN C) 1000 MG tablet Take 1,000 mg by mouth once daily.      aspirin (ECOTRIN) 81 MG EC tablet Take 81 mg by mouth. Every other day      b complex vitamins capsule Take 1 capsule by mouth once daily.      benazepriL (LOTENSIN) 10 MG tablet TAKE 1 TABLET BY MOUTH  TWICE DAILY 180 tablet 3    carvediloL (COREG) 6.25 MG tablet TAKE 1 TABLET BY MOUTH  TWICE DAILY WITH MEALS 180 tablet 3    cetirizine (ZYRTEC) 10 MG tablet Take 10 mg by mouth daily as needed. As needed      chlorthalidone (HYGROTEN) 25 MG Tab TAKE 1 TABLET BY MOUTH ONCE DAILY 90 tablet 3    cholecalciferol, vitamin D3, 125 mcg (5,000 unit) Tab Take 1 tablet by mouth once daily.      cinnamon bark 500 mg capsule Take 1,000 mg by mouth once daily.      DOCOSAHEXANOIC ACID/EPA (FISH OIL ORAL) Take 1,240 mg by mouth 2 (two) times daily.      estradiol (ESTRACE) 1 MG tablet Take 1 mg by mouth once daily.      fluticasone propionate (FLONASE) 50 mcg/actuation nasal spray USE 1 SPRAY IN BOTH  NOSTRILS TWICE DAILY 48 g 2    furosemide (LASIX) 20 MG tablet Take 1 tablet (20 mg total) by mouth daily as needed (fluid). 90 tablet 3    glucosamine-chondroitin 500-400 mg tablet Take 1 tablet by mouth 2 (two) times daily.      isosorbide mononitrate (ISMO,MONOKET) 10 mg tablet Take 1 tablet (10 mg total) by mouth 2 (two) times daily. 180 tablet 3    levothyroxine (SYNTHROID) 25 MCG tablet TAKE 1 TABLET BY  MOUTH DAILY 90 tablet 3    loperamide HCl (IMODIUM A-D ORAL) As needed      melatonin 10 mg Tab Take by mouth. As needed      multivitamin capsule Take 1 capsule by mouth once daily.      potassium chloride (MICRO-K) 10 MEQ CpSR TAKE 1 CAPSULE BY MOUTH  TWICE DAILY 180 capsule 3    pravastatin (PRAVACHOL) 20 MG tablet TAKE 1 TABLET BY MOUTH IN  THE EVENING 90 tablet 3    turmeric root extract 500 mg Cap Take by mouth.      vit A/vit C/vit E/zinc/copper (ICAPS AREDS ORAL) Take by mouth.       No current facility-administered medications for this visit.       Review of Systems   Constitutional:  Negative for chills, fatigue, fever and unexpected weight change.   HENT:  Negative for hearing loss and trouble swallowing.    Eyes:  Negative for photophobia and visual disturbance.   Respiratory:  Negative for cough, shortness of breath and wheezing.    Cardiovascular:  Positive for leg swelling. Negative for chest pain and palpitations.   Gastrointestinal:  Negative for abdominal pain and nausea.   Genitourinary:  Negative for dysuria and frequency.   Musculoskeletal:  Negative for arthralgias, back pain, gait problem, joint swelling and myalgias.   Skin:  Negative for rash and wound.   Neurological:  Negative for tremors, seizures, weakness, numbness and headaches.   Hematological:  Does not bruise/bleed easily.       Objective:        Physical Exam:   Foot Exam    General  General Appearance: appears stated age and healthy   Orientation: alert and oriented to person, place, and time   Affect: appropriate   Gait: unimpaired       Right Foot/Ankle     Inspection and Palpation  Ecchymosis: none  Tenderness: plantar fascia   Swelling: (Mild lower extremity edema)  Arch: normal  Hallux valgus: yes  Skin Exam: skin intact; no drainage, no ulcer and no erythema   Neurovascular  Dorsalis pedis: 2+  Posterior tibial: 2+  Capillary Refill: 2+  Varicose veins: present  Saphenous nerve sensation: normal  Tibial nerve sensation:  normal  Superficial peroneal nerve sensation: normal  Deep peroneal nerve sensation: normal  Sural nerve sensation: normal    Edema  Type of edema: non-pitting    Muscle Strength  Ankle dorsiflexion: 5  Ankle plantar flexion: 5  Ankle inversion: 5  Ankle eversion: 5  Great toe extension: 5  Great toe flexion: 5    Range of Motion    Normal right ankle ROM    Tests  Anterior drawer: negative   Talar tilt: negative   PT Tinel's sign: negative    Paresthesia: negative  Comments  Pain on palpation of the infeior medial heel and central plantar heel. No pain present  with side to side compression of the calcaneus. Negative tinnel's sign  at the tarsal tunnel. Negative Alarcon's nerve pain. Negative Calcaneal nerve pain. No soft tissue masses. Pain absent  with dorsiflexion of the ankle. No edema, erythema, or ecchymosis noted.         Physical Exam  Cardiovascular:      Pulses:           Dorsalis pedis pulses are 2+ on the right side.        Posterior tibial pulses are 2+ on the right side.   Musculoskeletal:      Right foot: Bunion present.   Feet:      Right foot:      Skin integrity: No ulcer or erythema.             Right Ankle/Foot Exam     Range of Motion   The patient has normal right ankle ROM.    Comments:  Pain on palpation of the infeior medial heel and central plantar heel. No pain present  with side to side compression of the calcaneus. Negative tinnel's sign  at the tarsal tunnel. Negative Alarcon's nerve pain. Negative Calcaneal nerve pain. No soft tissue masses. Pain absent  with dorsiflexion of the ankle. No edema, erythema, or ecchymosis noted.           Muscle Strength   Right Lower Extremity   Ankle Dorsiflexion:  5   Plantar flexion:  5/5    Vascular Exam     Right Pulses  Dorsalis Pedis:      2+  Posterior Tibial:      2+         Imaging: none            Assessment:       1. Plantar fasciitis    2. Pain of right heel    3. Right foot pain      Plan:   Plantar fasciitis  -     methylPREDNISolone  acetate injection 20 mg  -     BUPivacaine injection 7.5 mg  -     dexAMETHasone injection 4 mg    Pain of right heel  -     methylPREDNISolone acetate injection 20 mg  -     BUPivacaine injection 7.5 mg  -     dexAMETHasone injection 4 mg    Right foot pain      Follow up if symptoms worsen or fail to improve.    Procedures Informed consent was obtained.  Time-out was called.  The area was prepped with alcohol, and a steroid injection was performed at right plantar fascia using 1.5 cc of 0.5% Marcaine w/out epi, 1 cc Dexamethasone, 0.5 cc Methylprednisolone. Patient tolerated the procedure well.           Plantar Fasciitis Level I Treatment:   Anti-inflammatory  No bare feet  Over the counter insert  Restrict activity level   Use running shoes at full time  No impact exercise and stretch gastrocnemius muscle      Counseling:     I provided patient education verbally regarding:   Patient diagnosis, treatment options, as well as alternatives, risks, and benefits.     Discussed different treatment options for heel pain. I gave written and verbal instructions on heel cord stretching and this was demonstrated for the patient. Patient expressed understanding. Discussed wearing appropriate shoe gear and avoiding flats, slippers, sandals, barefoot, and sockfeet. Recommended arch supports. My recommendation for OTC supports is Spenco polysorb replacement insoles or patient may elect more aggressive treatment with prescription arch supports. We also discussed cortisone injections and NSAID therapy.       This note was created using Dragon voice recognition software that occasionally misinterpreted phrases or words.

## 2023-05-11 DIAGNOSIS — R00.2 PALPITATIONS: ICD-10-CM

## 2023-05-11 DIAGNOSIS — I48.19 PERSISTENT ATRIAL FIBRILLATION: ICD-10-CM

## 2023-05-11 DIAGNOSIS — M79.89 SYMPTOM OF LEG SWELLING: ICD-10-CM

## 2023-05-11 DIAGNOSIS — R06.09 DYSPNEA ON EXERTION: ICD-10-CM

## 2023-05-11 DIAGNOSIS — R53.83 FATIGUE, UNSPECIFIED TYPE: ICD-10-CM

## 2023-05-16 RX ORDER — CARVEDILOL 6.25 MG/1
TABLET ORAL
Qty: 180 TABLET | Refills: 3 | Status: SHIPPED | OUTPATIENT
Start: 2023-05-16

## 2023-05-16 RX ORDER — BENAZEPRIL HYDROCHLORIDE 10 MG/1
TABLET ORAL
Qty: 180 TABLET | Refills: 3 | Status: SHIPPED | OUTPATIENT
Start: 2023-05-16

## 2023-06-27 DIAGNOSIS — Z12.31 ENCOUNTER FOR SCREENING MAMMOGRAM FOR MALIGNANT NEOPLASM OF BREAST: Primary | ICD-10-CM

## 2023-07-10 ENCOUNTER — HOSPITAL ENCOUNTER (OUTPATIENT)
Dept: RADIOLOGY | Facility: HOSPITAL | Age: 83
Discharge: HOME | End: 2023-07-10
Attending: SPECIALIST
Payer: MEDICARE

## 2023-07-10 VITALS — BODY MASS INDEX: 22.82 KG/M2 | WEIGHT: 142 LBS | HEIGHT: 66 IN

## 2023-07-10 DIAGNOSIS — Z12.31 ENCOUNTER FOR SCREENING MAMMOGRAM FOR MALIGNANT NEOPLASM OF BREAST: ICD-10-CM

## 2023-07-10 PROCEDURE — 77067 SCR MAMMO BI INCL CAD: CPT | Mod: TC,PO

## 2023-08-03 DIAGNOSIS — I48.19 OTHER PERSISTENT ATRIAL FIBRILLATION: ICD-10-CM

## 2023-08-09 ENCOUNTER — LAB VISIT (OUTPATIENT)
Dept: LAB | Facility: HOSPITAL | Age: 83
End: 2023-08-09
Attending: FAMILY MEDICINE
Payer: MEDICARE

## 2023-08-09 DIAGNOSIS — E11.9 TYPE 2 DIABETES MELLITUS WITHOUT COMPLICATION, WITHOUT LONG-TERM CURRENT USE OF INSULIN: ICD-10-CM

## 2023-08-09 LAB
ESTIMATED AVG GLUCOSE: 134 MG/DL (ref 68–131)
HBA1C MFR BLD: 6.3 % (ref 4.5–6.2)

## 2023-08-09 PROCEDURE — 83036 HEMOGLOBIN GLYCOSYLATED A1C: CPT | Performed by: FAMILY MEDICINE

## 2023-08-09 PROCEDURE — 36415 COLL VENOUS BLD VENIPUNCTURE: CPT | Performed by: FAMILY MEDICINE

## 2023-08-14 ENCOUNTER — PATIENT MESSAGE (OUTPATIENT)
Dept: ADMINISTRATIVE | Facility: HOSPITAL | Age: 83
End: 2023-08-14
Payer: MEDICARE

## 2023-08-22 ENCOUNTER — OFFICE VISIT (OUTPATIENT)
Dept: PULMONOLOGY | Facility: CLINIC | Age: 83
End: 2023-08-22
Payer: MEDICARE

## 2023-08-22 VITALS
HEART RATE: 65 BPM | SYSTOLIC BLOOD PRESSURE: 130 MMHG | BODY MASS INDEX: 22.92 KG/M2 | WEIGHT: 142 LBS | OXYGEN SATURATION: 97 % | DIASTOLIC BLOOD PRESSURE: 64 MMHG

## 2023-08-22 DIAGNOSIS — G47.33 OBSTRUCTIVE SLEEP APNEA (ADULT) (PEDIATRIC): ICD-10-CM

## 2023-08-22 DIAGNOSIS — R06.02 SHORTNESS OF BREATH: Primary | ICD-10-CM

## 2023-08-22 DIAGNOSIS — G47.34 NOCTURNAL HYPOXEMIA: ICD-10-CM

## 2023-08-22 DIAGNOSIS — Q76.49: ICD-10-CM

## 2023-08-22 DIAGNOSIS — R94.2 DECREASED DIFFUSION CAPACITY: ICD-10-CM

## 2023-08-22 PROCEDURE — 99214 PR OFFICE/OUTPT VISIT, EST, LEVL IV, 30-39 MIN: ICD-10-PCS | Mod: S$GLB,,, | Performed by: INTERNAL MEDICINE

## 2023-08-22 PROCEDURE — 99214 OFFICE O/P EST MOD 30 MIN: CPT | Mod: S$GLB,,, | Performed by: INTERNAL MEDICINE

## 2023-08-22 NOTE — PROGRESS NOTES
SUBJECTIVE:    Patient ID: Jacqueline Mathias is a 83 y.o. female.    Chief Complaint: Shortness of Breath and Follow-up (6 month follow up JACQUELINE/Patient has SOB)      Apnea    Shortness of Breath    Follow-up       The patient is here with complaints of awakening dyspneic at night.  She gets chest tightness and then feels she can't breathe.  Dyspnea on exertion.    Her CPAP compliance is 100%.  Her AHI is 0.3.     The patient has known restrictive lung disease from her straight back.  She perceives her dyspnea is getting worse.    Past Medical History:   Diagnosis Date    Allergy     latex    Allergy     seasonal    Arthritis     Blood transfusion     Cataract     removed    Hypertension     IBS (irritable bowel syndrome)     Obstructive sleep apnea     Thyroid nodule 09/29/2016    Ulcer      Past Surgical History:   Procedure Laterality Date    ANGIOGRAM, CORONARY, WITH LEFT HEART CATHETERIZATION N/A 12/14/2021    Procedure: Angiogram, Coronary, with Left Heart Cath;  Surgeon: Napoleon Dumas MD;  Location: [facility] CATH/EP LAB;  Service: Cardiology;  Laterality: N/A;    CAROTID ARTERY ANGIOPLASTY Right 09/30/2016    CHOLECYSTECTOMY      EYE SURGERY      cataract removal    HYSTERECTOMY       Family History   Problem Relation Age of Onset    Stroke Mother     Heart disease Mother     Heart disease Sister     Alzheimer's disease Sister     Cancer Brother     Cancer Brother     Kidney disease Brother     Breast cancer Maternal Aunt         Social History:   Marital Status:   Occupation: Data Unavailable  Alcohol History:  reports current alcohol use.  Tobacco History:  reports that she has never smoked. She has never used smokeless tobacco.  Drug History:  reports no history of drug use.    Review of patient's allergies indicates:   Allergen Reactions    Latex, natural rubber      rash    Biaxin [clarithromycin] Diarrhea and Nausea Only    Codeine Itching    Oxycodone      Knocks pt out for 3 days    Metformin  Diarrhea       Current Outpatient Medications   Medication Sig Dispense Refill    apixaban (ELIQUIS) 5 mg Tab Take 1 tablet (5 mg total) by mouth 2 (two) times daily. 180 tablet 3    ascorbic acid, vitamin C, (VITAMIN C) 1000 MG tablet Take 1,000 mg by mouth once daily.      aspirin (ECOTRIN) 81 MG EC tablet Take 81 mg by mouth. Every other day      b complex vitamins capsule Take 1 capsule by mouth once daily.      benazepriL (LOTENSIN) 10 MG tablet TAKE 1 TABLET BY MOUTH  TWICE DAILY 180 tablet 3    carvediloL (COREG) 6.25 MG tablet TAKE 1 TABLET BY MOUTH  TWICE DAILY WITH MEALS 180 tablet 3    chlorthalidone (HYGROTEN) 25 MG Tab TAKE 1 TABLET BY MOUTH ONCE DAILY 90 tablet 3    cholecalciferol, vitamin D3, 125 mcg (5,000 unit) Tab Take 1 tablet by mouth once daily.      cinnamon bark 500 mg capsule Take 1,000 mg by mouth once daily.      DOCOSAHEXANOIC ACID/EPA (FISH OIL ORAL) Take 1,240 mg by mouth 2 (two) times daily.      estradiol (ESTRACE) 1 MG tablet Take 1 mg by mouth once daily.      fluticasone propionate (FLONASE) 50 mcg/actuation nasal spray USE 1 SPRAY IN BOTH  NOSTRILS TWICE DAILY 48 g 2    furosemide (LASIX) 20 MG tablet Take 1 tablet (20 mg total) by mouth daily as needed (fluid). 90 tablet 3    glucosamine-chondroitin 500-400 mg tablet Take 1 tablet by mouth 2 (two) times daily.      isosorbide mononitrate (ISMO,MONOKET) 10 mg tablet Take 1 tablet (10 mg total) by mouth 2 (two) times daily. 180 tablet 3    levothyroxine (SYNTHROID) 25 MCG tablet TAKE 1 TABLET BY MOUTH DAILY 90 tablet 3    loperamide HCl (IMODIUM A-D ORAL) As needed      multivitamin capsule Take 1 capsule by mouth once daily.      potassium chloride (MICRO-K) 10 MEQ CpSR TAKE 1 CAPSULE BY MOUTH  TWICE DAILY 180 capsule 3    pravastatin (PRAVACHOL) 20 MG tablet TAKE 1 TABLET BY MOUTH IN  THE EVENING 90 tablet 3    turmeric root extract 500 mg Cap Take by mouth.      vit A/vit C/vit E/zinc/copper (ICAPS AREDS ORAL) Take by mouth.       cetirizine (ZYRTEC) 10 MG tablet Take 10 mg by mouth daily as needed. As needed      melatonin 10 mg Tab Take by mouth. As needed       No current facility-administered medications for this visit.       Alpha-1 Antitrypsin:  Last PFT: 10/11/21 restriction and a diffusion defect, (straight back)  Last CT: 11/18/21   No acute cardiac or pulmonary process, with additional, and incidental findings as noted above.    Her overnight pulse ox with CPAP no oxygen showed significant drops during the night without the oxygen.    Review of Systems   Respiratory:  Positive for shortness of breath.      General: Feeling tired all the time.    Eyes: Vision is good.  ENT:  No rhinitis or sinusitis. She sneezes a  lot.  Heart:: Occasional palpatations, has A fib, chest tightness substernal  Lungs: shortness of breath with exertion, shortness of breath in the middle of the night, and shortness of breath with chest tightness  GI: IBS sometimes  : No dysuria, hesitancy, or nocturia.  Musculoskeletal: some arthritis  Skin: No lesions or rashes.  Neuro: No headaches or neuropathy.  Lymph: ankles swell a lot  Psych: nervousness, anxious  Endo: weight stable    OBJECTIVE:      /64 (BP Location: Right arm, Patient Position: Sitting, BP Method: Medium (Manual))   Pulse 65   Wt 64.4 kg (142 lb)   SpO2 97%   BMI 22.92 kg/m²     Physical Exam  GENERAL: Older patient in no distress.  HEENT: Pupils equal and reactive. Extraocular movements intact. Nose intact.  Pharynx moist.  NECK: Supple.   HEART: Regular rate and rhythm. No murmur or gallop auscultated.  LUNGS: Clear to auscultation and percussion. Lung excursion symmetrical. No change in fremitus. No adventitial noises.  ABDOMEN: Bowel sounds present. Non-tender, no masses palpated.  EXTREMITIES: Normal muscle tone and joint movement, no cyanosis or clubbing.   LYMPHATICS: No adenopathy palpated, no edema.  SKIN: Dry, intact, no lesions.   NEURO: Cranial nerves II-XII intact.  Motor strength 5/5 bilaterally, upper and lower extremities.  PSYCH: Appropriate affect.    Assessment:       1. Shortness of breath    2. Obstructive sleep apnea (adult) (pediatric)    3. Decreased diffusion capacity    4. Nocturnal hypoxemia    5. Straight back syndrome         Patient is very concerned that her dyspnea is worsening.  Plan:       Shortness of breath  -     CT Chest Without Contrast; Future; Expected date: 08/22/2023  -     Complete PFT with bronchodilator; Future  -     Stress test, pulmonary; Future; Expected date: 08/22/2023  -     PULSE OXIMETRY OVERNIGHT; Future; Expected date: 08/22/2023    Obstructive sleep apnea (adult) (pediatric)  -     PULSE OXIMETRY OVERNIGHT; Future; Expected date: 08/22/2023    Decreased diffusion capacity    Nocturnal hypoxemia    Straight back syndrome           Follow up in about 1 month (around 9/22/2023).    Continue CPAP with oxygen 4 liters

## 2023-08-23 ENCOUNTER — PATIENT OUTREACH (OUTPATIENT)
Dept: ADMINISTRATIVE | Facility: HOSPITAL | Age: 83
End: 2023-08-23
Payer: MEDICARE

## 2023-08-23 DIAGNOSIS — E78.2 MIXED HYPERLIPIDEMIA: Primary | ICD-10-CM

## 2023-08-23 NOTE — PROGRESS NOTES
[employer] [facility]-Portal message sent out regarding pt's overdue Lipid Panel.     [facility] Lipid Panel order placed.

## 2023-08-24 ENCOUNTER — OFFICE VISIT (OUTPATIENT)
Dept: FAMILY MEDICINE | Facility: CLINIC | Age: 83
End: 2023-08-24
Payer: MEDICARE

## 2023-08-24 VITALS
BODY MASS INDEX: 22.57 KG/M2 | HEART RATE: 58 BPM | DIASTOLIC BLOOD PRESSURE: 60 MMHG | OXYGEN SATURATION: 97 % | TEMPERATURE: 98 F | SYSTOLIC BLOOD PRESSURE: 126 MMHG | WEIGHT: 140.44 LBS | HEIGHT: 66 IN

## 2023-08-24 DIAGNOSIS — I27.20 PULMONARY HYPERTENSION: ICD-10-CM

## 2023-08-24 DIAGNOSIS — E78.2 MIXED HYPERLIPIDEMIA: ICD-10-CM

## 2023-08-24 DIAGNOSIS — I10 ESSENTIAL HYPERTENSION: ICD-10-CM

## 2023-08-24 DIAGNOSIS — I48.19 OTHER PERSISTENT ATRIAL FIBRILLATION: ICD-10-CM

## 2023-08-24 DIAGNOSIS — E03.4 HYPOTHYROIDISM DUE TO ACQUIRED ATROPHY OF THYROID: ICD-10-CM

## 2023-08-24 DIAGNOSIS — I25.10 CORONARY ARTERY DISEASE INVOLVING NATIVE CORONARY ARTERY OF NATIVE HEART WITHOUT ANGINA PECTORIS: ICD-10-CM

## 2023-08-24 DIAGNOSIS — J43.1 PANLOBULAR EMPHYSEMA: ICD-10-CM

## 2023-08-24 DIAGNOSIS — E11.9 TYPE 2 DIABETES MELLITUS WITHOUT COMPLICATION, WITHOUT LONG-TERM CURRENT USE OF INSULIN: Primary | ICD-10-CM

## 2023-08-24 PROCEDURE — 99999 PR PBB SHADOW E&M-EST. PATIENT-LVL III: CPT | Mod: PBBFAC,,, | Performed by: FAMILY MEDICINE

## 2023-08-24 PROCEDURE — 99999 PR PBB SHADOW E&M-EST. PATIENT-LVL III: ICD-10-PCS | Mod: PBBFAC,,, | Performed by: FAMILY MEDICINE

## 2023-08-24 PROCEDURE — 99213 OFFICE O/P EST LOW 20 MIN: CPT | Mod: PBBFAC,PN | Performed by: FAMILY MEDICINE

## 2023-08-24 PROCEDURE — 99214 OFFICE O/P EST MOD 30 MIN: CPT | Mod: S$PBB,,, | Performed by: FAMILY MEDICINE

## 2023-08-24 PROCEDURE — 99214 PR OFFICE/OUTPT VISIT, EST, LEVL IV, 30-39 MIN: ICD-10-PCS | Mod: S$PBB,,, | Performed by: FAMILY MEDICINE

## 2023-08-26 NOTE — PROGRESS NOTES
Subjective     Patient ID: Jacqueline Mathias is a 83 y.o. female.    Chief Complaint: Diabetes,Hypertension, Hyperlipidemia, Hypothyroidism, Coronary Artery Disease, Atrial Fibrillation, and COPD    Patient presents here for 6 month follow-up of hypertension, hyperlipidemia, hypothyroidism, CAD, atrial fibrillation, and COPD.  Her weight has decreased 3 lb over the last 6 months and her BMI today is 22.67.  Her hypertension is well controlled with her present medication and her blood pressure today is 126/60.  Her hyperlipidemia is well controlled with her present use of pravastatin 20 mg daily and her low-fat low-cholesterol diet.  Is stable with her present use of levothyroxine 25 mcg daily.  Last TSH was 1 year ago therapeutic range.  Her coronary artery disease is stable without chest pains or palpitations.  She did have a coronary angiography in 2021 which showed nonobstructive CAD.  She does have atrial fibrillation and her rate is controlled with carvedilol and she is on Eliquis as an anticoagulant.  Her main problem is increasing problems with her COPD.  She is followed by pulmonology, Dr. Galvez, and was recently seen by her.  That she has feelings of stopping breathing at night while she was using her CPAP.  This wakes her up from her sleep.  She also does have some increased shortness of breath with exertion.  Dr. García has ordered CT of the chest, PFTs with bronchodilator, pulmonary stress test, and overnight pulse oximetry.  As far as health maintenance, she is up-to-date with all of her recommended screening exams and immunizations with exception of shingles vaccine, 2nd COVID booster, and lipid profile.  I did neglect to mention that she does also have diabetes which is controlled with diet.  Her hemoglobin A1c in April was 6.8% but it has come down to 6.3% when done earlier this month.    Review of Systems   Constitutional:  Negative for chills, fatigue, fever and unexpected weight change.   HENT:   Negative for nasal congestion, postnasal drip and sore throat.    Respiratory:  Positive for shortness of breath. Negative for cough and wheezing.    Cardiovascular:  Positive for palpitations (Occasional). Negative for chest pain and leg swelling.   Gastrointestinal:  Negative for abdominal pain, diarrhea, nausea and vomiting.   Musculoskeletal:  Positive for arthralgias. Negative for back pain.   Neurological:  Negative for dizziness, light-headedness and headaches.   Psychiatric/Behavioral:  Positive for sleep disturbance. The patient is not nervous/anxious.           Objective     Physical Exam  Vitals reviewed.   Constitutional:       General: She is not in acute distress.     Appearance: Normal appearance. She is well-developed and normal weight.   HENT:      Right Ear: Tympanic membrane and external ear normal.      Left Ear: Tympanic membrane and external ear normal.      Mouth/Throat:      Pharynx: Oropharynx is clear. No posterior oropharyngeal erythema.   Neck:      Thyroid: No thyromegaly.      Vascular: No carotid bruit.   Cardiovascular:      Rate and Rhythm: Bradycardia present. Rhythm irregular.      Pulses: Normal pulses.      Heart sounds: Normal heart sounds. No murmur heard.  Pulmonary:      Effort: Pulmonary effort is normal.      Breath sounds: Normal breath sounds. No wheezing or rales.   Musculoskeletal:         General: No tenderness. Normal range of motion.      Cervical back: Normal range of motion and neck supple.      Right lower leg: No edema.      Left lower leg: No edema.   Lymphadenopathy:      Cervical: No cervical adenopathy.   Neurological:      General: No focal deficit present.      Mental Status: She is alert and oriented to person, place, and time.      Cranial Nerves: No cranial nerve deficit.      Deep Tendon Reflexes: Reflexes are normal and symmetric.          Assessment and Plan     1. Type 2 diabetes mellitus without complication, without long-term current use of  insulin    2. Essential hypertension  -     Comprehensive Metabolic Panel; Future; Expected date: 09/07/2023    3. Mixed hyperlipidemia  -     Cancel: Lipid Panel; Future; Expected date: 09/07/2023    4. Hypothyroidism due to acquired atrophy of thyroid  -     TSH; Future; Expected date: 08/26/2023    5. Coronary artery disease involving native coronary artery of native heart without angina pectoris    6. Other persistent atrial fibrillation    7. Panlobular emphysema    8. Pulmonary hypertension        1. Continue present medication as her hypertension, hyperlipidemia, hypothyroidism, CAD, and atrial fibrillation are stable  2.  Continue follow-up with pulmonary as she is having some issues with her COPD  3.  Continue low-sodium, low-fat low-cholesterol diet and exercise.  BMI today is 22.67  4.  CMP, lipid profile, TSH  5. Follow up with me in 6 months or p.r.n.          Follow up in about 6 months (around 2/24/2024).

## 2023-08-28 ENCOUNTER — LAB VISIT (OUTPATIENT)
Dept: LAB | Facility: HOSPITAL | Age: 83
End: 2023-08-28
Attending: FAMILY MEDICINE
Payer: MEDICARE

## 2023-08-28 DIAGNOSIS — I10 ESSENTIAL HYPERTENSION: ICD-10-CM

## 2023-08-28 DIAGNOSIS — E78.2 MIXED HYPERLIPIDEMIA: ICD-10-CM

## 2023-08-28 DIAGNOSIS — E03.4 HYPOTHYROIDISM DUE TO ACQUIRED ATROPHY OF THYROID: ICD-10-CM

## 2023-08-28 LAB
ALBUMIN SERPL BCP-MCNC: 3.9 G/DL (ref 3.5–5.2)
ALP SERPL-CCNC: 47 U/L (ref 55–135)
ALT SERPL W/O P-5'-P-CCNC: 17 U/L (ref 10–44)
ANION GAP SERPL CALC-SCNC: 8 MMOL/L (ref 8–16)
AST SERPL-CCNC: 22 U/L (ref 10–40)
BILIRUB SERPL-MCNC: 0.6 MG/DL (ref 0.1–1)
BUN SERPL-MCNC: 27 MG/DL (ref 8–23)
CALCIUM SERPL-MCNC: 9 MG/DL (ref 8.7–10.5)
CHLORIDE SERPL-SCNC: 101 MMOL/L (ref 95–110)
CHOLEST SERPL-MCNC: 140 MG/DL (ref 120–199)
CHOLEST/HDLC SERPL: 2.2 {RATIO} (ref 2–5)
CO2 SERPL-SCNC: 28 MMOL/L (ref 23–29)
CREAT SERPL-MCNC: 0.8 MG/DL (ref 0.5–1.4)
EST. GFR  (NO RACE VARIABLE): >60 ML/MIN/1.73 M^2
GLUCOSE SERPL-MCNC: 115 MG/DL (ref 70–110)
HDLC SERPL-MCNC: 64 MG/DL (ref 40–75)
HDLC SERPL: 45.7 % (ref 20–50)
LDLC SERPL CALC-MCNC: 49.8 MG/DL (ref 63–159)
NONHDLC SERPL-MCNC: 76 MG/DL
POTASSIUM SERPL-SCNC: 4 MMOL/L (ref 3.5–5.1)
PROT SERPL-MCNC: 7.7 G/DL (ref 6–8.4)
SODIUM SERPL-SCNC: 137 MMOL/L (ref 136–145)
T4 FREE SERPL-MCNC: 0.81 NG/DL (ref 0.71–1.51)
TRIGL SERPL-MCNC: 131 MG/DL (ref 30–150)
TSH SERPL DL<=0.005 MIU/L-ACNC: 5.64 UIU/ML (ref 0.34–5.6)

## 2023-08-28 PROCEDURE — 84439 ASSAY OF FREE THYROXINE: CPT | Performed by: FAMILY MEDICINE

## 2023-08-28 PROCEDURE — 80053 COMPREHEN METABOLIC PANEL: CPT | Performed by: FAMILY MEDICINE

## 2023-08-28 PROCEDURE — 36415 COLL VENOUS BLD VENIPUNCTURE: CPT | Performed by: FAMILY MEDICINE

## 2023-08-28 PROCEDURE — 84443 ASSAY THYROID STIM HORMONE: CPT | Performed by: FAMILY MEDICINE

## 2023-08-28 PROCEDURE — 80061 LIPID PANEL: CPT | Performed by: FAMILY MEDICINE

## 2023-08-29 DIAGNOSIS — E03.4 HYPOTHYROIDISM DUE TO ACQUIRED ATROPHY OF THYROID: Primary | ICD-10-CM

## 2023-08-29 RX ORDER — LEVOTHYROXINE SODIUM 50 UG/1
50 TABLET ORAL
Qty: 90 TABLET | Refills: 3 | Status: SHIPPED | OUTPATIENT
Start: 2023-08-29 | End: 2024-08-28

## 2023-09-05 NOTE — PATIENT INSTRUCTIONS

## 2023-09-06 ENCOUNTER — OFFICE VISIT (OUTPATIENT)
Dept: PODIATRY | Facility: CLINIC | Age: 83
End: 2023-09-06
Payer: MEDICARE

## 2023-09-06 VITALS — OXYGEN SATURATION: 97 % | BODY MASS INDEX: 22.92 KG/M2 | HEART RATE: 64 BPM | WEIGHT: 142 LBS

## 2023-09-06 DIAGNOSIS — M72.2 PLANTAR FASCIITIS: Primary | ICD-10-CM

## 2023-09-06 DIAGNOSIS — M79.671 RIGHT FOOT PAIN: ICD-10-CM

## 2023-09-06 PROCEDURE — 99999 PR PBB SHADOW E&M-EST. PATIENT-LVL IV: CPT | Mod: PBBFAC,,, | Performed by: PODIATRIST

## 2023-09-06 PROCEDURE — 99213 OFFICE O/P EST LOW 20 MIN: CPT | Mod: 25,S$PBB,, | Performed by: PODIATRIST

## 2023-09-06 PROCEDURE — 99999 PR PBB SHADOW E&M-EST. PATIENT-LVL IV: ICD-10-PCS | Mod: PBBFAC,,, | Performed by: PODIATRIST

## 2023-09-06 PROCEDURE — 20550 NJX 1 TENDON SHEATH/LIGAMENT: CPT | Mod: PBBFAC,PN,RT | Performed by: PODIATRIST

## 2023-09-06 PROCEDURE — 20550 NJX 1 TENDON SHEATH/LIGAMENT: CPT | Mod: S$PBB,RT,, | Performed by: PODIATRIST

## 2023-09-06 PROCEDURE — 99214 OFFICE O/P EST MOD 30 MIN: CPT | Mod: PBBFAC,PN,25 | Performed by: PODIATRIST

## 2023-09-06 PROCEDURE — 20550 PR INJECT TENDON SHEATH/LIGAMENT: ICD-10-PCS | Mod: S$PBB,RT,, | Performed by: PODIATRIST

## 2023-09-06 PROCEDURE — 99213 PR OFFICE/OUTPT VISIT, EST, LEVL III, 20-29 MIN: ICD-10-PCS | Mod: 25,S$PBB,, | Performed by: PODIATRIST

## 2023-09-06 NOTE — PROGRESS NOTES
[address] [facility] [address]  [place] [address]  Phone: [phone]   [phone]    Patient's PCP:Brice Weaver Jr., MD  Referring Provider: No ref. provider found    Subjective:      Chief Complaint:: Foot Pain (Plantar fasciitis right foot  )    Foot Pain  Pertinent negatives include no abdominal pain, arthralgias, chest pain, chills, coughing, fatigue, fever, headaches, joint swelling, myalgias, nausea, numbness, rash or weakness.     Jacqueline Mathias is a 83 y.o. female who presents today with a complaint of pain right foot . The current episode started Monday 9/4/23.  The symptoms include sharp pain in foot. Probable cause of complaint unknown.  The symptoms are aggravated by prolonged walking and standing. The problem has worsened. Treatment to date have included ice bottle and soaking which provided some relief.         Vitals:    09/06/23 1342   Pulse: 64   SpO2: 97%   Weight: 64.4 kg (141 lb 15.6 oz)   PainSc:   8      Shoe Size: 8.5-9    Past Surgical History:   Procedure Laterality Date    ANGIOGRAM, CORONARY, WITH LEFT HEART CATHETERIZATION N/A 12/14/2021    Procedure: Angiogram, Coronary, with Left Heart Cath;  Surgeon: Napoleon Dumas MD;  Location: [facility] CATH/EP LAB;  Service: Cardiology;  Laterality: N/A;    CAROTID ARTERY ANGIOPLASTY Right 09/30/2016    CHOLECYSTECTOMY      EYE SURGERY      cataract removal    HYSTERECTOMY       Past Medical History:   Diagnosis Date    Allergy     latex    Allergy     seasonal    Arthritis     Blood transfusion     Cataract     removed    Hypertension     IBS (irritable bowel syndrome)     Obstructive sleep apnea     Thyroid nodule 09/29/2016    Ulcer      Family History   Problem Relation Age of Onset    Stroke Mother     Heart disease Mother     Heart disease Sister     Alzheimer's disease Sister     Cancer Brother     Cancer Brother     Kidney disease Brother     Breast cancer Maternal Aunt         Social History:   Marital Status:   Alcohol  History:  reports current alcohol use.  Tobacco History:  reports that she has never smoked. She has never used smokeless tobacco.  Drug History:  reports no history of drug use.    Review of patient's allergies indicates:   Allergen Reactions    Latex, natural rubber      rash    Biaxin [clarithromycin] Diarrhea and Nausea Only    Codeine Itching    Oxycodone      Knocks pt out for 3 days    Metformin Diarrhea       Current Outpatient Medications   Medication Sig Dispense Refill    apixaban (ELIQUIS) 5 mg Tab Take 1 tablet (5 mg total) by mouth 2 (two) times daily. 180 tablet 3    ascorbic acid, vitamin C, (VITAMIN C) 1000 MG tablet Take 1,000 mg by mouth once daily.      aspirin (ECOTRIN) 81 MG EC tablet Take 81 mg by mouth. Every other day      b complex vitamins capsule Take 1 capsule by mouth once daily.      benazepriL (LOTENSIN) 10 MG tablet TAKE 1 TABLET BY MOUTH  TWICE DAILY 180 tablet 3    carvediloL (COREG) 6.25 MG tablet TAKE 1 TABLET BY MOUTH  TWICE DAILY WITH MEALS 180 tablet 3    cetirizine (ZYRTEC) 10 MG tablet Take 10 mg by mouth daily as needed. As needed      chlorthalidone (HYGROTEN) 25 MG Tab TAKE 1 TABLET BY MOUTH ONCE DAILY 90 tablet 3    cholecalciferol, vitamin D3, 125 mcg (5,000 unit) Tab Take 1 tablet by mouth once daily.      cinnamon bark 500 mg capsule Take 1,000 mg by mouth once daily.      DOCOSAHEXANOIC ACID/EPA (FISH OIL ORAL) Take 1,240 mg by mouth 2 (two) times daily.      estradiol (ESTRACE) 1 MG tablet Take 1 mg by mouth once daily.      fluticasone propionate (FLONASE) 50 mcg/actuation nasal spray USE 1 SPRAY IN BOTH  NOSTRILS TWICE DAILY 48 g 2    furosemide (LASIX) 20 MG tablet Take 1 tablet (20 mg total) by mouth daily as needed (fluid). 90 tablet 3    glucosamine-chondroitin 500-400 mg tablet Take 1 tablet by mouth 2 (two) times daily.      isosorbide mononitrate (ISMO,MONOKET) 10 mg tablet Take 1 tablet (10 mg total) by mouth 2 (two) times daily. 180 tablet 3     levothyroxine (SYNTHROID) 50 MCG tablet Take 1 tablet (50 mcg total) by mouth before breakfast. 90 tablet 3    loperamide HCl (IMODIUM A-D ORAL) As needed      melatonin 10 mg Tab Take by mouth. As needed      multivitamin capsule Take 1 capsule by mouth once daily.      potassium chloride (MICRO-K) 10 MEQ CpSR TAKE 1 CAPSULE BY MOUTH  TWICE DAILY 180 capsule 3    pravastatin (PRAVACHOL) 20 MG tablet TAKE 1 TABLET BY MOUTH IN  THE EVENING 90 tablet 3    turmeric root extract 500 mg Cap Take by mouth.      vit A/vit C/vit E/zinc/copper (ICAPS AREDS ORAL) Take by mouth.       No current facility-administered medications for this visit.       Review of Systems   Constitutional:  Negative for chills, fatigue, fever and unexpected weight change.   HENT:  Negative for hearing loss and trouble swallowing.    Eyes:  Negative for photophobia and visual disturbance.   Respiratory:  Negative for cough, shortness of breath and wheezing.    Cardiovascular:  Positive for leg swelling. Negative for chest pain and palpitations.   Gastrointestinal:  Negative for abdominal pain and nausea.   Genitourinary:  Negative for dysuria and frequency.   Musculoskeletal:  Negative for arthralgias, back pain, gait problem, joint swelling and myalgias.   Skin:  Negative for rash and wound.   Neurological:  Negative for tremors, seizures, weakness, numbness and headaches.   Hematological:  Does not bruise/bleed easily.         Objective:        Physical Exam:   Foot Exam    General  General Appearance: appears stated age and healthy   Orientation: alert and oriented to person, place, and time   Affect: appropriate   Gait: unimpaired       Right Foot/Ankle     Inspection and Palpation  Ecchymosis: none  Tenderness: plantar fascia   Swelling: (Mild lower extremity edema)  Arch: normal  Hallux valgus: yes  Skin Exam: skin intact; no drainage, no ulcer and no erythema   Neurovascular  Dorsalis pedis: 2+  Posterior tibial: 2+  Capillary Refill:  2+  Varicose veins: present  Saphenous nerve sensation: normal  Tibial nerve sensation: normal  Superficial peroneal nerve sensation: normal  Deep peroneal nerve sensation: normal  Sural nerve sensation: normal    Edema  Type of edema: non-pitting    Muscle Strength  Ankle dorsiflexion: 5  Ankle plantar flexion: 5  Ankle inversion: 5  Ankle eversion: 5  Great toe extension: 5  Great toe flexion: 5    Range of Motion    Normal right ankle ROM    Tests  Anterior drawer: negative   Talar tilt: negative   PT Tinel's sign: negative    Paresthesia: negative  Comments  Pain on palpation of the infeior medial heel and central plantar heel. No pain present  with side to side compression of the calcaneus. Negative tinnel's sign  at the tarsal tunnel. Negative Alarcon's nerve pain. Negative Calcaneal nerve pain. No soft tissue masses. Pain absent  with dorsiflexion of the ankle. No edema, erythema, or ecchymosis noted.           Physical Exam  Cardiovascular:      Pulses:           Dorsalis pedis pulses are 2+ on the right side.        Posterior tibial pulses are 2+ on the right side.   Musculoskeletal:      Right foot: Bunion present.   Feet:      Right foot:      Skin integrity: No ulcer or erythema.               Right Ankle/Foot Exam     Range of Motion   The patient has normal right ankle ROM.    Comments:  Pain on palpation of the infeior medial heel and central plantar heel. No pain present  with side to side compression of the calcaneus. Negative tinnel's sign  at the tarsal tunnel. Negative Alarcon's nerve pain. Negative Calcaneal nerve pain. No soft tissue masses. Pain absent  with dorsiflexion of the ankle. No edema, erythema, or ecchymosis noted.           Muscle Strength   Right Lower Extremity   Ankle Dorsiflexion:  5   Plantar flexion:  5/5    Vascular Exam     Right Pulses  Dorsalis Pedis:      2+  Posterior Tibial:      2+           Imaging: none            Assessment:       1. Plantar fasciitis    2. Right foot  pain      Plan:   Plantar fasciitis  -     methylPREDNISolone acetate injection 20 mg  -     BUPivacaine injection 7.5 mg  -     dexAMETHasone injection 4 mg    Right foot pain  -     methylPREDNISolone acetate injection 20 mg  -     BUPivacaine injection 7.5 mg  -     dexAMETHasone injection 4 mg      Follow up if symptoms worsen or fail to improve.      I discussed with the patient that it appears she has reoccurrence of her plantar fasciitis.  We discussed different ongoing treatment options for this.  We discussed options oral anti-inflammatories, shoe gear modification, oral steroid, steroid injection, Cam Walker boot immobilization, and physical therapy.  At this time as she is had previous relief for symptoms from the steroid injection patient wishes to proceed with this.        Procedures Informed consent was obtained.  Time-out was called.  The area was prepped with alcohol, and a steroid injection was performed at right plantar fascia using 1.5 cc of 0.5% Marcaine w/out epi, 1 cc Dexamethasone, 0.5 cc Methylprednisolone. Patient tolerated the procedure well.             Counseling:     I provided patient education verbally regarding:   Patient diagnosis, treatment options, as well as alternatives, risks, and benefits.     This note was created using Dragon voice recognition software that occasionally misinterpreted phrases or words.

## 2023-09-07 PROCEDURE — 99999PBSHW PR PBB SHADOW TECHNICAL ONLY FILED TO HB: Mod: PBBFAC,,,

## 2023-09-07 PROCEDURE — 99999PBSHW PR PBB SHADOW TECHNICAL ONLY FILED TO HB: ICD-10-PCS | Mod: PBBFAC,,,

## 2023-09-07 RX ORDER — METHYLPREDNISOLONE ACETATE 40 MG/ML
20 INJECTION, SUSPENSION INTRA-ARTICULAR; INTRALESIONAL; INTRAMUSCULAR; SOFT TISSUE
Status: COMPLETED | OUTPATIENT
Start: 2023-09-07 | End: 2023-09-07

## 2023-09-07 RX ORDER — BUPIVACAINE HYDROCHLORIDE 5 MG/ML
1.5 INJECTION, SOLUTION PERINEURAL
Status: COMPLETED | OUTPATIENT
Start: 2023-09-07 | End: 2023-09-07

## 2023-09-07 RX ORDER — DEXAMETHASONE SODIUM PHOSPHATE 4 MG/ML
4 INJECTION, SOLUTION INTRA-ARTICULAR; INTRALESIONAL; INTRAMUSCULAR; INTRAVENOUS; SOFT TISSUE
Status: COMPLETED | OUTPATIENT
Start: 2023-09-07 | End: 2023-09-07

## 2023-09-07 RX ADMIN — METHYLPREDNISOLONE ACETATE 20 MG: 40 INJECTION, SUSPENSION INTRA-ARTICULAR; INTRALESIONAL; INTRAMUSCULAR; INTRASYNOVIAL; SOFT TISSUE at 09:09

## 2023-09-07 RX ADMIN — DEXAMETHASONE SODIUM PHOSPHATE 4 MG: 4 INJECTION INTRA-ARTICULAR; INTRALESIONAL; INTRAMUSCULAR; INTRAVENOUS; SOFT TISSUE at 09:09

## 2023-09-07 RX ADMIN — BUPIVACAINE HYDROCHLORIDE 7.5 MG: 5 INJECTION, SOLUTION PERINEURAL at 09:09

## 2023-09-08 ENCOUNTER — HOSPITAL ENCOUNTER (OUTPATIENT)
Dept: RADIOLOGY | Facility: HOSPITAL | Age: 83
Discharge: HOME | End: 2023-09-08
Attending: INTERNAL MEDICINE
Payer: MEDICARE

## 2023-09-08 DIAGNOSIS — R06.02 SHORTNESS OF BREATH: ICD-10-CM

## 2023-09-08 PROCEDURE — 71250 CT THORAX DX C-: CPT | Mod: TC,PO

## 2023-09-11 ENCOUNTER — TELEPHONE (OUTPATIENT)
Dept: PULMONOLOGY | Facility: CLINIC | Age: 83
End: 2023-09-11

## 2023-09-11 ENCOUNTER — TELEPHONE (OUTPATIENT)
Dept: PODIATRY | Facility: CLINIC | Age: 83
End: 2023-09-11
Payer: MEDICARE

## 2023-09-12 DIAGNOSIS — M72.2 PLANTAR FASCIITIS: Primary | ICD-10-CM

## 2023-09-12 DIAGNOSIS — M79.671 PAIN OF RIGHT HEEL: ICD-10-CM

## 2023-09-12 DIAGNOSIS — M79.671 RIGHT FOOT PAIN: ICD-10-CM

## 2023-09-18 ENCOUNTER — TELEPHONE (OUTPATIENT)
Dept: PULMONOLOGY | Facility: HOSPITAL | Age: 83
End: 2023-09-18

## 2023-09-18 NOTE — TELEPHONE ENCOUNTER
Patient oxygenates well with 4 L of oxygen bled into her BiPAP.  She is not done her 6 minute walk as she injured her foot and is in a boot.  Expressed to her the need for her to do so as soon as she can.   Patient understands condition, prognosis and need for follow up care.

## 2023-09-27 ENCOUNTER — OFFICE VISIT (OUTPATIENT)
Dept: CARDIOLOGY | Facility: CLINIC | Age: 83
End: 2023-09-27
Payer: MEDICARE

## 2023-09-27 VITALS
DIASTOLIC BLOOD PRESSURE: 80 MMHG | RESPIRATION RATE: 16 BRPM | OXYGEN SATURATION: 98 % | BODY MASS INDEX: 22.98 KG/M2 | SYSTOLIC BLOOD PRESSURE: 132 MMHG | WEIGHT: 143 LBS | HEART RATE: 64 BPM | HEIGHT: 66 IN

## 2023-09-27 DIAGNOSIS — E03.4 HYPOTHYROIDISM DUE TO ACQUIRED ATROPHY OF THYROID: ICD-10-CM

## 2023-09-27 DIAGNOSIS — I25.10 CORONARY ARTERY DISEASE INVOLVING NATIVE CORONARY ARTERY OF NATIVE HEART WITHOUT ANGINA PECTORIS: ICD-10-CM

## 2023-09-27 DIAGNOSIS — G47.33 OSA (OBSTRUCTIVE SLEEP APNEA): ICD-10-CM

## 2023-09-27 DIAGNOSIS — E78.2 MIXED HYPERLIPIDEMIA: ICD-10-CM

## 2023-09-27 DIAGNOSIS — I27.20 PULMONARY HYPERTENSION: ICD-10-CM

## 2023-09-27 DIAGNOSIS — I10 ESSENTIAL HYPERTENSION: ICD-10-CM

## 2023-09-27 DIAGNOSIS — I10 WHITE COAT SYNDROME WITH DIAGNOSIS OF HYPERTENSION: ICD-10-CM

## 2023-09-27 DIAGNOSIS — I48.0 PAROXYSMAL ATRIAL FIBRILLATION: Primary | ICD-10-CM

## 2023-09-27 DIAGNOSIS — R06.09 DYSPNEA ON EXERTION: ICD-10-CM

## 2023-09-27 PROCEDURE — 99215 OFFICE O/P EST HI 40 MIN: CPT | Mod: S$PBB,,, | Performed by: INTERNAL MEDICINE

## 2023-09-27 PROCEDURE — 93005 ELECTROCARDIOGRAM TRACING: CPT | Mod: PBBFAC,PN | Performed by: INTERNAL MEDICINE

## 2023-09-27 PROCEDURE — 93010 ELECTROCARDIOGRAM REPORT: CPT | Mod: S$PBB,,, | Performed by: INTERNAL MEDICINE

## 2023-09-27 PROCEDURE — 99215 PR OFFICE/OUTPT VISIT, EST, LEVL V, 40-54 MIN: ICD-10-PCS | Mod: S$PBB,,, | Performed by: INTERNAL MEDICINE

## 2023-09-27 PROCEDURE — 99999 PR PBB SHADOW E&M-EST. PATIENT-LVL V: ICD-10-PCS | Mod: PBBFAC,,, | Performed by: INTERNAL MEDICINE

## 2023-09-27 PROCEDURE — 93010 EKG 12-LEAD: ICD-10-PCS | Mod: S$PBB,,, | Performed by: INTERNAL MEDICINE

## 2023-09-27 PROCEDURE — 99215 OFFICE O/P EST HI 40 MIN: CPT | Mod: PBBFAC,PN | Performed by: INTERNAL MEDICINE

## 2023-09-27 PROCEDURE — 99999 PR PBB SHADOW E&M-EST. PATIENT-LVL V: CPT | Mod: PBBFAC,,, | Performed by: INTERNAL MEDICINE

## 2023-09-27 NOTE — PROGRESS NOTES
Subjective:    Patient ID:  Jacqueline Mathias is a 83 y.o. female     Chief Complaint   Patient presents with    Hyperlipidemia    Carotid Artery Disease       HPI:  Ms Jacqueline Mathias is a 83 y.o. female here for follow-up.    Patient has been doing well except recently she had to add oxygen concentrator to her CPAP.  And she has been getting intermittently short winded.  She does follow-up with the pulmonologist and she needs further testing as per the pulmonologist.    She also has been diagnosed with plantar fasciitis and she is currently in a boot to help with the plantar fasciitis.  And she does have some swelling around the ankles for the same reason.  Because she has been more sedentary lately.  And occasionally she does keep heartbeats.    Her breathing is good otherwise.  Denies any chest pain or tightness or heaviness denies any loss of consciousness.    She is taking all her medications regularly    Review of patient's allergies indicates:   Allergen Reactions    Latex, natural rubber      rash    Biaxin [clarithromycin] Diarrhea and Nausea Only    Codeine Itching    Oxycodone      Knocks pt out for 3 days    Metformin Diarrhea       Past Medical History:   Diagnosis Date    Allergy     latex    Allergy     seasonal    Arthritis     Blood transfusion     Cataract     removed    Hypertension     IBS (irritable bowel syndrome)     Obstructive sleep apnea     Thyroid nodule 09/29/2016    Ulcer      Past Surgical History:   Procedure Laterality Date    ANGIOGRAM, CORONARY, WITH LEFT HEART CATHETERIZATION N/A 12/14/2021    Procedure: Angiogram, Coronary, with Left Heart Cath;  Surgeon: Napoleon Dumas MD;  Location: [facility] CATH/EP LAB;  Service: Cardiology;  Laterality: N/A;    CAROTID ARTERY ANGIOPLASTY Right 09/30/2016    CHOLECYSTECTOMY      EYE SURGERY      cataract removal    HYSTERECTOMY       Social History     Tobacco Use    Smoking status: Never    Smokeless tobacco: Never   Substance Use Topics     Alcohol use: Yes     Comment: social    Drug use: No     Family History   Problem Relation Age of Onset    Stroke Mother     Heart disease Mother     Heart disease Sister     Alzheimer's disease Sister     Cancer Brother     Cancer Brother     Kidney disease Brother     Breast cancer Maternal Aunt         Review of Systems:   Constitution: Negative for diaphoresis and fever.   HEENT: Negative for nosebleeds.    Cardiovascular: Negative for chest pain       Intermittent dyspnea on exertion       Bilateral leg swelling        Rare palpitations  Respiratory: Negative for shortness of breath and wheezing.    Hematologic/Lymphatic: Negative for bleeding problem. Does not bruise/bleed easily.   Skin: Negative for color change and rash.   Musculoskeletal: Negative for falls and myalgias.   Gastrointestinal: Negative for hematemesis and hematochezia.   Genitourinary: Negative for hematuria.   Neurological: Negative for dizziness and light-headedness.   Psychiatric/Behavioral: Negative for altered mental status and memory loss.          Objective:        Vitals:    09/27/23 1436   BP: 132/80   Pulse: 64   Resp: 16       Lab Results   Component Value Date    WBC 5.64 01/24/2023    HGB 11.3 (L) 01/24/2023    HCT 35.2 (L) 01/24/2023     01/24/2023    CHOL 140 08/28/2023    TRIG 131 08/28/2023    HDL 64 08/28/2023    ALT 17 08/28/2023    AST 22 08/28/2023     08/28/2023    K 4.0 08/28/2023     08/28/2023    CREATININE 0.8 08/28/2023    BUN 27 (H) 08/28/2023    CO2 28 08/28/2023    TSH 5.640 (H) 08/28/2023    INR 1.4 12/10/2021    GLUF 121 (H) 03/22/2019    HGBA1C 6.3 (H) 08/09/2023        ECHOCARDIOGRAM RESULTS  Results for orders placed during the hospital encounter of 04/27/22    Transesophageal echo (PAT)    Interpretation Summary  · The left ventricle is normal in size with normal systolic function.  · The estimated ejection fraction is 60%.  · Normal left ventricular diastolic function.  · Normal right  ventricular size with normal right ventricular systolic function.  · No interatrial septal defect present.  · No ASD or PFO closure device in interatrial septum.  · Normal appearing left atrial appendage. No thrombus is present in the appendage. Normal appendage velocities.  · Mild-to-moderate mitral regurgitation.        CURRENT/PREVIOUS VISIT EKG  Results for orders placed or performed in visit on 01/30/23   IN OFFICE EKG 12-LEAD (to [place])    Collection Time: 01/30/23 11:52 AM    Narrative    Test Reason : I48.0,    Vent. Rate : 068 BPM     Atrial Rate : 068 BPM     P-R Int : 142 ms          QRS Dur : 120 ms      QT Int : 476 ms       P-R-T Axes : 057 057 025 degrees     QTc Int : 506 ms    Sinus rhythm with occasional Premature ventricular complexes  Right bundle branch block  Abnormal ECG  When compared with ECG of 14-SEP-2022 09:42,  Premature ventricular complexes are now Present  Confirmed by Napoleon Dumas MD ([id number]) on 2/2/2023 11:56:30 AM    Referred By:             Confirmed By:Napoleon Dumas MD     No valid procedures specified.   Results for orders placed in visit on 11/19/21    Nuclear Stress Test    Interpretation Summary    The EKG portion of this study is negative for ischemia.    The patient reported no chest pain during the stress test.    The nuclear portion of this study will be reported separately.      Physical Exam:  CONSTITUTIONAL: No fever, no chills  HEENT: Normocephalic, atraumatic,pupils reactive to light                 NECK:  No JVD no carotid bruit  CVS: S1S2+, RRR, systolic murmurs,   LUNGS: Clear  ABDOMEN: Soft, NT, BS+  EXTREMITIES: No cyanosis, edema  : No vargas catheter  NEURO: AAO X 3  PSY: Normal affect      Medication List with Changes/Refills   Current Medications    APIXABAN (ELIQUIS) 5 MG TAB    Take 1 tablet (5 mg total) by mouth 2 (two) times daily.    ASCORBIC ACID, VITAMIN C, (VITAMIN C) 1000 MG TABLET    Take 1,000 mg by mouth once daily.    ASPIRIN (ECOTRIN) 81 MG EC  TABLET    Take 81 mg by mouth. Every other day    B COMPLEX VITAMINS CAPSULE    Take 1 capsule by mouth once daily.    BENAZEPRIL (LOTENSIN) 10 MG TABLET    TAKE 1 TABLET BY MOUTH  TWICE DAILY    CARVEDILOL (COREG) 6.25 MG TABLET    TAKE 1 TABLET BY MOUTH  TWICE DAILY WITH MEALS    CETIRIZINE (ZYRTEC) 10 MG TABLET    Take 10 mg by mouth daily as needed. As needed    CHLORTHALIDONE (HYGROTEN) 25 MG TAB    TAKE 1 TABLET BY MOUTH ONCE DAILY    CHOLECALCIFEROL, VITAMIN D3, 125 MCG (5,000 UNIT) TAB    Take 1 tablet by mouth once daily.    CINNAMON BARK 500 MG CAPSULE    Take 1,000 mg by mouth once daily.    DOCOSAHEXANOIC ACID/EPA (FISH OIL ORAL)    Take 1,240 mg by mouth 2 (two) times daily.    ESTRADIOL (ESTRACE) 1 MG TABLET    Take 1 mg by mouth once daily.    FLUTICASONE PROPIONATE (FLONASE) 50 MCG/ACTUATION NASAL SPRAY    USE 1 SPRAY IN BOTH  NOSTRILS TWICE DAILY    FUROSEMIDE (LASIX) 20 MG TABLET    Take 1 tablet (20 mg total) by mouth daily as needed (fluid).    GLUCOSAMINE-CHONDROITIN 500-400 MG TABLET    Take 1 tablet by mouth 2 (two) times daily.    ISOSORBIDE MONONITRATE (ISMO,MONOKET) 10 MG TABLET    Take 1 tablet (10 mg total) by mouth 2 (two) times daily.    LEVOTHYROXINE (SYNTHROID) 50 MCG TABLET    Take 1 tablet (50 mcg total) by mouth before breakfast.    LOPERAMIDE HCL (IMODIUM A-D ORAL)    As needed    MELATONIN 10 MG TAB    Take by mouth. As needed    MULTIVITAMIN CAPSULE    Take 1 capsule by mouth once daily.    POTASSIUM CHLORIDE (MICRO-K) 10 MEQ CPSR    TAKE 1 CAPSULE BY MOUTH  TWICE DAILY    PRAVASTATIN (PRAVACHOL) 20 MG TABLET    TAKE 1 TABLET BY MOUTH IN  THE EVENING    TURMERIC ROOT EXTRACT 500 MG CAP    Take by mouth.    VIT A/VIT C/VIT E/ZINC/COPPER (ICAPS AREDS ORAL)    Take by mouth.             Assessment:       1. Paroxysmal atrial fibrillation    2. Dyspnea on exertion    3. Pulmonary hypertension    4. Hypothyroidism due to acquired atrophy of thyroid    5. JACQUELINE (obstructive sleep apnea)     6. Essential hypertension    7. Mixed hyperlipidemia    8. Coronary artery disease involving native coronary artery of native heart without angina pectoris    9. White coat syndrome with diagnosis of hypertension         Plan:   1. Paroxysmal atrial fibrillation   Patient is currently in sinus rhythm.  She is on carvedilol 6.25 mg p.o. b.i.d. continue the same and she is on Eliquis 5 mg p.o. b.i.d..  Continue the same and no bleeding issues of blood in the stool or urine.  Patient to take aspirin once a week.  2. Dyspnea on exertion  Patient does have issues with breathing.  She follows up with her pulmonologist.  She basically has straight back lack of the curvature on the vertebral column and this makes her breathing a little bit more harder.  3. Pulmonary hypertension   Patient does have underlying obstructive sleep apnea and on her echo she has had mild pulmonary hypertension.  Currently she is stable.    4. Hypothyroidism  She is on levothyroxine 50 mcg p.o. daily continue the same she follows up with the primary physician.  5. Obstructive sleep apnea   Patient is on CPAP on regular basis.  She uses it.  And in addition now she has an oxygen concentrator attached to his CPAP machine.  And she following up with pulmonary physician.  6. Diastolic heart failure   Is currently on furosemide 20 mg p.o. daily and she is also on chlorthalidone 25 mg p.o. daily continue the same and is also on carvedilol 6.25 mg p.o. b.i.d. and benazepril 10 mg p.o. daily.  Continue the same.  7. EKG   Reviewed EKG independently patient is in normal sinus rhythm with a heart rate of 64 beats per minute incomplete right bundle branch block.  Voltage criteria for left ventricle hypertrophy.  No other acute changes.  8. Essential hypertension   Her blood pressure is 132/80 and she is on benazepril 10 mg p.o. daily carvedilol 6.25 mg p.o. daily chlorthalidone 25 mg p.o. daily and furosemide 20 mg p.o. daily continue the same.  9. Mixed  hyperlipidemia   She is currently on pravastatin 20 mg p.o. daily continue the same on her last blood work her total cholesterol was 140 HDL was 41 LDL was 49 and triglycerides were 131.  Continue the same dose.    10. Patient had cardiac catheterization and was found to have minimal coronary artery disease.  11. Continue current management and I will see her back in the office in 6 months time.              Problem List Items Addressed This Visit          Cardiac/Vascular    Mixed hyperlipidemia    Coronary artery disease involving native coronary artery of native heart without angina pectoris    Essential hypertension    Pulmonary hypertension       Endocrine    Hypothyroidism due to acquired atrophy of thyroid     Other Visit Diagnoses       Paroxysmal atrial fibrillation    -  Primary    Relevant Orders    IN OFFICE EKG 12-LEAD (to [facility])    Dyspnea on exertion        JACQUELINE (obstructive sleep apnea)        White coat syndrome with diagnosis of hypertension                No follow-ups on file.

## 2023-10-25 ENCOUNTER — OFFICE VISIT (OUTPATIENT)
Dept: PULMONOLOGY | Facility: CLINIC | Age: 83
End: 2023-10-25
Payer: MEDICARE

## 2023-10-25 ENCOUNTER — HOSPITAL ENCOUNTER (OUTPATIENT)
Dept: PULMONOLOGY | Facility: HOSPITAL | Age: 83
Discharge: HOME | End: 2023-10-25
Attending: INTERNAL MEDICINE
Payer: MEDICARE

## 2023-10-25 VITALS
WEIGHT: 141 LBS | SYSTOLIC BLOOD PRESSURE: 145 MMHG | OXYGEN SATURATION: 98 % | DIASTOLIC BLOOD PRESSURE: 80 MMHG | HEIGHT: 66 IN | BODY MASS INDEX: 22.66 KG/M2 | HEART RATE: 72 BPM

## 2023-10-25 VITALS — WEIGHT: 143 LBS | BODY MASS INDEX: 22.98 KG/M2 | HEIGHT: 66 IN

## 2023-10-25 DIAGNOSIS — R06.02 SHORTNESS OF BREATH: ICD-10-CM

## 2023-10-25 DIAGNOSIS — G47.33 OBSTRUCTIVE SLEEP APNEA (ADULT) (PEDIATRIC): Primary | ICD-10-CM

## 2023-10-25 DIAGNOSIS — R94.2 DIFFUSION CAPACITY OF LUNG (DL), DECREASED: ICD-10-CM

## 2023-10-25 DIAGNOSIS — Q76.49: ICD-10-CM

## 2023-10-25 PROCEDURE — 90694 VACC AIIV4 NO PRSRV 0.5ML IM: CPT | Mod: S$GLB,,, | Performed by: INTERNAL MEDICINE

## 2023-10-25 PROCEDURE — 94618 PULMONARY STRESS TESTING: CPT

## 2023-10-25 PROCEDURE — G0008 FLU VACCINE - QUADRIVALENT - ADJUVANTED: ICD-10-PCS | Mod: S$GLB,,, | Performed by: INTERNAL MEDICINE

## 2023-10-25 PROCEDURE — 90694 FLU VACCINE - QUADRIVALENT - ADJUVANTED: ICD-10-PCS | Mod: S$GLB,,, | Performed by: INTERNAL MEDICINE

## 2023-10-25 PROCEDURE — 94729 DIFFUSING CAPACITY: CPT

## 2023-10-25 PROCEDURE — 94727 GAS DIL/WSHOT DETER LNG VOL: CPT

## 2023-10-25 PROCEDURE — 94060 EVALUATION OF WHEEZING: CPT

## 2023-10-25 PROCEDURE — 99214 OFFICE O/P EST MOD 30 MIN: CPT | Mod: 25,S$GLB,, | Performed by: INTERNAL MEDICINE

## 2023-10-25 PROCEDURE — 94010 BREATHING CAPACITY TEST: CPT | Mod: XB

## 2023-10-25 PROCEDURE — G0008 ADMIN INFLUENZA VIRUS VAC: HCPCS | Mod: S$GLB,,, | Performed by: INTERNAL MEDICINE

## 2023-10-25 PROCEDURE — 99214 PR OFFICE/OUTPT VISIT, EST, LEVL IV, 30-39 MIN: ICD-10-PCS | Mod: 25,S$GLB,, | Performed by: INTERNAL MEDICINE

## 2023-10-25 NOTE — PROGRESS NOTES
SUBJECTIVE:    Patient ID: Jacqueline Mathias is a 83 y.o. female.    Chief Complaint: Follow-up      Apnea    Shortness of Breath    Follow-up       The patient is here after having a CT which shows minimal reticulations.  The PFT's show mild restriction and moderate diffusion defect.  They are unchanged since 2021.      Her CPAP compliance is 100%.  Her AHI is 1.1.  She had trouble with her concentrator which has been replaced.           Past Medical History:   Diagnosis Date    Allergy     latex    Allergy     seasonal    Arthritis     Blood transfusion     Cataract     removed    Hypertension     IBS (irritable bowel syndrome)     Obstructive sleep apnea     Thyroid nodule 09/29/2016    Ulcer      Past Surgical History:   Procedure Laterality Date    ANGIOGRAM, CORONARY, WITH LEFT HEART CATHETERIZATION N/A 12/14/2021    Procedure: Angiogram, Coronary, with Left Heart Cath;  Surgeon: Napoleon Dumas MD;  Location: [facility] CATH/EP LAB;  Service: Cardiology;  Laterality: N/A;    CAROTID ARTERY ANGIOPLASTY Right 09/30/2016    CHOLECYSTECTOMY      EYE SURGERY      cataract removal    HYSTERECTOMY       Family History   Problem Relation Age of Onset    Stroke Mother     Heart disease Mother     Heart disease Sister     Alzheimer's disease Sister     Cancer Brother     Cancer Brother     Kidney disease Brother     Breast cancer Maternal Aunt         Social History:   Marital Status:   Occupation: Data Unavailable  Alcohol History:  reports current alcohol use.  Tobacco History:  reports that she has never smoked. She has never used smokeless tobacco.  Drug History:  reports no history of drug use.    Review of patient's allergies indicates:   Allergen Reactions    Latex, natural rubber      rash    Biaxin [clarithromycin] Diarrhea and Nausea Only    Codeine Itching    Oxycodone      Knocks pt out for 3 days    Metformin Diarrhea       Current Outpatient Medications   Medication Sig Dispense Refill    apixaban  (ELIQUIS) 5 mg Tab Take 1 tablet (5 mg total) by mouth 2 (two) times daily. 180 tablet 3    ascorbic acid, vitamin C, (VITAMIN C) 1000 MG tablet Take 1,000 mg by mouth once daily.      aspirin (ECOTRIN) 81 MG EC tablet Take 81 mg by mouth. Every other day      b complex vitamins capsule Take 1 capsule by mouth once daily.      benazepriL (LOTENSIN) 10 MG tablet TAKE 1 TABLET BY MOUTH  TWICE DAILY 180 tablet 3    carvediloL (COREG) 6.25 MG tablet TAKE 1 TABLET BY MOUTH  TWICE DAILY WITH MEALS 180 tablet 3    cetirizine (ZYRTEC) 10 MG tablet Take 10 mg by mouth daily as needed. As needed      chlorthalidone (HYGROTEN) 25 MG Tab TAKE 1 TABLET BY MOUTH ONCE DAILY 90 tablet 3    cholecalciferol, vitamin D3, 125 mcg (5,000 unit) Tab Take 1 tablet by mouth once daily.      cinnamon bark 500 mg capsule Take 1,000 mg by mouth once daily.      DOCOSAHEXANOIC ACID/EPA (FISH OIL ORAL) Take 1,240 mg by mouth 2 (two) times daily.      estradiol (ESTRACE) 1 MG tablet Take 1 mg by mouth once daily.      fluticasone propionate (FLONASE) 50 mcg/actuation nasal spray USE 1 SPRAY IN BOTH  NOSTRILS TWICE DAILY 48 g 2    furosemide (LASIX) 20 MG tablet Take 1 tablet (20 mg total) by mouth daily as needed (fluid). 90 tablet 3    glucosamine-chondroitin 500-400 mg tablet Take 1 tablet by mouth 2 (two) times daily.      isosorbide mononitrate (ISMO,MONOKET) 10 mg tablet Take 1 tablet (10 mg total) by mouth 2 (two) times daily. 180 tablet 3    levothyroxine (SYNTHROID) 50 MCG tablet Take 1 tablet (50 mcg total) by mouth before breakfast. 90 tablet 3    loperamide HCl (IMODIUM A-D ORAL) As needed      melatonin 10 mg Tab Take by mouth. As needed      multivitamin capsule Take 1 capsule by mouth once daily.      potassium chloride (MICRO-K) 10 MEQ CpSR TAKE 1 CAPSULE BY MOUTH  TWICE DAILY 180 capsule 3    pravastatin (PRAVACHOL) 20 MG tablet TAKE 1 TABLET BY MOUTH IN  THE EVENING 90 tablet 3    turmeric root extract 500 mg Cap Take by mouth.    "   vit A/vit C/vit E/zinc/copper (ICAPS AREDS ORAL) Take by mouth.       No current facility-administered medications for this visit.       Alpha-1 Antitrypsin:  Last PFT: 10/11/23 mildrestriction and a moderate diffusion defect, (straight back)  Last CT:9/8/23  Lungs: There are no pulmonary nodules, infiltrates or pleural effusions.  Trachea and bronchi are normal.  There is stable minimal subpleural reticulations within the lung bases. There is no bronchiectasis.    Her overnight pulse ox with CPAP no oxygen showed significant drops during the night without the oxygen.    Review of Systems   Respiratory:  Positive for shortness of breath.      General: Feeling much better.  Eyes: Vision is good.  ENT:  No rhinitis or sinusitis. Had epistaxis with the concentrator malfunction.  Heart:: Occasional palpatations, has A fib, chest tightness substernal  Lungs: shortness of breath with exertion  GI: IBS sometimes  : No dysuria, hesitancy, or nocturia.  Musculoskeletal: some arthritis  Skin: No lesions or rashes.  Neuro: No headaches or neuropathy.  Lymph: ankles swell sometimes  Psych: nervousness, anxious  Endo: weight stable    OBJECTIVE:      BP (!) 145/80 (BP Location: Right arm, Patient Position: Sitting, BP Method: Medium (Manual))   Pulse 72   Ht 5' 6" (1.676 m)   Wt 64 kg (141 lb)   SpO2 98%   BMI 22.76 kg/m²     Physical Exam  GENERAL: Older patient in no distress.  HEENT: Pupils equal and reactive. Extraocular movements intact. Nose intact.  Pharynx moist.  NECK: Supple.   HEART: Regular rate and rhythm. No murmur or gallop auscultated.  LUNGS: Clear to auscultation and percussion. Lung excursion symmetrical. No change in fremitus. No adventitial noises.  ABDOMEN: Bowel sounds present. Non-tender, no masses palpated.  EXTREMITIES: Normal muscle tone and joint movement, no cyanosis or clubbing.   LYMPHATICS: No adenopathy palpated, no edema.  SKIN: Dry, intact, no lesions.   NEURO: Cranial nerves II-XII " intact. Motor strength 5/5 bilaterally, upper and lower extremities.  PSYCH: Appropriate affect.    Assessment:       1. Obstructive sleep apnea (adult) (pediatric)    2. Straight back syndrome    3. Diffusion capacity of lung (dl), decreased             Plan:       Obstructive sleep apnea (adult) (pediatric)  -     Influenza (FLUAD) - Quadrivalent (Adjuvanted) *Preferred* (65+) (PF)    Straight back syndrome    Diffusion capacity of lung (dl), decreased             Follow up in about 6 months (around 4/25/2024).    Continue CPAP with oxygen 4 liters  Flu shot

## 2023-10-25 NOTE — CARE UPDATE
"   10/25/23 1029   6MW Test   Ordering Provider Ashia García MD   Diagnosis Shortness of Breath   Height 5' 6" (1.676 m)   Weight 64.9 kg (143 lb)   BMI (Calculated) 23.1   Predicted Distance Meters (Calculated) 392.36 meters   Patient Race    6MWT Status completed without stopping   Patient Reported No complaints   Was O2 used? No   6MW Distance walked (feet) 1400 feet   Distance walked (meters) 426.72 meters   Did patient stop? No   Type of assistive device(s) used? no assistive devices   Is extra documentation required for this patient? Yes   Pre-Exercise   Oxygen Saturation 96 %   Supplemental Oxygen Room Air   Heart Rate 79 bpm   Post Exercise   Oxygen Saturation 98 %   Supplemental Oxygen Room Air   Heart Rate 112 bpm   Adela Dyspnea Rating  light   Recovery   Oxygen Saturation 98 %   Supplemental Oxygen Room Air   Heart Rate 112 bpm   Adela Dyspnea Rating  very light   Interpretation   Is procedure ready for interpretation? Yes   Did the patient stop or pause? No   Total Laps Walked 7   Final Partial Lap Distance (feet) 0 feet   Total Distance Feet (Calculated) 1400 feet   Total Distance Meters (Calculated) 426.72 meters   Percentage of Predicted (Calculated) 108.76   Peak VO2 (Calculated) 16.78   Mets 4.79   Comments This is a Non-Hypoxemic 6 min. walk.   Oxygen Qualification   Oxygen Qualification? No       "

## 2023-10-31 ENCOUNTER — TELEPHONE (OUTPATIENT)
Dept: PULMONOLOGY | Facility: CLINIC | Age: 83
End: 2023-10-31

## 2023-11-11 DIAGNOSIS — E78.2 MIXED HYPERLIPIDEMIA: ICD-10-CM

## 2023-11-11 RX ORDER — PRAVASTATIN SODIUM 20 MG/1
TABLET ORAL
Qty: 90 TABLET | Refills: 3 | Status: SHIPPED | OUTPATIENT
Start: 2023-11-11

## 2023-11-12 NOTE — TELEPHONE ENCOUNTER
Refill Decision Note   Jacqueline Mathias  is requesting a refill authorization.  Brief Assessment and Rationale for Refill:  Approve     Medication Therapy Plan:         Comments:     Note composed:11:25 PM 11/11/2023

## 2023-11-12 NOTE — TELEPHONE ENCOUNTER
No care due was identified.  [facility] Embedded Care Due Messages. Reference number: [id number].   11/11/2023 9:10:51 PM CST

## 2023-11-21 DIAGNOSIS — I10 ESSENTIAL HYPERTENSION: ICD-10-CM

## 2023-11-21 RX ORDER — FUROSEMIDE 20 MG/1
TABLET ORAL
Qty: 90 TABLET | Refills: 3 | Status: SHIPPED | OUTPATIENT
Start: 2023-11-21

## 2023-11-21 NOTE — TELEPHONE ENCOUNTER
No care due was identified.  [facility] Embedded Care Due Messages. Reference number: [id number].   11/21/2023 4:46:31 AM CST

## 2023-11-21 NOTE — TELEPHONE ENCOUNTER
Refill Routing Note   Medication(s) are not appropriate for processing by [facility] for the following reason(s):        Required vitals abnormal  Clarification of medication (Rx) details( PRN use)    [facility] action(s):  Defer        Medication Therapy Plan: vitals :(!) 145/80;  PRN indication on LOOP diuretics are outside protocol for refill center; Patient appears to be using chronically based on adherence data.      Appointments  past 12m or future 3m with PCP    Date Provider   Last Visit   8/24/2023 Brice Weaver Jr., MD   Next Visit   4/4/2024 Brice Weaver Jr., MD   ED visits in past 90 days: 0        Note composed:7:43 AM 11/21/2023

## 2023-12-28 ENCOUNTER — TELEPHONE (OUTPATIENT)
Dept: PULMONOLOGY | Facility: CLINIC | Age: 83
End: 2023-12-28

## 2023-12-28 NOTE — TELEPHONE ENCOUNTER
Left message for patient.  Told her to increase the humidity on her CPAP and apply triple antibiotic cream to her nose until it is healed.

## 2023-12-28 NOTE — TELEPHONE ENCOUNTER
----- Message from Jace Regalado sent at 12/28/2023  3:36 PM CST -----  Regarding: C PAP  Ms. Called stating she is getting nose bleeds from C pap machine with concentrator.  Please give her a call. [phone]  Thank you

## 2024-01-02 ENCOUNTER — TELEPHONE (OUTPATIENT)
Dept: PULMONOLOGY | Facility: CLINIC | Age: 84
End: 2024-01-02

## 2024-01-11 DIAGNOSIS — Z00.00 ENCOUNTER FOR MEDICARE ANNUAL WELLNESS EXAM: ICD-10-CM

## 2024-01-14 DIAGNOSIS — I10 HTN (HYPERTENSION), BENIGN: ICD-10-CM

## 2024-01-15 NOTE — TELEPHONE ENCOUNTER
No care due was identified.  [facility] Embedded Care Due Messages. Reference number: [id number].   1/14/2024 9:27:30 PM CST

## 2024-01-16 RX ORDER — CHLORTHALIDONE 25 MG/1
TABLET ORAL
Qty: 90 TABLET | Refills: 3 | Status: ON HOLD | OUTPATIENT
Start: 2024-01-16 | End: 2024-04-13 | Stop reason: HOSPADM

## 2024-01-16 NOTE — TELEPHONE ENCOUNTER
Refill Routing Note   Medication(s) are not appropriate for processing by [facility] for the following reason(s):        Required vitals abnormal    [facility] action(s):  Defer               Appointments  past 12m or future 3m with PCP    Date Provider   Last Visit   8/24/2023 Brice Weaver Jr., MD   Next Visit   4/4/2024 Brice Weaver Jr., MD   ED visits in past 90 days: 0        Note composed:10:36 AM 01/16/2024

## 2024-02-04 DIAGNOSIS — I10 HTN (HYPERTENSION), BENIGN: ICD-10-CM

## 2024-02-05 RX ORDER — POTASSIUM CHLORIDE 750 MG/1
CAPSULE, EXTENDED RELEASE ORAL
Qty: 180 CAPSULE | Refills: 1 | Status: SHIPPED | OUTPATIENT
Start: 2024-02-05

## 2024-02-05 NOTE — TELEPHONE ENCOUNTER
No care due was identified.  [facility] Embedded Care Due Messages. Reference number: [id number].   2/04/2024 9:21:52 PM CST

## 2024-02-05 NOTE — TELEPHONE ENCOUNTER
Refill Routing Note   Medication(s) are not appropriate for processing by [facility] for the following reason(s):        Drug-disease interaction    [facility] action(s):  Defer        Medication Therapy Plan: Drug-Disease: potassium chloride and Peptic ulcer disease    Pharmacist review requested: Yes     Appointments  past 12m or future 3m with PCP    Date Provider   Last Visit   8/24/2023 Brice Weaver Jr., MD   Next Visit   4/4/2024 Brice Weaver Jr., MD   ED visits in past 90 days: 0        Note composed:11:49 AM 02/05/2024

## 2024-02-05 NOTE — TELEPHONE ENCOUNTER
Refill Decision Note   Jacqueline Mathias  is requesting a refill authorization.  Brief Assessment and Rationale for Refill:  Approve     Medication Therapy Plan:        Pharmacist review requested: Yes   Extended chart review required: Yes   Comments:     Note composed:4:40 PM 02/05/2024

## 2024-02-29 DIAGNOSIS — I10 ESSENTIAL HYPERTENSION: ICD-10-CM

## 2024-02-29 RX ORDER — ISOSORBIDE MONONITRATE 10 MG/1
10 TABLET ORAL 2 TIMES DAILY
Qty: 180 TABLET | Refills: 1 | Status: ON HOLD | OUTPATIENT
Start: 2024-02-29 | End: 2024-04-13 | Stop reason: HOSPADM

## 2024-02-29 NOTE — TELEPHONE ENCOUNTER
Refill Decision Note   Jacqueline Mathias  is requesting a refill authorization.  Brief Assessment and Rationale for Refill:  Approve     Medication Therapy Plan:         Comments:     Note composed:4:04 PM 02/29/2024             Appointments     Last Visit   8/24/2023 Brice Weaver Jr., MD   Next Visit   4/4/2024 Brice Weaver Jr., MD

## 2024-02-29 NOTE — TELEPHONE ENCOUNTER
Requesting refill for  Isosorbide     Last visit 8/24/2024  Next visit 4/4/2024   Changing from mail order

## 2024-02-29 NOTE — TELEPHONE ENCOUNTER
No care due was identified.  [facility] Embedded Care Due Messages. Reference number: [id number].   2/29/2024 2:52:10 PM CST

## 2024-02-29 NOTE — TELEPHONE ENCOUNTER
----- Message from Riaz Capps sent at 2/29/2024  2:25 PM CST -----  Regarding: Refill  Contact: [phone]  Type:  RX Refill Request    Who Called: Patient   Refill or New Rx:Refill  RX Name and Strength:isosorbide mononitrate (ISMO,MONOKET) 10 mg tablet   How is the patient currently taking it? (ex. 1XDay):  Is this a 30 day or 90 day RX:  Preferred Pharmacy with phone number:[id number] - [place], [place] - [address] [place] [address]    Local or Mail Order:Local  Ordering Provider:Brice Weaver  Would the patient rather a call back or a response via [facility]? Call back  Best Call Back Number:[phone]   Additional Information: Needing three months and she is down to a week

## 2024-03-14 ENCOUNTER — TELEPHONE (OUTPATIENT)
Dept: FAMILY MEDICINE | Facility: CLINIC | Age: 84
End: 2024-03-14
Payer: MEDICARE

## 2024-03-14 NOTE — TELEPHONE ENCOUNTER
----- Message from Cristina Ying sent at 3/14/2024  1:59 PM CDT -----  Type: Needs Medical Advice  Who Called:  PT     Best Call Back Number: [phone]    Additional Information: pt calling in regards to her prescription , says it was sent to [facility] pharmacy   [phone]

## 2024-03-27 ENCOUNTER — TELEPHONE (OUTPATIENT)
Dept: FAMILY MEDICINE | Facility: CLINIC | Age: 84
End: 2024-03-27
Payer: MEDICARE

## 2024-03-27 DIAGNOSIS — E03.4 HYPOTHYROIDISM DUE TO ACQUIRED ATROPHY OF THYROID: ICD-10-CM

## 2024-03-27 DIAGNOSIS — E11.9 TYPE 2 DIABETES MELLITUS WITHOUT COMPLICATION, WITHOUT LONG-TERM CURRENT USE OF INSULIN: Primary | ICD-10-CM

## 2024-03-27 NOTE — TELEPHONE ENCOUNTER
----- Message from Maggie Cline sent at 3/27/2024 10:08 AM CDT -----  Contact: self  Pt has her six month checkup on 4/4/24 and at last visit the doctor told her to have labs before her next visit but needs orders put in, then please call patient back to advise at [phone] and thanks

## 2024-04-01 ENCOUNTER — LAB VISIT (OUTPATIENT)
Dept: LAB | Facility: HOSPITAL | Age: 84
End: 2024-04-01
Attending: FAMILY MEDICINE
Payer: MEDICARE

## 2024-04-01 DIAGNOSIS — E03.4 HYPOTHYROIDISM DUE TO ACQUIRED ATROPHY OF THYROID: ICD-10-CM

## 2024-04-01 DIAGNOSIS — E11.9 TYPE 2 DIABETES MELLITUS WITHOUT COMPLICATION, WITHOUT LONG-TERM CURRENT USE OF INSULIN: ICD-10-CM

## 2024-04-01 LAB
ESTIMATED AVG GLUCOSE: 131 MG/DL (ref 68–131)
HBA1C MFR BLD: 6.2 % (ref 4–5.6)
TSH SERPL DL<=0.005 MIU/L-ACNC: 1.8 UIU/ML (ref 0.4–4)

## 2024-04-01 PROCEDURE — 83036 HEMOGLOBIN GLYCOSYLATED A1C: CPT | Performed by: FAMILY MEDICINE

## 2024-04-01 PROCEDURE — 36415 COLL VENOUS BLD VENIPUNCTURE: CPT | Mod: PO | Performed by: FAMILY MEDICINE

## 2024-04-01 PROCEDURE — 84443 ASSAY THYROID STIM HORMONE: CPT | Performed by: FAMILY MEDICINE

## 2024-04-04 ENCOUNTER — OFFICE VISIT (OUTPATIENT)
Dept: FAMILY MEDICINE | Facility: CLINIC | Age: 84
End: 2024-04-04
Payer: MEDICARE

## 2024-04-04 DIAGNOSIS — E11.9 TYPE 2 DIABETES MELLITUS WITHOUT COMPLICATION, WITHOUT LONG-TERM CURRENT USE OF INSULIN: Primary | ICD-10-CM

## 2024-04-04 DIAGNOSIS — E78.2 MIXED HYPERLIPIDEMIA: ICD-10-CM

## 2024-04-04 DIAGNOSIS — J43.1 PANLOBULAR EMPHYSEMA: ICD-10-CM

## 2024-04-04 DIAGNOSIS — I25.10 CORONARY ARTERY DISEASE INVOLVING NATIVE CORONARY ARTERY OF NATIVE HEART WITHOUT ANGINA PECTORIS: ICD-10-CM

## 2024-04-04 DIAGNOSIS — I10 ESSENTIAL HYPERTENSION: ICD-10-CM

## 2024-04-04 DIAGNOSIS — E03.4 HYPOTHYROIDISM DUE TO ACQUIRED ATROPHY OF THYROID: ICD-10-CM

## 2024-04-04 DIAGNOSIS — I48.19 OTHER PERSISTENT ATRIAL FIBRILLATION: ICD-10-CM

## 2024-04-04 DIAGNOSIS — I27.20 PULMONARY HYPERTENSION: ICD-10-CM

## 2024-04-04 PROCEDURE — 99999 PR PBB SHADOW E&M-EST. PATIENT-LVL IV: CPT | Mod: PBBFAC,,, | Performed by: FAMILY MEDICINE

## 2024-04-04 PROCEDURE — 99214 OFFICE O/P EST MOD 30 MIN: CPT | Mod: S$PBB,,, | Performed by: FAMILY MEDICINE

## 2024-04-04 PROCEDURE — 99214 OFFICE O/P EST MOD 30 MIN: CPT | Mod: PBBFAC,PN | Performed by: FAMILY MEDICINE

## 2024-04-05 ENCOUNTER — OFFICE VISIT (OUTPATIENT)
Dept: CARDIOLOGY | Facility: CLINIC | Age: 84
End: 2024-04-05
Payer: MEDICARE

## 2024-04-05 ENCOUNTER — LAB VISIT (OUTPATIENT)
Dept: LAB | Facility: HOSPITAL | Age: 84
End: 2024-04-05
Attending: INTERNAL MEDICINE
Payer: MEDICARE

## 2024-04-05 VITALS
BODY MASS INDEX: 22.6 KG/M2 | WEIGHT: 140.63 LBS | HEART RATE: 72 BPM | DIASTOLIC BLOOD PRESSURE: 70 MMHG | OXYGEN SATURATION: 97 % | HEIGHT: 66 IN | SYSTOLIC BLOOD PRESSURE: 157 MMHG

## 2024-04-05 DIAGNOSIS — I10 ESSENTIAL HYPERTENSION: ICD-10-CM

## 2024-04-05 DIAGNOSIS — R06.02 SHORT OF BREATH ON EXERTION: ICD-10-CM

## 2024-04-05 DIAGNOSIS — Q24.5 MYOCARDIAL BRIDGE: ICD-10-CM

## 2024-04-05 DIAGNOSIS — R53.83 FATIGUE, UNSPECIFIED TYPE: ICD-10-CM

## 2024-04-05 DIAGNOSIS — I34.0 MITRAL VALVE INSUFFICIENCY, UNSPECIFIED ETIOLOGY: ICD-10-CM

## 2024-04-05 DIAGNOSIS — I25.10 CORONARY ARTERY DISEASE INVOLVING NATIVE CORONARY ARTERY OF NATIVE HEART WITHOUT ANGINA PECTORIS: ICD-10-CM

## 2024-04-05 DIAGNOSIS — I27.20 PULMONARY HYPERTENSION: ICD-10-CM

## 2024-04-05 DIAGNOSIS — R06.02 SHORT OF BREATH ON EXERTION: Primary | ICD-10-CM

## 2024-04-05 DIAGNOSIS — I50.32 CHRONIC HEART FAILURE WITH PRESERVED EJECTION FRACTION: ICD-10-CM

## 2024-04-05 DIAGNOSIS — I70.0 AORTIC ATHEROSCLEROSIS: ICD-10-CM

## 2024-04-05 DIAGNOSIS — I48.0 PAROXYSMAL ATRIAL FIBRILLATION: ICD-10-CM

## 2024-04-05 LAB
ANION GAP SERPL CALC-SCNC: 7 MMOL/L (ref 8–16)
BNP SERPL-MCNC: 175 PG/ML (ref 0–99)
BUN SERPL-MCNC: 46 MG/DL (ref 8–23)
CALCIUM SERPL-MCNC: 10.1 MG/DL (ref 8.7–10.5)
CHLORIDE SERPL-SCNC: 100 MMOL/L (ref 95–110)
CO2 SERPL-SCNC: 32 MMOL/L (ref 23–29)
CREAT SERPL-MCNC: 1 MG/DL (ref 0.5–1.4)
EST. GFR  (NO RACE VARIABLE): 55.6 ML/MIN/1.73 M^2
GLUCOSE SERPL-MCNC: 144 MG/DL (ref 70–110)
POTASSIUM SERPL-SCNC: 3.8 MMOL/L (ref 3.5–5.1)
SODIUM SERPL-SCNC: 139 MMOL/L (ref 136–145)

## 2024-04-05 PROCEDURE — 80048 BASIC METABOLIC PNL TOTAL CA: CPT | Performed by: INTERNAL MEDICINE

## 2024-04-05 PROCEDURE — 99999 PR PBB SHADOW E&M-EST. PATIENT-LVL V: CPT | Mod: PBBFAC,,, | Performed by: INTERNAL MEDICINE

## 2024-04-05 PROCEDURE — 83880 ASSAY OF NATRIURETIC PEPTIDE: CPT | Performed by: INTERNAL MEDICINE

## 2024-04-05 PROCEDURE — 93010 ELECTROCARDIOGRAM REPORT: CPT | Mod: S$PBB,,, | Performed by: GENERAL PRACTICE

## 2024-04-05 PROCEDURE — 99215 OFFICE O/P EST HI 40 MIN: CPT | Mod: PBBFAC,PN,25 | Performed by: INTERNAL MEDICINE

## 2024-04-05 PROCEDURE — 93005 ELECTROCARDIOGRAM TRACING: CPT | Mod: PBBFAC,PN | Performed by: GENERAL PRACTICE

## 2024-04-05 PROCEDURE — 36415 COLL VENOUS BLD VENIPUNCTURE: CPT | Performed by: INTERNAL MEDICINE

## 2024-04-05 PROCEDURE — 99214 OFFICE O/P EST MOD 30 MIN: CPT | Mod: S$PBB,,, | Performed by: INTERNAL MEDICINE

## 2024-04-05 NOTE — PROGRESS NOTES
[facility]    Subjective:     Patient ID:  Jacqueline Mathias is a 84 y.o. female patient here for evaluation Atrial Fibrillation (Follow up 6 months /)      HPI:  84-year-old female here for follow-up previous patient of Dr. SIMPSON.  Has history of paroxysmal atrial fibrillation.  Gets episodes of AFib for a few minutes once a month.  Reports being short of breath.  Elevated BNP in the past.  Currently on low-dose Lasix and chlorthalidone.  Echo in the past showed normal EF but moderate to severe mitral regurgitation, follow-up PAT showed mild-to-moderate MR.  Had an angiogram which showed a mid myocardial LAD bridge but otherwise was nonobstructive with a LVEDP of 12.  Has had pulmonary hypertension based on an echocardiogram in the past    Review of Systems   All other systems reviewed and are negative.       Past Medical History:   Diagnosis Date    Allergy     latex    Allergy     seasonal    Arthritis     Blood transfusion     Cataract     removed    Hypertension     IBS (irritable bowel syndrome)     Obstructive sleep apnea     Thyroid nodule 09/29/2016    Ulcer        Past Surgical History:   Procedure Laterality Date    ANGIOGRAM, CORONARY, WITH LEFT HEART CATHETERIZATION N/A 12/14/2021    Procedure: Angiogram, Coronary, with Left Heart Cath;  Surgeon: Napoleon Dumas MD;  Location: [facility] CATH/EP LAB;  Service: Cardiology;  Laterality: N/A;    CAROTID ARTERY ANGIOPLASTY Right 09/30/2016    CHOLECYSTECTOMY      EYE SURGERY      cataract removal    HYSTERECTOMY         Family History   Problem Relation Age of Onset    Stroke Mother     Heart disease Mother     Heart disease Sister     Alzheimer's disease Sister     Cancer Brother     Cancer Brother     Kidney disease Brother     Breast cancer Maternal Aunt        Social History     Socioeconomic History    Marital status:    Tobacco Use    Smoking status: Never    Smokeless tobacco: Never    Substance and Sexual Activity    Alcohol use: Yes     Comment: social    Drug use: No     Social Determinants of Health     Financial Resource Strain: Low Risk  (4/4/2024)    Overall Financial Resource Strain (CARDIA)     Difficulty of Paying Living Expenses: Not very hard   Food Insecurity: No Food Insecurity (4/4/2024)    Hunger Vital Sign     Worried About Running Out of Food in the Last Year: Never true     Ran Out of Food in the Last Year: Never true   Transportation Needs: No Transportation Needs (4/4/2024)    PRAPARE - Transportation     Lack of Transportation (Medical): No     Lack of Transportation (Non-Medical): No   Physical Activity: Unknown (4/4/2024)    Exercise Vital Sign     Days of Exercise per Week: 2 days   Stress: Stress Concern Present (4/4/2024)    [employer] - Occupational Stress Questionnaire     Feeling of Stress : To some extent   Social Connections: Unknown (4/4/2024)    Social Connection and Isolation Panel [NHANES]     Frequency of Communication with Friends and Family: More than three times a week     Frequency of Social Gatherings with Friends and Family: More than three times a week     Active Member of Clubs or Organizations: Yes     Marital Status:    Housing Stability: Unknown (4/4/2024)    Housing Stability Vital Sign     Unable to Pay for Housing in the Last Year: No     Unstable Housing in the Last Year: No       Current Outpatient Medications   Medication Sig Dispense Refill    apixaban (ELIQUIS) 5 mg Tab Take 1 tablet (5 mg total) by mouth 2 (two) times daily. 180 tablet 3    ascorbic acid, vitamin C, (VITAMIN C) 1000 MG tablet Take 1,000 mg by mouth once daily.      b complex vitamins capsule Take 1 capsule by mouth once daily.      benazepriL (LOTENSIN) 10 MG tablet TAKE 1 TABLET BY MOUTH  TWICE DAILY 180 tablet 3    carvediloL (COREG) 6.25 MG tablet TAKE 1 TABLET BY MOUTH  TWICE DAILY WITH MEALS 180 tablet 3    cetirizine (ZYRTEC) 10  MG tablet Take 10 mg by mouth daily as needed. As needed      chlorthalidone (HYGROTEN) 25 MG Tab TAKE 1 TABLET BY MOUTH ONCE  DAILY 90 tablet 3    cholecalciferol, vitamin D3, 125 mcg (5,000 unit) Tab Take 1 tablet by mouth once daily.      cinnamon bark 500 mg capsule Take 1,000 mg by mouth once daily.      estradiol (ESTRACE) 1 MG tablet Take 1 mg by mouth once daily.      fluticasone propionate (FLONASE) 50 mcg/actuation nasal spray USE 1 SPRAY IN BOTH  NOSTRILS TWICE DAILY 48 g 2    furosemide (LASIX) 20 MG tablet TAKE 1 TABLET BY MOUTH DAILY AS  NEEDED (FLUID) 90 tablet 3    glucosamine-chondroitin 500-400 mg tablet Take 1 tablet by mouth 2 (two) times daily.      isosorbide mononitrate (ISMO,MONOKET) 10 mg tablet Take 1 tablet (10 mg total) by mouth 2 (two) times daily. 180 tablet 1    levothyroxine (SYNTHROID) 50 MCG tablet Take 1 tablet (50 mcg total) by mouth before breakfast. 90 tablet 3    loperamide HCl (IMODIUM A-D ORAL) As needed      melatonin 10 mg Tab Take by mouth. As needed      multivitamin capsule Take 1 capsule by mouth once daily.      potassium chloride (MICRO-K) 10 MEQ CpSR TAKE 1 CAPSULE BY MOUTH TWICE  DAILY 180 capsule 1    pravastatin (PRAVACHOL) 20 MG tablet TAKE 1 TABLET BY MOUTH IN THE  EVENING 90 tablet 3    vit A/vit C/vit E/zinc/copper (ICAPS AREDS ORAL) Take by mouth.       No current facility-administered medications for this visit.       Review of patient's allergies indicates:   Allergen Reactions    Latex, natural rubber      rash    Biaxin [clarithromycin] Diarrhea and Nausea Only    Codeine Itching    Oxycodone      Knocks pt out for 3 days    Metformin Diarrhea         Objective:        Vitals:    04/05/24 1340   BP: (!) 157/70   Pulse: 72       Physical Exam  Vitals reviewed.   Constitutional:       Appearance: Normal appearance.   HENT:      Mouth/Throat:      Mouth: Mucous membranes are moist.   Eyes:      Extraocular Movements: Extraocular movements intact.       Pupils: Pupils are equal, round, and reactive to light.   Cardiovascular:      Rate and Rhythm: Normal rate and regular rhythm.      Pulses: Normal pulses.      Heart sounds: Normal heart sounds. No murmur heard.     No gallop.   Pulmonary:      Effort: Pulmonary effort is normal.      Breath sounds: Normal breath sounds.   Abdominal:      General: Bowel sounds are normal.      Palpations: Abdomen is soft.   Musculoskeletal:         General: Normal range of motion.   Skin:     General: Skin is warm and dry.   Neurological:      General: No focal deficit present.      Mental Status: She is alert and oriented to person, place, and time.   Psychiatric:         Mood and Affect: Mood normal.         LIPIDS - LAST 2   Lab Results   Component Value Date    CHOL 140 08/28/2023    CHOL 139 03/21/2022    HDL 64 08/28/2023    HDL 66 03/21/2022    LDLCALC 49.8 (L) 08/28/2023    LDLCALC 47.4 (L) 03/21/2022    TRIG 131 08/28/2023    TRIG 128 03/21/2022    CHOLHDL 45.7 08/28/2023    CHOLHDL 47.5 03/21/2022       CBC - LAST 2  Lab Results   Component Value Date    WBC 5.64 01/24/2023    WBC 6.92 04/25/2022    RBC 4.02 01/24/2023    RBC 4.52 04/25/2022    HGB 11.3 (L) 01/24/2023    HGB 12.4 04/25/2022    HCT 35.2 (L) 01/24/2023    HCT 39.5 04/25/2022    MCV 88 01/24/2023    MCV 87 04/25/2022    MCH 28.1 01/24/2023    MCH 27.4 04/25/2022    MCHC 32.1 01/24/2023    MCHC 31.4 (L) 04/25/2022    RDW 14.7 (H) 01/24/2023    RDW 14.6 (H) 04/25/2022     01/24/2023     04/25/2022    MPV 12.9 01/24/2023    MPV 11.8 04/25/2022    GRAN 3.5 01/24/2023    GRAN 62.4 01/24/2023    LYMPH 1.4 01/24/2023    LYMPH 24.8 01/24/2023    MONO 0.6 01/24/2023    MONO 10.5 01/24/2023    BASO 0.02 01/24/2023    BASO 0.02 04/25/2022    NRBC 0 01/24/2023    NRBC 0 04/25/2022       CHEMISTRY & LIVER FUNCTION - LAST 2  Lab Results   Component Value Date     08/28/2023     01/24/2023    K 4.0 08/28/2023    K 3.8 01/24/2023      08/28/2023    CL 99 01/24/2023    CO2 28 08/28/2023    CO2 30 (H) 01/24/2023    ANIONGAP 8 08/28/2023    ANIONGAP 10 01/24/2023    BUN 27 (H) 08/28/2023    BUN 22 01/24/2023    CREATININE 0.8 08/28/2023    CREATININE 0.9 01/24/2023     (H) 08/28/2023     (H) 01/24/2023    CALCIUM 9.0 08/28/2023    CALCIUM 10.2 01/24/2023    MG 1.8 01/24/2023    MG 1.8 04/25/2022    ALBUMIN 3.9 08/28/2023    ALBUMIN 4.2 12/10/2021    PROT 7.7 08/28/2023    PROT 8.0 12/10/2021    ALKPHOS 47 (L) 08/28/2023    ALKPHOS 55 12/10/2021    ALT 17 08/28/2023    ALT 18 12/10/2021    AST 22 08/28/2023    AST 23 12/10/2021    BILITOT 0.6 08/28/2023    BILITOT 0.8 12/10/2021        CARDIAC PROFILE - LAST 2  Lab Results   Component Value Date     (H) 01/24/2023     (H) 05/25/2022        COAGULATION - LAST 2  Lab Results   Component Value Date    LABPT 15.8 (H) 12/10/2021    LABPT 14.9 (H) 12/01/2021    INR 1.4 12/10/2021    INR 1.3 12/01/2021    APTT 32.8 12/10/2021       ENDOCRINE & PSA - LAST 2  Lab Results   Component Value Date    HGBA1C 6.2 (H) 04/01/2024    HGBA1C 6.3 (H) 08/09/2023    TSH 1.798 04/01/2024    TSH 5.640 (H) 08/28/2023        ECHOCARDIOGRAM RESULTS  Results for orders placed during the hospital encounter of 04/27/22    Transesophageal echo (PAT)    Interpretation Summary  · The left ventricle is normal in size with normal systolic function.  · The estimated ejection fraction is 60%.  · Normal left ventricular diastolic function.  · Normal right ventricular size with normal right ventricular systolic function.  · No interatrial septal defect present.  · No ASD or PFO closure device in interatrial septum.  · Normal appearing left atrial appendage. No thrombus is present in the appendage. Normal appendage velocities.  · Mild-to-moderate mitral regurgitation.      CURRENT/PREVIOUS VISIT EKG  Results for orders placed or performed in visit on 09/27/23   IN OFFICE EKG 12-LEAD (to [place])    Collection Time:  09/27/23  2:34 PM    Narrative    Test Reason : I48.0,    Vent. Rate : 064 BPM     Atrial Rate : 064 BPM     P-R Int : 136 ms          QRS Dur : 114 ms      QT Int : 458 ms       P-R-T Axes : 039 011 022 degrees     QTc Int : 472 ms    Normal sinus rhythm  Incomplete right bundle branch block  Minimal voltage criteria for LVH, may be normal variant  Borderline Abnormal ECG  When compared with ECG of 30-JAN-2023 11:52,  Premature ventricular complexes are no longer Present  Confirmed by Napoleon Dumas MD ([id number]) on 10/4/2023 8:45:05 PM    Referred By:             Confirmed By:Napoleon Dumas MD     No valid procedures specified.   Results for orders placed in visit on 11/19/21    Nuclear Stress Test    Interpretation Summary    The EKG portion of this study is negative for ischemia.    The patient reported no chest pain during the stress test.    The nuclear portion of this study will be reported separately.    No valid procedures specified.        Assessment:       1. Short of breath on exertion    2. Chronic heart failure with preserved ejection fraction    3. Paroxysmal atrial fibrillation    4. Myocardial bridge    5. Coronary artery disease involving native coronary artery of native heart without angina pectoris    6. Mitral valve insufficiency, unspecified etiology    7. Pulmonary hypertension    8. Fatigue, unspecified type    9. Essential hypertension    10. Aortic atherosclerosis           Plan:       Short of breath on exertion  -     Echo; Future  -     B-TYPE NATRIURETIC PEPTIDE; Future; Expected date: 04/05/2024  -     Basic metabolic panel; Future; Expected date: 04/05/2024    Chronic heart failure with preserved ejection fraction  -     Echo; Future  -     B-TYPE NATRIURETIC PEPTIDE; Future; Expected date: 04/05/2024    Paroxysmal atrial fibrillation  -     IN OFFICE EKG 12-LEAD (to [place])    Myocardial bridge    Coronary artery disease involving native coronary artery of native heart without angina  pectoris  -     IN OFFICE EKG 12-LEAD (to [facility])    Mitral valve insufficiency, unspecified etiology  -     Echo; Future    Pulmonary hypertension  -     IN OFFICE EKG 12-LEAD (to [facility])  -     B-TYPE NATRIURETIC PEPTIDE; Future; Expected date: 04/05/2024    Fatigue, unspecified type  -     IN OFFICE EKG 12-LEAD (to [facility])    Essential hypertension    Aortic atherosclerosis    Unsure of the etiology of the shortness of breath.  Could be related to elevated BNP and failure preserved EF.  Continue with Lasix and chlorthalidone.  Will consider adding Jardiance if continues to have elevated BNP and normal renal function.  Will evaluate mitral regurgitation arranged with transthoracic echo and proceed with PAT if needed.  Does have a mentioned for myocardial bridge, will have to discontinue isosorbide at next visit if blood pressure can tolerate.  Patient instructed to maintain a blood pressure record at home for more accurate blood pressure recordings and blood pressure medication titration.    Follow up in about 4 weeks (around 5/3/2024) for f/u SOB with BNP, BMP, Echo.          MD [facility] [place]  [facility]

## 2024-04-06 VITALS
BODY MASS INDEX: 22.9 KG/M2 | TEMPERATURE: 98 F | HEART RATE: 64 BPM | SYSTOLIC BLOOD PRESSURE: 132 MMHG | DIASTOLIC BLOOD PRESSURE: 68 MMHG | OXYGEN SATURATION: 97 % | HEIGHT: 66 IN | WEIGHT: 142.5 LBS

## 2024-04-06 NOTE — PROGRESS NOTES
Subjective     Patient ID: Jacqueline Mathias is a 84 y.o. female.    Chief Complaint: Diabetes, Hypertension, Hyperlipidemia, Hypothyroidism, Coronary Artery Disease, Atrial Fibrillation, COPD, and Pulmonary Hypertension    Patient presents here for 6 month follow-up of diabetes, hypertension, hyperlipidemia, hypothyroidism, CAD, atrial fibrillation, COPD, and pulmonary hypertension.  Her weight is stable since her last visit and her BMI is optimal at 22.70.  Her diabetes is well controlled as evidenced by her recent hemoglobin A1c of 6.2%.  She denies any hypoglycemia.  She has no diabetic neuropathy, nephropathy, or retinopathy.  She is on diet control at this time.  Her hypertension has been well controlled although it is slightly elevated here today.  Initial blood pressure was 157/70 but on recheck it came down to 132/68.  Her hyperlipidemia has been well controlled with her present use of pravastatin 20 mg daily.  She is followed by Cardiology for both her coronary artery disease and atrial fibrillation and these are stable at this time.  She denies any chest pains or palpitations.  Her rate is controlled with carvedilol 6.25 mg b.i.d. and she is on Eliquis for anticoagulation.  Her hypothyroidism is stable on her present dose of levothyroxine 50 mcg daily and recent TSH was in the therapeutic range.  She is followed by Pulmonary for both COPD as well as pulmonary hypertension.  She states she does feel very fatigued and short of breath with mild-to-moderate exertion.  She does have oxygen at home to use on an as-needed basis and it sleep.  She is due for follow-up with Pulmonary soon.  I did speak to her about possibly undergoing pulmonary rehab as I feel this will help and she is agreeable.  She will discuss this with her pulmonologist to make sure that she is agreeable also.  As far as health maintenance, she is up-to-date with all of her recommended screening exams and immunizations with the exception of  shingles vaccine, RSV vaccine, and 2nd COVID booster.      Review of Systems   Constitutional:  Positive for fatigue. Negative for chills, fever and unexpected weight change.   HENT:  Negative for nasal congestion, postnasal drip and sore throat.    Eyes:  Negative for pain and visual disturbance.        Up-to-date with eye exam; no diabetic retinopathy   Respiratory:  Positive for shortness of breath. Negative for cough and wheezing.    Cardiovascular:  Negative for chest pain and palpitations.   Gastrointestinal:  Negative for abdominal pain, diarrhea, nausea and vomiting.   Musculoskeletal:  Negative for arthralgias and back pain.   Neurological:  Negative for dizziness, light-headedness and headaches.   Hematological:  Bruises/bleeds easily.   Psychiatric/Behavioral:  Negative for sleep disturbance. The patient is not nervous/anxious.           Objective     Physical Exam  Vitals reviewed.   Constitutional:       General: She is not in acute distress.     Appearance: Normal appearance. She is well-developed and normal weight.   HENT:      Right Ear: Tympanic membrane and external ear normal.      Left Ear: Tympanic membrane and external ear normal.      Mouth/Throat:      Pharynx: Oropharynx is clear. No posterior oropharyngeal erythema.   Neck:      Thyroid: No thyromegaly.      Vascular: No carotid bruit.   Cardiovascular:      Rate and Rhythm: Normal rate and regular rhythm.      Pulses: Normal pulses.      Heart sounds: Normal heart sounds. No murmur heard.  Pulmonary:      Effort: Pulmonary effort is normal.      Breath sounds: Normal breath sounds. No wheezing or rales.   Musculoskeletal:         General: No tenderness. Normal range of motion.      Cervical back: Normal range of motion and neck supple.      Right lower leg: No edema.      Left lower leg: No edema.   Lymphadenopathy:      Cervical: No cervical adenopathy.   Neurological:      General: No focal deficit present.      Mental Status: She is  alert and oriented to person, place, and time.      Cranial Nerves: No cranial nerve deficit.      Deep Tendon Reflexes: Reflexes are normal and symmetric.            Assessment and Plan     1. Type 2 diabetes mellitus without complication, without long-term current use of insulin    2. Essential hypertension    3. Mixed hyperlipidemia    4. Coronary artery disease involving native coronary artery of native heart without angina pectoris    5. Other persistent atrial fibrillation  -     apixaban (ELIQUIS) 5 mg Tab; Take 1 tablet (5 mg total) by mouth 2 (two) times daily.  Dispense: 180 tablet; Refill: 3    6. Panlobular emphysema  -     Ambulatory referral/consult to Pulmonary Rehab; Future; Expected date: 04/11/2024    7. Pulmonary hypertension  -     Ambulatory referral/consult to Pulmonary Rehab; Future; Expected date: 04/11/2024    8. Hypothyroidism due to acquired atrophy of thyroid        1. Continue present medication as her diabetes, hypertension, hyperlipidemia, hypothyroidism, CAD, and atrial fibrillation are stable  2. Patient continues to have shortness of breath and fatigue due to her COPD and pulmonary hypertension  3. Recommend pulmonary rehabilitation if okay with her pulmonologist  4. Continue diabetic, low-sodium, low-fat low-cholesterol diet.  BMI is optimal at 22.70  5. Follow up with Cardiology and pulmonology as scheduled  6.  Follow up with me in 6 months           No follow-ups on file.

## 2024-04-07 ENCOUNTER — PATIENT MESSAGE (OUTPATIENT)
Dept: CARDIOLOGY | Facility: CLINIC | Age: 84
End: 2024-04-07
Payer: MEDICARE

## 2024-04-11 ENCOUNTER — HOSPITAL ENCOUNTER (INPATIENT)
Facility: HOSPITAL | Age: 84
LOS: 2 days | Discharge: HOME | DRG: 087 | End: 2024-04-13
Attending: EMERGENCY MEDICINE | Admitting: HOSPITALIST
Payer: MEDICARE

## 2024-04-11 DIAGNOSIS — R07.9 CHEST PAIN: ICD-10-CM

## 2024-04-11 DIAGNOSIS — S00.03XA HEMATOMA OF SCALP, INITIAL ENCOUNTER: ICD-10-CM

## 2024-04-11 DIAGNOSIS — S06.33AA CEREBRAL CONTUSION: ICD-10-CM

## 2024-04-11 DIAGNOSIS — I60.9 SAH (SUBARACHNOID HEMORRHAGE): Primary | ICD-10-CM
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
## 2024-04-11 PROBLEM — I48.91 ATRIAL FIBRILLATION: Status: ACTIVE | Noted: 2024-04-11

## 2024-04-11 PROBLEM — R23.4 SCAB: Status: ACTIVE | Noted: 2024-04-11

## 2024-04-11 LAB
ABO + RH BLD: NORMAL
ALBUMIN SERPL BCP-MCNC: 4.7 G/DL (ref 3.5–5.2)
ALP SERPL-CCNC: 58 U/L (ref 55–135)
ALT SERPL W/O P-5'-P-CCNC: 13 U/L (ref 10–44)
ANION GAP SERPL CALC-SCNC: 8 MMOL/L (ref 8–16)
APTT PPP: 29.5 SEC (ref 21–32)
AST SERPL-CCNC: 16 U/L (ref 10–40)
BASOPHILS # BLD AUTO: 0.02 K/UL (ref 0–0.2)
BASOPHILS NFR BLD: 0.2 % (ref 0–1.9)
BILIRUB SERPL-MCNC: 0.6 MG/DL (ref 0.1–1)
BILIRUB UR QL STRIP: NEGATIVE
BLD GP AB SCN CELLS X3 SERPL QL: NORMAL
BUN SERPL-MCNC: 25 MG/DL (ref 8–23)
CALCIUM SERPL-MCNC: 10.3 MG/DL (ref 8.7–10.5)
CHLORIDE SERPL-SCNC: 96 MMOL/L (ref 95–110)
CLARITY UR: CLEAR
CO2 SERPL-SCNC: 34 MMOL/L (ref 23–29)
COLOR UR: YELLOW
CREAT SERPL-MCNC: 0.9 MG/DL (ref 0.5–1.4)
DIFFERENTIAL METHOD BLD: NORMAL
EOSINOPHIL # BLD AUTO: 0.1 K/UL (ref 0–0.5)
EOSINOPHIL NFR BLD: 0.6 % (ref 0–8)
ERYTHROCYTE [DISTWIDTH] IN BLOOD BY AUTOMATED COUNT: 14.3 % (ref 11.5–14.5)
EST. GFR  (NO RACE VARIABLE): >60 ML/MIN/1.73 M^2
GLUCOSE SERPL-MCNC: 116 MG/DL (ref 70–110)
GLUCOSE UR QL STRIP: NEGATIVE
HCT VFR BLD AUTO: 39 % (ref 37–48.5)
HGB BLD-MCNC: 12.6 G/DL (ref 12–16)
HGB UR QL STRIP: NEGATIVE
IMM GRANULOCYTES # BLD AUTO: 0.03 K/UL (ref 0–0.04)
IMM GRANULOCYTES NFR BLD AUTO: 0.3 % (ref 0–0.5)
INR PPP: 1.1 (ref 0.8–1.2)
KETONES UR QL STRIP: ABNORMAL
LEUKOCYTE ESTERASE UR QL STRIP: NEGATIVE
LYMPHOCYTES # BLD AUTO: 1.9 K/UL (ref 1–4.8)
LYMPHOCYTES NFR BLD: 21.9 % (ref 18–48)
MAGNESIUM SERPL-MCNC: 1.7 MG/DL (ref 1.6–2.6)
MCH RBC QN AUTO: 28.8 PG (ref 27–31)
MCHC RBC AUTO-ENTMCNC: 32.3 G/DL (ref 32–36)
MCV RBC AUTO: 89 FL (ref 82–98)
MONOCYTES # BLD AUTO: 0.7 K/UL (ref 0.3–1)
MONOCYTES NFR BLD: 7.6 % (ref 4–15)
NEUTROPHILS # BLD AUTO: 6 K/UL (ref 1.8–7.7)
NEUTROPHILS NFR BLD: 69.4 % (ref 38–73)
NITRITE UR QL STRIP: NEGATIVE
NRBC BLD-RTO: 0 /100 WBC
PH UR STRIP: 5 [PH] (ref 5–8)
PLATELET # BLD AUTO: 172 K/UL (ref 150–450)
PMV BLD AUTO: 11.9 FL (ref 9.2–12.9)
POTASSIUM SERPL-SCNC: 3.5 MMOL/L (ref 3.5–5.1)
PROT SERPL-MCNC: 8.7 G/DL (ref 6–8.4)
PROT UR QL STRIP: NEGATIVE
PROTHROMBIN TIME: 12.3 SEC (ref 9–12.5)
RBC # BLD AUTO: 4.38 M/UL (ref 4–5.4)
SODIUM SERPL-SCNC: 138 MMOL/L (ref 136–145)
SP GR UR STRIP: 1.01 (ref 1–1.03)
SPECIMEN OUTDATE: NORMAL
TROPONIN I SERPL HS-MCNC: 10.9 PG/ML (ref 0–14.9)
URN SPEC COLLECT METH UR: ABNORMAL
UROBILINOGEN UR STRIP-ACNC: NEGATIVE EU/DL
WBC # BLD AUTO: 8.59 K/UL (ref 3.9–12.7)

## 2024-04-11 PROCEDURE — 84484 ASSAY OF TROPONIN QUANT: CPT | Performed by: EMERGENCY MEDICINE

## 2024-04-11 PROCEDURE — 25000003 PHARM REV CODE 250: Performed by: HOSPITALIST

## 2024-04-11 PROCEDURE — 93005 ELECTROCARDIOGRAM TRACING: CPT | Performed by: GENERAL PRACTICE

## 2024-04-11 PROCEDURE — 85730 THROMBOPLASTIN TIME PARTIAL: CPT | Performed by: EMERGENCY MEDICINE

## 2024-04-11 PROCEDURE — 86850 RBC ANTIBODY SCREEN: CPT | Performed by: EMERGENCY MEDICINE

## 2024-04-11 PROCEDURE — 94761 N-INVAS EAR/PLS OXIMETRY MLT: CPT

## 2024-04-11 PROCEDURE — 80053 COMPREHEN METABOLIC PANEL: CPT | Performed by: EMERGENCY MEDICINE

## 2024-04-11 PROCEDURE — 20000000 HC ICU ROOM

## 2024-04-11 PROCEDURE — 99900031 HC PATIENT EDUCATION (STAT)

## 2024-04-11 PROCEDURE — 63600531 PHARM REV CODE 636 NO ALT 250 W HCPCS: Mod: JZ,JG | Performed by: EMERGENCY MEDICINE

## 2024-04-11 PROCEDURE — 93010 ELECTROCARDIOGRAM REPORT: CPT | Mod: ,,, | Performed by: GENERAL PRACTICE

## 2024-04-11 PROCEDURE — 94799 UNLISTED PULMONARY SVC/PX: CPT

## 2024-04-11 PROCEDURE — 30283B1 TRANSFUSION OF NONAUTOLOGOUS 4-FACTOR PROTHROMBIN COMPLEX CONCENTRATE INTO VEIN, PERCUTANEOUS APPROACH: ICD-10-PCS | Performed by: EMERGENCY MEDICINE

## 2024-04-11 PROCEDURE — 85025 COMPLETE CBC W/AUTO DIFF WBC: CPT | Performed by: EMERGENCY MEDICINE

## 2024-04-11 PROCEDURE — 94660 CPAP INITIATION&MGMT: CPT

## 2024-04-11 PROCEDURE — 99291 CRITICAL CARE FIRST HOUR: CPT

## 2024-04-11 PROCEDURE — 81003 URINALYSIS AUTO W/O SCOPE: CPT | Performed by: EMERGENCY MEDICINE

## 2024-04-11 PROCEDURE — 85610 PROTHROMBIN TIME: CPT | Performed by: EMERGENCY MEDICINE

## 2024-04-11 PROCEDURE — 83735 ASSAY OF MAGNESIUM: CPT | Performed by: EMERGENCY MEDICINE

## 2024-04-11 PROCEDURE — 63600175 PHARM REV CODE 636 W HCPCS: Performed by: HOSPITALIST

## 2024-04-11 PROCEDURE — 63600175 PHARM REV CODE 636 W HCPCS

## 2024-04-11 PROCEDURE — 99900035 HC TECH TIME PER 15 MIN (STAT)

## 2024-04-11 PROCEDURE — 96365 THER/PROPH/DIAG IV INF INIT: CPT

## 2024-04-11 RX ORDER — HYDROCODONE BITARTRATE AND ACETAMINOPHEN 5; 325 MG/1; MG/1
1 TABLET ORAL EVERY 6 HOURS PRN
Status: DISCONTINUED | OUTPATIENT
Start: 2024-04-11 | End: 2024-04-13 | Stop reason: HOSPADM

## 2024-04-11 RX ORDER — MUPIROCIN 20 MG/G
OINTMENT TOPICAL 2 TIMES DAILY
Status: DISCONTINUED | OUTPATIENT
Start: 2024-04-11 | End: 2024-04-13 | Stop reason: HOSPADM

## 2024-04-11 RX ORDER — SODIUM,POTASSIUM PHOSPHATES 280-250MG
2 POWDER IN PACKET (EA) ORAL
Status: DISCONTINUED | OUTPATIENT
Start: 2024-04-11 | End: 2024-04-13 | Stop reason: HOSPADM

## 2024-04-11 RX ORDER — PRAVASTATIN SODIUM 20 MG/1
20 TABLET ORAL NIGHTLY
Status: DISCONTINUED | OUTPATIENT
Start: 2024-04-11 | End: 2024-04-13 | Stop reason: HOSPADM

## 2024-04-11 RX ORDER — NALOXONE HCL 0.4 MG/ML
0.02 VIAL (ML) INJECTION
Status: DISCONTINUED | OUTPATIENT
Start: 2024-04-11 | End: 2024-04-13 | Stop reason: HOSPADM

## 2024-04-11 RX ORDER — ONDANSETRON HYDROCHLORIDE 2 MG/ML
4 INJECTION, SOLUTION INTRAVENOUS EVERY 6 HOURS PRN
Status: DISCONTINUED | OUTPATIENT
Start: 2024-04-11 | End: 2024-04-13 | Stop reason: HOSPADM

## 2024-04-11 RX ORDER — ALUMINUM HYDROXIDE, MAGNESIUM HYDROXIDE, AND SIMETHICONE 1200; 120; 1200 MG/30ML; MG/30ML; MG/30ML
30 SUSPENSION ORAL 4 TIMES DAILY PRN
Status: DISCONTINUED | OUTPATIENT
Start: 2024-04-11 | End: 2024-04-13 | Stop reason: HOSPADM

## 2024-04-11 RX ORDER — IBUPROFEN 200 MG
16 TABLET ORAL
Status: DISCONTINUED | OUTPATIENT
Start: 2024-04-11 | End: 2024-04-13 | Stop reason: HOSPADM

## 2024-04-11 RX ORDER — SODIUM CHLORIDE 0.9 % (FLUSH) 0.9 %
10 SYRINGE (ML) INJECTION EVERY 12 HOURS PRN
Status: DISCONTINUED | OUTPATIENT
Start: 2024-04-11 | End: 2024-04-13 | Stop reason: HOSPADM

## 2024-04-11 RX ORDER — LANOLIN ALCOHOL/MO/W.PET/CERES
800 CREAM (GRAM) TOPICAL
Status: DISCONTINUED | OUTPATIENT
Start: 2024-04-11 | End: 2024-04-13 | Stop reason: HOSPADM

## 2024-04-11 RX ORDER — TALC
6 POWDER (GRAM) TOPICAL NIGHTLY PRN
Status: DISCONTINUED | OUTPATIENT
Start: 2024-04-11 | End: 2024-04-13 | Stop reason: HOSPADM

## 2024-04-11 RX ORDER — NICARDIPINE HYDROCHLORIDE 0.2 MG/ML
INJECTION INTRAVENOUS
Status: COMPLETED
Start: 2024-04-11 | End: 2024-04-11

## 2024-04-11 RX ORDER — LEVOTHYROXINE SODIUM 25 UG/1
50 TABLET ORAL
Status: DISCONTINUED | OUTPATIENT
Start: 2024-04-12 | End: 2024-04-13 | Stop reason: HOSPADM

## 2024-04-11 RX ORDER — ACETAMINOPHEN 325 MG/1
650 TABLET ORAL EVERY 8 HOURS PRN
Status: DISCONTINUED | OUTPATIENT
Start: 2024-04-11 | End: 2024-04-13 | Stop reason: HOSPADM

## 2024-04-11 RX ORDER — GLUCAGON 1 MG
1 KIT INJECTION
Status: DISCONTINUED | OUTPATIENT
Start: 2024-04-11 | End: 2024-04-13 | Stop reason: HOSPADM

## 2024-04-11 RX ORDER — ACETAMINOPHEN 325 MG/1
650 TABLET ORAL EVERY 4 HOURS PRN
Status: DISCONTINUED | OUTPATIENT
Start: 2024-04-11 | End: 2024-04-13 | Stop reason: HOSPADM

## 2024-04-11 RX ORDER — IBUPROFEN 200 MG
24 TABLET ORAL
Status: DISCONTINUED | OUTPATIENT
Start: 2024-04-11 | End: 2024-04-13 | Stop reason: HOSPADM

## 2024-04-11 RX ORDER — NICARDIPINE HYDROCHLORIDE 0.2 MG/ML
0-15 INJECTION INTRAVENOUS CONTINUOUS
Status: DISCONTINUED | OUTPATIENT
Start: 2024-04-11 | End: 2024-04-12

## 2024-04-11 RX ADMIN — NICARDIPINE HYDROCHLORIDE 5 MG/HR: 0.2 INJECTION INTRAVENOUS at 03:04

## 2024-04-11 RX ADMIN — ACETAMINOPHEN 650 MG: 325 TABLET ORAL at 05:04

## 2024-04-11 RX ADMIN — PRAVASTATIN SODIUM 20 MG: 20 TABLET ORAL at 08:04

## 2024-04-11 RX ADMIN — MUPIROCIN 1 G: 20 OINTMENT TOPICAL at 08:04

## 2024-04-11 RX ADMIN — PROTHROMBIN, COAGULATION FACTOR VII HUMAN, COAGULATION FACTOR IX HUMAN, COAGULATION FACTOR X HUMAN, PROTEIN C, PROTEIN S HUMAN, AND WATER 2140 UNITS: KIT at 05:04

## 2024-04-11 RX ADMIN — NICARDIPINE HYDROCHLORIDE 7.5 MG/HR: 0.2 INJECTION INTRAVENOUS at 09:04

## 2024-04-11 NOTE — HPI
84-year-old female with past medical history of AFib on Eliquis who presented to the ER because of pain at the back of her head.  About 3 days ago specifically on Monday April 8, patient has stepped and fall in her house.  She falls backwards hitting her buttock and head on the floor.  She did not lose consciousness, and can remember the whole event.  Shortly after she hit her head she started to experience dizziness, nausea and blurry vision.   help her stand up and she sat on the couch for the rest of the day.  Soon after she started to develop headache occipital, sharp, 8/10 on pain scale.  Symptoms of blurry vision, nausea and dizziness resolved, but patient has been having headache as well as pain at the back of her scalp on and off for the last 3 days.  Today, the pain at the back of her head got worse.  She went to an urgent Care, and they advised her to go to the ER for evaluation.    In the ER, vitals showed a blood pressure of 180/77, heart rate of 68, afebrile.  CBC and CMP are within normal limits.  Troponin is negative.  EKG showed normal sinus rhythm, with incomplete right bundle branch block.  EKG with no ischemic changes. CT head showed 1.4 cm round hyperattenuating focus at the inferior right frontal lobe with mild surrounding vasogenic edema, and a probable trace amount of subarachnoid hemorrhage in the anterior temporal lobe on the right.  Neurosurgery was consulted, case was discussed with the ER provider.  Neurosurgery recommended admission to ICU with Q 1 neuro check, and to give patient Kcentra, with repeat CT head in the morning.  Patient will be admitted to Medicine for further care.

## 2024-04-11 NOTE — SUBJECTIVE & OBJECTIVE
Past Medical History:   Diagnosis Date    Allergy     latex    Allergy     seasonal    Arthritis     Blood transfusion     Cataract     removed    Hypertension     IBS (irritable bowel syndrome)     Obstructive sleep apnea     Thyroid nodule 09/29/2016    Ulcer        Past Surgical History:   Procedure Laterality Date    ANGIOGRAM, CORONARY, WITH LEFT HEART CATHETERIZATION N/A 12/14/2021    Procedure: Angiogram, Coronary, with Left Heart Cath;  Surgeon: Napoleon Dumas MD;  Location: [facility] CATH/EP LAB;  Service: Cardiology;  Laterality: N/A;    CAROTID ARTERY ANGIOPLASTY Right 09/30/2016    CHOLECYSTECTOMY      EYE SURGERY      cataract removal    HYSTERECTOMY         Review of patient's allergies indicates:   Allergen Reactions    Latex, natural rubber      rash    Biaxin [clarithromycin] Diarrhea and Nausea Only    Codeine Itching    Oxycodone      Knocks pt out for 3 days    Metformin Diarrhea       No current facility-administered medications on file prior to encounter.     Current Outpatient Medications on File Prior to Encounter   Medication Sig    apixaban (ELIQUIS) 5 mg Tab Take 1 tablet (5 mg total) by mouth 2 (two) times daily.    ascorbic acid, vitamin C, (VITAMIN C) 1000 MG tablet Take 1,000 mg by mouth once daily.    b complex vitamins capsule Take 1 capsule by mouth once daily.    benazepriL (LOTENSIN) 10 MG tablet TAKE 1 TABLET BY MOUTH  TWICE DAILY    carvediloL (COREG) 6.25 MG tablet TAKE 1 TABLET BY MOUTH  TWICE DAILY WITH MEALS    cetirizine (ZYRTEC) 10 MG tablet Take 10 mg by mouth daily as needed. As needed    chlorthalidone (HYGROTEN) 25 MG Tab TAKE 1 TABLET BY MOUTH ONCE  DAILY    cholecalciferol, vitamin D3, 125 mcg (5,000 unit) Tab Take 1 tablet by mouth once daily.    cinnamon bark 500 mg capsule Take 1,000 mg by mouth once daily.    estradiol (ESTRACE) 1 MG tablet Take 1 mg by mouth once daily.    fluticasone propionate (FLONASE) 50 mcg/actuation nasal spray USE 1 SPRAY IN BOTH  NOSTRILS  TWICE DAILY    furosemide (LASIX) 20 MG tablet TAKE 1 TABLET BY MOUTH DAILY AS  NEEDED (FLUID)    glucosamine-chondroitin 500-400 mg tablet Take 1 tablet by mouth 2 (two) times daily.    isosorbide mononitrate (ISMO,MONOKET) 10 mg tablet Take 1 tablet (10 mg total) by mouth 2 (two) times daily.    levothyroxine (SYNTHROID) 50 MCG tablet Take 1 tablet (50 mcg total) by mouth before breakfast.    loperamide HCl (IMODIUM A-D ORAL) As needed    melatonin 10 mg Tab Take by mouth. As needed    multivitamin capsule Take 1 capsule by mouth once daily.    potassium chloride (MICRO-K) 10 MEQ CpSR TAKE 1 CAPSULE BY MOUTH TWICE  DAILY    pravastatin (PRAVACHOL) 20 MG tablet TAKE 1 TABLET BY MOUTH IN THE  EVENING    vit A/vit C/vit E/zinc/copper (ICAPS AREDS ORAL) Take by mouth.     Family History       Problem Relation (Age of Onset)    Alzheimer's disease Sister    Breast cancer Maternal Aunt    Cancer Brother, Brother    Heart disease Mother, Sister    Kidney disease Brother    Stroke Mother          Tobacco Use    Smoking status: Never    Smokeless tobacco: Never   Substance and Sexual Activity    Alcohol use: Yes     Comment: social    Drug use: No    Sexual activity: Not on file     Review of Systems   Constitutional:  Negative for fatigue.   HENT:  Negative for sore throat.         Soreness at the back of her head.    Respiratory:  Negative for cough and shortness of breath.    Cardiovascular:  Negative for chest pain and palpitations.   Gastrointestinal:  Negative for abdominal pain, nausea and vomiting.   Genitourinary:  Negative for dysuria, frequency and urgency.   Musculoskeletal:  Positive for myalgias.   Skin:         Scab at the back of scalp.    Neurological:  Negative for dizziness and syncope.   Psychiatric/Behavioral:  Negative for confusion.      Objective:     Vital Signs (Most Recent):  Temp: 97.6 °F (36.4 °C) (04/11/24 1059)  Pulse: 67 (04/11/24 1515)  Resp: 16 (04/11/24 1515)  BP: (!) 185/79 (04/11/24  "1515)  SpO2: 98 % (04/11/24 1515) Vital Signs (24h Range):  Temp:  [97.6 °F (36.4 °C)] 97.6 °F (36.4 °C)  Pulse:  [67-71] 67  Resp:  [16-21] 16  SpO2:  [93 %-98 %] 98 %  BP: (180-223)/() 185/79     Weight: 64.4 kg (142 lb)  Body mass index is 22.92 kg/m².     Physical Exam  Vitals reviewed.   Constitutional:       Appearance: Normal appearance.   HENT:      Head: Normocephalic and atraumatic.      Mouth/Throat:      Mouth: Mucous membranes are moist.   Eyes:      Extraocular Movements: Extraocular movements intact.      Conjunctiva/sclera: Conjunctivae normal.      Pupils: Pupils are equal, round, and reactive to light.   Cardiovascular:      Rate and Rhythm: Normal rate and regular rhythm.   Pulmonary:      Effort: Pulmonary effort is normal.      Breath sounds: Normal breath sounds.   Abdominal:      General: Abdomen is flat. Bowel sounds are normal.      Palpations: Abdomen is soft.   Musculoskeletal:         General: No swelling or tenderness.      Cervical back: Normal range of motion and neck supple.   Skin:     Comments: Bloody Scab at the back of the head.    Neurological:      General: No focal deficit present.      Mental Status: She is alert and oriented to person, place, and time.   Psychiatric:         Mood and Affect: Mood normal.         Behavior: Behavior normal.              CRANIAL NERVES     CN III, IV, VI   Pupils are equal, round, and reactive to light.       Significant Labs: All pertinent labs within the past 24 hours have been reviewed.  CBC:   Recent Labs   Lab 04/11/24  1434   WBC 8.59   HGB 12.6   HCT 39.0        CMP:   Recent Labs   Lab 04/11/24  1434      K 3.5   CL 96   CO2 34*   *   BUN 25*   CREATININE 0.9   CALCIUM 10.3   PROT 8.7*   ALBUMIN 4.7   BILITOT 0.6   ALKPHOS 58   AST 16   ALT 13   ANIONGAP 8     Cardiac Markers: No results for input(s): "CKMB", "MYOGLOBIN", "BNP", "TROPISTAT" in the last 48 hours.  Coagulation:   Recent Labs   Lab 04/11/24  1434 " "  INR 1.1   APTT 29.5     Lactic Acid: No results for input(s): "LACTATE" in the last 48 hours.  Lipase: No results for input(s): "LIPASE" in the last 48 hours.  Lipid Panel: No results for input(s): "CHOL", "HDL", "LDLCALC", "TRIG", "CHOLHDL" in the last 48 hours.  Magnesium:   Recent Labs   Lab 04/11/24  1434   MG 1.7     Troponin:   Recent Labs   Lab 04/11/24  1434   TROPONINIHS 10.9     TSH:   Recent Labs   Lab 04/01/24  0746   TSH 1.798     Urine Culture: No results for input(s): "LABURIN" in the last 48 hours.  Urine Studies: No results for input(s): "COLORU", "APPEARANCEUA", "PHUR", "SPECGRAV", "PROTEINUA", "GLUCUA", "KETONESU", "BILIRUBINUA", "OCCULTUA", "NITRITE", "UROBILINOGEN", "LEUKOCYTESUR", "RBCUA", "WBCUA", "BACTERIA", "SQUAMEPITHEL", "HYALINECASTS" in the last 48 hours.    Invalid input(s): "WRIGHTSUR"    Significant Imaging: I have reviewed all pertinent imaging results/findings within the past 24 hours.  "

## 2024-04-11 NOTE — ASSESSMENT & PLAN NOTE
CT head showed 1.4 cm round hyperattenuating focus at the inferior right frontal lobe with mild surrounding vasogenic edema. In the setting of trauma, this likely reflects a hemorrhagic contusion.   Please see above for plan of care.

## 2024-04-11 NOTE — ASSESSMENT & PLAN NOTE
Jacqueline Mathias is a 84-year-old female presented to [facility] Emergency Department due to a mechanical fall that took place 4 days prior to Emergency Department encounter.  CT head revealed inferior right frontal lobe hemorrhagic contusion with trace subarachnoid hemorrhage.  Neurosurgery consulted for subarachnoid hemorrhage.    Neurologically intact.    No surgical intervention warranted at this time    --Repeat head CT tomorrow in a.m.  --Reverse Eliquis and hold   --Neuro checks Q 1 hour.  --SBP <160  --Na >135  --HOB >30  --Diet okay  --Continue to monitor clinically, notify NSGY immediately with any changes in neuro status    Discussed with Dr. Horton

## 2024-04-11 NOTE — ASSESSMENT & PLAN NOTE
Chronic, uncontrolled. Latest blood pressure and vitals reviewed-     Temp:  [97.6 °F (36.4 °C)]   Pulse:  [67-71]   Resp:  [16-21]   BP: (180-223)/()   SpO2:  [93 %-98 %] .   Home meds for hypertension were reviewed and noted below.   Hypertension Medications               benazepriL (LOTENSIN) 10 MG tablet TAKE 1 TABLET BY MOUTH  TWICE DAILY    carvediloL (COREG) 6.25 MG tablet TAKE 1 TABLET BY MOUTH  TWICE DAILY WITH MEALS    chlorthalidone (HYGROTEN) 25 MG Tab TAKE 1 TABLET BY MOUTH ONCE  DAILY    furosemide (LASIX) 20 MG tablet TAKE 1 TABLET BY MOUTH DAILY AS  NEEDED (FLUID)    isosorbide mononitrate (ISMO,MONOKET) 10 mg tablet Take 1 tablet (10 mg total) by mouth 2 (two) times daily.            While in the hospital, will manage blood pressure as follows; will start patient on Cardene drip for a goal to keep systolic blood pressure less than 140 given her current brain bleed.  Patient was actually on multiple blood pressure medication at home and per patient, her blood pressure still not controlled.  Primary team to adjust medication prior to discharge.

## 2024-04-11 NOTE — ED NOTES
Presents to ED after having a fall on Monday where she hit back of head. Hematoma noted to back of head. Takes Eliquis BID and has been taking it since fall. Last dose was this AM. No deficits noted. GCS 15. NIHSS 0. Pt able to walk with a steady gait. C/o minor headache. Hx of hypertension took BP medications this AM although noted to be hypertensive at this time. Respirations are equal and nonlabored. SO at BS. Denies neck pain or back pain. Pupils equal and reactive. Hx of Afib. Call light is within in reach. No acute distress noted.

## 2024-04-11 NOTE — ASSESSMENT & PLAN NOTE
CT head showed probable trace amount of subarachnoid hemorrhage in the anterior temporal lobe on the right.  Admit pt to ICU.   Q 1 neuro check.  Kcentra ordered.  Repeat CT head in the morning.  Will start pt on nicardipine for her bp of 190. Goal to keep SBP<140.  Will consult PT/OT.   Care Transitions Outreach Attempt    Call within 2 business days of discharge: Yes   Attempted to reach patient for transitions of care follow up. Unable to reach patient. Patient: Mart Mensah Patient : 1966 MRN: [id number]    Last Discharge [address]       Date Complaint Diagnosis Description Type Department Provider    23  Primary osteoarthritis of left knee Admission (Discharged) Agustin Ferrer MD              Was this an external facility discharge?  No Discharge Facility: [facility]    Noted following upcoming appointments from discharge chart review:   [facility] follow up appointment(s):   Future Appointments   Date Time Provider [address]   10/20/2023 10:40 AM Lucius Reddy MD [address]   10/25/2023  8:50 AM Gaby Mccain MD [id number]   2024 10:00 AM Lucius Reddy MD [address]     Non-[facility] follow up appointment(s): n/a

## 2024-04-11 NOTE — ASSESSMENT & PLAN NOTE
Will hold Eliquis for now.  Please follow up Neurosurgery recommendation regarding AC.  Patient was on Coreg for rate control.  Her heart rate currently in the 60s and 70s.  Continue to hold while being on the Cardene drip.

## 2024-04-11 NOTE — CARE UPDATE
04/11/24 1720   Patient Assessment/Suction   Level of Consciousness (AVPU) alert   Respiratory Effort Normal;Unlabored   Expansion/Accessory Muscles/Retractions no use of accessory muscles   Rhythm/Pattern, Respiratory unlabored   Cough Frequency no cough   PRE-TX-O2   Device (Oxygen Therapy) room air   SpO2 97 %   Pulse Oximetry Type Continuous   $ Pulse Oximetry - Multiple Charge Pulse Oximetry - Multiple   Pulse 79   Resp (!) 22   Tobacco Cessation Intervention   Do you use any type of tobacco product? No   Respiratory Evaluation   $ Care Plan Tech Time 15 min   $ Eval/Re-eval Charges Evaluation   Evaluation For Transfer   Admitting Diagnosis SAH   Cardiac Diagnosis CAD   Pulmonary Diagnosis JACQUELINE, COPD, ASTHMA   Home Oxygen   Has Home Oxygen? Yes   Liter Flow 4   Duration with sleep   Route mask  (CPAP MASK)   Mode continuous   Device home concentrator   Home Aerosol, MDI, DPI, and Other Treatments/Therapies   Home Respiratory Therapy Per Patient/Review of Chart Yes   Other Home Respiratory Therapies CPAP   Oxygen Care Plan   Oxygen Care Plan Per Protocol   SPO2 Goal (%) 92% non-cardiac   Rationale SpO2 is <92% (Non-cardiac Pt.)   Bronchodilator Care Plan   Rationale No Rationale found   Atelectasis Care Plan   Rationale No Rational Found   Airway Clearance Care Plan   Rationale No rationale found     DR GANDARA HAS CLEARED PATIENT TO USE CPAP AT NIGHT

## 2024-04-11 NOTE — ED PROVIDER NOTES
Encounter Date: 4/11/2024       History     Chief Complaint   Patient presents with    Head Injury     Pt fell hit head two days ago is on blood thinners has knot to back of head.      84-year-old female past medical history of atrial fibrillation on Eliquis, hypertension, IBS, thyroid nodule, presents emergency department with fall and head injury.  Patient says that she was carrying a pot inside on Monday and slipped on water on the ground and hit the back of her head.  She did not lose consciousness.  This injury happened on Monday, 4 days ago.  She says she has had persistent headaches ever since.  However today she woke up and did not have a headache today.  She has been taking her Eliquis faithfully and took her last dose at 8:00 a.m. this morning.  She has had some nausea but no vomiting.  She has not had any seizure activity no unilateral weakness numbness or tingling, no dysarthria.  She does state that she landed on her buttocks as well in her posterior right buttocks hurts but she has been able to ambulate without difficulty at home.      Review of patient's allergies indicates:   Allergen Reactions    Latex, natural rubber      rash    Biaxin [clarithromycin] Diarrhea and Nausea Only    Codeine Itching    Oxycodone      Knocks pt out for 3 days    Metformin Diarrhea     Past Medical History:   Diagnosis Date    Allergy     latex    Allergy     seasonal    Arthritis     Blood transfusion     Cataract     removed    Hypertension     IBS (irritable bowel syndrome)     Obstructive sleep apnea     Thyroid nodule 09/29/2016    Ulcer      Past Surgical History:   Procedure Laterality Date    ANGIOGRAM, CORONARY, WITH LEFT HEART CATHETERIZATION N/A 12/14/2021    Procedure: Angiogram, Coronary, with Left Heart Cath;  Surgeon: Napoleon Dumas MD;  Location: [facility] CATH/EP LAB;  Service: Cardiology;  Laterality: N/A;    CAROTID ARTERY ANGIOPLASTY Right 09/30/2016    CHOLECYSTECTOMY      EYE SURGERY      cataract  removal    HYSTERECTOMY       Family History   Problem Relation Age of Onset    Stroke Mother     Heart disease Mother     Heart disease Sister     Alzheimer's disease Sister     Cancer Brother     Cancer Brother     Kidney disease Brother     Breast cancer Maternal Aunt      Social History     Tobacco Use    Smoking status: Never    Smokeless tobacco: Never   Substance Use Topics    Alcohol use: Yes     Comment: social    Drug use: No     Review of Systems   Constitutional:  Negative for chills and fever.   HENT:  Negative for congestion and sore throat.    Respiratory:  Negative for shortness of breath.    Cardiovascular:  Negative for chest pain.   Gastrointestinal:  Positive for nausea. Negative for abdominal pain, diarrhea and vomiting.   Genitourinary:  Negative for dysuria.   Musculoskeletal:  Negative for back pain.   Skin:  Negative for rash.   Neurological:  Positive for headaches. Negative for seizures, syncope and weakness.   Hematological:  Does not bruise/bleed easily.   All other systems reviewed and are negative.      Physical Exam     Initial Vitals [04/11/24 1059]   BP Pulse Resp Temp SpO2   (!) 180/77 68 18 97.6 °F (36.4 °C) 97 %      MAP       --         Physical Exam    Nursing note and vitals reviewed.  Constitutional: She appears well-developed and well-nourished. No distress.   HENT:   Head: Normocephalic.   Mouth/Throat: No oropharyngeal exudate.   Hematoma to the right posterior vertex occipital region of the back of the head.  No bleeding, no laceration.  Ecchymosis noted.  No raccoon eyes.  No reeves sign   Eyes: Conjunctivae and EOM are normal. Pupils are equal, round, and reactive to light.   Neck: Neck supple. No tracheal deviation present.   Cardiovascular:  Normal rate, regular rhythm, normal heart sounds and intact distal pulses.           No murmur heard.  Pulmonary/Chest: Breath sounds normal. No stridor. No respiratory distress. She has no wheezes. She has no rhonchi. She has no  rales.   Abdominal: Abdomen is soft. She exhibits no distension. There is no abdominal tenderness. There is no rebound and no guarding.   Musculoskeletal:         General: No tenderness or edema. Normal range of motion.      Cervical back: Neck supple.     Neurological: She is alert and oriented to person, place, and time. She has normal strength. No cranial nerve deficit or sensory deficit.   Skin: Skin is warm and dry. Capillary refill takes less than 2 seconds. No rash noted. No erythema. No pallor.   Psychiatric: She has a normal mood and affect. Thought content normal.         ED Course   Procedures  Labs Reviewed   COMPREHENSIVE METABOLIC PANEL - Abnormal; Notable for the following components:       Result Value    CO2 34 (*)     Glucose 116 (*)     BUN 25 (*)     Total Protein 8.7 (*)     All other components within normal limits   PROTIME-INR   APTT   MAGNESIUM   CBC W/ AUTO DIFFERENTIAL   TROPONIN I HIGH SENSITIVITY     Results for orders placed or performed during the hospital encounter of 04/11/24   Protime-INR   Result Value Ref Range    Prothrombin Time 12.3 9.0 - 12.5 sec    INR 1.1 0.8 - 1.2   APTT   Result Value Ref Range    aPTT 29.5 21.0 - 32.0 sec   Urinalysis, Reflex to Urine Culture Urine, Clean Catch    Specimen: Urine   Result Value Ref Range    Specimen UA Urine, Clean Catch     Color, UA Yellow Yellow, Straw, Maura    Appearance, UA Clear Clear    pH, UA 5.0 5.0 - 8.0    Specific Gravity, UA 1.015 1.005 - 1.030    Protein, UA Negative Negative    Glucose, UA Negative Negative    Ketones, UA Trace (A) Negative    Bilirubin (UA) Negative Negative    Occult Blood UA Negative Negative    Nitrite, UA Negative Negative    Urobilinogen, UA Negative Negative EU/dL    Leukocytes, UA Negative Negative   Magnesium   Result Value Ref Range    Magnesium 1.7 1.6 - 2.6 mg/dL   CBC auto differential   Result Value Ref Range    WBC 8.59 3.90 - 12.70 K/uL    RBC 4.38 4.00 - 5.40 M/uL    Hemoglobin 12.6 12.0 -  16.0 g/dL    Hematocrit 39.0 37.0 - 48.5 %    MCV 89 82 - 98 fL    MCH 28.8 27.0 - 31.0 pg    MCHC 32.3 32.0 - 36.0 g/dL    RDW 14.3 11.5 - 14.5 %    Platelets 172 150 - 450 K/uL    MPV 11.9 9.2 - 12.9 fL    Immature Granulocytes 0.3 0.0 - 0.5 %    Gran # (ANC) 6.0 1.8 - 7.7 K/uL    Immature Grans (Abs) 0.03 0.00 - 0.04 K/uL    Lymph # 1.9 1.0 - 4.8 K/uL    Mono # 0.7 0.3 - 1.0 K/uL    Eos # 0.1 0.0 - 0.5 K/uL    Baso # 0.02 0.00 - 0.20 K/uL    nRBC 0 0 /100 WBC    Gran % 69.4 38.0 - 73.0 %    Lymph % 21.9 18.0 - 48.0 %    Mono % 7.6 4.0 - 15.0 %    Eosinophil % 0.6 0.0 - 8.0 %    Basophil % 0.2 0.0 - 1.9 %    Differential Method Automated    Comprehensive metabolic panel   Result Value Ref Range    Sodium 138 136 - 145 mmol/L    Potassium 3.5 3.5 - 5.1 mmol/L    Chloride 96 95 - 110 mmol/L    CO2 34 (H) 23 - 29 mmol/L    Glucose 116 (H) 70 - 110 mg/dL    BUN 25 (H) 8 - 23 mg/dL    Creatinine 0.9 0.5 - 1.4 mg/dL    Calcium 10.3 8.7 - 10.5 mg/dL    Total Protein 8.7 (H) 6.0 - 8.4 g/dL    Albumin 4.7 3.5 - 5.2 g/dL    Total Bilirubin 0.6 0.1 - 1.0 mg/dL    Alkaline Phosphatase 58 55 - 135 U/L    AST 16 10 - 40 U/L    ALT 13 10 - 44 U/L    eGFR >60.0 >60 mL/min/1.73 m^2    Anion Gap 8 8 - 16 mmol/L   Troponin I High Sensitivity #1   Result Value Ref Range    Troponin I High Sensitivity 10.9 0.0 - 14.9 pg/mL   EKG 12-lead   Result Value Ref Range    QRS Duration 118 ms    [facility] QTC Calculation 497 ms   Type & Screen   Result Value Ref Range    Group & Rh A POS     Indirect Diamond NEG     Specimen Outdate 04/14/2024 23:59           ECG Results              EKG 12-lead (In process)        Collection Time Result Time QRS Duration [facility] QTC Calculation    04/11/24 14:00:00 04/11/24 14:15:37 118 497                     In process by Interface, Lab In [facility] (04/11/24 14:15:44)                   Narrative:    Test Reason : S06.33AA,    Vent. Rate : 071 BPM     Atrial Rate : 071 BPM     P-R Int : 136 ms          QRS Dur : 118  ms      QT Int : 458 ms       P-R-T Axes : 087 036 013 degrees     QTc Int : 497 ms    Normal sinus rhythm  Incomplete right bundle branch block  Nonspecific ST and T wave abnormality  Prolonged QT  Abnormal ECG  When compared with ECG of 05-APR-2024 13:40,  QT has lengthened    Referred By: TANISHA FERREIRA           Confirmed By:                                   Imaging Results              CT Cervical Spine Without Contrast (Edited Result - FINAL)  Result time 04/11/24 19:31:51      Addendum (preliminary) 1 of 1 by Nickie Singleton MD (04/11/24 19:31:51)      Note is also made of opacification of the visualized portion of the left side of the sphenoid sinus.  This corresponds as was described more completely visualized on the CT of the head of the same day      Electronically signed by: Nickie Singleton MD  Date:    04/11/2024  Time:    19:31                 Final result by Nickie Singleton MD (04/11/24 19:00:01)                   Impression:      Degenerative change and reversal of the usual cervical lordosis.  No acute fracture, compression or subluxation.      Electronically signed by: Nickie Singleton MD  Date:    04/11/2024  Time:    19:00               Narrative:    EXAMINATION:  CT CERVICAL SPINE WITHOUT CONTRAST    CLINICAL HISTORY:  fall on AC;    TECHNIQUE:  Low dose axial images, sagittal and coronal reformations were performed though the cervical spine.  Contrast was not administered.    COMPARISON:  None    FINDINGS:  Reversal of the usual cervical lordosis.  Degenerative changes more so in the lower cervical spine.  Disc space narrowing mild C4-C5 and C5-C6 and moderate C6-C7.  No acute fracture, compression or subluxation.  Evaluation of spinal canal contents limited without into the IV contrast but with no epidural hematoma or prevertebral soft tissue swelling or hematoma seen.  There are degenerative changes including severe facet arthropathy left C3-C4, left C4-C5, and there are disc osteophyte  complexes.  As visualized appears mild spinal canal narrowing C6-C7 and multilevel neural foraminal narrowing appearing moderate right C5-C6 mild to moderate left C5-C6 moderate right C6-C7 and moderately severe left C6-C7.    Nodularity thyroid gland suggesting multiple thyroid nodules, measuring 8 mm on the right without suspicious characteristics.                                       CT Head Without Contrast (Final result)  Result time 04/11/24 13:33:00      Final result by Charly Starkey MD (04/11/24 13:33:00)                   Impression:      1. 1.4 cm round hyperattenuating focus at the inferior right frontal lobe with mild surrounding vasogenic edema.  In the setting of trauma, this likely reflects a hemorrhagic contusion.  Short-term CT follow-up is recommended to document resolution.  Follow-up MRI brain with and without contrast is also recommended for exclusion of underlying mass..  2. There is a probable trace amount of subarachnoid hemorrhage in the anterior temporal lobe on the right.  3. Mild atrophy and chronic small vessel ischemic changes.  4. Acute left sphenoid sinusitis.  5. Posterior scalp soft tissue swelling on the right.  Findings were called to Dr. Francis at in the emergency department at 13:30 hours on April 11, 2024.      Electronically signed by: Charly Starkey  Date:    04/11/2024  Time:    13:33               Narrative:    EXAMINATION:  CT HEAD WITHOUT CONTRAST    CLINICAL HISTORY:  Head trauma, moderate-severe;.    COMPARISON:  2016    FINDINGS:  Thin-section axial noncontrast images were obtained.    In the inferior right frontal region, there is a round 1.4 cm hyperdense focus consistent with intra-axial hemorrhage, with mild surrounding vasogenic edema.  Findings likely reflect a hemorrhagic contusion in the setting of trauma.  Hemorrhagic mass is not excluded.  CT follow-up to document resolution is recommended.  Follow-up MRI brain with and without contrast is also  recommended for exclusion of mass.    There is a small linear hyperattenuating focus at the anteroinferior right temporal lobe suspicious for minimal subarachnoid hemorrhage (series 3, image 9).    There is no midline shift.  Ventricles and sulci are mildly prominent.  No pathologic subdural or epidural fluid collections are identified.  Changes of mild chronic microvascular white matter disease are noted.    The cerebellum and brainstem are unremarkable.  The calvarium is intact.  Posterior scalp soft tissue swelling is noted on the right.    There is a large amount of fluid in left sphenoid sinus consistent with acute left sphenoid sinusitis.                                       Medications   levothyroxine tablet 50 mcg (has no administration in time range)   pravastatin tablet 20 mg (20 mg Oral Given 4/11/24 2032)   sodium chloride 0.9% flush 10 mL (has no administration in time range)   melatonin tablet 6 mg (has no administration in time range)   ondansetron injection 4 mg (has no administration in time range)   acetaminophen tablet 650 mg (650 mg Oral Given 4/11/24 1729)   aluminum-magnesium hydroxide-simethicone 200-200-20 mg/5 mL suspension 30 mL (has no administration in time range)   acetaminophen tablet 650 mg (has no administration in time range)   HYDROcodone-acetaminophen 5-325 mg per tablet 1 tablet (has no administration in time range)   naloxone 0.4 mg/mL injection 0.02 mg (has no administration in time range)   potassium bicarbonate disintegrating tablet 50 mEq (has no administration in time range)   potassium bicarbonate disintegrating tablet 35 mEq (has no administration in time range)   potassium bicarbonate disintegrating tablet 60 mEq (has no administration in time range)   magnesium oxide tablet 800 mg (has no administration in time range)   magnesium oxide tablet 800 mg (has no administration in time range)   potassium, sodium phosphates 280-160-250 mg packet 2 packet (has no administration  in time range)   potassium, sodium phosphates 280-160-250 mg packet 2 packet (has no administration in time range)   potassium, sodium phosphates 280-160-250 mg packet 2 packet (has no administration in time range)   glucose chewable tablet 16 g (has no administration in time range)   glucose chewable tablet 24 g (has no administration in time range)   dextrose 50% injection 12.5 g (has no administration in time range)   dextrose 50% injection 25 g (has no administration in time range)   glucagon (human recombinant) injection 1 mg (has no administration in time range)   niCARdipine 40 mg/200 mL (0.2 mg/mL) infusion (7.5 mg/hr Intravenous New Bag 4/11/24 2146)   mupirocin 2 % ointment (1 g Nasal Given 4/11/24 2032)   human prothrombin complex (PCC) (KCENTRA) 2,140 Units in sterile water for injection IVPB (2,140 Units Intravenous New Bag 4/11/24 1730)     Medical Decision Making  Differential includes but not limited to fall, trauma, intracranial hemorrhage, hematoma, fracture, dislocation,    Emergent evaluation of an 84-year-old female presents emergency department after head injury.  Unfortunately patient found to have evidence of contrecoup injury/cool injury.  Patient with intraparenchymal hematoma.  Patient also with small subarachnoid hemorrhage as well.  Patient is on Eliquis.  Patient has received IV Kcentra for reversal of her intracranial hemorrhage.  Patient will be admitted to the ICU.  I discussed the case with neurosurgery.  Patient hemodynamically stable, GCS of 15.  Patient has no focal neurological deficits.    Amount and/or Complexity of Data Reviewed  External Data Reviewed: labs and notes.  Labs: ordered. Decision-making details documented in ED Course.  Radiology: ordered and independent interpretation performed. Decision-making details documented in ED Course.  ECG/medicine tests: ordered and independent interpretation performed. Decision-making details documented in ED  Course.    Risk  Prescription drug management.  Decision regarding hospitalization.              Attending Attestation:             Attending ED Notes:   Attending Critical Care:   Critical Care Times:   Direct Patient Care (initial evaluation, reassessments, and time considering the case)................................................................10 minutes.   Additional History from reviewing old medical records or taking additional history from the family, EMS, PCP, etc.......................10 minutes.   Ordering, Reviewing, and Interpreting Diagnostic Studies...............................................................................................................10 minutes.   Documentation..................................................................................................................................................................................5 minutes.   ==============================================================  · Total Critical Care Time - exclusive of procedural time: 35 minutes.  ==============================================================  Critical care was necessary to treat or prevent imminent or life-threatening deterioration of the following conditions:  Intracranial hemorrhage.   Critical care was time spent personally by me on the following activities: obtaining history from patient or relative, examination of patient, review of x-rays / CT sent with the patient, ordering lab, x-rays, and/or EKG, development of treatment plan with patient or relative, ordering and performing treatments and interventions, interpretation of cardiac measurements and re-evaluation of patient's conition.   Critical Care Condition: potentially life-threatening         ED Course as of 04/11/24 2217   Thu Apr 11, 2024   5948 I discussed the case with Dr. Horton from Neurosurgery who stated to reverse the patient with Kcentra for the patient's Eliquis.  Admit to the  ICU, Q 1 hour neuro  checks [JR]   1433 EKG 2:10 p.m. normal sinus rhythm rate of 71, right bundle branch block.  No ST elevation or depression.  No STEMI.  EKG interpreted independently by me.  Prolonged QT interval.  Nonspecific ST T wave changes.   [JR]   1502 Dr. Bailey from [facility] will admit the patient [JR]      ED Course User Index  [JR] Dimas Harkins DO                           Clinical Impression:  Final diagnoses:  [S06.33AA] Cerebral contusion  [I60.9] SAH (subarachnoid hemorrhage) (Primary)  [W19.XXXA] Fall, initial encounter  [S00.03XA] Hematoma of scalp, initial encounter  [R07.9] Chest pain          ED Disposition Condition    Admit Stable                Dimas Harkins DO  04/11/24 [id number]

## 2024-04-11 NOTE — ASSESSMENT & PLAN NOTE
Sound mechanical fall, with no chest pain dizziness or shortness a breath prior to the fall.  EKG with no ischemic changes.  Troponin is negative.  CT head as discussed above.  Order CT C-spine.  PT/OT.

## 2024-04-11 NOTE — FIRST PROVIDER EVALUATION
Emergency Department TeleTriage Encounter Note      CHIEF COMPLAINT    Chief Complaint   Patient presents with    Head Injury     Pt fell hit head two days ago is on blood thinners has knot to back of head.        VITAL SIGNS   Initial Vitals [04/11/24 1059]   BP Pulse Resp Temp SpO2   (!) 180/77 68 18 97.6 °F (36.4 °C) 97 %      MAP       --            ALLERGIES    Review of patient's allergies indicates:   Allergen Reactions    Latex, natural rubber      rash    Biaxin [clarithromycin] Diarrhea and Nausea Only    Codeine Itching    Oxycodone      Knocks pt out for 3 days    Metformin Diarrhea       PROVIDER TRIAGE NOTE  This is a teletriage evaluation of a 84 y.o. female presenting to the ED complaining of head trauma on Monday - on blood thinners.     Initial orders will be placed and care will be transferred to an alternate provider when patient is roomed for a full evaluation. Any additional orders and the final disposition will be determined by that provider.       ORDERS  Labs Reviewed - No data to display    ED Orders (720h ago, onward)      Start Ordered     Status Ordering Provider    04/11/24 1111 04/11/24 1111  CT Head Without Contrast  1 time imaging         Ordered [place] GAMEZ              Virtual Visit Note: The provider triage portion of this emergency department evaluation and documentation was performed via [employer], a HIPAA-compliant telemedicine application, in concert with a tele-presenter in the room. A face to face patient evaluation with one of my colleagues will occur once the patient is placed in an emergency department room.      DISCLAIMER: This note was prepared with M*[employer] voice recognition transcription software. Garbled syntax, mangled pronouns, and other bizarre constructions may be attributed to that software system.

## 2024-04-11 NOTE — SUBJECTIVE & OBJECTIVE
Medications Prior to Admission   Medication Sig Dispense Refill Last Dose    apixaban (ELIQUIS) 5 mg Tab Take 1 tablet (5 mg total) by mouth 2 (two) times daily. 180 tablet 3     ascorbic acid, vitamin C, (VITAMIN C) 1000 MG tablet Take 1,000 mg by mouth once daily.       b complex vitamins capsule Take 1 capsule by mouth once daily.       benazepriL (LOTENSIN) 10 MG tablet TAKE 1 TABLET BY MOUTH  TWICE DAILY 180 tablet 3     carvediloL (COREG) 6.25 MG tablet TAKE 1 TABLET BY MOUTH  TWICE DAILY WITH MEALS 180 tablet 3     cetirizine (ZYRTEC) 10 MG tablet Take 10 mg by mouth daily as needed. As needed       chlorthalidone (HYGROTEN) 25 MG Tab TAKE 1 TABLET BY MOUTH ONCE  DAILY 90 tablet 3     cholecalciferol, vitamin D3, 125 mcg (5,000 unit) Tab Take 1 tablet by mouth once daily.       cinnamon bark 500 mg capsule Take 1,000 mg by mouth once daily.       estradiol (ESTRACE) 1 MG tablet Take 1 mg by mouth once daily.       fluticasone propionate (FLONASE) 50 mcg/actuation nasal spray USE 1 SPRAY IN BOTH  NOSTRILS TWICE DAILY 48 g 2     furosemide (LASIX) 20 MG tablet TAKE 1 TABLET BY MOUTH DAILY AS  NEEDED (FLUID) 90 tablet 3     glucosamine-chondroitin 500-400 mg tablet Take 1 tablet by mouth 2 (two) times daily.       isosorbide mononitrate (ISMO,MONOKET) 10 mg tablet Take 1 tablet (10 mg total) by mouth 2 (two) times daily. 180 tablet 1     levothyroxine (SYNTHROID) 50 MCG tablet Take 1 tablet (50 mcg total) by mouth before breakfast. 90 tablet 3     loperamide HCl (IMODIUM A-D ORAL) As needed       melatonin 10 mg Tab Take by mouth. As needed       multivitamin capsule Take 1 capsule by mouth once daily.       potassium chloride (MICRO-K) 10 MEQ CpSR TAKE 1 CAPSULE BY MOUTH TWICE  DAILY 180 capsule 1     pravastatin (PRAVACHOL) 20 MG tablet TAKE 1 TABLET BY MOUTH IN THE  EVENING 90 tablet 3     vit A/vit C/vit E/zinc/copper (ICAPS AREDS ORAL) Take by mouth.          Review of patient's allergies indicates:    Allergen Reactions    Latex, natural rubber      rash    Biaxin [clarithromycin] Diarrhea and Nausea Only    Codeine Itching    Oxycodone      Knocks pt out for 3 days    Metformin Diarrhea       Past Medical History:   Diagnosis Date    Allergy     latex    Allergy     seasonal    Arthritis     Blood transfusion     Cataract     removed    Hypertension     IBS (irritable bowel syndrome)     Obstructive sleep apnea     Thyroid nodule 09/29/2016    Ulcer      Past Surgical History:   Procedure Laterality Date    ANGIOGRAM, CORONARY, WITH LEFT HEART CATHETERIZATION N/A 12/14/2021    Procedure: Angiogram, Coronary, with Left Heart Cath;  Surgeon: Napoleon Dumas MD;  Location: [facility] CATH/EP LAB;  Service: Cardiology;  Laterality: N/A;    CAROTID ARTERY ANGIOPLASTY Right 09/30/2016    CHOLECYSTECTOMY      EYE SURGERY      cataract removal    HYSTERECTOMY       Family History       Problem Relation (Age of Onset)    Alzheimer's disease Sister    Breast cancer Maternal Aunt    Cancer Brother, Brother    Heart disease Mother, Sister    Kidney disease Brother    Stroke Mother          Tobacco Use    Smoking status: Never    Smokeless tobacco: Never   Substance and Sexual Activity    Alcohol use: Yes     Comment: social    Drug use: No    Sexual activity: Not on file     Objective:     Weight: 64.4 kg (142 lb)  Body mass index is 22.92 kg/m².  Vital Signs (Most Recent):  Temp: 97.6 °F (36.4 °C) (04/11/24 1631)  Pulse: 81 (04/11/24 1631)  Resp: (!) 23 (04/11/24 1631)  BP: (!) 191/81 (04/11/24 1626)  SpO2: 97 % (04/11/24 1631) Vital Signs (24h Range):  Temp:  [97.6 °F (36.4 °C)] 97.6 °F (36.4 °C)  Pulse:  [67-81] 81  Resp:  [13-23] 23  SpO2:  [93 %-98 %] 97 %  BP: (156-223)/() 191/81     Date 04/11/24 0700 - 04/12/24 0659   Shift 5818-2633 4786-0203 1290-9468 24 Hour Total   INTAKE   I.V.(mL/kg)  22.5(0.3)  22.5(0.3)   Shift Total(mL/kg)  22.5(0.3)  22.5(0.3)   OUTPUT   Shift Total(mL/kg)       Weight (kg) 64.4 64.4  64.4 64.4       Neurosurgery Physical Exam  General: well developed, well nourished, no distress.   Head: normocephalic  Neurologic: Alert and oriented. Thought content appropriate.  GCS: E4 V5 M6; Total: 15  Mental Status: Awake, Alert, Oriented to self, month, year and [place] president.  Language: No aphasia  Speech: No dysarthria  Cranial nerves: face symmetric, tongue midline, CN II-XII grossly intact.   Eyes: pupils equal, round, reactive to light, EOMI.   Pulmonary: normal respirations, no signs of respiratory distress  Skin: Skin is warm, dry and intact.    Motor Strength: Moves all extremities spontaneously with good tone.  No abnormal movements seen.     Finger-to-nose: intact bilaterally   Pronator drift: absent bilaterally     Significant Labs:  Recent Labs   Lab 04/11/24  1434   *      K 3.5   CL 96   CO2 34*   BUN 25*   CREATININE 0.9   CALCIUM 10.3   MG 1.7     Recent Labs   Lab 04/11/24  1434   WBC 8.59   HGB 12.6   HCT 39.0        Recent Labs   Lab 04/11/24  1434   INR 1.1   APTT 29.5     Microbiology Results (last 7 days)       ** No results found for the last 168 hours. **

## 2024-04-11 NOTE — CONSULTS
[facility]  Neurosurgery  Consult Note    Inpatient consult to Neurosurgery  Consult performed by: Yasmin Morales PA-C  Consult ordered by: Dimas Harkins DO  Reason for consult: Subarachnoid hemorrhage/hemorrhagic contusion        Subjective:     Chief Complaint/Reason for Admission:  Hemorrhagic contusion    History of Present Illness: Jacqueline Mathias is a 84-year-old female with past medical history of obstructive sleep apnea treated with CPAP, asthma, hypothyroidism, hyperlipidemia,  type 2 diabetes, irritable bowel syndrome, peptic ulcer disease, and atrial fibrillation treated with Eliquis presented to [facility] Emergency Department due to a mechanical fall that took place 4 days prior to Emergency Department encounter.  CT head revealed inferior right frontal lobe hemorrhagic contusion with trace subarachnoid hemorrhage.  Neurosurgery consulted for subarachnoid hemorrhage.    On initiation of encounter, very pleasant patient was found in bed, awake and alert with nurses at bedside.  Patient reports since the fall she has been having intermittent headaches, dizziness, nausea and blurry vision.  However, today she only reports a headache which has improved since initial injury.  She denies loss of consciousness, nausea, vomiting or new extremity weakness    Medications Prior to Admission   Medication Sig Dispense Refill Last Dose    apixaban (ELIQUIS) 5 mg Tab Take 1 tablet (5 mg total) by mouth 2 (two) times daily. 180 tablet 3     ascorbic acid, vitamin C, (VITAMIN C) 1000 MG tablet Take 1,000 mg by mouth once daily.       b complex vitamins capsule Take 1 capsule by mouth once daily.       benazepriL (LOTENSIN) 10 MG tablet TAKE 1 TABLET BY MOUTH  TWICE DAILY 180 tablet 3     carvediloL (COREG) 6.25 MG tablet TAKE 1 TABLET BY MOUTH  TWICE DAILY WITH MEALS 180 tablet 3     cetirizine (ZYRTEC) 10 MG tablet Take 10 mg by mouth daily as needed. As needed       chlorthalidone (HYGROTEN) 25  MG Tab TAKE 1 TABLET BY MOUTH ONCE  DAILY 90 tablet 3     cholecalciferol, vitamin D3, 125 mcg (5,000 unit) Tab Take 1 tablet by mouth once daily.       cinnamon bark 500 mg capsule Take 1,000 mg by mouth once daily.       estradiol (ESTRACE) 1 MG tablet Take 1 mg by mouth once daily.       fluticasone propionate (FLONASE) 50 mcg/actuation nasal spray USE 1 SPRAY IN BOTH  NOSTRILS TWICE DAILY 48 g 2     furosemide (LASIX) 20 MG tablet TAKE 1 TABLET BY MOUTH DAILY AS  NEEDED (FLUID) 90 tablet 3     glucosamine-chondroitin 500-400 mg tablet Take 1 tablet by mouth 2 (two) times daily.       isosorbide mononitrate (ISMO,MONOKET) 10 mg tablet Take 1 tablet (10 mg total) by mouth 2 (two) times daily. 180 tablet 1     levothyroxine (SYNTHROID) 50 MCG tablet Take 1 tablet (50 mcg total) by mouth before breakfast. 90 tablet 3     loperamide HCl (IMODIUM A-D ORAL) As needed       melatonin 10 mg Tab Take by mouth. As needed       multivitamin capsule Take 1 capsule by mouth once daily.       potassium chloride (MICRO-K) 10 MEQ CpSR TAKE 1 CAPSULE BY MOUTH TWICE  DAILY 180 capsule 1     pravastatin (PRAVACHOL) 20 MG tablet TAKE 1 TABLET BY MOUTH IN THE  EVENING 90 tablet 3     vit A/vit C/vit E/zinc/copper (ICAPS AREDS ORAL) Take by mouth.          Review of patient's allergies indicates:   Allergen Reactions    Latex, natural rubber      rash    Biaxin [clarithromycin] Diarrhea and Nausea Only    Codeine Itching    Oxycodone      Knocks pt out for 3 days    Metformin Diarrhea       Past Medical History:   Diagnosis Date    Allergy     latex    Allergy     seasonal    Arthritis     Blood transfusion     Cataract     removed    Hypertension     IBS (irritable bowel syndrome)     Obstructive sleep apnea     Thyroid nodule 09/29/2016    Ulcer      Past Surgical History:   Procedure Laterality Date    ANGIOGRAM, CORONARY, WITH LEFT HEART CATHETERIZATION N/A 12/14/2021    Procedure: Angiogram, Coronary, with Left Heart Cath;   Surgeon: Napoleon Dumas MD;  Location: [facility] CATH/EP LAB;  Service: Cardiology;  Laterality: N/A;    CAROTID ARTERY ANGIOPLASTY Right 09/30/2016    CHOLECYSTECTOMY      EYE SURGERY      cataract removal    HYSTERECTOMY       Family History       Problem Relation (Age of Onset)    Alzheimer's disease Sister    Breast cancer Maternal Aunt    Cancer Brother, Brother    Heart disease Mother, Sister    Kidney disease Brother    Stroke Mother          Tobacco Use    Smoking status: Never    Smokeless tobacco: Never   Substance and Sexual Activity    Alcohol use: Yes     Comment: social    Drug use: No    Sexual activity: Not on file     Objective:     Weight: 64.4 kg (142 lb)  Body mass index is 22.92 kg/m².  Vital Signs (Most Recent):  Temp: 97.6 °F (36.4 °C) (04/11/24 1631)  Pulse: 81 (04/11/24 1631)  Resp: (!) 23 (04/11/24 1631)  BP: (!) 191/81 (04/11/24 1626)  SpO2: 97 % (04/11/24 1631) Vital Signs (24h Range):  Temp:  [97.6 °F (36.4 °C)] 97.6 °F (36.4 °C)  Pulse:  [67-81] 81  Resp:  [13-23] 23  SpO2:  [93 %-98 %] 97 %  BP: (156-223)/() 191/81     Date 04/11/24 0700 - 04/12/24 0659   Shift [id number] 1989-0471 7537-7540 24 Hour Total   INTAKE   I.V.(mL/kg)  22.5(0.3)  22.5(0.3)   Shift Total(mL/kg)  22.5(0.3)  22.5(0.3)   OUTPUT   Shift Total(mL/kg)       Weight (kg) 64.4 64.4 64.4 64.4       Neurosurgery Physical Exam  General: well developed, well nourished, no distress.   Head: normocephalic  Neurologic: Alert and oriented. Thought content appropriate.  GCS: E4 V5 M6; Total: 15  Mental Status: Awake, Alert, Oriented to self, month, year and [place]. president.  Language: No aphasia  Speech: No dysarthria  Cranial nerves: face symmetric, tongue midline, CN II-XII grossly intact.   Eyes: pupils equal, round, reactive to light, EOMI.   Pulmonary: normal respirations, no signs of respiratory distress  Skin: Skin is warm, dry and intact.    Motor Strength: Moves all extremities spontaneously with good tone.  No  abnormal movements seen.     Finger-to-nose: intact bilaterally   Pronator drift: absent bilaterally     Significant Labs:  Recent Labs   Lab 04/11/24  1434   *      K 3.5   CL 96   CO2 34*   BUN 25*   CREATININE 0.9   CALCIUM 10.3   MG 1.7     Recent Labs   Lab 04/11/24  1434   WBC 8.59   HGB 12.6   HCT 39.0        Recent Labs   Lab 04/11/24  1434   INR 1.1   APTT 29.5     Microbiology Results (last 7 days)       ** No results found for the last 168 hours. **            Assessment/Plan:     Focal hemorrhagic contusion of cerebrum  Jacqueline Mathias is a 84-year-old female presented to [facility] Emergency Department due to a mechanical fall that took place 4 days prior to Emergency Department encounter.  CT head revealed inferior right frontal lobe hemorrhagic contusion with trace subarachnoid hemorrhage.  Neurosurgery consulted for subarachnoid hemorrhage.    Neurologically intact.    No surgical intervention warranted at this time    --Repeat head CT tomorrow in a.m.  --Reverse Eliquis and hold   --Neuro checks Q 1 hour.  --SBP <160  --Na >135  --HOB >30  --Diet okay  --Continue to monitor clinically, notify NSGY immediately with any changes in neuro status    Discussed with Dr. Horton        Thank you for your consult. I will follow-up with patient. Please contact us if you have any additional questions.    NALINI MONTES PA-C  Neurosurgery  [facility]

## 2024-04-11 NOTE — HPI
Jacqueline Mathias is a 84-year-old female with past medical history of obstructive sleep apnea treated with CPAP, asthma, hypothyroidism, hyperlipidemia,  type 2 diabetes, irritable bowel syndrome, peptic ulcer disease, and atrial fibrillation treated with Eliquis presented to [facility] Emergency Department due to a mechanical fall that took place 4 days prior to Emergency Department encounter.  CT head revealed inferior right frontal lobe hemorrhagic contusion with trace some arachnoid hemorrhage.  Neurosurgery consulted for subarachnoid hemorrhage.    On initiation of encounter, very pleasant patient was found in bed, awake and alert with nurses at bedside.  Patient reports since the fall she has been having intermittent headaches, dizziness, nausea and blurry vision.  However, today she only reports a headache which has improved since initial injury.  She denies loss of consciousness, nausea, vomiting or new extremity weakness

## 2024-04-11 NOTE — H&P
[facility] - Emergency Dept  Hospital Medicine  History & Physical    Patient Name: Jacqueline Mathias  MRN: [id number]  Patient Class: IP- Inpatient  Admission Date: 4/11/2024  Attending Physician: patient and ER records  Primary Care Provider: Brice Weaver Jr., MD         Patient information was obtained from patient and ER records.     Subjective:     Principal Problem:SAH (subarachnoid hemorrhage)    Chief Complaint:   Chief Complaint   Patient presents with    Head Injury     Pt fell hit head two days ago is on blood thinners has knot to back of head.         HPI: 84-year-old female with past medical history of AFib on Eliquis who presented to the ER because of pain at the back of her head.  About 3 days ago specifically on Monday April 8, patient has stepped and fall in her house.  She falls backwards hitting her buttock and head on the floor.  She did not lose consciousness, and can remember the whole event.  Shortly after she hit her head she started to experience dizziness, nausea and blurry vision.   help her stand up and she sat on the couch for the rest of the day.  Soon after she started to develop headache occipital, sharp, 8/10 on pain scale.  Symptoms of blurry vision, nausea and dizziness resolved, but patient has been having headache as well as pain at the back of her scalp on and off for the last 3 days.  Today, the pain at the back of her head got worse.  She went to an urgent Care, and they advised her to go to the ER for evaluation.    In the ER, vitals showed a blood pressure of 180/77, heart rate of 68, afebrile.  CBC and CMP are within normal limits.  Troponin is negative.  EKG showed normal sinus rhythm, with incomplete right bundle branch block.  EKG with no ischemic changes. CT head showed 1.4 cm round hyperattenuating focus at the inferior right frontal lobe with mild surrounding vasogenic edema, and a probable trace amount of subarachnoid hemorrhage in the  anterior temporal lobe on the right.  Neurosurgery was consulted, case was discussed with the ER provider.  Neurosurgery recommended admission to ICU with Q 1 neuro check, and to give patient Kcentra, with repeat CT head in the morning.  Patient will be admitted to Medicine for further care.        Past Medical History:   Diagnosis Date    Allergy     latex    Allergy     seasonal    Arthritis     Blood transfusion     Cataract     removed    Hypertension     IBS (irritable bowel syndrome)     Obstructive sleep apnea     Thyroid nodule 09/29/2016    Ulcer        Past Surgical History:   Procedure Laterality Date    ANGIOGRAM, CORONARY, WITH LEFT HEART CATHETERIZATION N/A 12/14/2021    Procedure: Angiogram, Coronary, with Left Heart Cath;  Surgeon: Napoleon Dumas MD;  Location: [facility] CATH/EP LAB;  Service: Cardiology;  Laterality: N/A;    CAROTID ARTERY ANGIOPLASTY Right 09/30/2016    CHOLECYSTECTOMY      EYE SURGERY      cataract removal    HYSTERECTOMY         Review of patient's allergies indicates:   Allergen Reactions    Latex, natural rubber      rash    Biaxin [clarithromycin] Diarrhea and Nausea Only    Codeine Itching    Oxycodone      Knocks pt out for 3 days    Metformin Diarrhea       No current facility-administered medications on file prior to encounter.     Current Outpatient Medications on File Prior to Encounter   Medication Sig    apixaban (ELIQUIS) 5 mg Tab Take 1 tablet (5 mg total) by mouth 2 (two) times daily.    ascorbic acid, vitamin C, (VITAMIN C) 1000 MG tablet Take 1,000 mg by mouth once daily.    b complex vitamins capsule Take 1 capsule by mouth once daily.    benazepriL (LOTENSIN) 10 MG tablet TAKE 1 TABLET BY MOUTH  TWICE DAILY    carvediloL (COREG) 6.25 MG tablet TAKE 1 TABLET BY MOUTH  TWICE DAILY WITH MEALS    cetirizine (ZYRTEC) 10 MG tablet Take 10 mg by mouth daily as needed. As needed    chlorthalidone (HYGROTEN) 25 MG Tab TAKE 1 TABLET BY MOUTH ONCE  DAILY    cholecalciferol,  vitamin D3, 125 mcg (5,000 unit) Tab Take 1 tablet by mouth once daily.    cinnamon bark 500 mg capsule Take 1,000 mg by mouth once daily.    estradiol (ESTRACE) 1 MG tablet Take 1 mg by mouth once daily.    fluticasone propionate (FLONASE) 50 mcg/actuation nasal spray USE 1 SPRAY IN BOTH  NOSTRILS TWICE DAILY    furosemide (LASIX) 20 MG tablet TAKE 1 TABLET BY MOUTH DAILY AS  NEEDED (FLUID)    glucosamine-chondroitin 500-400 mg tablet Take 1 tablet by mouth 2 (two) times daily.    isosorbide mononitrate (ISMO,MONOKET) 10 mg tablet Take 1 tablet (10 mg total) by mouth 2 (two) times daily.    levothyroxine (SYNTHROID) 50 MCG tablet Take 1 tablet (50 mcg total) by mouth before breakfast.    loperamide HCl (IMODIUM A-D ORAL) As needed    melatonin 10 mg Tab Take by mouth. As needed    multivitamin capsule Take 1 capsule by mouth once daily.    potassium chloride (MICRO-K) 10 MEQ CpSR TAKE 1 CAPSULE BY MOUTH TWICE  DAILY    pravastatin (PRAVACHOL) 20 MG tablet TAKE 1 TABLET BY MOUTH IN THE  EVENING    vit A/vit C/vit E/zinc/copper (ICAPS AREDS ORAL) Take by mouth.     Family History       Problem Relation (Age of Onset)    Alzheimer's disease Sister    Breast cancer Maternal Aunt    Cancer Brother, Brother    Heart disease Mother, Sister    Kidney disease Brother    Stroke Mother          Tobacco Use    Smoking status: Never    Smokeless tobacco: Never   Substance and Sexual Activity    Alcohol use: Yes     Comment: social    Drug use: No    Sexual activity: Not on file     Review of Systems   Constitutional:  Negative for fatigue.   HENT:  Negative for sore throat.         Soreness at the back of her head.    Respiratory:  Negative for cough and shortness of breath.    Cardiovascular:  Negative for chest pain and palpitations.   Gastrointestinal:  Negative for abdominal pain, nausea and vomiting.   Genitourinary:  Negative for dysuria, frequency and urgency.   Musculoskeletal:  Positive for myalgias.   Skin:          Scab at the back of scalp.    Neurological:  Negative for dizziness and syncope.   Psychiatric/Behavioral:  Negative for confusion.      Objective:     Vital Signs (Most Recent):  Temp: 97.6 °F (36.4 °C) (04/11/24 1059)  Pulse: 67 (04/11/24 1515)  Resp: 16 (04/11/24 1515)  BP: (!) 185/79 (04/11/24 1515)  SpO2: 98 % (04/11/24 1515) Vital Signs (24h Range):  Temp:  [97.6 °F (36.4 °C)] 97.6 °F (36.4 °C)  Pulse:  [67-71] 67  Resp:  [16-21] 16  SpO2:  [93 %-98 %] 98 %  BP: (180-223)/() 185/79     Weight: 64.4 kg (142 lb)  Body mass index is 22.92 kg/m².     Physical Exam  Vitals reviewed.   Constitutional:       Appearance: Normal appearance.   HENT:      Head: Normocephalic and atraumatic.      Mouth/Throat:      Mouth: Mucous membranes are moist.   Eyes:      Extraocular Movements: Extraocular movements intact.      Conjunctiva/sclera: Conjunctivae normal.      Pupils: Pupils are equal, round, and reactive to light.   Cardiovascular:      Rate and Rhythm: Normal rate and regular rhythm.   Pulmonary:      Effort: Pulmonary effort is normal.      Breath sounds: Normal breath sounds.   Abdominal:      General: Abdomen is flat. Bowel sounds are normal.      Palpations: Abdomen is soft.   Musculoskeletal:         General: No swelling or tenderness.      Cervical back: Normal range of motion and neck supple.   Skin:     Comments: Bloody Scab at the back of the head.    Neurological:      General: No focal deficit present.      Mental Status: She is alert and oriented to person, place, and time.   Psychiatric:         Mood and Affect: Mood normal.         Behavior: Behavior normal.              CRANIAL NERVES     CN III, IV, VI   Pupils are equal, round, and reactive to light.       Significant Labs: All pertinent labs within the past 24 hours have been reviewed.  CBC:   Recent Labs   Lab 04/11/24  1434   WBC 8.59   HGB 12.6   HCT 39.0        CMP:   Recent Labs   Lab 04/11/24  1434      K 3.5   CL 96   CO2  "34*   *   BUN 25*   CREATININE 0.9   CALCIUM 10.3   PROT 8.7*   ALBUMIN 4.7   BILITOT 0.6   ALKPHOS 58   AST 16   ALT 13   ANIONGAP 8     Cardiac Markers: No results for input(s): "CKMB", "MYOGLOBIN", "BNP", "TROPISTAT" in the last 48 hours.  Coagulation:   Recent Labs   Lab 04/11/24  1434   INR 1.1   APTT 29.5     Lactic Acid: No results for input(s): "LACTATE" in the last 48 hours.  Lipase: No results for input(s): "LIPASE" in the last 48 hours.  Lipid Panel: No results for input(s): "CHOL", "HDL", "LDLCALC", "TRIG", "CHOLHDL" in the last 48 hours.  Magnesium:   Recent Labs   Lab 04/11/24  1434   MG 1.7     Troponin:   Recent Labs   Lab 04/11/24  1434   TROPONINIHS 10.9     TSH:   Recent Labs   Lab 04/01/24  0746   TSH 1.798     Urine Culture: No results for input(s): "LABURIN" in the last 48 hours.  Urine Studies: No results for input(s): "COLORU", "APPEARANCEUA", "PHUR", "SPECGRAV", "PROTEINUA", "GLUCUA", "KETONESU", "BILIRUBINUA", "OCCULTUA", "NITRITE", "UROBILINOGEN", "LEUKOCYTESUR", "RBCUA", "WBCUA", "BACTERIA", "SQUAMEPITHEL", "HYALINECASTS" in the last 48 hours.    Invalid input(s): "WRIGHTSUR"    Significant Imaging: I have reviewed all pertinent imaging results/findings within the past 24 hours.  Assessment/Plan:     * SAH (subarachnoid hemorrhage)  CT head showed probable trace amount of subarachnoid hemorrhage in the anterior temporal lobe on the right.  Admit pt to ICU.   Q 1 neuro check.  Kcentra ordered.  Repeat CT head in the morning.  Will start pt on nicardipine for her bp of 190. Goal to keep SBP<140.  Will consult PT/OT.    Focal hemorrhagic contusion of cerebrum  CT head showed 1.4 cm round hyperattenuating focus at the inferior right frontal lobe with mild surrounding vasogenic edema. In the setting of trauma, this likely reflects a hemorrhagic contusion.   Please see above for plan of care.       Fall  Sound mechanical fall, with no chest pain dizziness or shortness a breath prior to " the fall.  EKG with no ischemic changes.  Troponin is negative.  CT head as discussed above.  Order CT C-spine.  PT/OT.      Atrial fibrillation  Will hold Eliquis for now.  Please follow up Neurosurgery recommendation regarding AC.  Patient was on Coreg for rate control.  Her heart rate currently in the 60s and 70s.  Continue to hold while being on the Cardene drip.    Scab  Patient has a scab at the back of her head secondary to the fall.  We will consult Wound Care.      Accelerated hypertension  Chronic, uncontrolled. Latest blood pressure and vitals reviewed-     Temp:  [97.6 °F (36.4 °C)]   Pulse:  [67-71]   Resp:  [16-21]   BP: (180-223)/()   SpO2:  [93 %-98 %] .   Home meds for hypertension were reviewed and noted below.   Hypertension Medications               benazepriL (LOTENSIN) 10 MG tablet TAKE 1 TABLET BY MOUTH  TWICE DAILY    carvediloL (COREG) 6.25 MG tablet TAKE 1 TABLET BY MOUTH  TWICE DAILY WITH MEALS    chlorthalidone (HYGROTEN) 25 MG Tab TAKE 1 TABLET BY MOUTH ONCE  DAILY    furosemide (LASIX) 20 MG tablet TAKE 1 TABLET BY MOUTH DAILY AS  NEEDED (FLUID)    isosorbide mononitrate (ISMO,MONOKET) 10 mg tablet Take 1 tablet (10 mg total) by mouth 2 (two) times daily.            While in the hospital, will manage blood pressure as follows; will start patient on Cardene drip for a goal to keep systolic blood pressure less than 140 given her current brain bleed.  Patient was actually on multiple blood pressure medication at home and per patient, her blood pressure still not controlled.  Primary team to adjust medication prior to discharge.          VTE Risk Mitigation (From admission, onward)           Ordered     IP VTE HIGH RISK PATIENT  Once         04/11/24 1517     Place sequential compression device  Until discontinued         04/11/24 1517                                    Roz Bailey MD  Department of Hospital Medicine  [facility] - Emergency Dept

## 2024-04-12 DIAGNOSIS — I62.00 NONTRAUMATIC SUBDURAL HEMORRHAGE, UNSPECIFIED: Primary | ICD-10-CM

## 2024-04-12 LAB
ALBUMIN SERPL BCP-MCNC: 4.2 G/DL (ref 3.5–5.2)
ALP SERPL-CCNC: 64 U/L (ref 55–135)
ALT SERPL W/O P-5'-P-CCNC: 7 U/L (ref 10–44)
ANION GAP SERPL CALC-SCNC: 10 MMOL/L (ref 8–16)
AST SERPL-CCNC: 17 U/L (ref 10–40)
BASOPHILS # BLD AUTO: 0.02 K/UL (ref 0–0.2)
BASOPHILS NFR BLD: 0.3 % (ref 0–1.9)
BILIRUB SERPL-MCNC: 0.5 MG/DL (ref 0.1–1)
BUN SERPL-MCNC: 23 MG/DL (ref 8–23)
CALCIUM SERPL-MCNC: 9.4 MG/DL (ref 8.7–10.5)
CHLORIDE SERPL-SCNC: 99 MMOL/L (ref 95–110)
CO2 SERPL-SCNC: 28 MMOL/L (ref 23–29)
CREAT SERPL-MCNC: 0.8 MG/DL (ref 0.5–1.4)
DIFFERENTIAL METHOD BLD: NORMAL
EOSINOPHIL # BLD AUTO: 0.1 K/UL (ref 0–0.5)
EOSINOPHIL NFR BLD: 1.2 % (ref 0–8)
ERYTHROCYTE [DISTWIDTH] IN BLOOD BY AUTOMATED COUNT: 13.9 % (ref 11.5–14.5)
EST. GFR  (NO RACE VARIABLE): >60 ML/MIN/1.73 M^2
GLUCOSE SERPL-MCNC: 119 MG/DL (ref 70–110)
HCT VFR BLD AUTO: 37.7 % (ref 37–48.5)
HGB BLD-MCNC: 12.2 G/DL (ref 12–16)
IMM GRANULOCYTES # BLD AUTO: 0.04 K/UL (ref 0–0.04)
IMM GRANULOCYTES NFR BLD AUTO: 0.5 % (ref 0–0.5)
LYMPHOCYTES # BLD AUTO: 2.1 K/UL (ref 1–4.8)
LYMPHOCYTES NFR BLD: 26.9 % (ref 18–48)
MAGNESIUM SERPL-MCNC: 1.8 MG/DL (ref 1.6–2.6)
MCH RBC QN AUTO: 28.2 PG (ref 27–31)
MCHC RBC AUTO-ENTMCNC: 32.4 G/DL (ref 32–36)
MCV RBC AUTO: 87 FL (ref 82–98)
MONOCYTES # BLD AUTO: 0.7 K/UL (ref 0.3–1)
MONOCYTES NFR BLD: 9.5 % (ref 4–15)
NEUTROPHILS # BLD AUTO: 4.8 K/UL (ref 1.8–7.7)
NEUTROPHILS NFR BLD: 61.6 % (ref 38–73)
NRBC BLD-RTO: 0 /100 WBC
PLATELET # BLD AUTO: 165 K/UL (ref 150–450)
PMV BLD AUTO: 11.8 FL (ref 9.2–12.9)
POTASSIUM SERPL-SCNC: 3.5 MMOL/L (ref 3.5–5.1)
PROT SERPL-MCNC: 7.7 G/DL (ref 6–8.4)
RBC # BLD AUTO: 4.32 M/UL (ref 4–5.4)
SODIUM SERPL-SCNC: 137 MMOL/L (ref 136–145)
WBC # BLD AUTO: 7.8 K/UL (ref 3.9–12.7)

## 2024-04-12 PROCEDURE — 94660 CPAP INITIATION&MGMT: CPT

## 2024-04-12 PROCEDURE — 83735 ASSAY OF MAGNESIUM: CPT | Performed by: HOSPITALIST

## 2024-04-12 PROCEDURE — 36415 COLL VENOUS BLD VENIPUNCTURE: CPT | Performed by: HOSPITALIST

## 2024-04-12 PROCEDURE — 99232 SBSQ HOSP IP/OBS MODERATE 35: CPT | Mod: ,,, | Performed by: HEALTH CARE PROVIDER

## 2024-04-12 PROCEDURE — 97116 GAIT TRAINING THERAPY: CPT

## 2024-04-12 PROCEDURE — 85025 COMPLETE CBC W/AUTO DIFF WBC: CPT | Performed by: HOSPITALIST

## 2024-04-12 PROCEDURE — 27000221 HC OXYGEN, UP TO 24 HOURS

## 2024-04-12 PROCEDURE — 80053 COMPREHEN METABOLIC PANEL: CPT | Performed by: HOSPITALIST

## 2024-04-12 PROCEDURE — 97165 OT EVAL LOW COMPLEX 30 MIN: CPT

## 2024-04-12 PROCEDURE — 20000000 HC ICU ROOM

## 2024-04-12 PROCEDURE — 63600175 PHARM REV CODE 636 W HCPCS: Performed by: HOSPITALIST

## 2024-04-12 PROCEDURE — 94761 N-INVAS EAR/PLS OXIMETRY MLT: CPT

## 2024-04-12 PROCEDURE — 25000003 PHARM REV CODE 250: Performed by: HOSPITALIST

## 2024-04-12 PROCEDURE — 97535 SELF CARE MNGMENT TRAINING: CPT

## 2024-04-12 PROCEDURE — 99900035 HC TECH TIME PER 15 MIN (STAT)

## 2024-04-12 PROCEDURE — 97161 PT EVAL LOW COMPLEX 20 MIN: CPT

## 2024-04-12 PROCEDURE — 25000003 PHARM REV CODE 250: Performed by: INTERNAL MEDICINE

## 2024-04-12 RX ORDER — CARVEDILOL 3.12 MG/1
6.25 TABLET ORAL 2 TIMES DAILY
Status: DISCONTINUED | OUTPATIENT
Start: 2024-04-12 | End: 2024-04-13 | Stop reason: HOSPADM

## 2024-04-12 RX ORDER — CHLORTHALIDONE 25 MG/1
25 TABLET ORAL DAILY
Status: DISCONTINUED | OUTPATIENT
Start: 2024-04-12 | End: 2024-04-13

## 2024-04-12 RX ORDER — POTASSIUM CHLORIDE 20 MEQ/1
40 TABLET, EXTENDED RELEASE ORAL ONCE
Status: COMPLETED | OUTPATIENT
Start: 2024-04-12 | End: 2024-04-12

## 2024-04-12 RX ORDER — ISOSORBIDE MONONITRATE 30 MG/1
30 TABLET, EXTENDED RELEASE ORAL DAILY
Status: DISCONTINUED | OUTPATIENT
Start: 2024-04-12 | End: 2024-04-13 | Stop reason: HOSPADM

## 2024-04-12 RX ORDER — LISINOPRIL 10 MG/1
10 TABLET ORAL DAILY
Status: DISCONTINUED | OUTPATIENT
Start: 2024-04-12 | End: 2024-04-13

## 2024-04-12 RX ADMIN — CARVEDILOL 6.25 MG: 3.12 TABLET, FILM COATED ORAL at 08:04

## 2024-04-12 RX ADMIN — MUPIROCIN 1 G: 20 OINTMENT TOPICAL at 08:04

## 2024-04-12 RX ADMIN — LISINOPRIL 10 MG: 10 TABLET ORAL at 08:04

## 2024-04-12 RX ADMIN — POTASSIUM CHLORIDE 40 MEQ: 1500 TABLET, EXTENDED RELEASE ORAL at 05:04

## 2024-04-12 RX ADMIN — CHLORTHALIDONE 25 MG: 25 TABLET ORAL at 08:04

## 2024-04-12 RX ADMIN — NICARDIPINE HYDROCHLORIDE 5 MG/HR: 0.2 INJECTION INTRAVENOUS at 08:04

## 2024-04-12 RX ADMIN — NICARDIPINE HYDROCHLORIDE 7.5 MG/HR: 0.2 INJECTION INTRAVENOUS at 02:04

## 2024-04-12 RX ADMIN — LEVOTHYROXINE SODIUM 50 MCG: 0.03 TABLET ORAL at 06:04

## 2024-04-12 RX ADMIN — POTASSIUM BICARBONATE 50 MEQ: 977.5 TABLET, EFFERVESCENT ORAL at 06:04

## 2024-04-12 RX ADMIN — Medication 800 MG: at 05:04

## 2024-04-12 RX ADMIN — ACETAMINOPHEN 650 MG: 325 TABLET ORAL at 03:04

## 2024-04-12 RX ADMIN — Medication 800 MG: at 06:04

## 2024-04-12 RX ADMIN — PRAVASTATIN SODIUM 20 MG: 20 TABLET ORAL at 08:04

## 2024-04-12 RX ADMIN — ISOSORBIDE MONONITRATE 30 MG: 30 TABLET, EXTENDED RELEASE ORAL at 08:04

## 2024-04-12 NOTE — PT/OT/SLP EVAL
Occupational Therapy   Evaluation and Discharge Note    Name: Jacqueline Mathias  MRN: [id number]  Admitting Diagnosis: SAH (subarachnoid hemorrhage)  Recent Surgery: * No surgery found *      Recommendations:     Discharge Recommendations: No Therapy Indicated  Discharge Equipment Recommendations: none  Barriers to discharge:  None    Assessment:     Jacqueline Mathias is a 84 y.o. female with a medical diagnosis of SAH (subarachnoid hemorrhage). At this time, patient is functioning at their prior level of function and does not require further acute OT services.     Plan:     During this hospitalization, patient does not require further acute OT services.  Please re-consult if situation changes.    Plan of Care Reviewed with: patient    Subjective     Chief Complaint: None  Patient/Family Comments/goals: To go home    Occupational Profile:  Living Environment: lives with spouse  Previous level of function: independent with ADLs, IADLs and driving.  Roles and Routines: Primary homemaker  Equipment Used at home: none  Assistance upon Discharge: Spouse    Pain/Comfort:  Pain Rating 1: 0/10  Pain Rating Post-Intervention 1: 0/10    Patients cultural, spiritual, Tenriism conflicts given the current situation: no    Objective:     Communicated with: nurse prior to session.  Patient found HOB elevated with telemetry, peripheral IV upon OT entry to room.    General Precautions: Standard,  (None)  Orthopedic Precautions: N/A  Braces: N/A  Respiratory Status: Room air     Occupational Performance:    Bed Mobility:    Patient completed Scooting/Bridging with stand by assistance  Patient completed Supine to Sit with stand by assistance  Patient completed Sit to Supine with stand by assistance  Performed unsupported sitting EOB with stand by assistance.    Functional Mobility/Transfers:  Patient completed Sit <> Stand Transfer with stand by assistance  with  no assistive device   Functional Mobility: ambulated 15 feet in the hospital  room with stand by assistance using no AD.    Activities of Daily Living:  Lower Body Dressing: stand by assistance to don/doff socks sitting EOB.    Cognitive/Visual Perceptual:  Cognitive/Psychosocial Skills:     -       Oriented to: Person, Place, Time, and Situation   -       Follows Commands/attention:Follows multistep  commands  -       Communication: clear/fluent  -       Memory: No Deficits noted  -       Safety awareness/insight to disability: intact   -       Mood/Affect/Coping skills/emotional control: Cooperative and Pleasant  Visual/Perceptual:      -Intact Acuity    Physical Exam:  Balance:    -       Sitting/Standing: Stand by Assist  Upper Extremity Range of Motion:     -       Right Upper Extremity: WFL  -       Left Upper Extremity: WFL  Upper Extremity Strength:    -       Right Upper Extremity: WFL  -       Left Upper Extremity: WFL   Strength:    -       Right Upper Extremity: WFL  -       Left Upper Extremity: WFL  Fine Motor Coordination:    -       Intact    AMPAC 6 Click ADL:  AMPAC Total Score: 24    Treatment & Education:  Patient is currently stand by assistance with sitting/standing ADL activity.     Patient left sitting edge of bed with all lines intact and call button in reach    GOALS:   Multidisciplinary Problems       Occupational Therapy Goals       Not on file                    History:     Past Medical History:   Diagnosis Date    Allergy     latex    Allergy     seasonal    Arthritis     Blood transfusion     Cataract     removed    Hypertension     IBS (irritable bowel syndrome)     Obstructive sleep apnea     Thyroid nodule 09/29/2016    Ulcer          Past Surgical History:   Procedure Laterality Date    ANGIOGRAM, CORONARY, WITH LEFT HEART CATHETERIZATION N/A 12/14/2021    Procedure: Angiogram, Coronary, with Left Heart Cath;  Surgeon: Napoleon Dumas MD;  Location: [facility] CATH/EP LAB;  Service: Cardiology;  Laterality: N/A;    CAROTID ARTERY ANGIOPLASTY Right  09/30/2016    CHOLECYSTECTOMY      EYE SURGERY      cataract removal    HYSTERECTOMY         Time Tracking:     OT Date of Treatment: 04/12/24  OT Start Time: 1006  OT Stop Time: 1023  OT Total Time (min): 17 min    Billable Minutes:Evaluation 7  Self Care/Home Management 10    4/12/2024

## 2024-04-12 NOTE — SUBJECTIVE & OBJECTIVE
Interval History: 4/12:  No acute events overnight.  Afebrile.  Blood pressure 115/58.  Pulse 69.  Respirations 17.  Patient was found in bed, awake and alert eating breakfast.  She denies headache, dizziness, visual disturbances or extremity weakness.    Medications:  Continuous Infusions:   nicardipine 5 mg/hr (04/12/24 0818)     Scheduled Meds:   carvediloL  6.25 mg Oral BID    chlorthalidone  25 mg Oral Daily    isosorbide mononitrate  30 mg Oral Daily    levothyroxine  50 mcg Oral Before breakfast    lisinopriL  10 mg Oral Daily    mupirocin   Nasal BID    pravastatin  20 mg Oral QHS     PRN Meds:acetaminophen, acetaminophen, aluminum-magnesium hydroxide-simethicone, dextrose 50%, dextrose 50%, glucagon (human recombinant), glucose, glucose, HYDROcodone-acetaminophen, magnesium oxide, magnesium oxide, melatonin, naloxone, ondansetron, potassium bicarbonate, potassium bicarbonate, potassium bicarbonate, potassium, sodium phosphates, potassium, sodium phosphates, potassium, sodium phosphates, sodium chloride 0.9%       Objective:     Weight: 64.4 kg (142 lb)  Body mass index is 22.92 kg/m².  Vital Signs (Most Recent):  Temp: 98.1 °F (36.7 °C) (04/12/24 0701)  Pulse: 102 (04/12/24 0749)  Resp: (!) 23 (04/12/24 0749)  BP: (!) 115/58 (04/12/24 0701)  SpO2: 95 % (04/12/24 0749) Vital Signs (24h Range):  Temp:  [97.6 °F (36.4 °C)-98.1 °F (36.7 °C)] 98.1 °F (36.7 °C)  Pulse:  [] 102  Resp:  [13-38] 23  SpO2:  [88 %-100 %] 95 %  BP: (115-223)/() 115/58     Date 04/12/24 0700 - 04/13/24 0659   Shift 9210-3828 1106-5893 9306-3994 24 Hour Total   INTAKE   I.V.(mL/kg) 38.1(0.6)   38.1(0.6)   Shift Total(mL/kg) 38.1(0.6)   38.1(0.6)   OUTPUT   Shift Total(mL/kg)       Weight (kg) 64.4 64.4 64.4 64.4           Neurosurgery Physical Exam  General:  No acute distress.  Head: normocephalic, atraumatic  Neurologic: Alert and oriented. Thought content appropriate.  GCS: E4 V5 M6; Total: 15  Mental Status: Awake, Alert,  Oriented to self, month, year and [place] president.  Language: No aphasia  Speech: No dysarthria  Cranial nerves: face symmetric, tongue midline, CN II-XII grossly intact.   Pulmonary: normal respirations, no signs of respiratory distress  Skin: Skin is warm, dry and intact.    Motor Strength: Moves all extremities spontaneously with good tone.  No abnormal movements seen.          Significant Labs:  Recent Labs   Lab 04/11/24  1434 04/12/24  0501   * 119*    137   K 3.5 3.5   CL 96 99   CO2 34* 28   BUN 25* 23   CREATININE 0.9 0.8   CALCIUM 10.3 9.4   MG 1.7 1.8     Recent Labs   Lab 04/11/24  1434 04/12/24  0501   WBC 8.59 7.80   HGB 12.6 12.2   HCT 39.0 37.7    165     Recent Labs   Lab 04/11/24  1434   INR 1.1   APTT 29.5     Microbiology Results (last 7 days)       ** No results found for the last 168 hours. **

## 2024-04-12 NOTE — ASSESSMENT & PLAN NOTE
Mechanical fall, with no chest pain dizziness or shortness a breath prior to the fall.  EKG with no ischemic changes.  Troponin is negative.  CT head as discussed above.  CT cervical spine negative for fracture or dislocation.  PT/OT.

## 2024-04-12 NOTE — HOSPITAL COURSE
4/12:  No acute events overnight.  Afebrile.  Blood pressure 115/58.  Pulse 69.  Respirations 17.  Patient was found in bed, awake and alert eating breakfast.  She denies headache, dizziness, visual disturbances or extremity weakness.

## 2024-04-12 NOTE — PROGRESS NOTES
[facility]  Neurosurgery  Progress Note    Subjective:     History of Present Illness: Jacqueline Mathias is a 84-year-old female with past medical history of obstructive sleep apnea treated with CPAP, asthma, hypothyroidism, hyperlipidemia,  type 2 diabetes, irritable bowel syndrome, peptic ulcer disease, and atrial fibrillation treated with Eliquis presented to [facility] Emergency Department due to a mechanical fall that took place 4 days prior to Emergency Department encounter.  CT head revealed inferior right frontal lobe hemorrhagic contusion with trace some arachnoid hemorrhage.  Neurosurgery consulted for subarachnoid hemorrhage.    On initiation of encounter, very pleasant patient was found in bed, awake and alert with nurses at bedside.  Patient reports since the fall she has been having intermittent headaches, dizziness, nausea and blurry vision.  However, today she only reports a headache which has improved since initial injury.  She denies loss of consciousness, nausea, vomiting or new extremity weakness    Post-Op Info:  * No surgery found *       Interval History: 4/12:  No acute events overnight.  Afebrile.  Blood pressure 115/58.  Pulse 69.  Respirations 17.  Patient was found in bed, awake and alert eating breakfast.  She denies headache, dizziness, visual disturbances or extremity weakness.    Medications:  Continuous Infusions:   nicardipine 5 mg/hr (04/12/24 0818)     Scheduled Meds:   carvediloL  6.25 mg Oral BID    chlorthalidone  25 mg Oral Daily    isosorbide mononitrate  30 mg Oral Daily    levothyroxine  50 mcg Oral Before breakfast    lisinopriL  10 mg Oral Daily    mupirocin   Nasal BID    pravastatin  20 mg Oral QHS     PRN Meds:acetaminophen, acetaminophen, aluminum-magnesium hydroxide-simethicone, dextrose 50%, dextrose 50%, glucagon (human recombinant), glucose, glucose, HYDROcodone-acetaminophen, magnesium oxide, magnesium oxide, melatonin, naloxone, ondansetron,  potassium bicarbonate, potassium bicarbonate, potassium bicarbonate, potassium, sodium phosphates, potassium, sodium phosphates, potassium, sodium phosphates, sodium chloride 0.9%       Objective:     Weight: 64.4 kg (142 lb)  Body mass index is 22.92 kg/m².  Vital Signs (Most Recent):  Temp: 98.1 °F (36.7 °C) (04/12/24 0701)  Pulse: 102 (04/12/24 0749)  Resp: (!) 23 (04/12/24 0749)  BP: (!) 115/58 (04/12/24 0701)  SpO2: 95 % (04/12/24 0749) Vital Signs (24h Range):  Temp:  [97.6 °F (36.4 °C)-98.1 °F (36.7 °C)] 98.1 °F (36.7 °C)  Pulse:  [] 102  Resp:  [13-38] 23  SpO2:  [88 %-100 %] 95 %  BP: (115-223)/() 115/58     Date 04/12/24 0700 - 04/13/24 0659   Shift 8934-9413 6976-7890 4515-6769 24 Hour Total   INTAKE   I.V.(mL/kg) 38.1(0.6)   38.1(0.6)   Shift Total(mL/kg) 38.1(0.6)   38.1(0.6)   OUTPUT   Shift Total(mL/kg)       Weight (kg) 64.4 64.4 64.4 64.4           Neurosurgery Physical Exam  General:  No acute distress.  Head: normocephalic, atraumatic  Neurologic: Alert and oriented. Thought content appropriate.  GCS: E4 V5 M6; Total: 15  Mental Status: Awake, Alert, Oriented to self, month, year and [place] president.  Language: No aphasia  Speech: No dysarthria  Cranial nerves: face symmetric, tongue midline, CN II-XII grossly intact.   Pulmonary: normal respirations, no signs of respiratory distress  Skin: Skin is warm, dry and intact.    Motor Strength: Moves all extremities spontaneously with good tone.  No abnormal movements seen.          Significant Labs:  Recent Labs   Lab 04/11/24  1434 04/12/24  0501   * 119*    137   K 3.5 3.5   CL 96 99   CO2 34* 28   BUN 25* 23   CREATININE 0.9 0.8   CALCIUM 10.3 9.4   MG 1.7 1.8     Recent Labs   Lab 04/11/24  1434 04/12/24  0501   WBC 8.59 7.80   HGB 12.6 12.2   HCT 39.0 37.7    165     Recent Labs   Lab 04/11/24  1434   INR 1.1   APTT 29.5     Microbiology Results (last 7 days)       ** No results found for the last 168 hours. **             Assessment/Plan:     Focal hemorrhagic contusion of cerebrum  Jacqueline Mathias is a 84-year-old female presented to [facility] Emergency Department due to a mechanical fall that took place 4 days prior to Emergency Department encounter.  CT head revealed inferior right frontal lobe hemorrhagic contusion with trace subarachnoid hemorrhage.  Neurosurgery consulted for subarachnoid hemorrhage.    Neuro intact.    Repeated CT head today demonstrates stable right frontal lobe hemorrhagic contusion, stable trace right temporal lobe hemorrhage and newly identified tiny hemorrhage in right lateral ventricle posterior horn.    From neurosurgery perspective patient can be discharged home.  Patient will follow-up in 2 weeks with head CT.  Until then patient should hold Eliquis.    Discussed with Dr. Clifford MONTES PA-C  Neurosurgery  [facility]

## 2024-04-12 NOTE — SUBJECTIVE & OBJECTIVE
Interval History: No acute events overnight. Attempting to wean cardene.    Review of Systems   Constitutional:  Negative for activity change, appetite change, chills and fever.   HENT:  Negative for congestion, ear pain, nosebleeds and sinus pain.    Eyes:  Negative for discharge and itching.   Respiratory:  Negative for apnea, cough, chest tightness and shortness of breath.    Cardiovascular:  Negative for chest pain, palpitations and leg swelling.   Gastrointestinal:  Negative for abdominal distention, abdominal pain and vomiting.   Genitourinary:  Negative for difficulty urinating, dysuria, flank pain and frequency.   Musculoskeletal:  Negative for arthralgias, back pain, joint swelling and myalgias.   Skin:  Negative for color change, pallor and rash.        Wound on back of head due to fall.   Neurological:  Positive for headaches. Negative for dizziness, weakness and light-headedness.   Psychiatric/Behavioral:  Negative for agitation, behavioral problems, confusion and suicidal ideas.      Objective:     Vital Signs (Most Recent):  Temp: 98.1 °F (36.7 °C) (04/12/24 0701)  Pulse: 87 (04/12/24 0901)  Resp: (!) 25 (04/12/24 0901)  BP: 138/61 (04/12/24 0901)  SpO2: 96 % (04/12/24 0901) Vital Signs (24h Range):  Temp:  [97.6 °F (36.4 °C)-98.1 °F (36.7 °C)] 98.1 °F (36.7 °C)  Pulse:  [] 87  Resp:  [13-38] 25  SpO2:  [88 %-100 %] 96 %  BP: (115-223)/() 138/61     Weight: 64.4 kg (142 lb)  Body mass index is 22.92 kg/m².    Intake/Output Summary (Last 24 hours) at 4/12/2024 0933  Last data filed at 4/12/2024 0901  Gross per 24 hour   Intake 1302.09 ml   Output 500 ml   Net 802.09 ml         Physical Exam  Vitals reviewed.   Constitutional:       General: She is not in acute distress.     Appearance: Normal appearance. She is normal weight. She is not ill-appearing.   HENT:      Head: Normocephalic and atraumatic.      Nose: Nose normal.      Mouth/Throat:      Mouth: Mucous membranes are moist.       Pharynx: Oropharynx is clear.   Eyes:      General: No scleral icterus.     Extraocular Movements: Extraocular movements intact.      Conjunctiva/sclera: Conjunctivae normal.      Pupils: Pupils are equal, round, and reactive to light.   Cardiovascular:      Rate and Rhythm: Normal rate and regular rhythm.      Pulses: Normal pulses.      Heart sounds: Normal heart sounds. No murmur heard.     No gallop.   Pulmonary:      Effort: Pulmonary effort is normal. No respiratory distress.      Breath sounds: Normal breath sounds. No wheezing, rhonchi or rales.   Chest:      Chest wall: No tenderness.   Abdominal:      General: Abdomen is flat. There is no distension.      Palpations: Abdomen is soft.      Tenderness: There is no abdominal tenderness.   Musculoskeletal:         General: No swelling or tenderness.      Cervical back: Normal range of motion and neck supple. No rigidity or tenderness.      Right lower leg: No edema.      Left lower leg: No edema.   Skin:     General: Skin is warm and dry.      Comments: Wound on back of head in area where patient struck head.   Neurological:      General: No focal deficit present.      Mental Status: She is alert and oriented to person, place, and time. Mental status is at baseline.      Motor: No weakness.   Psychiatric:         Mood and Affect: Mood normal.         Behavior: Behavior normal.         Thought Content: Thought content normal.             Significant Labs: All pertinent labs within the past 24 hours have been reviewed.  BMP:   Recent Labs   Lab 04/12/24  0501   *      K 3.5   CL 99   CO2 28   BUN 23   CREATININE 0.8   CALCIUM 9.4   MG 1.8     CBC:   Recent Labs   Lab 04/11/24  1434 04/12/24  0501   WBC 8.59 7.80   HGB 12.6 12.2   HCT 39.0 37.7    165     CMP:   Recent Labs   Lab 04/11/24  1434 04/12/24  0501    137   K 3.5 3.5   CL 96 99   CO2 34* 28   * 119*   BUN 25* 23   CREATININE 0.9 0.8   CALCIUM 10.3 9.4   PROT 8.7* 7.7    ALBUMIN 4.7 4.2   BILITOT 0.6 0.5   ALKPHOS 58 64   AST 16 17   ALT 13 7*   ANIONGAP 8 10       Significant Imaging: I have reviewed all pertinent imaging results/findings within the past 24 hours.

## 2024-04-12 NOTE — PROGRESS NOTES
[facility] Medicine  Progress Note    Patient Name: Jacqueline Mathias  MRN: [id number]  Patient Class: IP- Inpatient   Admission Date: 4/11/2024  Length of Stay: 1 days  Attending Physician: Adilene Moss MD  Primary Care Provider: Brice Weaver Jr., MD        Subjective:     Principal Problem:SAH (subarachnoid hemorrhage)        HPI:  84-year-old female with past medical history of AFib on Eliquis who presented to the ER because of pain at the back of her head.  About 3 days ago specifically on Monday April 8, patient has stepped and fall in her house.  She falls backwards hitting her buttock and head on the floor.  She did not lose consciousness, and can remember the whole event.  Shortly after she hit her head she started to experience dizziness, nausea and blurry vision.   help her stand up and she sat on the couch for the rest of the day.  Soon after she started to develop headache occipital, sharp, 8/10 on pain scale.  Symptoms of blurry vision, nausea and dizziness resolved, but patient has been having headache as well as pain at the back of her scalp on and off for the last 3 days.  Today, the pain at the back of her head got worse.  She went to an urgent Care, and they advised her to go to the ER for evaluation.    In the ER, vitals showed a blood pressure of 180/77, heart rate of 68, afebrile.  CBC and CMP are within normal limits.  Troponin is negative.  EKG showed normal sinus rhythm, with incomplete right bundle branch block.  EKG with no ischemic changes. CT head showed 1.4 cm round hyperattenuating focus at the inferior right frontal lobe with mild surrounding vasogenic edema, and a probable trace amount of subarachnoid hemorrhage in the anterior temporal lobe on the right.  Neurosurgery was consulted, case was discussed with the ER provider.  Neurosurgery recommended admission to ICU with Q 1 neuro check, and to give patient Kcentra, with repeat CT head in the  morning.  Patient will be admitted to Medicine for further care.        Overview/Hospital Course:  The patient was admitted to ICU for close monitoring. She received Kcenta for eliquis reversal. Neurosurgery had a repeat CT of the head done to reevaluate, and there was one additional small hemorrhage. Neurosurgery aware and finds patient to be stable. Patient noted to have accelerated hypertension, and was placed on a cardene infusion. Her home blood pressure meds were ordered in attempt to wean cardene.  The patient remained neurologically intact.    Interval History: No acute events overnight. Attempting to wean cardene.    Review of Systems   Constitutional:  Negative for activity change, appetite change, chills and fever.   HENT:  Negative for congestion, ear pain, nosebleeds and sinus pain.    Eyes:  Negative for discharge and itching.   Respiratory:  Negative for apnea, cough, chest tightness and shortness of breath.    Cardiovascular:  Negative for chest pain, palpitations and leg swelling.   Gastrointestinal:  Negative for abdominal distention, abdominal pain and vomiting.   Genitourinary:  Negative for difficulty urinating, dysuria, flank pain and frequency.   Musculoskeletal:  Negative for arthralgias, back pain, joint swelling and myalgias.   Skin:  Negative for color change, pallor and rash.        Wound on back of head due to fall.   Neurological:  Positive for headaches. Negative for dizziness, weakness and light-headedness.   Psychiatric/Behavioral:  Negative for agitation, behavioral problems, confusion and suicidal ideas.      Objective:     Vital Signs (Most Recent):  Temp: 98.1 °F (36.7 °C) (04/12/24 0701)  Pulse: 87 (04/12/24 0901)  Resp: (!) 25 (04/12/24 0901)  BP: 138/61 (04/12/24 0901)  SpO2: 96 % (04/12/24 0901) Vital Signs (24h Range):  Temp:  [97.6 °F (36.4 °C)-98.1 °F (36.7 °C)] 98.1 °F (36.7 °C)  Pulse:  [] 87  Resp:  [13-38] 25  SpO2:  [88 %-100 %] 96 %  BP: (115-223)/()  138/61     Weight: 64.4 kg (142 lb)  Body mass index is 22.92 kg/m².    Intake/Output Summary (Last 24 hours) at 4/12/2024 0933  Last data filed at 4/12/2024 0901  Gross per 24 hour   Intake 1302.09 ml   Output 500 ml   Net 802.09 ml         Physical Exam  Vitals reviewed.   Constitutional:       General: She is not in acute distress.     Appearance: Normal appearance. She is normal weight. She is not ill-appearing.   HENT:      Head: Normocephalic and atraumatic.      Nose: Nose normal.      Mouth/Throat:      Mouth: Mucous membranes are moist.      Pharynx: Oropharynx is clear.   Eyes:      General: No scleral icterus.     Extraocular Movements: Extraocular movements intact.      Conjunctiva/sclera: Conjunctivae normal.      Pupils: Pupils are equal, round, and reactive to light.   Cardiovascular:      Rate and Rhythm: Normal rate and regular rhythm.      Pulses: Normal pulses.      Heart sounds: Normal heart sounds. No murmur heard.     No gallop.   Pulmonary:      Effort: Pulmonary effort is normal. No respiratory distress.      Breath sounds: Normal breath sounds. No wheezing, rhonchi or rales.   Chest:      Chest wall: No tenderness.   Abdominal:      General: Abdomen is flat. There is no distension.      Palpations: Abdomen is soft.      Tenderness: There is no abdominal tenderness.   Musculoskeletal:         General: No swelling or tenderness.      Cervical back: Normal range of motion and neck supple. No rigidity or tenderness.      Right lower leg: No edema.      Left lower leg: No edema.   Skin:     General: Skin is warm and dry.      Comments: Wound on back of head in area where patient struck head.   Neurological:      General: No focal deficit present.      Mental Status: She is alert and oriented to person, place, and time. Mental status is at baseline.      Motor: No weakness.   Psychiatric:         Mood and Affect: Mood normal.         Behavior: Behavior normal.         Thought Content: Thought  content normal.             Significant Labs: All pertinent labs within the past 24 hours have been reviewed.  BMP:   Recent Labs   Lab 04/12/24  0501   *      K 3.5   CL 99   CO2 28   BUN 23   CREATININE 0.8   CALCIUM 9.4   MG 1.8     CBC:   Recent Labs   Lab 04/11/24  1434 04/12/24  0501   WBC 8.59 7.80   HGB 12.6 12.2   HCT 39.0 37.7    165     CMP:   Recent Labs   Lab 04/11/24  1434 04/12/24  0501    137   K 3.5 3.5   CL 96 99   CO2 34* 28   * 119*   BUN 25* 23   CREATININE 0.9 0.8   CALCIUM 10.3 9.4   PROT 8.7* 7.7   ALBUMIN 4.7 4.2   BILITOT 0.6 0.5   ALKPHOS 58 64   AST 16 17   ALT 13 7*   ANIONGAP 8 10       Significant Imaging: I have reviewed all pertinent imaging results/findings within the past 24 hours.    Assessment/Plan:      * SAH (subarachnoid hemorrhage)  CT head showed probable trace amount of subarachnoid hemorrhage in the anterior temporal lobe on the right.  Monitor  Q 1 neuro check.  Kcentra given to reverse eliquis  Repeat CT head shows additional small area of hemorrhage. Neurosurgery aware.      Atrial fibrillation  Will hold Eliquis for now.    Restarted home coreg dose    Scab  Patient has a scab at the back of her head secondary to the fall.  Wound care consulted      Fall  Mechanical fall, with no chest pain dizziness or shortness a breath prior to the fall.  EKG with no ischemic changes.  Troponin is negative.  CT head as discussed above.  CT cervical spine negative for fracture or dislocation.  PT/OT.      Focal hemorrhagic contusion of cerebrum  CT head showed 1.4 cm round hyperattenuating focus at the inferior right frontal lobe with mild surrounding vasogenic edema. In the setting of trauma, this likely reflects a hemorrhagic contusion.   Please see above for plan of care.       Accelerated hypertension  Continue cardene drip with weaning when possible  Restarted home bp meds          VTE Risk Mitigation (From admission, onward)           Ordered      IP VTE HIGH RISK PATIENT  Once         04/11/24 1517     Place sequential compression device  Until discontinued         04/11/24 1517                    Discharge Planning   TOLU:      Code Status: Full Code   Is the patient medically ready for discharge?:     Reason for patient still in hospital (select all that apply): Patient trending condition               Critical care time spent on the evaluation and treatment of severe organ dysfunction, review of pertinent labs and imaging studies, discussions with consulting providers and discussions with patient/family: 34 minutes.      Adilene Moss MD, MD  Department of Hospital Medicine   [facility]

## 2024-04-12 NOTE — NURSING
84 yr old female   Skin tear right arm    Clean with chlorhexidine/ns.  Pat dry. Apply Medihoney Cover with adaptic, abd pad, and kerlex.

## 2024-04-12 NOTE — ASSESSMENT & PLAN NOTE
Jacqueline Mathias is a 84-year-old female presented to [facility] Emergency Department due to a mechanical fall that took place 4 days prior to Emergency Department encounter.  CT head revealed inferior right frontal lobe hemorrhagic contusion with trace subarachnoid hemorrhage.  Neurosurgery consulted for subarachnoid hemorrhage.    Neuro intact.    Repeated CT head today demonstrates stable right frontal lobe hemorrhagic contusion, stable trace right temporal lobe hemorrhage and newly identified tiny hemorrhage in right lateral ventricle posterior horn.    From neurosurgery perspective patient can be discharged home.  Patient will follow-up in 2 weeks with head CT.  Until then patient should hold Eliquis.    Discussed with Dr. Horton

## 2024-04-12 NOTE — ASSESSMENT & PLAN NOTE
CT head showed probable trace amount of subarachnoid hemorrhage in the anterior temporal lobe on the right.  Monitor  Q 1 neuro check.  Kcentra given to reverse eliquis  Repeat CT head shows additional small area of hemorrhage. Neurosurgery aware.

## 2024-04-12 NOTE — CARE UPDATE
04/11/24 2300   Patient Assessment/Suction   Level of Consciousness (AVPU) alert   Respiratory Effort Unlabored   Expansion/Accessory Muscles/Retractions no use of accessory muscles   All Lung Fields Breath Sounds clear;equal bilaterally   Rhythm/Pattern, Respiratory no shortness of breath reported   Cough Frequency no cough   PRE-TX-O2   Device (Oxygen Therapy) CPAP   SpO2 100 %   Pulse Oximetry Type Continuous   $ Pulse Oximetry - Multiple Charge Pulse Oximetry - Multiple   Pulse 64   Resp (!) 36   Positioning   Head of Bed (HOB) Positioning HOB elevated;HOB at 30 degrees   Preset CPAP/BiPAP Settings   Mode Of Delivery CPAP   $ CPAP/BiPAP Daily Charge BiPAP/CPAP Daily   $ Initial CPAP/BiPAP Setup? Yes   $ Is patient using? Yes   Size of Mask Small   Sized Appropriately? Yes   Equipment Type DeVilbiss   Airway Device Type small full face mask   Humidifier not applicable   CPAP (cm H2O) 10   Education   $ Education Other (see comment);15 min  (cpap)

## 2024-04-12 NOTE — PT/OT/SLP EVAL
Physical Therapy Evaluation and Discharge Note    Patient Name:  Jacqueline Mathias   MRN:  [id number]    Recommendations:     Discharge Recommendations: No Therapy Indicated  Discharge Equipment Recommendations: none   Barriers to discharge:  medical status    Assessment:     Jacqueline Mathias is a 84 y.o. female admitted with a medical diagnosis of SAH (subarachnoid hemorrhage). .  At this time, patient is functioning at their prior level of function and does not require further acute PT services.     Recent Surgery: * No surgery found *      Plan:     During this hospitalization, patient does not require further acute PT services.  Please re-consult if situation changes.      Subjective     Chief Complaint: occ dizziness with transitional movements but improves is transitions are slow  Patient/Family Comments/goals: discharge home  Pain/Comfort:  Pain Rating 1: 0/10    Patients cultural, spiritual, Confucianist conflicts given the current situation: no    Living Environment:  Pt lives with her  in a one story home with 3 JACLYN and no HR (+)   Prior to admission, patients level of function was Independent.  Equipment used at home: none.  DME owned (not currently used): none.  Upon discharge, patient will have assistance from .    Objective:     Communicated with RN prior to session.  Patient found HOB elevated with telemetry, pulse ox (continuous), peripheral IV, blood pressure cuff upon PT entry to room.    General Precautions: Standard, other (see comments) (none)    Orthopedic Precautions:N/A   Braces: N/A  Respiratory Status: Room air    Exams:  RLE ROM: WFL  RLE Strength: WFL  LLE ROM: WFL  LLE Strength: WFL    Functional Mobility:  Bed Mobility:     Supine to Sit: independence  Sit to Supine: independence  Transfers:     Sit to Stand:  independence with no AD  Gait: 200 ft with no AD and supervision, slow gait pattern due to fear of falling    AM-PAC 6 CLICK MOBILITY  Total Score:23       Treatment  and Education:  Pt educated on POC, discharge recommendation, importance of time OOB, pacing/energy conservation, need for assist with mobility, use of call bell to seek assistance as needed and fall prevention      AM-PAC 6 CLICK MOBILITY  Total Score:23     Patient left HOB elevated with all lines intact, call button in reach, and RN notified.    GOALS:   Multidisciplinary Problems       Physical Therapy Goals       Not on file                    History:     Past Medical History:   Diagnosis Date    Allergy     latex    Allergy     seasonal    Arthritis     Blood transfusion     Cataract     removed    Hypertension     IBS (irritable bowel syndrome)     Obstructive sleep apnea     Thyroid nodule 09/29/2016    Ulcer        Past Surgical History:   Procedure Laterality Date    ANGIOGRAM, CORONARY, WITH LEFT HEART CATHETERIZATION N/A 12/14/2021    Procedure: Angiogram, Coronary, with Left Heart Cath;  Surgeon: Napoleon Dumas MD;  Location: [facility] CATH/EP LAB;  Service: Cardiology;  Laterality: N/A;    CAROTID ARTERY ANGIOPLASTY Right 09/30/2016    CHOLECYSTECTOMY      EYE SURGERY      cataract removal    HYSTERECTOMY         Time Tracking:     PT Received On: 04/12/24  PT Start Time: 1042     PT Stop Time: 1058  PT Total Time (min): 16 min     Billable Minutes: Evaluation 8 and Gait Training 8      04/12/2024

## 2024-04-12 NOTE — HOSPITAL COURSE
The patient was admitted to ICU for close monitoring. She received Kcenta for eliquis reversal. Neurosurgery had a repeat CT of the head done to reevaluate, and there was one additional small hemorrhage. Neurosurgery aware and finds patient to be stable. Patient noted to have accelerated hypertension, and was placed on a cardene infusion. Her home blood pressure meds were ordered in attempt to wean cardene.  The patient remained neurologically intact. Patient was seen by Neurosurgery and cleared by them.  She was restarted on her home blood pressure medicines, and they were adjusted.  Blood pressure improved.  Neurosurgery had an appointment set up for 2 weeks from discharge.  She also had Cardiology follow-up on Wednesday of this coming week.  Patient was told to monitor blood pressure at home and bring that your cardiology appointment for adjustments in medications.  She will have her Eliquis held until she sees Neurosurgery in 2 weeks.

## 2024-04-12 NOTE — PLAN OF CARE
[facility]ly [facility]  Initial Discharge Assessment       Primary Care Provider: Brice Weaver Jr., MD    Admission Diagnosis: SAH (subarachnoid hemorrhage) [I60.9]    Admission Date: 4/11/2024  Expected Discharge Date: 4/15/2024    Transition of Care Barriers: None    Assessment completed at bedside.  Pt intends to discharge home where she lives with her spouse and has the ability to complete adl's without assistance.  Pt denies hh, dialysis, coumadin, hme, services or needs prior to admit.    Payor: MEDICARE / Plan: MEDICARE PART A & B / Product Type: Government /     Extended Emergency Contact Information  Primary Emergency Contact: Edward Mathias  Address[place] [address] [place] [place] [address] [place]  Mobile Phone: [phone]  Relation: Spouse  Preferred language: English   needed? No  Secondary Emergency Contact: Marianne Mcneill  Address: [address] [place] [address] [facility]  Home Phone: [phone]  Mobile Phone: [phone]  Relation: None  Preferred language: English   needed? No    Discharge Plan A: Home  Discharge Plan B: Home with family      [id number] - [place], [place] - [address] AT [facility] [address]  [address]  [place] [address]  Phone: [phone] Fax: [phone]    [facility] ([facility]) - [place] [address] [address]  Phone: [phone] Fax: [phone]    [facility] - [place], [place] - [address]  [address]  [facility] [address]  [facility] [address]  Phone: [phone] Fax: [phone]      Initial Assessment (most recent)       Adult Discharge Assessment - 04/12/24 1059          Discharge Assessment    Assessment Type Discharge Planning Assessment     Confirmed/corrected address, phone number and insurance Yes     Confirmed Demographics  Correct on Facesheet     Source of Information patient     Communicated TOLU with patient/caregiver Yes     Reason For Admission SAH     People in Home spouse     Facility Arrived From: home     Do you expect to return to your current living situation? Yes     Do you have help at home or someone to help you manage your care at home? Yes     Who are your caregiver(s) and their phone number(s)? fadi Mathias     Prior to hospitilization cognitive status: Alert/Oriented     Current cognitive status: Alert/Oriented     Walking or Climbing Stairs Difficulty no     Dressing/Bathing Difficulty no     Equipment Currently Used at Home none     Readmission within 30 days? No     Patient currently being followed by outpatient case management? No     Do you currently have service(s) that help you manage your care at home? No     Is the pt/caregiver preference to resume services with current agency No     Do you take prescription medications? Yes     Do you have prescription coverage? Yes     Coverage BCBS     Do you have any problems affording any of your prescribed medications? No     Is the patient taking medications as prescribed? yes     Who is going to help you get home at discharge? fadi jermain [phone]     How do you get to doctors appointments? car, drives self;family or friend will provide     Are you on dialysis? No     Do you take coumadin? No     Discharge Plan A Home     Discharge Plan B Home with family     DME Needed Upon Discharge  none     Discharge Plan discussed with: Patient     Transition of Care Barriers None

## 2024-04-13 VITALS
SYSTOLIC BLOOD PRESSURE: 149 MMHG | HEIGHT: 66 IN | WEIGHT: 142 LBS | BODY MASS INDEX: 22.82 KG/M2 | OXYGEN SATURATION: 95 % | HEART RATE: 74 BPM | DIASTOLIC BLOOD PRESSURE: 70 MMHG | RESPIRATION RATE: 21 BRPM | TEMPERATURE: 98 F

## 2024-04-13 LAB
ALBUMIN SERPL BCP-MCNC: 3.8 G/DL (ref 3.5–5.2)
ALP SERPL-CCNC: 51 U/L (ref 55–135)
ALT SERPL W/O P-5'-P-CCNC: 10 U/L (ref 10–44)
ANION GAP SERPL CALC-SCNC: 5 MMOL/L (ref 8–16)
AST SERPL-CCNC: 14 U/L (ref 10–40)
BASOPHILS # BLD AUTO: 0.01 K/UL (ref 0–0.2)
BASOPHILS NFR BLD: 0.1 % (ref 0–1.9)
BILIRUB SERPL-MCNC: 0.5 MG/DL (ref 0.1–1)
BUN SERPL-MCNC: 25 MG/DL (ref 8–23)
CALCIUM SERPL-MCNC: 9.5 MG/DL (ref 8.7–10.5)
CHLORIDE SERPL-SCNC: 100 MMOL/L (ref 95–110)
CO2 SERPL-SCNC: 33 MMOL/L (ref 23–29)
CREAT SERPL-MCNC: 0.8 MG/DL (ref 0.5–1.4)
DIFFERENTIAL METHOD BLD: ABNORMAL
EOSINOPHIL # BLD AUTO: 0.1 K/UL (ref 0–0.5)
EOSINOPHIL NFR BLD: 1 % (ref 0–8)
ERYTHROCYTE [DISTWIDTH] IN BLOOD BY AUTOMATED COUNT: 14.4 % (ref 11.5–14.5)
EST. GFR  (NO RACE VARIABLE): >60 ML/MIN/1.73 M^2
GLUCOSE SERPL-MCNC: 127 MG/DL (ref 70–110)
HCT VFR BLD AUTO: 33.6 % (ref 37–48.5)
HGB BLD-MCNC: 10.8 G/DL (ref 12–16)
IMM GRANULOCYTES # BLD AUTO: 0.03 K/UL (ref 0–0.04)
IMM GRANULOCYTES NFR BLD AUTO: 0.4 % (ref 0–0.5)
LYMPHOCYTES # BLD AUTO: 2.1 K/UL (ref 1–4.8)
LYMPHOCYTES NFR BLD: 26.9 % (ref 18–48)
MAGNESIUM SERPL-MCNC: 2 MG/DL (ref 1.6–2.6)
MCH RBC QN AUTO: 28.7 PG (ref 27–31)
MCHC RBC AUTO-ENTMCNC: 32.1 G/DL (ref 32–36)
MCV RBC AUTO: 89 FL (ref 82–98)
MONOCYTES # BLD AUTO: 0.7 K/UL (ref 0.3–1)
MONOCYTES NFR BLD: 8.5 % (ref 4–15)
NEUTROPHILS # BLD AUTO: 5 K/UL (ref 1.8–7.7)
NEUTROPHILS NFR BLD: 63.1 % (ref 38–73)
NRBC BLD-RTO: 0 /100 WBC
PLATELET # BLD AUTO: 157 K/UL (ref 150–450)
PMV BLD AUTO: 11.4 FL (ref 9.2–12.9)
POTASSIUM SERPL-SCNC: 4.6 MMOL/L (ref 3.5–5.1)
PROT SERPL-MCNC: 6.8 G/DL (ref 6–8.4)
RBC # BLD AUTO: 3.76 M/UL (ref 4–5.4)
SODIUM SERPL-SCNC: 138 MMOL/L (ref 136–145)
WBC # BLD AUTO: 7.97 K/UL (ref 3.9–12.7)

## 2024-04-13 PROCEDURE — 25000003 PHARM REV CODE 250: Performed by: HOSPITALIST

## 2024-04-13 PROCEDURE — 85025 COMPLETE CBC W/AUTO DIFF WBC: CPT | Performed by: HOSPITALIST

## 2024-04-13 PROCEDURE — 80053 COMPREHEN METABOLIC PANEL: CPT | Performed by: HOSPITALIST

## 2024-04-13 PROCEDURE — 25000003 PHARM REV CODE 250: Performed by: INTERNAL MEDICINE

## 2024-04-13 PROCEDURE — 94761 N-INVAS EAR/PLS OXIMETRY MLT: CPT

## 2024-04-13 PROCEDURE — 36415 COLL VENOUS BLD VENIPUNCTURE: CPT | Performed by: HOSPITALIST

## 2024-04-13 PROCEDURE — 83735 ASSAY OF MAGNESIUM: CPT | Performed by: HOSPITALIST

## 2024-04-13 RX ORDER — LISINOPRIL 20 MG/1
20 TABLET ORAL DAILY
Status: DISCONTINUED | OUTPATIENT
Start: 2024-04-13 | End: 2024-04-13 | Stop reason: HOSPADM

## 2024-04-13 RX ORDER — CHLORTHALIDONE 50 MG/1
50 TABLET ORAL DAILY
Qty: 30 TABLET | Refills: 11 | Status: SHIPPED | OUTPATIENT
Start: 2024-04-14 | End: 2024-04-17 | Stop reason: SDUPTHER

## 2024-04-13 RX ORDER — ISOSORBIDE MONONITRATE 30 MG/1
30 TABLET, EXTENDED RELEASE ORAL DAILY
Qty: 30 TABLET | Refills: 11 | Status: SHIPPED | OUTPATIENT
Start: 2024-04-14 | End: 2024-04-17

## 2024-04-13 RX ORDER — LISINOPRIL 20 MG/1
20 TABLET ORAL DAILY
Qty: 90 TABLET | Refills: 3 | Status: SHIPPED | OUTPATIENT
Start: 2024-04-14 | End: 2024-04-17 | Stop reason: SDUPTHER

## 2024-04-13 RX ORDER — CHLORTHALIDONE 25 MG/1
50 TABLET ORAL DAILY
Status: DISCONTINUED | OUTPATIENT
Start: 2024-04-13 | End: 2024-04-13 | Stop reason: HOSPADM

## 2024-04-13 RX ADMIN — MUPIROCIN 1 G: 20 OINTMENT TOPICAL at 08:04

## 2024-04-13 RX ADMIN — LISINOPRIL 20 MG: 20 TABLET ORAL at 08:04

## 2024-04-13 RX ADMIN — LEVOTHYROXINE SODIUM 50 MCG: 0.03 TABLET ORAL at 05:04

## 2024-04-13 RX ADMIN — ISOSORBIDE MONONITRATE 30 MG: 30 TABLET, EXTENDED RELEASE ORAL at 08:04

## 2024-04-13 RX ADMIN — CHLORTHALIDONE 50 MG: 25 TABLET ORAL at 08:04

## 2024-04-13 RX ADMIN — CARVEDILOL 6.25 MG: 3.12 TABLET, FILM COATED ORAL at 08:04

## 2024-04-13 NOTE — PLAN OF CARE
Pt had a better night and was able to get some rest. Pt had a morning CHG bath with linen/gown change.     Problem: Adult Inpatient Plan of Care  Goal: Plan of Care Review  Outcome: Ongoing, Progressing  Goal: Patient-Specific Goal (Individualized)  Outcome: Ongoing, Progressing  Goal: Absence of Hospital-Acquired Illness or Injury  Outcome: Ongoing, Progressing  Goal: Optimal Comfort and Wellbeing  Outcome: Ongoing, Progressing  Goal: Readiness for Transition of Care  Outcome: Ongoing, Progressing     Problem: Diabetes Comorbidity  Goal: Blood Glucose Level Within Targeted Range  Outcome: Ongoing, Progressing     Problem: Fall Injury Risk  Goal: Absence of Fall and Fall-Related Injury  Outcome: Ongoing, Progressing     Problem: Impaired Wound Healing  Goal: Optimal Wound Healing  Outcome: Ongoing, Progressing

## 2024-04-13 NOTE — PLAN OF CARE
04/13/24 1017   Final Note   Assessment Type Final Discharge Note   Anticipated Discharge Disposition Home   What phone number can be called within the next 1-3 days to see how you are doing after discharge? [phone]   Post-Acute Status   Coverage Medicare Part A and Part B   Discharge Delays None known at this time     Patient cleared for discharge from case management standpoint.      Chart and discharge orders reviewed.  Patient discharged home with no further case management needs.

## 2024-04-13 NOTE — DISCHARGE SUMMARY
[facility] Medicine  Discharge Summary      Patient Name: Jacqueline Mathias  MRN: [id number]  GUY: [id number]  Patient Class: IP- Inpatient  Admission Date: 4/11/2024  Hospital Length of Stay: 2 days  Discharge Date and Time:  04/13/2024 10:14 AM  Attending Physician: Olvin Solano Jr., MD   Discharging Provider: Olvin Solano Jr, MD  Primary Care Provider: Brice Weaver Jr., MD    Primary Care Team: Networked reference to record PCT     HPI:   84-year-old female with past medical history of AFib on Eliquis who presented to the ER because of pain at the back of her head.  About 3 days ago specifically on Monday April 8, patient has stepped and fall in her house.  She falls backwards hitting her buttock and head on the floor.  She did not lose consciousness, and can remember the whole event.  Shortly after she hit her head she started to experience dizziness, nausea and blurry vision.   help her stand up and she sat on the couch for the rest of the day.  Soon after she started to develop headache occipital, sharp, 8/10 on pain scale.  Symptoms of blurry vision, nausea and dizziness resolved, but patient has been having headache as well as pain at the back of her scalp on and off for the last 3 days.  Today, the pain at the back of her head got worse.  She went to an urgent Care, and they advised her to go to the ER for evaluation.    In the ER, vitals showed a blood pressure of 180/77, heart rate of 68, afebrile.  CBC and CMP are within normal limits.  Troponin is negative.  EKG showed normal sinus rhythm, with incomplete right bundle branch block.  EKG with no ischemic changes. CT head showed 1.4 cm round hyperattenuating focus at the inferior right frontal lobe with mild surrounding vasogenic edema, and a probable trace amount of subarachnoid hemorrhage in the anterior temporal lobe on the right.  Neurosurgery was consulted, case was discussed with the ER provider.  Neurosurgery  recommended admission to ICU with Q 1 neuro check, and to give patient Kcentra, with repeat CT head in the morning.  Patient will be admitted to Medicine for further care.        * No surgery found *      Hospital Course:   The patient was admitted to ICU for close monitoring. She received Kcenta for eliquis reversal. Neurosurgery had a repeat CT of the head done to reevaluate, and there was one additional small hemorrhage. Neurosurgery aware and finds patient to be stable. Patient noted to have accelerated hypertension, and was placed on a cardene infusion. Her home blood pressure meds were ordered in attempt to wean cardene.  The patient remained neurologically intact. Patient was seen by Neurosurgery and cleared by them.  She was restarted on her home blood pressure medicines, and they were adjusted.  Blood pressure improved.  Neurosurgery had an appointment set up for 2 weeks from discharge.  She also had Cardiology follow-up on Wednesday of this coming week.  Patient was told to monitor blood pressure at home and bring that your cardiology appointment for adjustments in medications.  She will have her Eliquis held until she sees Neurosurgery in 2 weeks.     Goals of Care Treatment Preferences:  Code Status: Full Code      Consults:   Consults (From admission, onward)          Status Ordering Provider     Inpatient consult to Neurosurgery  Once        Provider:  Aric Horton, DO    Completed CHLOÉ ALVES            No new Assessment & Plan notes have been filed under this hospital service since the last note was generated.  Service: Hospital Medicine    Final Active Diagnoses:    Diagnosis Date Noted POA    PRINCIPAL PROBLEM:  SAH (subarachnoid hemorrhage) [I60.9] 04/11/2024 Yes    Focal hemorrhagic contusion of cerebrum [S06.33AA] 04/11/2024 Yes    Fall [W19.XXXA] 04/11/2024 Yes    Scab [R23.4] 04/11/2024 Yes    Atrial fibrillation [I48.91] 04/11/2024 Yes    Accelerated hypertension [I10] 11/10/2021 Yes       Problems Resolved During this Admission:       Discharged Condition: fair    Disposition: Home or Self Care    Follow Up:   Follow-up Information       Brice Weaver Jr., MD Follow up in 1 week(s).    Specialty: Family Medicine  Contact information:  [address]SH[address] [facility]  [address]  [facility] [id number] [phone]                           Patient Instructions:   No discharge procedures on file.    Significant Diagnostic Studies: Labs: CMP   Recent Labs   Lab 04/11/24  1434 04/12/24  0501 04/13/24  0408    137 138   K 3.5 3.5 4.6   CL 96 99 100   CO2 34* 28 33*   * 119* 127*   BUN 25* 23 25*   CREATININE 0.9 0.8 0.8   CALCIUM 10.3 9.4 9.5   PROT 8.7* 7.7 6.8   ALBUMIN 4.7 4.2 3.8   BILITOT 0.6 0.5 0.5   ALKPHOS 58 64 51*   AST 16 17 14   ALT 13 7* 10   ANIONGAP 8 10 5*    and CBC   Recent Labs   Lab 04/11/24  1434 04/12/24  0501 04/13/24  0408   WBC 8.59 7.80 7.97   HGB 12.6 12.2 10.8*   HCT 39.0 37.7 33.6*    165 157       Pending Diagnostic Studies:       None           Medications:  Reconciled Home Medications:      Medication List        START taking these medications      isosorbide mononitrate 30 MG 24 hr tablet  Commonly known as: IMDUR  Take 1 tablet (30 mg total) by mouth once daily.  Start taking on: April 14, 2024  Replaces: isosorbide mononitrate 10 mg tablet     lisinopriL 20 MG tablet  Commonly known as: PRINIVIL,ZESTRIL  Take 1 tablet (20 mg total) by mouth once daily.  Start taking on: April 14, 2024            CHANGE how you take these medications      chlorthalidone 50 MG Tab  Commonly known as: HYGROTEN  Take 1 tablet (50 mg total) by mouth once daily.  Start taking on: April 14, 2024  What changed:   medication strength  how much to take  when to take this            CONTINUE taking these medications      ascorbic acid (vitamin C) 1000 MG tablet  Commonly known as: VITAMIN C  Take 1,000 mg by mouth once daily.     b complex vitamins capsule  Take 1 capsule by mouth once  daily.     carvediloL 6.25 MG tablet  Commonly known as: COREG  TAKE 1 TABLET BY MOUTH  TWICE DAILY WITH MEALS     cetirizine 10 MG tablet  Commonly known as: ZYRTEC  Take 10 mg by mouth daily as needed. As needed     cholecalciferol (vitamin D3) 125 mcg (5,000 unit) Tab  Take 1 tablet by mouth once daily.     cinnamon bark 500 mg capsule  Take 1,000 mg by mouth once daily.     estradioL 1 MG tablet  Commonly known as: ESTRACE  Take 1 mg by mouth once daily.     fluticasone propionate 50 mcg/actuation nasal spray  Commonly known as: FLONASE  USE 1 SPRAY IN BOTH  NOSTRILS TWICE DAILY     glucosamine-chondroitin 500-400 mg tablet  Take 1 tablet by mouth 2 (two) times daily.     ICAPS AREDS ORAL  Take by mouth.     IMODIUM A-D ORAL  As needed     levothyroxine 50 MCG tablet  Commonly known as: SYNTHROID  Take 1 tablet (50 mcg total) by mouth before breakfast.     melatonin 10 mg Tab  Take by mouth. As needed     multivitamin capsule  Take 1 capsule by mouth once daily.     potassium chloride 10 MEQ Cpsr  Commonly known as: MICRO-K  TAKE 1 CAPSULE BY MOUTH TWICE  DAILY     pravastatin 20 MG tablet  Commonly known as: PRAVACHOL  TAKE 1 TABLET BY MOUTH IN THE  EVENING            STOP taking these medications      apixaban 5 mg Tab  Commonly known as: ELIQUIS     benazepriL 10 MG tablet  Commonly known as: LOTENSIN     furosemide 20 MG tablet  Commonly known as: LASIX     isosorbide mononitrate 10 mg tablet  Commonly known as: ISMO,MONOKET  Replaced by: isosorbide mononitrate 30 MG 24 hr tablet              Indwelling Lines/Drains at time of discharge:   Lines/Drains/Airways       None                   Time spent on the discharge of patient: 40 minutes    Critical care time spent on the evaluation and treatment of severe organ dysfunction, review of pertinent labs and imaging studies, discussions with consulting providers and discussions with patient/family: 40 minutes.     Olvin Solano Jr, MD  Department of [facility]  Medicine  [facility]

## 2024-04-15 ENCOUNTER — PATIENT OUTREACH (OUTPATIENT)
Dept: ADMINISTRATIVE | Facility: CLINIC | Age: 84
End: 2024-04-15
Payer: MEDICARE

## 2024-04-15 NOTE — PROGRESS NOTES
C3 nurse attempted to contact Jacqueline Mathias  for a TCC post hospital discharge follow up call. The patient is unable to conduct the call @ this time. The patient requested a callback.    The patient does not have a scheduled HOSFU appointment within 5-7 days post hospital discharge date 4/13/2024. Message sent to Physician staff to assist with HOSFU appointment scheduling.

## 2024-04-16 NOTE — PROGRESS NOTES
C3 nurse spoke with Jacqueline Mathias  for a TCC post hospital discharge follow up call. The patient does not have a scheduled HOSFU appointment with Brice Weaver Jr., MD  within 5-7 days post hospital discharge date 4/13/2024. C3 nurse was unable to schedule HOSFU appointment in [facility].    Message sent to PCP staff requesting they contact patient and schedule follow up appointment.

## 2024-04-16 NOTE — TELEPHONE ENCOUNTER
Spoke with patient to schedule hospital follow up appt. For this Friday at 9:00 a.m., however pt says this is too early for her  to bring her. The only other option since provider is out Monday and hear half a day next Tues is for 4/24 at 2:00 p.m. Pt was scheduled for this date and time.

## 2024-04-17 ENCOUNTER — OFFICE VISIT (OUTPATIENT)
Dept: CARDIOLOGY | Facility: CLINIC | Age: 84
End: 2024-04-17
Payer: MEDICARE

## 2024-04-17 VITALS
SYSTOLIC BLOOD PRESSURE: 124 MMHG | WEIGHT: 137.56 LBS | HEART RATE: 71 BPM | HEIGHT: 66 IN | DIASTOLIC BLOOD PRESSURE: 63 MMHG | BODY MASS INDEX: 22.11 KG/M2 | OXYGEN SATURATION: 97 %

## 2024-04-17 DIAGNOSIS — I48.0 PAROXYSMAL ATRIAL FIBRILLATION: ICD-10-CM

## 2024-04-17 DIAGNOSIS — I60.9 SAH (SUBARACHNOID HEMORRHAGE): ICD-10-CM

## 2024-04-17 DIAGNOSIS — R42 ORTHOSTATIC DIZZINESS: ICD-10-CM

## 2024-04-17 DIAGNOSIS — I25.10 CORONARY ARTERY DISEASE INVOLVING NATIVE CORONARY ARTERY OF NATIVE HEART WITHOUT ANGINA PECTORIS: Primary | ICD-10-CM

## 2024-04-17 DIAGNOSIS — I34.0 MITRAL VALVE INSUFFICIENCY, UNSPECIFIED ETIOLOGY: ICD-10-CM

## 2024-04-17 DIAGNOSIS — Q24.5 MYOCARDIAL BRIDGE: ICD-10-CM

## 2024-04-17 DIAGNOSIS — R79.89 ELEVATED BRAIN NATRIURETIC PEPTIDE (BNP) LEVEL: ICD-10-CM

## 2024-04-17 DIAGNOSIS — I10 ESSENTIAL HYPERTENSION: ICD-10-CM

## 2024-04-17 PROCEDURE — 99214 OFFICE O/P EST MOD 30 MIN: CPT | Mod: S$PBB,,, | Performed by: INTERNAL MEDICINE

## 2024-04-17 PROCEDURE — 99215 OFFICE O/P EST HI 40 MIN: CPT | Mod: PBBFAC,PN | Performed by: INTERNAL MEDICINE

## 2024-04-17 PROCEDURE — 99999 PR PBB SHADOW E&M-EST. PATIENT-LVL V: CPT | Mod: PBBFAC,,, | Performed by: INTERNAL MEDICINE

## 2024-04-17 PROCEDURE — 93005 ELECTROCARDIOGRAM TRACING: CPT | Mod: PBBFAC,PN | Performed by: GENERAL PRACTICE

## 2024-04-17 PROCEDURE — 93010 ELECTROCARDIOGRAM REPORT: CPT | Mod: S$PBB,,, | Performed by: GENERAL PRACTICE

## 2024-04-17 RX ORDER — CHLORTHALIDONE 25 MG/1
25 TABLET ORAL DAILY
Qty: 90 TABLET | Refills: 3 | Status: SHIPPED | OUTPATIENT
Start: 2024-04-17 | End: 2025-04-17

## 2024-04-17 RX ORDER — LISINOPRIL 10 MG/1
10 TABLET ORAL DAILY
Qty: 90 TABLET | Refills: 3 | Status: SHIPPED | OUTPATIENT
Start: 2024-04-17 | End: 2024-05-03

## 2024-04-17 NOTE — PROGRESS NOTES
[facility] [place]  [place]    Subjective:     Patient ID:  Jacqueline Mathias is a 84 y.o. female patient here for evaluation Hospital Follow Up (Patient was at [facility] for Fall 04/11/2024 .)      HPI: 84-year-old female here for follow-up.  Recently admitted with a fall.  Elevated BNP.  History of moderate severe MR in the past with follow-up PAT showed mild MR.  But had elevated BNP.  Had a repeat echo but it was canceled as she had a fall and was in the hospital.  Reports she tripped while walking on a slippery floor.  No   Dizziness or lightheadedness. Does have history of Afib in the past and was on Eliquis which was stopped after the brain bleed.  Has a follow-up in Neurosurgery.  History of nonobstructive CAD with the LAD bridge and LDL of 50  On pravastatin.    Review of Systems   All other systems reviewed and are negative.       Past Medical History:   Diagnosis Date    Allergy     latex    Allergy     seasonal    Arthritis     Blood transfusion     Cataract     removed    Hypertension     IBS (irritable bowel syndrome)     Obstructive sleep apnea     Thyroid nodule 09/29/2016    Ulcer        Past Surgical History:   Procedure Laterality Date    ANGIOGRAM, CORONARY, WITH LEFT HEART CATHETERIZATION N/A 12/14/2021    Procedure: Angiogram, Coronary, with Left Heart Cath;  Surgeon: Napoleon Dumas MD;  Location: [facility] CATH/EP LAB;  Service: Cardiology;  Laterality: N/A;    CAROTID ARTERY ANGIOPLASTY Right 09/30/2016    CHOLECYSTECTOMY      EYE SURGERY      cataract removal    HYSTERECTOMY         Family History   Problem Relation Name Age of Onset    Stroke Mother      Heart disease Mother      Heart disease Sister      Alzheimer's disease Sister      Cancer Brother      Cancer Brother      Kidney disease Brother      Breast cancer Maternal Aunt         Social History     Socioeconomic History    Marital status:    Tobacco Use    Smoking status: Never    Smokeless  tobacco: Never   Substance and Sexual Activity    Alcohol use: Yes     Comment: social    Drug use: No     Social Determinants of Health     Financial Resource Strain: Low Risk  (4/13/2024)    Overall Financial Resource Strain (CARDIA)     Difficulty of Paying Living Expenses: Not very hard   Food Insecurity: No Food Insecurity (4/13/2024)    Hunger Vital Sign     Worried About Running Out of Food in the Last Year: Never true     Ran Out of Food in the Last Year: Never true   Transportation Needs: No Transportation Needs (4/13/2024)    PRAPARE - Transportation     Lack of Transportation (Medical): No     Lack of Transportation (Non-Medical): No   Physical Activity: Unknown (4/4/2024)    Exercise Vital Sign     Days of Exercise per Week: 2 days   Stress: Stress Concern Present (4/13/2024)    [employer] - Occupational Stress Questionnaire     Feeling of Stress : To some extent   Social Connections: Unknown (4/4/2024)    Social Connection and Isolation Panel [NHANES]     Frequency of Communication with Friends and Family: More than three times a week     Frequency of Social Gatherings with Friends and Family: More than three times a week     Active Member of Clubs or Organizations: Yes     Marital Status:    Housing Stability: Unknown (4/4/2024)    Housing Stability Vital Sign     Unable to Pay for Housing in the Last Year: No     Unstable Housing in the Last Year: No       Current Outpatient Medications   Medication Sig Dispense Refill    ascorbic acid, vitamin C, (VITAMIN C) 1000 MG tablet Take 1,000 mg by mouth once daily.      b complex vitamins capsule Take 1 capsule by mouth once daily.      carvediloL (COREG) 6.25 MG tablet TAKE 1 TABLET BY MOUTH  TWICE DAILY WITH MEALS 180 tablet 3    cetirizine (ZYRTEC) 10 MG tablet Take 10 mg by mouth daily as needed. As needed      cholecalciferol, vitamin D3, 125 mcg (5,000 unit) Tab Take 1 tablet by mouth once daily.      fluticasone  propionate (FLONASE) 50 mcg/actuation nasal spray USE 1 SPRAY IN BOTH  NOSTRILS TWICE DAILY 48 g 2    glucosamine-chondroitin 500-400 mg tablet Take 1 tablet by mouth 2 (two) times daily.      levothyroxine (SYNTHROID) 50 MCG tablet Take 1 tablet (50 mcg total) by mouth before breakfast. 90 tablet 3    loperamide HCl (IMODIUM A-D ORAL) As needed      melatonin 10 mg Tab Take by mouth. As needed      multivitamin capsule Take 1 capsule by mouth once daily.      potassium chloride (MICRO-K) 10 MEQ CpSR TAKE 1 CAPSULE BY MOUTH TWICE  DAILY 180 capsule 1    vit A/vit C/vit E/zinc/copper (ICAPS AREDS ORAL) Take by mouth.      chlorthalidone (HYGROTEN) 25 MG Tab Take 1 tablet (25 mg total) by mouth once daily. 90 tablet 3    lisinopriL 10 MG tablet Take 1 tablet (10 mg total) by mouth once daily. 90 tablet 3     No current facility-administered medications for this visit.       Review of patient's allergies indicates:   Allergen Reactions    Latex, natural rubber      rash    Biaxin [clarithromycin] Diarrhea and Nausea Only    Codeine Itching    Oxycodone      Knocks pt out for 3 days    Metformin Diarrhea         Objective:        Vitals:    04/17/24 0822   BP: 124/63   Pulse: 71       Physical Exam  Vitals reviewed.   Constitutional:       Appearance: Normal appearance.   HENT:      Mouth/Throat:      Mouth: Mucous membranes are moist.   Eyes:      Extraocular Movements: Extraocular movements intact.      Pupils: Pupils are equal, round, and reactive to light.   Cardiovascular:      Rate and Rhythm: Normal rate and regular rhythm.      Pulses: Normal pulses.      Heart sounds: Normal heart sounds. No murmur heard.     No gallop.   Pulmonary:      Effort: Pulmonary effort is normal.      Breath sounds: Normal breath sounds.   Abdominal:      General: Bowel sounds are normal.      Palpations: Abdomen is soft.   Musculoskeletal:         General: Normal range of motion.   Skin:     General: Skin is warm and dry.    Neurological:      General: No focal deficit present.      Mental Status: She is alert and oriented to person, place, and time.   Psychiatric:         Mood and Affect: Mood normal.         LIPIDS - LAST 2   Lab Results   Component Value Date    CHOL 140 08/28/2023    CHOL 139 03/21/2022    HDL 64 08/28/2023    HDL 66 03/21/2022    LDLCALC 49.8 (L) 08/28/2023    LDLCALC 47.4 (L) 03/21/2022    TRIG 131 08/28/2023    TRIG 128 03/21/2022    CHOLHDL 45.7 08/28/2023    CHOLHDL 47.5 03/21/2022       CBC - LAST 2  Lab Results   Component Value Date    WBC 7.97 04/13/2024    WBC 7.80 04/12/2024    RBC 3.76 (L) 04/13/2024    RBC 4.32 04/12/2024    HGB 10.8 (L) 04/13/2024    HGB 12.2 04/12/2024    HCT 33.6 (L) 04/13/2024    HCT 37.7 04/12/2024    MCV 89 04/13/2024    MCV 87 04/12/2024    MCH 28.7 04/13/2024    MCH 28.2 04/12/2024    MCHC 32.1 04/13/2024    MCHC 32.4 04/12/2024    RDW 14.4 04/13/2024    RDW 13.9 04/12/2024     04/13/2024     04/12/2024    MPV 11.4 04/13/2024    MPV 11.8 04/12/2024    GRAN 5.0 04/13/2024    GRAN 63.1 04/13/2024    LYMPH 2.1 04/13/2024    LYMPH 26.9 04/13/2024    MONO 0.7 04/13/2024    MONO 8.5 04/13/2024    BASO 0.01 04/13/2024    BASO 0.02 04/12/2024    NRBC 0 04/13/2024    NRBC 0 04/12/2024       CHEMISTRY & LIVER FUNCTION - LAST 2  Lab Results   Component Value Date     04/13/2024     04/12/2024    K 4.6 04/13/2024    K 3.5 04/12/2024     04/13/2024    CL 99 04/12/2024    CO2 33 (H) 04/13/2024    CO2 28 04/12/2024    ANIONGAP 5 (L) 04/13/2024    ANIONGAP 10 04/12/2024    BUN 25 (H) 04/13/2024    BUN 23 04/12/2024    CREATININE 0.8 04/13/2024    CREATININE 0.8 04/12/2024     (H) 04/13/2024     (H) 04/12/2024    CALCIUM 9.5 04/13/2024    CALCIUM 9.4 04/12/2024    MG 2.0 04/13/2024    MG 1.8 04/12/2024    ALBUMIN 3.8 04/13/2024    ALBUMIN 4.2 04/12/2024    PROT 6.8 04/13/2024    PROT 7.7 04/12/2024    ALKPHOS 51 (L) 04/13/2024    ALKPHOS 64  04/12/2024    ALT 10 04/13/2024    ALT 7 (L) 04/12/2024    AST 14 04/13/2024    AST 17 04/12/2024    BILITOT 0.5 04/13/2024    BILITOT 0.5 04/12/2024        CARDIAC PROFILE - LAST 2  Lab Results   Component Value Date     (H) 04/05/2024     (H) 01/24/2023        COAGULATION - LAST 2  Lab Results   Component Value Date    LABPT 15.8 (H) 12/10/2021    LABPT 14.9 (H) 12/01/2021    INR 1.1 04/11/2024    INR 1.4 12/10/2021    APTT 29.5 04/11/2024    APTT 32.8 12/10/2021       ENDOCRINE & PSA - LAST 2  Lab Results   Component Value Date    HGBA1C 6.2 (H) 04/01/2024    HGBA1C 6.3 (H) 08/09/2023    TSH 1.798 04/01/2024    TSH 5.640 (H) 08/28/2023        ECHOCARDIOGRAM RESULTS  Results for orders placed during the hospital encounter of 04/27/22    Transesophageal echo (PAT)    Interpretation Summary  · The left ventricle is normal in size with normal systolic function.  · The estimated ejection fraction is 60%.  · Normal left ventricular diastolic function.  · Normal right ventricular size with normal right ventricular systolic function.  · No interatrial septal defect present.  · No ASD or PFO closure device in interatrial septum.  · Normal appearing left atrial appendage. No thrombus is present in the appendage. Normal appendage velocities.  · Mild-to-moderate mitral regurgitation.      CURRENT/PREVIOUS VISIT EKG  Results for orders placed or performed during the hospital encounter of 04/11/24   EKG 12-lead    Collection Time: 04/11/24  2:00 PM   Result Value Ref Range    QRS Duration 118 ms    OHS QTC Calculation 497 ms    Narrative    Test Reason : S06.33AA,    Vent. Rate : 071 BPM     Atrial Rate : 071 BPM     P-R Int : 136 ms          QRS Dur : 118 ms      QT Int : 458 ms       P-R-T Axes : 087 036 013 degrees     QTc Int : 497 ms    Normal sinus rhythm  Incomplete right bundle branch block  Nonspecific ST and T wave abnormality  Prolonged QT  Abnormal ECG  When compared with ECG of 05-APR-2024 13:40,  QT  has lengthened    Referred By: TANISHA FERREIRA           Confirmed By:      No valid procedures specified.   Results for orders placed in visit on 11/19/21    Nuclear Stress Test    Interpretation Summary    The EKG portion of this study is negative for ischemia.    The patient reported no chest pain during the stress test.    The nuclear portion of this study will be reported separately.    No valid procedures specified.        Assessment:       1. Coronary artery disease involving native coronary artery of native heart without angina pectoris    2. Essential hypertension    3. Mitral valve insufficiency, unspecified etiology    4. Paroxysmal atrial fibrillation    5. Elevated brain natriuretic peptide (BNP) level    6. SAH (subarachnoid hemorrhage)    7. Myocardial bridge    8. Orthostatic dizziness           Plan:       Coronary artery disease involving native coronary artery of native heart without angina pectoris  -     IN OFFICE EKG 12-LEAD (to [facility])    Essential hypertension  Comments:  reports orthostatic dizziness. Decrease chlorothal to 25 mg, dec lisino to 10. stop imdur  Orders:  -     IN OFFICE EKG 12-LEAD (to [facility])    Mitral valve insufficiency, unspecified etiology  -     IN OFFICE EKG 12-LEAD (to [facility])  -     Cancel: Echo; Future  -     Basic metabolic panel; Future; Expected date: 04/17/2024  -     B-TYPE NATRIURETIC PEPTIDE; Future; Expected date: 04/17/2024  -     Echo; Future    Paroxysmal atrial fibrillation  -     IN OFFICE EKG 12-LEAD (to [place])  -     Ambulatory referral/consult to Electrophysiology; Future; Expected date: 04/24/2024    Elevated brain natriuretic peptide (BNP) level  -     Cancel: Echo; Future  -     Basic metabolic panel; Future; Expected date: 04/17/2024  -     B-TYPE NATRIURETIC PEPTIDE; Future; Expected date: 04/17/2024  -     Echo; Future    SAH (subarachnoid hemorrhage)  -     Ambulatory referral/consult to Electrophysiology; Future; Expected date: 04/24/2024    Myocardial  bridge    Orthostatic dizziness  -     Basic metabolic panel; Future; Expected date: 04/17/2024  -     B-TYPE NATRIURETIC PEPTIDE; Future; Expected date: 04/17/2024    Other orders  -     lisinopriL 10 MG tablet; Take 1 tablet (10 mg total) by mouth once daily.  Dispense: 90 tablet; Refill: 3  -     chlorthalidone (HYGROTEN) 25 MG Tab; Take 1 tablet (25 mg total) by mouth once daily.  Dispense: 90 tablet; Refill: 3      Patient's biggest issue today is diarrhea and orthostatic dizziness.  Informed her I do not evaluate diarrhea.  Offered a referral to GI versus reaching out to her primary care, patient would like to reach out to her primary care.      Patient reports profound orthostatic dizziness.  Will decrease antihypertensives including chlorthalidone, lisinopril.  Discontinue Imdur especially mention of myocardial bridge.  Stop statin for now.  Repeat an echocardiogram to evaluate mitral valve again.  Repeat BMP and BNP.      Since patient is not on Eliquis anymore, will refer to EP for consideration of [facility].    Follow up in about 2 weeks (around 5/1/2024) for f/u dizziness, BP, BMP, BNP, Echo.          MD Krist[facility] [place]  [place]

## 2024-04-23 ENCOUNTER — OFFICE VISIT (OUTPATIENT)
Dept: PULMONOLOGY | Facility: CLINIC | Age: 84
End: 2024-04-23
Payer: MEDICARE

## 2024-04-23 ENCOUNTER — HOSPITAL ENCOUNTER (OUTPATIENT)
Dept: CARDIOLOGY | Facility: HOSPITAL | Age: 84
Discharge: HOME | End: 2024-04-23
Attending: INTERNAL MEDICINE
Payer: MEDICARE

## 2024-04-23 VITALS
WEIGHT: 140 LBS | OXYGEN SATURATION: 96 % | HEART RATE: 70 BPM | HEIGHT: 66 IN | BODY MASS INDEX: 22.5 KG/M2 | DIASTOLIC BLOOD PRESSURE: 65 MMHG | SYSTOLIC BLOOD PRESSURE: 130 MMHG

## 2024-04-23 VITALS — BODY MASS INDEX: 22.5 KG/M2 | WEIGHT: 140 LBS | HEIGHT: 66 IN

## 2024-04-23 DIAGNOSIS — Q76.49: ICD-10-CM

## 2024-04-23 DIAGNOSIS — R79.89 ELEVATED BRAIN NATRIURETIC PEPTIDE (BNP) LEVEL: ICD-10-CM

## 2024-04-23 DIAGNOSIS — R94.2 DIFFUSION CAPACITY OF LUNG (DL), DECREASED: ICD-10-CM

## 2024-04-23 DIAGNOSIS — I27.20 PULMONARY HYPERTENSION: ICD-10-CM

## 2024-04-23 DIAGNOSIS — G47.33 OSA (OBSTRUCTIVE SLEEP APNEA): Primary | ICD-10-CM

## 2024-04-23 DIAGNOSIS — G47.34 NOCTURNAL HYPOXEMIA: ICD-10-CM

## 2024-04-23 DIAGNOSIS — I34.0 MITRAL VALVE INSUFFICIENCY, UNSPECIFIED ETIOLOGY: ICD-10-CM

## 2024-04-23 DIAGNOSIS — J43.1 PANLOBULAR EMPHYSEMA: ICD-10-CM

## 2024-04-23 PROCEDURE — 93306 TTE W/DOPPLER COMPLETE: CPT

## 2024-04-23 PROCEDURE — 99214 OFFICE O/P EST MOD 30 MIN: CPT | Mod: S$GLB,,, | Performed by: INTERNAL MEDICINE

## 2024-04-23 PROCEDURE — 93306 TTE W/DOPPLER COMPLETE: CPT | Mod: 26,,, | Performed by: INTERNAL MEDICINE

## 2024-04-23 NOTE — PROGRESS NOTES
SUBJECTIVE:    Patient ID: Jacqueline Mathias is a 84 y.o. female.    Chief Complaint: Apnea      Apnea    Shortness of Breath    Follow-up     The patient is here after a fall with a subarachnoid hemorrhage and gluteal ecchymoses. She is till very sore. She has follow-ups ordered.  She is scheduled for an Echo today.       Her CPAP compliance is 93%.  Her AHI is 0.5.  She is sleeping on her concentrator and CPAP.        Past Medical History:   Diagnosis Date    Allergy     latex    Allergy     seasonal    Arthritis     Blood transfusion     Cataract     removed    Hypertension     IBS (irritable bowel syndrome)     Obstructive sleep apnea     Thyroid nodule 09/29/2016    Ulcer      Past Surgical History:   Procedure Laterality Date    ANGIOGRAM, CORONARY, WITH LEFT HEART CATHETERIZATION N/A 12/14/2021    Procedure: Angiogram, Coronary, with Left Heart Cath;  Surgeon: Napoleon Dumas MD;  Location: [facility] CATH/EP LAB;  Service: Cardiology;  Laterality: N/A;    CAROTID ARTERY ANGIOPLASTY Right 09/30/2016    CHOLECYSTECTOMY      EYE SURGERY      cataract removal    HYSTERECTOMY       Family History   Problem Relation Name Age of Onset    Stroke Mother      Heart disease Mother      Heart disease Sister      Alzheimer's disease Sister      Cancer Brother      Cancer Brother      Kidney disease Brother      Breast cancer Maternal Aunt          Social History:   Marital Status:   Occupation: Data Unavailable  Alcohol History:  reports current alcohol use.  Tobacco History:  reports that she has never smoked. She has never used smokeless tobacco.  Drug History:  reports no history of drug use.    Review of patient's allergies indicates:   Allergen Reactions    Latex, natural rubber      rash    Biaxin [clarithromycin] Diarrhea and Nausea Only    Codeine Itching    Oxycodone      Knocks pt out for 3 days    Metformin Diarrhea       Current Outpatient Medications   Medication Sig Dispense Refill    ascorbic acid,  vitamin C, (VITAMIN C) 1000 MG tablet Take 1,000 mg by mouth once daily.      b complex vitamins capsule Take 1 capsule by mouth once daily.      carvediloL (COREG) 6.25 MG tablet TAKE 1 TABLET BY MOUTH  TWICE DAILY WITH MEALS 180 tablet 3    cetirizine (ZYRTEC) 10 MG tablet Take 10 mg by mouth daily as needed. As needed      chlorthalidone (HYGROTEN) 25 MG Tab Take 1 tablet (25 mg total) by mouth once daily. 90 tablet 3    cholecalciferol, vitamin D3, 125 mcg (5,000 unit) Tab Take 1 tablet by mouth once daily.      fluticasone propionate (FLONASE) 50 mcg/actuation nasal spray USE 1 SPRAY IN BOTH  NOSTRILS TWICE DAILY 48 g 2    glucosamine-chondroitin 500-400 mg tablet Take 1 tablet by mouth 2 (two) times daily.      levothyroxine (SYNTHROID) 50 MCG tablet Take 1 tablet (50 mcg total) by mouth before breakfast. 90 tablet 3    lisinopriL 10 MG tablet Take 1 tablet (10 mg total) by mouth once daily. 90 tablet 3    loperamide HCl (IMODIUM A-D ORAL) As needed      melatonin 10 mg Tab Take by mouth. As needed      multivitamin capsule Take 1 capsule by mouth once daily.      potassium chloride (MICRO-K) 10 MEQ CpSR TAKE 1 CAPSULE BY MOUTH TWICE  DAILY 180 capsule 1    vit A/vit C/vit E/zinc/copper (ICAPS AREDS ORAL) Take by mouth.       No current facility-administered medications for this visit.       Alpha-1 Antitrypsin:  Last PFT: 10/11/23 mildrestriction and a moderate diffusion defect, (straight back)  Last CT:9/8/23  Lungs: There are no pulmonary nodules, infiltrates or pleural effusions.  Trachea and bronchi are normal.  There is stable minimal subpleural reticulations within the lung bases. There is no bronchiectasis.    Her overnight pulse ox with CPAP no oxygen showed significant drops during the night without the oxygen.    Review of Systems   Respiratory:  Positive for shortness of breath.      General: Feeling quite sore still.  Eyes: Vision is good.  ENT:  No rhinitis or sinusitis. Epistaxis is better with  "more humidity in system.  Heart:: Occasional palpatations, has A fib, seeing Dr. Young  Lungs: shortness of breath with exertion is stable  GI: IBS sometimes  : No dysuria, hesitancy, or nocturia.  Musculoskeletal: some arthritis  Skin: No lesions or rashes.  Neuro: No headaches or neuropathy.  Lymph: ankles swell sometimes  Psych: nervousness, anxious  Endo: weight stable    OBJECTIVE:      /65 (BP Location: Left arm, Patient Position: Sitting, BP Method: Medium (Manual))   Pulse 70   Ht 5' 6" (1.676 m)   Wt 63.5 kg (140 lb)   SpO2 96%   BMI 22.60 kg/m²     Physical Exam  GENERAL: Older patient in no distress.  HEENT: Pupils equal and reactive. Extraocular movements intact. Nose intact.  Pharynx moist.  NECK: Supple.   HEART: Regular rate and rhythm. No murmur or gallop auscultated.  LUNGS: Clear to auscultation and percussion. Lung excursion symmetrical. No change in fremitus. No adventitial noises.  ABDOMEN: Bowel sounds present. Non-tender, no masses palpated.  EXTREMITIES: Normal muscle tone and joint movement, no cyanosis or clubbing.   LYMPHATICS: No adenopathy palpated, no edema.  SKIN: Dry, intact, no lesions.  Green ecchymosis to R buttock.  NEURO: Cranial nerves II-XII intact. Motor strength 5/5 bilaterally, upper and lower extremities.  PSYCH: Appropriate affect.    Assessment:       1. JACQUELINE (obstructive sleep apnea)    2. Panlobular emphysema    3. Pulmonary hypertension    4. Straight back syndrome    5. Diffusion capacity of lung (dl), decreased    6. Nocturnal hypoxemia             Plan:       JACQUELINE (obstructive sleep apnea)    Straight back syndrome    Diffusion capacity of lung (dl), decreased    Nocturnal hypoxemia    Follow up in about 6 months (around 10/23/2024).    Continue CPAP with oxygen 4 liters  Continue current levels of humidity  Know that pain in buttocks is going to improve                  "

## 2024-04-24 ENCOUNTER — OFFICE VISIT (OUTPATIENT)
Dept: PRIMARY CARE CLINIC | Facility: CLINIC | Age: 84
End: 2024-04-24
Payer: MEDICARE

## 2024-04-24 ENCOUNTER — HOSPITAL ENCOUNTER (OUTPATIENT)
Dept: RADIOLOGY | Facility: HOSPITAL | Age: 84
Discharge: HOME | End: 2024-04-24
Attending: NURSE PRACTITIONER
Payer: MEDICARE

## 2024-04-24 VITALS
WEIGHT: 139.44 LBS | TEMPERATURE: 98 F | HEART RATE: 70 BPM | BODY MASS INDEX: 22.41 KG/M2 | SYSTOLIC BLOOD PRESSURE: 128 MMHG | OXYGEN SATURATION: 97 % | HEIGHT: 66 IN | DIASTOLIC BLOOD PRESSURE: 60 MMHG

## 2024-04-24 DIAGNOSIS — S09.90XD TRAUMATIC INJURY OF HEAD, SUBSEQUENT ENCOUNTER: ICD-10-CM
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
## 2024-04-24 DIAGNOSIS — M25.552 BILATERAL HIP PAIN: ICD-10-CM

## 2024-04-24 DIAGNOSIS — M79.18 BUTTOCK PAIN: ICD-10-CM

## 2024-04-24 DIAGNOSIS — M25.551 BILATERAL HIP PAIN: ICD-10-CM

## 2024-04-24 DIAGNOSIS — I60.9 SAH (SUBARACHNOID HEMORRHAGE): Primary | ICD-10-CM

## 2024-04-24 PROCEDURE — 99213 OFFICE O/P EST LOW 20 MIN: CPT | Mod: PBBFAC,PN | Performed by: NURSE PRACTITIONER

## 2024-04-24 PROCEDURE — 72190 X-RAY EXAM OF PELVIS: CPT | Mod: 26,,, | Performed by: RADIOLOGY

## 2024-04-24 PROCEDURE — 99495 TRANSJ CARE MGMT MOD F2F 14D: CPT | Mod: S$PBB,,, | Performed by: NURSE PRACTITIONER

## 2024-04-24 PROCEDURE — 99999 PR PBB SHADOW E&M-EST. PATIENT-LVL III: CPT | Mod: PBBFAC,,, | Performed by: NURSE PRACTITIONER

## 2024-04-24 PROCEDURE — 72190 X-RAY EXAM OF PELVIS: CPT | Mod: TC

## 2024-04-24 NOTE — PROGRESS NOTES
This dictation has been generated using Modal Fluency Dictation some phonetic errors may occur. Please contact author for clarification if needed.     Problem List Items Addressed This Visit       SAH (subarachnoid hemorrhage) - Primary    Fall    Relevant Orders    X-Ray Pelvis Complete min 3 views     Other Visit Diagnoses       Traumatic injury of head, subsequent encounter        Buttock pain        Relevant Orders    X-Ray Pelvis Complete min 3 views    Bilateral hip pain        Relevant Orders    X-Ray Pelvis Complete min 3 views            Orders Placed This Encounter    X-Ray Pelvis Complete min 3 views         Discharged: 4/13/24  Saw cards: Essential hypertension  Comments:  reports orthostatic dizziness. Decrease chlorothal to 25 mg, dec lisino to 10. stop imdur  Plan echo and electrophysiology    Fall and hip pain check pelvic x-rays.  Low probability of coccyx fracture no midline tenderness.  Consider ramus fracture.  Consider hip fracture.  I will review images and address accordingly when available. No fracture per my interpretation of the images. Radiology concurred.     No follow-ups on file.    ________________________________________________________________  ________________________________________________________________      Chief Complaint   Patient presents with    Hospital Follow Up     History of present illness  Transitional Care Note    Family and/or Caretaker present at visit?  Yes.   Diagnostic tests reviewed/disposition: No diagnosic tests pending after this hospitalization.  Disease/illness education: SAH and age related atrophy risks. Seek care early. Discussed pain and ice/heat.   Home health/community services discussion/referrals: Patient does not have home health established from hospital visit.  They do not need home health.  If needed, we will set up home health for the patient.   Establishment or re-establishment of referral orders for community resources: No other  necessary community resources.   Discussion with other health care providers: No discussion with other health care providers necessary.     This 84 y.o. presents today for complaint of hospital follow-up SAH.  Patient had fall at home while caring roses.  It tripped watery and she slipped on the floor landing on her buttock and then smashing her head in the floor.  Did not seek care immediately and was noted to have subarachnoid hemorrhage when she went to the emergency room.  When she went to have her hair done the hairdresser noted blood and bruising and told her to go the ER.  Patient denies losing consciousness.  She had some blurry vision at the time.  Given the emergent nature of the SAH the buttock pain though assessed was not imaged.  Given her continual pain she is asking about additional help.  Pain medication contraindicated due to head injury and risk of falls.  NSAIDs contraindicated due to mild renal insufficiency.  It was also deferred at 1st due to blood thinner use.  She has currently not taking her Eliquis.  She has a follow-up with Neurology.  She has seen Cardiology and pulmonology since discharge.  She has had some loose stools without marked diarrhea.    Admission Date: 4/11/2024  Hospital Length of Stay: 2 days  Discharge Date and Time:  04/13/2024 10:14 AM  Attending Physician: Olvin Solano Jr., MD   Discharging Provider: Olvin Solano Jr, MD  Primary Care Provider: Brice Weaver Jr., MD     Primary Care Team: Networked reference to record PCT      HPI:   84-year-old female with past medical history of AFib on Eliquis who presented to the ER because of pain at the back of her head.  About 3 days ago specifically on Monday April 8, patient has stepped and fall in her house.  She falls backwards hitting her buttock and head on the floor.  She did not lose consciousness, and can remember the whole event.  Shortly after she hit her head she started to experience dizziness, nausea and  blurry vision.   help her stand up and she sat on the couch for the rest of the day.  Soon after she started to develop headache occipital, sharp, 8/10 on pain scale.  Symptoms of blurry vision, nausea and dizziness resolved, but patient has been having headache as well as pain at the back of her scalp on and off for the last 3 days.  Today, the pain at the back of her head got worse.  She went to an urgent Care, and they advised her to go to the ER for evaluation.     In the ER, vitals showed a blood pressure of 180/77, heart rate of 68, afebrile.  CBC and CMP are within normal limits.  Troponin is negative.  EKG showed normal sinus rhythm, with incomplete right bundle branch block.  EKG with no ischemic changes. CT head showed 1.4 cm round hyperattenuating focus at the inferior right frontal lobe with mild surrounding vasogenic edema, and a probable trace amount of subarachnoid hemorrhage in the anterior temporal lobe on the right.  Neurosurgery was consulted, case was discussed with the ER provider.  Neurosurgery recommended admission to ICU with Q 1 neuro check, and to give patient Kcentra, with repeat CT head in the morning.  Patient will be admitted to Medicine for further care.     * No surgery found *       Hospital Course:   The patient was admitted to ICU for close monitoring. She received Kcenta for eliquis reversal. Neurosurgery had a repeat CT of the head done to reevaluate, and there was one additional small hemorrhage. Neurosurgery aware and finds patient to be stable. Patient noted to have accelerated hypertension, and was placed on a cardene infusion. Her home blood pressure meds were ordered in attempt to wean cardene.  The patient remained neurologically intact. Patient was seen by Neurosurgery and cleared by them.  She was restarted on her home blood pressure medicines, and they were adjusted.  Blood pressure improved.  Neurosurgery had an appointment set up for 2 weeks from discharge.   She also had Cardiology follow-up on Wednesday of this coming week.  Patient was told to monitor blood pressure at home and bring that your cardiology appointment for adjustments in medications.  She will have her Eliquis held until she sees Neurosurgery in 2 weeks.      No bowel or bladder changes.  No saddle anesthesia.      Past Medical History:   Diagnosis Date    Allergy     latex    Allergy     seasonal    Arthritis     Blood transfusion     Cataract     removed    Hypertension     IBS (irritable bowel syndrome)     Obstructive sleep apnea     Thyroid nodule 09/29/2016    Ulcer        Past Surgical History:   Procedure Laterality Date    ANGIOGRAM, CORONARY, WITH LEFT HEART CATHETERIZATION N/A 12/14/2021    Procedure: Angiogram, Coronary, with Left Heart Cath;  Surgeon: Napoleon Dumas MD;  Location: [facility] CATH/EP LAB;  Service: Cardiology;  Laterality: N/A;    CAROTID ARTERY ANGIOPLASTY Right 09/30/2016    CHOLECYSTECTOMY      EYE SURGERY      cataract removal    HYSTERECTOMY         Family History   Problem Relation Name Age of Onset    Stroke Mother      Heart disease Mother      Heart disease Sister      Alzheimer's disease Sister      Cancer Brother      Cancer Brother      Kidney disease Brother      Breast cancer Maternal Aunt         Social History     Socioeconomic History    Marital status:    Tobacco Use    Smoking status: Never    Smokeless tobacco: Never   Substance and Sexual Activity    Alcohol use: Yes     Comment: social    Drug use: No     Social Determinants of Health     Financial Resource Strain: Low Risk  (4/13/2024)    Overall Financial Resource Strain (CARDIA)     Difficulty of Paying Living Expenses: Not very hard   Food Insecurity: No Food Insecurity (4/13/2024)    Hunger Vital Sign     Worried About Running Out of Food in the Last Year: Never true     Ran Out of Food in the Last Year: Never true   Transportation Needs: No Transportation Needs (4/13/2024)    PRAPARE -  Transportation     Lack of Transportation (Medical): No     Lack of Transportation (Non-Medical): No   Physical Activity: Unknown (4/4/2024)    Exercise Vital Sign     Days of Exercise per Week: 2 days   Stress: Stress Concern Present (4/13/2024)    [employer] - Occupational Stress Questionnaire     Feeling of Stress : To some extent   Social Connections: Unknown (4/4/2024)    Social Connection and Isolation Panel [NHANES]     Frequency of Communication with Friends and Family: More than three times a week     Frequency of Social Gatherings with Friends and Family: More than three times a week     Active Member of Clubs or Organizations: Yes     Marital Status:    Housing Stability: Unknown (4/4/2024)    Housing Stability Vital Sign     Unable to Pay for Housing in the Last Year: No     Unstable Housing in the Last Year: No       Current Outpatient Medications   Medication Sig Dispense Refill    ascorbic acid, vitamin C, (VITAMIN C) 1000 MG tablet Take 1,000 mg by mouth once daily.      b complex vitamins capsule Take 1 capsule by mouth once daily.      carvediloL (COREG) 6.25 MG tablet TAKE 1 TABLET BY MOUTH  TWICE DAILY WITH MEALS 180 tablet 3    cetirizine (ZYRTEC) 10 MG tablet Take 10 mg by mouth daily as needed. As needed      chlorthalidone (HYGROTEN) 25 MG Tab Take 1 tablet (25 mg total) by mouth once daily. 90 tablet 3    cholecalciferol, vitamin D3, 125 mcg (5,000 unit) Tab Take 1 tablet by mouth once daily.      fluticasone propionate (FLONASE) 50 mcg/actuation nasal spray USE 1 SPRAY IN BOTH  NOSTRILS TWICE DAILY 48 g 2    glucosamine-chondroitin 500-400 mg tablet Take 1 tablet by mouth 2 (two) times daily.      levothyroxine (SYNTHROID) 50 MCG tablet Take 1 tablet (50 mcg total) by mouth before breakfast. 90 tablet 3    lisinopriL 10 MG tablet Take 1 tablet (10 mg total) by mouth once daily. 90 tablet 3    loperamide HCl (IMODIUM A-D ORAL) As needed      melatonin 10  "mg Tab Take by mouth. As needed      multivitamin capsule Take 1 capsule by mouth once daily.      potassium chloride (MICRO-K) 10 MEQ CpSR TAKE 1 CAPSULE BY MOUTH TWICE  DAILY 180 capsule 1    vit A/vit C/vit E/zinc/copper (ICAPS AREDS ORAL) Take by mouth.       No current facility-administered medications for this visit.       Review of patient's allergies indicates:   Allergen Reactions    Latex, natural rubber      rash    Biaxin [clarithromycin] Diarrhea and Nausea Only    Codeine Itching    Oxycodone      Knocks pt out for 3 days    Metformin Diarrhea       Physical examination  Vitals Reviewed  /60 (BP Location: Right arm, Patient Position: Sitting, BP Method: Medium (Manual))   Pulse 70   Temp 97.6 °F (36.4 °C) (Oral)   Ht 5' 6" (1.676 m)   Wt 63.3 kg (139 lb 7.1 oz)   SpO2 97%   BMI 22.51 kg/m²  Body mass index is 22.51 kg/m².     BP Readings from Last 3 Encounters:   04/24/24 128/60   04/23/24 130/65   04/17/24 124/63       Wt Readings from Last 3 Encounters:   04/24/24 63.3 kg (139 lb 7.1 oz)   04/23/24 63.5 kg (139 lb 15.9 oz)   04/23/24 63.5 kg (140 lb)     Gen. Well-dressed well-nourished   Skin warm dry and intact.  No rashes noted.  Chest.  Respirations are even unlabored.  Lungs are clear to auscultation.  Cardiac regular rate and rhythm.  No chest wall adenopathy noted.  Neuro. Awake alert oriented x4.  Normal judgment and cognition noted.  Extremities no clubbing cyanosis or edema noted.  Tenderness over the buttocks.  Midline tenderness is limited over the coccyx.  Walking with slight limp.  Unable to get on exam table due to limitations of room for full exam.    Call or return to clinic prn if these symptoms worsen or fail to improve as anticipated.      "

## 2024-04-26 LAB
AORTIC ROOT ANNULUS: 2.9 CM
AORTIC VALVE CUSP SEPERATION: 1.6 CM
AV INDEX (PROSTH): 0.83
AV MEAN GRADIENT: 4 MMHG
AV PEAK GRADIENT: 7 MMHG
AV VALVE AREA BY VELOCITY RATIO: 2.13 CM²
AV VALVE AREA: 2.12 CM²
AV VELOCITY RATIO: 0.84
BSA FOR ECHO PROCEDURE: 1.72 M2
CV ECHO LV RWT: 0.44 CM
DOP CALC AO PEAK VEL: 1.34 M/S
DOP CALC AO VTI: 29.1 CM
DOP CALC LVOT AREA: 2.5 CM2
DOP CALC LVOT DIAMETER: 1.8 CM
DOP CALC LVOT PEAK VEL: 1.12 M/S
DOP CALC LVOT STROKE VOLUME: 61.55 CM3
DOP CALC MV VTI: 30.3 CM
DOP CALCLVOT PEAK VEL VTI: 24.2 CM
E WAVE DECELERATION TIME: 188 MSEC
E/A RATIO: 1.01
E/E' RATIO: 15.23 M/S
ECHO LV POSTERIOR WALL: 0.99 CM (ref 0.6–1.1)
FRACTIONAL SHORTENING: 37 % (ref 28–44)
INTERVENTRICULAR SEPTUM: 0.99 CM (ref 0.6–1.1)
IVC DIAMETER: 1.73 CM
IVRT: 70 MSEC
LEFT ATRIUM SIZE: 3.8 CM
LEFT INTERNAL DIMENSION IN SYSTOLE: 2.81 CM (ref 2.1–4)
LEFT VENTRICLE DIASTOLIC VOLUME INDEX: 52.38 ML/M2
LEFT VENTRICLE DIASTOLIC VOLUME: 90.1 ML
LEFT VENTRICLE MASS INDEX: 86 G/M2
LEFT VENTRICLE SYSTOLIC VOLUME INDEX: 17.3 ML/M2
LEFT VENTRICLE SYSTOLIC VOLUME: 29.8 ML
LEFT VENTRICULAR INTERNAL DIMENSION IN DIASTOLE: 4.45 CM (ref 3.5–6)
LEFT VENTRICULAR MASS: 148.47 G
LV LATERAL E/E' RATIO: 11 M/S
LV SEPTAL E/E' RATIO: 24.75 M/S
LVOT MG: 3 MMHG
LVOT MV: 0.74 CM/S
MV MEAN GRADIENT: 3 MMHG
MV PEAK A VEL: 0.98 M/S
MV PEAK E VEL: 0.99 M/S
MV PEAK GRADIENT: 5 MMHG
MV VALVE AREA BY CONTINUITY EQUATION: 2.03 CM2
OHS CV RV/LV RATIO: 0.48 CM
PISA TR MAX VEL: 2.88 M/S
PV MV: 0.87 M/S
PV PEAK GRADIENT: 6 MMHG
PV PEAK VELOCITY: 1.27 M/S
RA PRESSURE ESTIMATED: 3 MMHG
RIGHT VENTRICULAR END-DIASTOLIC DIMENSION: 2.14 CM
RV TB RVSP: 6 MMHG
RV TISSUE DOPPLER FREE WALL SYSTOLIC VELOCITY 1 (APICAL 4 CHAMBER VIEW): 14.4 CM/S
TDI LATERAL: 0.09 M/S
TDI SEPTAL: 0.04 M/S
TDI: 0.07 M/S
TR MAX PG: 33 MMHG
TRICUSPID ANNULAR PLANE SYSTOLIC EXCURSION: 1.91 CM
TV REST PULMONARY ARTERY PRESSURE: 36 MMHG
Z-SCORE OF LEFT VENTRICULAR DIMENSION IN END DIASTOLE: -0.7
Z-SCORE OF LEFT VENTRICULAR DIMENSION IN END SYSTOLE: -0.39

## 2024-05-01 ENCOUNTER — OFFICE VISIT (OUTPATIENT)
Dept: NEUROSURGERY | Facility: CLINIC | Age: 84
End: 2024-05-01
Payer: MEDICARE

## 2024-05-01 ENCOUNTER — HOSPITAL ENCOUNTER (OUTPATIENT)
Dept: RADIOLOGY | Facility: HOSPITAL | Age: 84
Discharge: HOME | End: 2024-05-01
Attending: HEALTH CARE PROVIDER
Payer: MEDICARE

## 2024-05-01 VITALS
SYSTOLIC BLOOD PRESSURE: 159 MMHG | WEIGHT: 139.56 LBS | DIASTOLIC BLOOD PRESSURE: 69 MMHG | HEART RATE: 66 BPM | HEIGHT: 66 IN | BODY MASS INDEX: 22.43 KG/M2

## 2024-05-01 DIAGNOSIS — I62.00 NONTRAUMATIC SUBDURAL HEMORRHAGE, UNSPECIFIED: ICD-10-CM

## 2024-05-01 DIAGNOSIS — S06.33AA FOCAL HEMORRHAGIC CONTUSION OF CEREBRUM: Primary | ICD-10-CM

## 2024-05-01 LAB
OHS QRS DURATION: 118 MS
OHS QTC CALCULATION: 497 MS

## 2024-05-01 PROCEDURE — 99213 OFFICE O/P EST LOW 20 MIN: CPT | Mod: S$PBB,,, | Performed by: NEUROLOGICAL SURGERY

## 2024-05-01 PROCEDURE — 70450 CT HEAD/BRAIN W/O DYE: CPT | Mod: 26,,, | Performed by: RADIOLOGY

## 2024-05-01 PROCEDURE — 70450 CT HEAD/BRAIN W/O DYE: CPT | Mod: TC

## 2024-05-01 NOTE — PROGRESS NOTES
History of Present Illness 04/12/2024 Jacqueline Mathias is a 84-year-old female with past medical history of obstructive sleep apnea treated with CPAP, asthma, hypothyroidism, hyperlipidemia,  type 2 diabetes, irritable bowel syndrome, peptic ulcer disease, and atrial fibrillation treated with Eliquis presented to [facility] Emergency Department due to a mechanical fall that took place 4 days prior to Emergency Department encounter.  CT head revealed inferior right frontal lobe hemorrhagic contusion with trace some arachnoid hemorrhage.  Neurosurgery consulted for subarachnoid hemorrhage.     On initiation of encounter, very pleasant patient was found in bed, awake and alert with nurses at bedside.  Patient reports since the fall she has been having intermittent headaches, dizziness, nausea and blurry vision.  However, today she only reports a headache which has improved since initial injury.  She denies loss of consciousness, nausea, vomiting or new extremity weakness    Interval history 05/01/2024: She follows up with repeat head CT for review.  She denies headache visual disturbance weakness imbalance.  She has remained off Eliquis.  Her primary concern today is ongoing right buttock pain which developed after the fall.  She landed her right hip and buttock.  She reports bruising in the right buttock region.  Pelvis x-rays were negative.  She denies shooting leg pain.  She denies extremity weakness or numbness.  She denies bowel or bladder dysfunction.  She is taking Tylenol as needed.  She has a history lumbar fusion.    Repeat head CT obtained today was reviewed and shows resolution of the hemorrhagic contusion.  No new findings.      Exam:  No distress, well-groomed, pleasant  Awake, alert, oriented to person place and time.    Cranial nerves 2-12 grossly intact.    Strength is graded 5/5 in all muscle groups.    Sensation is grossly intact throughout.    Gait and stance are normal  Midline lumbar spine  nontender to palpation and percussion  Tender to palpation right gluteal region  Mali's negative bilaterally    Analysis:  She may resume Eliquis from a neurosurgical standpoint.  No indication for repeat head CT.  Her right buttock pain is likely due to soft tissue trauma.  Consider MRI hip and lumbar spine if her symptoms remain refractory.  We will be glad to see her back as needed     Jacqueline was seen today for follow-up.    Diagnoses and all orders for this visit:    Focal hemorrhagic contusion of cerebrum

## 2024-05-02 ENCOUNTER — PATIENT MESSAGE (OUTPATIENT)
Dept: CASE MANAGEMENT | Facility: HOSPITAL | Age: 84
End: 2024-05-02

## 2024-05-03 ENCOUNTER — OFFICE VISIT (OUTPATIENT)
Dept: CARDIOLOGY | Facility: CLINIC | Age: 84
End: 2024-05-03
Payer: MEDICARE

## 2024-05-03 VITALS
OXYGEN SATURATION: 97 % | BODY MASS INDEX: 22.36 KG/M2 | HEART RATE: 68 BPM | SYSTOLIC BLOOD PRESSURE: 158 MMHG | WEIGHT: 139.13 LBS | DIASTOLIC BLOOD PRESSURE: 68 MMHG | HEIGHT: 66 IN

## 2024-05-03 DIAGNOSIS — I10 ESSENTIAL HYPERTENSION: ICD-10-CM

## 2024-05-03 DIAGNOSIS — Q24.5 MYOCARDIAL BRIDGE: ICD-10-CM

## 2024-05-03 DIAGNOSIS — I50.32 CHRONIC HEART FAILURE WITH PRESERVED EJECTION FRACTION: Primary | ICD-10-CM

## 2024-05-03 DIAGNOSIS — I25.10 CORONARY ARTERY DISEASE INVOLVING NATIVE CORONARY ARTERY OF NATIVE HEART WITHOUT ANGINA PECTORIS: ICD-10-CM

## 2024-05-03 DIAGNOSIS — I48.0 PAROXYSMAL ATRIAL FIBRILLATION: ICD-10-CM

## 2024-05-03 PROCEDURE — 99214 OFFICE O/P EST MOD 30 MIN: CPT | Mod: S$PBB,,, | Performed by: INTERNAL MEDICINE

## 2024-05-03 PROCEDURE — 99999 PR PBB SHADOW E&M-EST. PATIENT-LVL IV: CPT | Mod: PBBFAC,,, | Performed by: INTERNAL MEDICINE

## 2024-05-03 PROCEDURE — 99214 OFFICE O/P EST MOD 30 MIN: CPT | Mod: PBBFAC,PN | Performed by: INTERNAL MEDICINE

## 2024-05-03 RX ORDER — PRAVASTATIN SODIUM 20 MG/1
20 TABLET ORAL DAILY
COMMUNITY

## 2024-05-03 RX ORDER — LOSARTAN POTASSIUM 25 MG/1
25 TABLET ORAL DAILY
Qty: 90 TABLET | Refills: 3 | Status: SHIPPED | OUTPATIENT
Start: 2024-05-03 | End: 2025-05-03

## 2024-05-03 RX ORDER — LOSARTAN POTASSIUM 50 MG/1
50 TABLET ORAL DAILY
Qty: 90 TABLET | Refills: 3 | Status: SHIPPED | OUTPATIENT
Start: 2024-05-03 | End: 2024-05-03

## 2024-05-03 NOTE — PROGRESS NOTES
[facility] [place] [facility]    Subjective:     Patient ID:  aJcqueline Mathias is a 84 y.o. female patient here for evaluation Results (Echo)      HPI:  84-year-old female here for follow-up.  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  Preserved EF.  Repeat echocardiogram showed only mild MR, used to be moderate to severe in a echocardiogram in the past.  Elevated BNP.  Started on chlorthalidone.  Lisinopril decreased at last visit for dizziness but no improvement in positional dizziness.    Review of Systems   All other systems reviewed and are negative.       Past Medical History:   Diagnosis Date    Allergy     latex    Allergy     seasonal    Arthritis     Blood transfusion     Cataract     removed    Hypertension     IBS (irritable bowel syndrome)     Obstructive sleep apnea     Thyroid nodule 09/29/2016    Ulcer        Past Surgical History:   Procedure Laterality Date    ANGIOGRAM, CORONARY, WITH LEFT HEART CATHETERIZATION N/A 12/14/2021    Procedure: Angiogram, Coronary, with Left Heart Cath;  Surgeon: Napoleon Dumas MD;  Location[facility] CATH[facility]EP LAB;  Service: Cardiology;  Laterality: N/A;    CAROTID ARTERY ANGIOPLASTY Right 09/30/2016    CHOLECYSTECTOMY      EYE SURGERY      cataract removal    HYSTERECTOMY         Family History   Problem Relation Name Age of Onset    Stroke Mother      Heart disease Mother      Heart disease Sister      Alzheimer's disease Sister      Cancer Brother      Cancer Brother      Kidney disease Brother      Breast cancer Maternal Aunt         Social History     Socioeconomic History    Marital status:    Tobacco Use    Smoking status: Never    Smokeless tobacco: Never   Substance and Sexual Activity    Alcohol use: Yes     Comment: social    Drug use: No     Social Determinants of Health     Financial Resource Strain: Low Risk  (4/13/2024)    Overall Financial Resource Strain (CARDIA)     Difficulty of  Paying Living Expenses: Not very hard   Food Insecurity: No Food Insecurity (4/13/2024)    Hunger Vital Sign     Worried About Running Out of Food in the Last Year: Never true     Ran Out of Food in the Last Year: Never true   Transportation Needs: No Transportation Needs (4/13/2024)    PRAPARE - Transportation     Lack of Transportation (Medical): No     Lack of Transportation (Non-Medical): No   Physical Activity: Unknown (4/4/2024)    Exercise Vital Sign     Days of Exercise per Week: 2 days   Stress: Stress Concern Present (4/13/2024)    [facility] [employer] - Occupational Stress Questionnaire     Feeling of Stress : To some extent   Housing Stability: Unknown (4/4/2024)    Housing Stability Vital Sign     Unable to Pay for Housing in the Last Year: No     Unstable Housing in the Last Year: No       Current Outpatient Medications   Medication Sig Dispense Refill    ascorbic acid, vitamin C, (VITAMIN C) 1000 MG tablet Take 1,000 mg by mouth once daily.      b complex vitamins capsule Take 1 capsule by mouth once daily.      carvediloL (COREG) 6.25 MG tablet TAKE 1 TABLET BY MOUTH  TWICE DAILY WITH MEALS 180 tablet 3    cetirizine (ZYRTEC) 10 MG tablet Take 10 mg by mouth daily as needed. As needed      chlorthalidone (HYGROTEN) 25 MG Tab Take 1 tablet (25 mg total) by mouth once daily. 90 tablet 3    cholecalciferol, vitamin D3, 125 mcg (5,000 unit) Tab Take 1 tablet by mouth once daily.      fluticasone propionate (FLONASE) 50 mcg/actuation nasal spray USE 1 SPRAY IN BOTH  NOSTRILS TWICE DAILY 48 g 2    glucosamine-chondroitin 500-400 mg tablet Take 1 tablet by mouth 2 (two) times daily.      levothyroxine (SYNTHROID) 50 MCG tablet Take 1 tablet (50 mcg total) by mouth before breakfast. 90 tablet 3    loperamide HCl (IMODIUM A-D ORAL) As needed      melatonin 10 mg Tab Take by mouth. As needed      multivitamin capsule Take 1 capsule by mouth once daily.      potassium chloride (MICRO-K) 10  MEQ CpSR TAKE 1 CAPSULE BY MOUTH TWICE  DAILY 180 capsule 1    vit A/vit C/vit E/zinc/copper (ICAPS AREDS ORAL) Take by mouth.      losartan (COZAAR) 25 MG tablet Take 1 tablet (25 mg total) by mouth once daily. 90 tablet 3    pravastatin (PRAVACHOL) 20 MG tablet Take 20 mg by mouth once daily.       No current facility-administered medications for this visit.       Review of patient's allergies indicates:   Allergen Reactions    Latex, natural rubber      rash    Biaxin [clarithromycin] Diarrhea and Nausea Only    Codeine Itching    Oxycodone      Knocks pt out for 3 days    Metformin Diarrhea         Objective:        Vitals:    05/03/24 1306   BP: (!) 158/68   Pulse: 68       Physical Exam  Vitals reviewed.   Constitutional:       Appearance: Normal appearance.   HENT:      Mouth/Throat:      Mouth: Mucous membranes are moist.   Eyes:      Pupils: Pupils are equal, round, and reactive to light.   Cardiovascular:      Rate and Rhythm: Normal rate and regular rhythm.      Pulses: Normal pulses.      Heart sounds: Normal heart sounds. No murmur heard.     No gallop.   Pulmonary:      Effort: Pulmonary effort is normal.      Breath sounds: Normal breath sounds.   Abdominal:      General: Bowel sounds are normal.      Palpations: Abdomen is soft.   Musculoskeletal:         General: Normal range of motion.   Skin:     General: Skin is warm and dry.   Neurological:      General: No focal deficit present.      Mental Status: She is alert and oriented to person, place, and time.   Psychiatric:         Mood and Affect: Mood normal.         LIPIDS - LAST 2   Lab Results   Component Value Date    CHOL 140 08/28/2023    CHOL 139 03/21/2022    HDL 64 08/28/2023    HDL 66 03/21/2022    LDLCALC 49.8 (L) 08/28/2023    LDLCALC 47.4 (L) 03/21/2022    TRIG 131 08/28/2023    TRIG 128 03/21/2022    CHOLHDL 45.7 08/28/2023    CHOLHDL 47.5 03/21/2022       CBC - LAST 2  Lab Results   Component Value Date    WBC 7.97 04/13/2024    WBC  7.80 04/12/2024    RBC 3.76 (L) 04/13/2024    RBC 4.32 04/12/2024    HGB 10.8 (L) 04/13/2024    HGB 12.2 04/12/2024    HCT 33.6 (L) 04/13/2024    HCT 37.7 04/12/2024    MCV 89 04/13/2024    MCV 87 04/12/2024    MCH 28.7 04/13/2024    MCH 28.2 04/12/2024    MCHC 32.1 04/13/2024    MCHC 32.4 04/12/2024    RDW 14.4 04/13/2024    RDW 13.9 04/12/2024     04/13/2024     04/12/2024    MPV 11.4 04/13/2024    MPV 11.8 04/12/2024    GRAN 5.0 04/13/2024    GRAN 63.1 04/13/2024    LYMPH 2.1 04/13/2024    LYMPH 26.9 04/13/2024    MONO 0.7 04/13/2024    MONO 8.5 04/13/2024    BASO 0.01 04/13/2024    BASO 0.02 04/12/2024    NRBC 0 04/13/2024    NRBC 0 04/12/2024       CHEMISTRY & LIVER FUNCTION - LAST 2  Lab Results   Component Value Date     05/01/2024     04/13/2024    K 4.7 05/01/2024    K 4.6 04/13/2024    CL 99 05/01/2024     04/13/2024    CO2 31 (H) 05/01/2024    CO2 33 (H) 04/13/2024    ANIONGAP 7 (L) 05/01/2024    ANIONGAP 5 (L) 04/13/2024    BUN 18 05/01/2024    BUN 25 (H) 04/13/2024    CREATININE 0.8 05/01/2024    CREATININE 0.8 04/13/2024     (H) 05/01/2024     (H) 04/13/2024    CALCIUM 9.7 05/01/2024    CALCIUM 9.5 04/13/2024    MG 2.0 04/13/2024    MG 1.8 04/12/2024    ALBUMIN 3.8 04/13/2024    ALBUMIN 4.2 04/12/2024    PROT 6.8 04/13/2024    PROT 7.7 04/12/2024    ALKPHOS 51 (L) 04/13/2024    ALKPHOS 64 04/12/2024    ALT 10 04/13/2024    ALT 7 (L) 04/12/2024    AST 14 04/13/2024    AST 17 04/12/2024    BILITOT 0.5 04/13/2024    BILITOT 0.5 04/12/2024        CARDIAC PROFILE - LAST 2  Lab Results   Component Value Date     (H) 05/01/2024     (H) 04/05/2024        COAGULATION - LAST 2  Lab Results   Component Value Date    LABPT 15.8 (H) 12/10/2021    LABPT 14.9 (H) 12/01/2021    INR 1.1 04/11/2024    INR 1.4 12/10/2021    APTT 29.5 04/11/2024    APTT 32.8 12/10/2021       ENDOCRINE & PSA - LAST 2  Lab Results   Component Value Date    HGBA1C 6.2 (H) 04/01/2024     HGBA1C 6.3 (H) 08/09/2023    TSH 1.798 04/01/2024    TSH 5.640 (H) 08/28/2023        ECHOCARDIOGRAM RESULTS  Results for orders placed during the hospital encounter of 04/23/24    Echo    Interpretation Summary    Left Ventricle: The left ventricle is normal in size. Normal wall thickness. There is normal systolic function with a visually estimated ejection fraction of 55 - 60%.    Right Ventricle: Right ventricle was not well visualized due to poor acoustic window. Systolic function is normal.    Aortic Valve: The aortic valve is a trileaflet valve.    Mitral Valve: There is no stenosis. The mean pressure gradient across the mitral valve is 3 mmHg at a heart rate of  bpm.    Pulmonic Valve: There is mild regurgitation.    IVC/SVC: Normal venous pressure at 3 mmHg.      CURRENT/PREVIOUS VISIT EKG  Results for orders placed or performed in visit on 04/17/24   IN OFFICE EKG 12-LEAD (to [employer])    Collection Time: 04/17/24  8:20 AM   Result Value Ref Range    QRS Duration 110 ms    OHS QTC Calculation 471 ms    Narrative    Test Reason : I25.10,I10,I34.0,I48.0,    Vent. Rate : 073 BPM     Atrial Rate : 073 BPM     P-R Int : 130 ms          QRS Dur : 110 ms      QT Int : 428 ms       P-R-T Axes : 069 037 027 degrees     QTc Int : 471 ms    Normal sinus rhythm  Right ventricular conduction delay  Nonspecific ST and T wave abnormality  Prolonged QT  Abnormal ECG  When compared with ECG of 11-APR-2024 14:10,  RSR' pattern in V1 has replaced Incomplete right bundle branch block    Referred By:             Confirmed By:      No valid procedures specified.   Results for orders placed in visit on 11/19/21    Nuclear Stress Test    Interpretation Summary    The EKG portion of this study is negative for ischemia.    The patient reported no chest pain during the stress test.    The nuclear portion of this study will be reported separately.    No valid procedures specified.        Assessment:       1. Chronic heart failure with  preserved ejection fraction    2. Myocardial bridge    3. Coronary artery disease involving native coronary artery of native heart without angina pectoris    4. Paroxysmal atrial fibrillation    5. Essential hypertension           Plan:       Chronic heart failure with preserved ejection fraction    Myocardial bridge    Coronary artery disease involving native coronary artery of native heart without angina pectoris    Paroxysmal atrial fibrillation    Essential hypertension  -     Discontinue: losartan (COZAAR) 50 MG tablet; Take 1 tablet (50 mg total) by mouth once daily.  Dispense: 90 tablet; Refill: 3  -     losartan (COZAAR) 25 MG tablet; Take 1 tablet (25 mg total) by mouth once daily.  Dispense: 90 tablet; Refill: 3    Follow-up with Dr. Carolina for management of atrial fibrillation and anticoagulation given history of falls and no logical issues with it.    Has elevated BNP, discussed about increasing diuretics at the expense of making dizziness worse, patient does not want to do that, continue with hydrochlorothiazide.  Patient reports she is having allergic reaction to lisinopril with some diarrhea, will like to change it to alternative medication, will try losartan.    Continue with carvedilol.  Follow-up with nurse practitioner in about 6 months for further management.  Follow up in about 6 months (around 11/3/2024) for NP for HFpEF.          MD [facility] [place]  Krista

## 2024-05-06 LAB
OHS QRS DURATION: 120 MS
OHS QTC CALCULATION: 446 MS

## 2024-05-13 ENCOUNTER — OFFICE VISIT (OUTPATIENT)
Dept: PRIMARY CARE CLINIC | Facility: CLINIC | Age: 84
End: 2024-05-13
Payer: MEDICARE

## 2024-05-13 VITALS
OXYGEN SATURATION: 97 % | HEIGHT: 66 IN | BODY MASS INDEX: 22.5 KG/M2 | TEMPERATURE: 98 F | WEIGHT: 140 LBS | DIASTOLIC BLOOD PRESSURE: 70 MMHG | HEART RATE: 60 BPM | SYSTOLIC BLOOD PRESSURE: 138 MMHG

## 2024-05-13 DIAGNOSIS — I60.9 SAH (SUBARACHNOID HEMORRHAGE): Primary | ICD-10-CM

## 2024-05-13 PROCEDURE — 99214 OFFICE O/P EST MOD 30 MIN: CPT | Mod: S$PBB,,, | Performed by: NURSE PRACTITIONER

## 2024-05-13 PROCEDURE — 99213 OFFICE O/P EST LOW 20 MIN: CPT | Mod: PBBFAC,PN | Performed by: NURSE PRACTITIONER

## 2024-05-13 PROCEDURE — 99999 PR PBB SHADOW E&M-EST. PATIENT-LVL III: CPT | Mod: PBBFAC,,, | Performed by: NURSE PRACTITIONER

## 2024-05-13 NOTE — PROGRESS NOTES
Transitional Care Note  Subjective:       Patient ID: Jacqueline Mathias is a 84 y.o. female.  Chief Complaint: Hospital Follow Up    Family and/or Caretaker present at visit?  No.  Diagnostic tests reviewed/disposition: I have reviewed all completed as well as pending diagnostic tests at the time of discharge.  Disease/illness education:   Home health/community services discussion/referrals: Patient does not have home health established from hospital visit.  They do not need home health.  If needed, we will set up home health for the patient.   Establishment or re-establishment of referral orders for community resources: No other necessary community resources.   Discussion with other health care providers: No discussion with other health care providers necessary.   Jacqueline Mathias is a 84-year-old female with past medical history of obstructive sleep apnea treated with CPAP, asthma, hypothyroidism, hyperlipidemia,  type 2 diabetes, irritable bowel syndrome, peptic ulcer disease, and atrial fibrillation treated with Eliquis who presents for hospital follow up. Patient was admitted following mechanical fall and found to have subarachnoid hemorrhage. She was recently seen by neurosurgery on 5/1/24 and repeat head CT showed resolution of the hemorrhagic contusion. Patient reports she is feeling better and denies headaches, confusion, nausea and vomiting. Patient continues to complain of sacral pain, present since fall, but states it is slowly improving. No reports of numbness, tingling, bowel or bladder incontinence. She is scheduled to see Dr Young next week for cardiology follow up and to discuss when to resume anticoagulation. No further complaints        Review of Systems   Constitutional:  Negative for activity change, appetite change, chills and fever.   HENT:  Negative for congestion, sore throat and trouble swallowing.    Eyes:  Negative for photophobia and visual disturbance.   Respiratory:  Negative for cough,  chest tightness and shortness of breath.    Cardiovascular:  Negative for chest pain, palpitations and leg swelling.   Gastrointestinal:  Negative for abdominal pain, diarrhea and nausea.   Genitourinary:  Negative for dysuria, flank pain and hematuria.   Musculoskeletal:  Negative for back pain (sacral).   Neurological:  Negative for dizziness, weakness and headaches.   Psychiatric/Behavioral:  Negative for confusion.        Objective:      Physical Exam  Vitals reviewed.   Constitutional:       Appearance: Normal appearance. She is normal weight.   HENT:      Head: Normocephalic.      Mouth/Throat:      Mouth: Mucous membranes are moist.      Pharynx: Oropharynx is clear.   Eyes:      Pupils: Pupils are equal, round, and reactive to light.   Cardiovascular:      Rate and Rhythm: Normal rate.      Pulses: Normal pulses.   Pulmonary:      Effort: Pulmonary effort is normal.      Breath sounds: Normal breath sounds.   Abdominal:      General: Bowel sounds are normal.   Musculoskeletal:         General: Normal range of motion.      Cervical back: Normal range of motion.   Skin:     General: Skin is warm and dry.   Neurological:      Mental Status: She is alert and oriented to person, place, and time. Mental status is at baseline.   Psychiatric:         Mood and Affect: Mood normal.         Assessment:       1. SAH (subarachnoid hemorrhage)        Plan:       Follow up with cardiology and neurosurgery as scheduled.  Discuss anticoagulation with cardiology/neurosurgery

## 2024-05-15 ENCOUNTER — OFFICE VISIT (OUTPATIENT)
Dept: CARDIOLOGY | Facility: CLINIC | Age: 84
End: 2024-05-15
Payer: MEDICARE

## 2024-05-15 ENCOUNTER — HOSPITAL ENCOUNTER (OUTPATIENT)
Dept: CARDIOLOGY | Facility: CLINIC | Age: 84
Discharge: HOME | End: 2024-05-15
Attending: INTERNAL MEDICINE
Payer: MEDICARE

## 2024-05-15 VITALS
DIASTOLIC BLOOD PRESSURE: 78 MMHG | HEIGHT: 66 IN | HEART RATE: 84 BPM | RESPIRATION RATE: 16 BRPM | SYSTOLIC BLOOD PRESSURE: 128 MMHG | WEIGHT: 140 LBS | OXYGEN SATURATION: 95 % | BODY MASS INDEX: 22.5 KG/M2

## 2024-05-15 DIAGNOSIS — I48.19 OTHER PERSISTENT ATRIAL FIBRILLATION: Primary | ICD-10-CM

## 2024-05-15 DIAGNOSIS — I34.0 NONRHEUMATIC MITRAL VALVE REGURGITATION: ICD-10-CM

## 2024-05-15 DIAGNOSIS — E11.9 TYPE 2 DIABETES MELLITUS WITHOUT COMPLICATION, WITHOUT LONG-TERM CURRENT USE OF INSULIN: ICD-10-CM

## 2024-05-15 DIAGNOSIS — I48.19 OTHER PERSISTENT ATRIAL FIBRILLATION: ICD-10-CM

## 2024-05-15 DIAGNOSIS — I60.9 SAH (SUBARACHNOID HEMORRHAGE): ICD-10-CM

## 2024-05-15 DIAGNOSIS — E03.4 HYPOTHYROIDISM DUE TO ACQUIRED ATROPHY OF THYROID: ICD-10-CM

## 2024-05-15 DIAGNOSIS — I48.0 PAROXYSMAL ATRIAL FIBRILLATION: ICD-10-CM

## 2024-05-15 DIAGNOSIS — G47.33 OBSTRUCTIVE SLEEP APNEA (ADULT) (PEDIATRIC): ICD-10-CM

## 2024-05-15 PROCEDURE — 93246 EXT ECG>7D<15D RECORDING: CPT | Mod: ,,, | Performed by: INTERNAL MEDICINE

## 2024-05-15 PROCEDURE — 99214 OFFICE O/P EST MOD 30 MIN: CPT | Mod: PBBFAC,PN | Performed by: INTERNAL MEDICINE

## 2024-05-15 PROCEDURE — 99205 OFFICE O/P NEW HI 60 MIN: CPT | Mod: S$PBB,,, | Performed by: INTERNAL MEDICINE

## 2024-05-15 PROCEDURE — 99999 PR PBB SHADOW E&M-EST. PATIENT-LVL IV: CPT | Mod: PBBFAC,,, | Performed by: INTERNAL MEDICINE

## 2024-05-15 PROCEDURE — 93248 EXT ECG>7D<15D REV&INTERPJ: CPT | Mod: ,,, | Performed by: INTERNAL MEDICINE

## 2024-06-06 LAB
OHS CV EVENT MONITOR DAY: 13
OHS CV HOLTER HOOKUP DATE: NORMAL
OHS CV HOLTER HOOKUP TIME: NORMAL
OHS CV HOLTER LENGTH DECIMAL HOURS: 328
OHS CV HOLTER LENGTH HOURS: 16
OHS CV HOLTER LENGTH MINUTES: 0
OHS CV HOLTER SCAN DATE: NORMAL
OHS CV HOLTER SINUS AVERAGE HR: 65 BPM
OHS CV HOLTER SINUS MAX HR: 169 BPM
OHS CV HOLTER SINUS MIN HR: 44 BPM
OHS CV HOLTER STUDY END DATE: NORMAL
OHS CV HOLTER STUDY END TIME: NORMAL

## 2024-06-07 ENCOUNTER — TELEPHONE (OUTPATIENT)
Dept: CARDIOLOGY | Facility: CLINIC | Age: 84
End: 2024-06-07
Payer: MEDICARE

## 2024-06-07 NOTE — TELEPHONE ENCOUNTER
----- Message from Yaima Hernandez sent at 6/7/2024  2:28 PM CDT -----  Roseanne ALONZO do this loop since Luisa doesn't. We discussed.  ----- Message -----  From: Jonel Young MD  Sent: 6/7/2024   1:52 PM CDT  To: Yaima Hernandez; Vazquez Moran MD    Hi Yaima--Pt of Luisa who needs a loop recorder (recent event monitor negative--see my office note).    Can you please schedule for [place] with one of the docs out there?    She can see me again PRN.    GP  ----- Message -----  From: Jonel Young MD  Sent: 6/6/2024  10:14 AM CDT  To: Jonel Young MD

## 2024-06-08 LAB
OHS QRS DURATION: 110 MS
OHS QTC CALCULATION: 471 MS

## 2024-06-10 ENCOUNTER — TELEPHONE (OUTPATIENT)
Dept: CARDIOLOGY | Facility: CLINIC | Age: 84
End: 2024-06-10
Payer: MEDICARE

## 2024-06-10 NOTE — TELEPHONE ENCOUNTER
Patient was called and informed on what Dr Young said regarding loop implant. Pt understood and would wait on someone to set up procedure with Dr. Dumas.

## 2024-06-10 NOTE — TELEPHONE ENCOUNTER
----- Message from Marizol Mckeon MA sent at 6/10/2024  9:10 AM CDT -----  Regarding: FW: ret call  Contact: jacqueline at [phone]    ----- Message -----  From: Lazaro Swan  Sent: 6/10/2024   9:02 AM CDT  To: Hannah Remy Staff  Subject: ret call                                         Type:  Patient Returning Call    Who Called:  Jacqueline    Who Left Message for Patient:  Uma    Does the patient know what this is regarding?:  yes    Best Call Back Number:  [phone]    Additional Information:

## 2024-06-12 ENCOUNTER — TELEPHONE (OUTPATIENT)
Dept: CARDIOLOGY | Facility: CLINIC | Age: 84
End: 2024-06-12
Payer: MEDICARE

## 2024-06-12 DIAGNOSIS — I48.91 ATRIAL FIBRILLATION, UNSPECIFIED TYPE: Primary | ICD-10-CM

## 2024-06-12 DIAGNOSIS — I48.91 A-FIB: ICD-10-CM

## 2024-06-12 NOTE — TELEPHONE ENCOUNTER
----- Message from Liat Allen sent at 6/12/2024 10:43 AM CDT -----  Regarding: Needs Medical Status  Contact: patient at [phone]  Type: Needs Medical Status  Who Called:  patient at [phone]    Additional Information: patient would like an update call to discuss getting the loop implant. Please call and advise. Thank you

## 2024-06-13 ENCOUNTER — TELEPHONE (OUTPATIENT)
Dept: CARDIOLOGY | Facility: CLINIC | Age: 84
End: 2024-06-13
Payer: MEDICARE

## 2024-06-13 NOTE — TELEPHONE ENCOUNTER
----- Message from Yaima Hernandez sent at 6/7/2024  2:28 PM CDT -----  Roseanne ALONZO do this loop since Luisa doesn't. We discussed.  ----- Message -----  From: Jonel Young MD  Sent: 6/7/2024   1:52 PM CDT  To: Yaima Hernandez; Vazquez Moran MD    Hi Yaima--Pt of Luisa who needs a loop recorder (recent event monitor negative--see my office note).    Can you please schedule for [place] with one of the docs out there?    She can see me again PRN.    GP  ----- Message -----  From: Jonel Young MD  Sent: 6/6/2024  10:14 AM CDT  To: Jonel Young MD

## 2024-06-19 ENCOUNTER — HOSPITAL ENCOUNTER (OUTPATIENT)
Facility: HOSPITAL | Age: 84
Discharge: HOME | End: 2024-06-19
Attending: INTERNAL MEDICINE | Admitting: INTERNAL MEDICINE
Payer: MEDICARE

## 2024-06-19 ENCOUNTER — PATIENT MESSAGE (OUTPATIENT)
Dept: CARDIOLOGY | Facility: CLINIC | Age: 84
End: 2024-06-19

## 2024-06-19 DIAGNOSIS — I48.91 ATRIAL FIBRILLATION, UNSPECIFIED TYPE: ICD-10-CM

## 2024-06-19 DIAGNOSIS — I48.91 A-FIB: ICD-10-CM

## 2024-06-19 PROCEDURE — C1764 EVENT RECORDER, CARDIAC: HCPCS | Performed by: INTERNAL MEDICINE

## 2024-06-19 PROCEDURE — 33285 INSJ SUBQ CAR RHYTHM MNTR: CPT | Performed by: INTERNAL MEDICINE

## 2024-06-19 PROCEDURE — 25000003 PHARM REV CODE 250: Performed by: INTERNAL MEDICINE

## 2024-06-19 PROCEDURE — 33285 INSJ SUBQ CAR RHYTHM MNTR: CPT | Mod: ,,, | Performed by: INTERNAL MEDICINE

## 2024-06-19 RX ORDER — LIDOCAINE HYDROCHLORIDE 10 MG/ML
INJECTION INFILTRATION; PERINEURAL
Status: DISCONTINUED | OUTPATIENT
Start: 2024-06-19 | End: 2024-06-19 | Stop reason: HOSPADM

## 2024-06-19 RX ORDER — LIDOCAINE HYDROCHLORIDE 10 MG/ML
INJECTION, SOLUTION EPIDURAL; INFILTRATION; INTRACAUDAL; PERINEURAL
Status: DISCONTINUED
Start: 2024-06-19 | End: 2024-06-19 | Stop reason: HOSPADM

## 2024-06-19 NOTE — PLAN OF CARE
Ambulated around room without difficulty. No complaints of pain or distress noted. VSS. Dressing to upper left chest wall remains CDI. Proceed with discharge as ordered.   Verbalizes understanding of discharge instructions, loop recorder insertion site care, and follow up care. Belongings gathered including home monitoring system and dressed in personal clothing. Ambulated off unit with family.

## 2024-06-19 NOTE — H&P
[facility]    History and physical        Subjective:     Patient ID:  Jacqueline Mathias is a 84 y.o. female patient here for evaluation No chief complaint on file.  HPI  Patient was 84-year-old with history of nonobstructive coronary artery disease and preserved ejection fraction has been having recurrent episodes of dizziness and lightheadedness.  She was history of paroxysmal atrial fibrillation she was recommended to have loop recorder implantation now admitted for the same.        Details per Dr. Moran's evaluation  HPI:  84-year-old female here for follow-up.  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  Preserved EF.  Repeat echocardiogram showed only mild MR, used to be moderate to severe in a echocardiogram in the past.  Elevated BNP.  Started on chlorthalidone.  Lisinopril decreased at last visit for dizziness but no improvement in positional dizziness.    Review of Systems   All other systems reviewed and are negative.       Past Medical History:   Diagnosis Date    A-fib     Allergy     latex    Allergy     seasonal    Arthritis     Blood transfusion     Cataract     removed    COPD (chronic obstructive pulmonary disease)     Focal hemorrhagic contusion of cerebrum     Hypertension     IBS (irritable bowel syndrome)     Myocardial bridge     Obstructive sleep apnea     On home O2     Nightly    SAH (subarachnoid hemorrhage)     Thyroid nodule 09/29/2016    Ulcer        Past Surgical History:   Procedure Laterality Date    ANGIOGRAM, CORONARY, WITH LEFT HEART CATHETERIZATION N/A 12/14/2021    Procedure: Angiogram, Coronary, with Left Heart Cath;  Surgeon: Napoleon Dumas MD;  Location: [facility] CATH/EP LAB;  Service: Cardiology;  Laterality: N/A;    CAROTID ARTERY ANGIOPLASTY Right 09/30/2016    CHOLECYSTECTOMY      EYE SURGERY      cataract removal    HYSTERECTOMY         Family History   Problem Relation Name Age of Onset     Stroke Mother      Heart disease Mother      Heart disease Sister      Alzheimer's disease Sister      Cancer Brother      Cancer Brother      Kidney disease Brother      Breast cancer Maternal Aunt         Social History     Socioeconomic History    Marital status:    Tobacco Use    Smoking status: Never    Smokeless tobacco: Never   Substance and Sexual Activity    Alcohol use: Not Currently    Drug use: No     Social Determinants of Health     Financial Resource Strain: Low Risk  (4/13/2024)    Overall Financial Resource Strain (CARDIA)     Difficulty of Paying Living Expenses: Not very hard   Food Insecurity: No Food Insecurity (4/13/2024)    Hunger Vital Sign     Worried About Running Out of Food in the Last Year: Never true     Ran Out of Food in the Last Year: Never true   Transportation Needs: No Transportation Needs (4/13/2024)    PRAPARE - Transportation     Lack of Transportation (Medical): No     Lack of Transportation (Non-Medical): No   Physical Activity: Unknown (4/4/2024)    Exercise Vital Sign     Days of Exercise per Week: 2 days   Stress: Stress Concern Present (4/13/2024)    [employer] - Occupational Stress Questionnaire     Feeling of Stress : To some extent   Housing Stability: Unknown (4/4/2024)    Housing Stability Vital Sign     Unable to Pay for Housing in the Last Year: No     Unstable Housing in the Last Year: No       Current Facility-Administered Medications   Medication Dose Route Frequency Provider Last Rate Last Admin    LIDOcaine (PF) 10 mg/ml (1%) 10 mg/mL (1 %) injection             LIDOcaine HCL 10 mg/ml (1%) injection    Mario Brooks MD   20 mL at 06/19/24 0907       Review of patient's allergies indicates:   Allergen Reactions    Latex, natural rubber      rash    Biaxin [clarithromycin] Diarrhea and Nausea Only    Codeine Itching    Oxycodone      Knocks pt out for 3 days    Metformin Diarrhea         Objective:      Physical  examination:    Weight is 63 kilos   Heart rate is 76 beats per minute   Blood pressure:181/78      Constitutional:  Well-built well-nourished in no apparent distress, alert and oriented    Neck: no carotid bruit, no JVD, no masses    Lungs: diminished breath sounds bibasilar, rhonchi bilaterally  Chest Wall: no tenderness  CARDIAC:  regular rate and rhythm, S1, S2 normal,   Abdomen: soft, non-tender; bowel sounds normal; no masses,  no organomegaly, no guarding or rebound noted  Extremities: Extremities normal, atraumatic, no cyanosis, clubbing, or edema  Skin: Skin color, texture, turgor normal. No rashes or lesions  Neuro: Alert and responsive.  Moving all extremities      LIPIDS - LAST 2   Lab Results   Component Value Date    CHOL 140 08/28/2023    CHOL 139 03/21/2022    HDL 64 08/28/2023    HDL 66 03/21/2022    LDLCALC 49.8 (L) 08/28/2023    LDLCALC 47.4 (L) 03/21/2022    TRIG 131 08/28/2023    TRIG 128 03/21/2022    CHOLHDL 45.7 08/28/2023    CHOLHDL 47.5 03/21/2022       CBC - LAST 2  Lab Results   Component Value Date    WBC 7.97 04/13/2024    WBC 7.80 04/12/2024    RBC 3.76 (L) 04/13/2024    RBC 4.32 04/12/2024    HGB 10.8 (L) 04/13/2024    HGB 12.2 04/12/2024    HCT 33.6 (L) 04/13/2024    HCT 37.7 04/12/2024    MCV 89 04/13/2024    MCV 87 04/12/2024    MCH 28.7 04/13/2024    MCH 28.2 04/12/2024    MCHC 32.1 04/13/2024    MCHC 32.4 04/12/2024    RDW 14.4 04/13/2024    RDW 13.9 04/12/2024     04/13/2024     04/12/2024    MPV 11.4 04/13/2024    MPV 11.8 04/12/2024    GRAN 5.0 04/13/2024    GRAN 63.1 04/13/2024    LYMPH 2.1 04/13/2024    LYMPH 26.9 04/13/2024    MONO 0.7 04/13/2024    MONO 8.5 04/13/2024    BASO 0.01 04/13/2024    BASO 0.02 04/12/2024    NRBC 0 04/13/2024    NRBC 0 04/12/2024       CHEMISTRY & LIVER FUNCTION - LAST 2  Lab Results   Component Value Date     05/01/2024     04/13/2024    K 4.7 05/01/2024    K 4.6 04/13/2024    CL 99 05/01/2024     04/13/2024     CO2 31 (H) 05/01/2024    CO2 33 (H) 04/13/2024    ANIONGAP 7 (L) 05/01/2024    ANIONGAP 5 (L) 04/13/2024    BUN 18 05/01/2024    BUN 25 (H) 04/13/2024    CREATININE 0.8 05/01/2024    CREATININE 0.8 04/13/2024     (H) 05/01/2024     (H) 04/13/2024    CALCIUM 9.7 05/01/2024    CALCIUM 9.5 04/13/2024    MG 2.0 04/13/2024    MG 1.8 04/12/2024    ALBUMIN 3.8 04/13/2024    ALBUMIN 4.2 04/12/2024    PROT 6.8 04/13/2024    PROT 7.7 04/12/2024    ALKPHOS 51 (L) 04/13/2024    ALKPHOS 64 04/12/2024    ALT 10 04/13/2024    ALT 7 (L) 04/12/2024    AST 14 04/13/2024    AST 17 04/12/2024    BILITOT 0.5 04/13/2024    BILITOT 0.5 04/12/2024        CARDIAC PROFILE - LAST 2  Lab Results   Component Value Date     (H) 05/01/2024     (H) 04/05/2024        COAGULATION - LAST 2  Lab Results   Component Value Date    LABPT 15.8 (H) 12/10/2021    LABPT 14.9 (H) 12/01/2021    INR 1.1 04/11/2024    INR 1.4 12/10/2021    APTT 29.5 04/11/2024    APTT 32.8 12/10/2021       ENDOCRINE & PSA - LAST 2  Lab Results   Component Value Date    HGBA1C 6.2 (H) 04/01/2024    HGBA1C 6.3 (H) 08/09/2023    TSH 1.798 04/01/2024    TSH 5.640 (H) 08/28/2023        ECHOCARDIOGRAM RESULTS  Results for orders placed during the hospital encounter of 04/23/24    Echo    Interpretation Summary    Left Ventricle: The left ventricle is normal in size. Normal wall thickness. There is normal systolic function with a visually estimated ejection fraction of 55 - 60%.    Right Ventricle: Right ventricle was not well visualized due to poor acoustic window. Systolic function is normal.    Aortic Valve: The aortic valve is a trileaflet valve.    Mitral Valve: There is no stenosis. The mean pressure gradient across the mitral valve is 3 mmHg at a heart rate of  bpm.    Pulmonic Valve: There is mild regurgitation.    IVC/SVC: Normal venous pressure at 3 mmHg.      CURRENT/PREVIOUS VISIT EKG  Results for orders placed or performed in visit on 04/17/24    IN OFFICE EKG 12-LEAD (to [employer])    Collection Time: 04/17/24  8:20 AM   Result Value Ref Range    QRS Duration 110 ms    [facility] QTC Calculation 471 ms    Narrative    Test Reason : I25.10,I10,I34.0,I48.0,    Vent. Rate : 073 BPM     Atrial Rate : 073 BPM     P-R Int : 130 ms          QRS Dur : 110 ms      QT Int : 428 ms       P-R-T Axes : 069 037 027 degrees     QTc Int : 471 ms    Normal sinus rhythm  Right ventricular conduction delay  Nonspecific ST and T wave abnormality  Prolonged QT  Abnormal ECG  When compared with ECG of 11-APR-2024 14:10,  RSR' pattern in V1 has replaced Incomplete right bundle branch block  Confirmed by Shandra PERRY, Gio POPE ([id number]) on 6/8/2024 8:35:14 AM    Referred By:             Confirmed By:Gio Devi MD     No valid procedures specified.   Results for orders placed in visit on 11/19/21    Nuclear Stress Test    Interpretation Summary    The EKG portion of this study is negative for ischemia.    The patient reported no chest pain during the stress test.    The nuclear portion of this study will be reported separately.    No valid procedures specified.        Assessment:       1. Atrial fibrillation, unspecified type    2. A-fib           Plan:         Recommend loop recorder implantation  Risks and benefits of loop recorder implantation reviewed with the patient in detail including in her risk of infection bleeding but not limited to these alone.  Informed consent has been obtained.  Encouraged her to follow-up with Dr. Carolina in his scheduled    Plans per Dr. Moran   Follow-up with Dr. Carolina for management of atrial fibrillation and anticoagulation given history of falls and no logical issues with it.    Has elevated BNP, discussed about increasing diuretics at the expense of making dizziness worse, patient does not want to do that, continue with hydrochlorothiazide.  Patient reports she is having allergic reaction to lisinopril with some diarrhea, will like to change it to  alternative medication, will try losartan.    Continue with carvedilol.  Follow-up with nurse practitioner in about 6 months for further management.  No follow-ups on file.          MD [facility] [place] [facility]  06/19/2024

## 2024-06-19 NOTE — Clinical Note
The site was marked. The left chest was prepped. The site was prepped with chlorhexidine. The patient was draped.

## 2024-06-19 NOTE — DISCHARGE SUMMARY
[facility]  Discharge Note  Short Stay    Procedure(s) (LRB):  Insertion, Implantable Loop Recorder (N/A)      OUTCOME: Patient tolerated treatment/procedure well without complication and is now ready for discharge.    DISPOSITION: Home or Self Care    FINAL DIAGNOSIS:  Paroxysmal atrial fibrillation  Recurrent episodes of dizziness  Evaluation for need for anticoagulation therapy    FOLLOWUP: In clinic    DISCHARGE INSTRUCTIONS:  Ice pack for 4 hours  Encouraged her to keep the wound clean  Instructed patient not to remove the Steri-Strips  May shower in 24 hours keeping the out of bandage intact  May remove the outer bandage in 48 hours  Follow-up in the office in 7 days for wound check    TIME SPENT ON DISCHARGE: 25 minutes

## 2024-06-19 NOTE — BRIEF OP NOTE
[facility] informed consent for loop recorder implantation and admitted to monitored bed.  The anterior chest wall was then marked prepped and draped in usual sterile fashion.  Local anesthesia was obtained using lidocaine infiltration.  A linear incision was made using the tool provided with the kit.  A tunnel was then created using the tunneling tool and then the loop recorder was successfully deployed without any problem.  Complete hemostasis was then obtained the wound was then closed with Steri-Strips after applying Mastisol on the margins for better adhesive purposes.  Sterile dressings were then applied and ice pack was placed.  Patient remained stable throughout the procedure.  The device was then tested with adequate sensing.

## 2024-06-19 NOTE — DISCHARGE INSTRUCTIONS
Discharge Instructions for Loop Recorders:  You may shower in 24 hours after the procedure. Allow soapy water to gently run over chest incision, not directly on incision. Do not rub or scrub incision. No bathtubs or submerging in water until site is completely healed.   Keep site clean and dry at all times.   Inspect site daily for tenderness, discharge, or signs of infection.  Apply ice pack for 4 hours.  Do not remove the steri strips, allow them to fall off on their own.

## 2024-06-20 VITALS
WEIGHT: 139 LBS | HEIGHT: 66 IN | RESPIRATION RATE: 24 BRPM | HEART RATE: 71 BPM | DIASTOLIC BLOOD PRESSURE: 79 MMHG | BODY MASS INDEX: 22.34 KG/M2 | SYSTOLIC BLOOD PRESSURE: 181 MMHG

## 2024-06-27 ENCOUNTER — CLINICAL SUPPORT (OUTPATIENT)
Dept: CARDIOLOGY | Facility: CLINIC | Age: 84
End: 2024-06-27
Payer: MEDICARE

## 2024-06-27 DIAGNOSIS — Z71.9 COUNSELED BY NURSE: Primary | ICD-10-CM

## 2024-07-06 DIAGNOSIS — E03.4 HYPOTHYROIDISM DUE TO ACQUIRED ATROPHY OF THYROID: ICD-10-CM

## 2024-07-06 DIAGNOSIS — I10 HTN (HYPERTENSION), BENIGN: ICD-10-CM

## 2024-07-07 NOTE — TELEPHONE ENCOUNTER
No care due was identified.  [facility] Embedded Care Due Messages. Reference number: [id number].   7/06/2024 9:35:05 PM CDT

## 2024-07-08 DIAGNOSIS — Z12.31 ENCOUNTER FOR SCREENING MAMMOGRAM FOR MALIGNANT NEOPLASM OF BREAST: Primary | ICD-10-CM

## 2024-07-08 RX ORDER — LEVOTHYROXINE SODIUM 50 UG/1
50 TABLET ORAL
Qty: 90 TABLET | Refills: 2 | Status: SHIPPED | OUTPATIENT
Start: 2024-07-08

## 2024-07-08 RX ORDER — POTASSIUM CHLORIDE 750 MG/1
CAPSULE, EXTENDED RELEASE ORAL
Qty: 180 CAPSULE | Refills: 2 | Status: SHIPPED | OUTPATIENT
Start: 2024-07-08

## 2024-07-08 NOTE — TELEPHONE ENCOUNTER
Refill Routing Note   Medication(s) are not appropriate for processing by [facility] for the following reason(s):        Drug-disease interactionpotassium chloride and Peptic ulcer disease     [facility] action(s):  Defer  Approve        Medication Therapy Plan: FOV WITH PROVIDER 11/2024; ED documentation reviewed. No changes to therapy note    Pharmacist review requested: Yes   Extended chart review required: Yes     Appointments  past 12m or future 3m with PCP    Date Provider   Last Visit   4/4/2024 Brice Weaver Jr., MD   Next Visit   11/25/2024 Brice Weaver Jr., MD   ED visits in past 90 days: 0        Note composed:9:41 AM 07/08/2024

## 2024-07-08 NOTE — TELEPHONE ENCOUNTER
Refill Decision Note   Jacqueline Mathias  is requesting a refill authorization.  Brief Assessment and Rationale for Refill:  Approve     Medication Therapy Plan: FOV WITH PROVIDER 11/2024; ED documentation reviewed. No changes to therapy note      Pharmacist review requested: Yes   Comments:     Note composed:11:22 AM 07/08/2024

## 2024-07-08 NOTE — TELEPHONE ENCOUNTER
Requesting refills for   Micro-K and Levothyroxine    Last visit 4/4/2024   Next visit  11/25/2024

## 2024-07-11 ENCOUNTER — TELEPHONE (OUTPATIENT)
Dept: NEUROSURGERY | Facility: CLINIC | Age: 84
End: 2024-07-11
Payer: MEDICARE

## 2024-07-11 DIAGNOSIS — S06.33AA FOCAL HEMORRHAGIC CONTUSION OF CEREBRUM: Primary | ICD-10-CM

## 2024-07-11 NOTE — TELEPHONE ENCOUNTER
Returned call to pt. She reports ongoing dizziness when standing from sitting and when leaning her head back.  Spoke with Dr. Horton and relayed pt's symptoms.   Informed pt that Dr. Horton recommends a MRI Brain and referral to ENT for further evaluation of dizziness. Pt agreed to MRI but does not wish to be referred to ENT at this time. MRI scheduled. Pt voiced understanding.

## 2024-07-11 NOTE — TELEPHONE ENCOUNTER
----- Message from Stacey M Lefort sent at 7/11/2024  1:42 PM CDT -----  Pt is requesting a callback at [phone] or [phone]

## 2024-07-12 ENCOUNTER — OFFICE VISIT (OUTPATIENT)
Dept: CARDIOLOGY | Facility: CLINIC | Age: 84
End: 2024-07-12
Payer: MEDICARE

## 2024-07-12 ENCOUNTER — HOSPITAL ENCOUNTER (OUTPATIENT)
Dept: RADIOLOGY | Facility: HOSPITAL | Age: 84
Discharge: HOME | End: 2024-07-12
Attending: SPECIALIST
Payer: MEDICARE

## 2024-07-12 VITALS
DIASTOLIC BLOOD PRESSURE: 68 MMHG | RESPIRATION RATE: 16 BRPM | OXYGEN SATURATION: 93 % | BODY MASS INDEX: 23.14 KG/M2 | WEIGHT: 144 LBS | HEART RATE: 58 BPM | HEIGHT: 66 IN | SYSTOLIC BLOOD PRESSURE: 120 MMHG

## 2024-07-12 DIAGNOSIS — E11.9 TYPE 2 DIABETES MELLITUS WITHOUT COMPLICATION, WITHOUT LONG-TERM CURRENT USE OF INSULIN: ICD-10-CM

## 2024-07-12 DIAGNOSIS — Z12.31 ENCOUNTER FOR SCREENING MAMMOGRAM FOR MALIGNANT NEOPLASM OF BREAST: ICD-10-CM

## 2024-07-12 DIAGNOSIS — E78.2 MIXED HYPERLIPIDEMIA: ICD-10-CM

## 2024-07-12 DIAGNOSIS — I10 ACCELERATED HYPERTENSION: ICD-10-CM

## 2024-07-12 DIAGNOSIS — I48.0 PAROXYSMAL ATRIAL FIBRILLATION: ICD-10-CM

## 2024-07-12 DIAGNOSIS — H83.2X3 LABYRINTHINE DYSFUNCTION, BILATERAL: ICD-10-CM

## 2024-07-12 DIAGNOSIS — I25.10 CORONARY ARTERY DISEASE INVOLVING NATIVE CORONARY ARTERY OF NATIVE HEART WITHOUT ANGINA PECTORIS: Primary | ICD-10-CM

## 2024-07-12 DIAGNOSIS — I70.0 AORTIC ATHEROSCLEROSIS: ICD-10-CM

## 2024-07-12 PROCEDURE — 77063 BREAST TOMOSYNTHESIS BI: CPT | Mod: 26,,, | Performed by: RADIOLOGY

## 2024-07-12 PROCEDURE — 99999 PR PBB SHADOW E&M-EST. PATIENT-LVL V: CPT | Mod: PBBFAC,,, | Performed by: INTERNAL MEDICINE

## 2024-07-12 PROCEDURE — 77067 SCR MAMMO BI INCL CAD: CPT | Mod: 26,,, | Performed by: RADIOLOGY

## 2024-07-12 PROCEDURE — 77063 BREAST TOMOSYNTHESIS BI: CPT | Mod: TC,PO

## 2024-07-12 PROCEDURE — 77067 SCR MAMMO BI INCL CAD: CPT | Mod: TC,PO

## 2024-07-12 PROCEDURE — 99215 OFFICE O/P EST HI 40 MIN: CPT | Mod: PBBFAC,PN | Performed by: INTERNAL MEDICINE

## 2024-07-12 RX ORDER — MECLIZINE HCL 12.5 MG 12.5 MG/1
12.5 TABLET ORAL 3 TIMES DAILY PRN
Qty: 90 TABLET | Refills: 3 | Status: SHIPPED | OUTPATIENT
Start: 2024-07-12

## 2024-07-12 NOTE — PROGRESS NOTES
Subjective:    Patient ID:  Jacqueline Mathias is a 84 y.o. female patient here for evaluation Follow-up (Loop implant, she is switching from Dr. Moran), Dizziness (She has some dizziness, she has noticed when she stands too quick), and Fatigue      History of Present Illness:     Is 84-year-old with intermittent episodes of dizziness occurring with especially with change of posture and any movement.  And she was seeking follow-up evaluation at this time denies having any sustained palpitations but intermittent episodes of vague fullness sensation in his chest is noted.  She would previous stress test done in 2021 and angiographic evaluation and a the time showed mild-to-moderate coronary disease and myocardial bridging.  No sustained arm neck or jaw pain noted.  No cough or congestion no fevers or chills  She was also scheduled to have MRI of the brain to rule out any intracardiac cranial pathology causing dizziness        Review of patient's allergies indicates:   Allergen Reactions    Latex, natural rubber      rash    Biaxin [clarithromycin] Diarrhea and Nausea Only    Codeine Itching    Oxycodone      Knocks pt out for 3 days    Metformin Diarrhea       Past Medical History:   Diagnosis Date    A-fib     Allergy     latex    Allergy     seasonal    Arthritis     Blood transfusion     Cataract     removed    COPD (chronic obstructive pulmonary disease)     Focal hemorrhagic contusion of cerebrum     Hypertension     IBS (irritable bowel syndrome)     Myocardial bridge     Obstructive sleep apnea     On home O2     Nightly    SAH (subarachnoid hemorrhage)     Thyroid nodule 09/29/2016    Ulcer      Past Surgical History:   Procedure Laterality Date    ANGIOGRAM, CORONARY, WITH LEFT HEART CATHETERIZATION N/A 12/14/2021    Procedure: Angiogram, Coronary, with Left Heart Cath;  Surgeon: Napoleon Dumas MD;  Location: [facility] CATH/EP LAB;  Service: Cardiology;  Laterality: N/A;    CAROTID ARTERY ANGIOPLASTY Right  09/30/2016    CHOLECYSTECTOMY      EYE SURGERY      cataract removal    HYSTERECTOMY      INSERTION OF IMPLANTABLE LOOP RECORDER N/A 6/19/2024    Procedure: Insertion, Implantable Loop Recorder;  Surgeon: Mario Dumas MD;  Location: [facility] CATH/EP LAB;  Service: Cardiology;  Laterality: N/A;     Social History     Tobacco Use    Smoking status: Never    Smokeless tobacco: Never   Substance Use Topics    Alcohol use: Not Currently    Drug use: No        Review of Systems:    As noted in HPI in addition      REVIEW OF SYSTEMS  CARDIOVASCULAR: No recent chest pain, palpitations, arm, neck, or jaw pain  RESPIRATORY: No recent fever, cough chills, SOB or congestion  : No blood in the urine  GI: No Nausea, vomiting, constipation, diarrhea, blood, or reflux.  MUSCULOSKELETAL: No myalgias  NEURO: No lightheadedness or dizziness  EYES: No Double vision, blurry, vision or headache              Objective        Vitals:    07/12/24 1000   BP: 120/68   Pulse: (!) 58   Resp: 16       LIPIDS - LAST 2   Lab Results   Component Value Date    CHOL 140 08/28/2023    CHOL 139 03/21/2022    HDL 64 08/28/2023    HDL 66 03/21/2022    LDLCALC 49.8 (L) 08/28/2023    LDLCALC 47.4 (L) 03/21/2022    TRIG 131 08/28/2023    TRIG 128 03/21/2022    CHOLHDL 45.7 08/28/2023    CHOLHDL 47.5 03/21/2022       CBC - LAST 2  Lab Results   Component Value Date    WBC 7.97 04/13/2024    WBC 7.80 04/12/2024    RBC 3.76 (L) 04/13/2024    RBC 4.32 04/12/2024    HGB 10.8 (L) 04/13/2024    HGB 12.2 04/12/2024    HCT 33.6 (L) 04/13/2024    HCT 37.7 04/12/2024    MCV 89 04/13/2024    MCV 87 04/12/2024    MCH 28.7 04/13/2024    MCH 28.2 04/12/2024    MCHC 32.1 04/13/2024    MCHC 32.4 04/12/2024    RDW 14.4 04/13/2024    RDW 13.9 04/12/2024     04/13/2024     04/12/2024    MPV 11.4 04/13/2024    MPV 11.8 04/12/2024    GRAN 5.0 04/13/2024    GRAN 63.1 04/13/2024    LYMPH 2.1 04/13/2024    LYMPH 26.9 04/13/2024    MONO 0.7 04/13/2024    MONO 8.5  04/13/2024    BASO 0.01 04/13/2024    BASO 0.02 04/12/2024    NRBC 0 04/13/2024    NRBC 0 04/12/2024       CHEMISTRY & LIVER FUNCTION - LAST 2  Lab Results   Component Value Date     05/01/2024     04/13/2024    K 4.7 05/01/2024    K 4.6 04/13/2024    CL 99 05/01/2024     04/13/2024    CO2 31 (H) 05/01/2024    CO2 33 (H) 04/13/2024    ANIONGAP 7 (L) 05/01/2024    ANIONGAP 5 (L) 04/13/2024    BUN 18 05/01/2024    BUN 25 (H) 04/13/2024    CREATININE 0.8 05/01/2024    CREATININE 0.8 04/13/2024     (H) 05/01/2024     (H) 04/13/2024    CALCIUM 9.7 05/01/2024    CALCIUM 9.5 04/13/2024    MG 2.0 04/13/2024    MG 1.8 04/12/2024    ALBUMIN 3.8 04/13/2024    ALBUMIN 4.2 04/12/2024    PROT 6.8 04/13/2024    PROT 7.7 04/12/2024    ALKPHOS 51 (L) 04/13/2024    ALKPHOS 64 04/12/2024    ALT 10 04/13/2024    ALT 7 (L) 04/12/2024    AST 14 04/13/2024    AST 17 04/12/2024    BILITOT 0.5 04/13/2024    BILITOT 0.5 04/12/2024        CARDIAC PROFILE - LAST 2  Lab Results   Component Value Date     (H) 05/01/2024     (H) 04/05/2024    TROPONINIHS 10.9 04/11/2024        COAGULATION - LAST 2  Lab Results   Component Value Date    LABPT 15.8 (H) 12/10/2021    LABPT 14.9 (H) 12/01/2021    INR 1.1 04/11/2024    INR 1.4 12/10/2021    APTT 29.5 04/11/2024    APTT 32.8 12/10/2021       ENDOCRINE & PSA - LAST 2  Lab Results   Component Value Date    HGBA1C 6.2 (H) 04/01/2024    HGBA1C 6.3 (H) 08/09/2023    TSH 1.798 04/01/2024    TSH 5.640 (H) 08/28/2023        ECHOCARDIOGRAM RESULTS  Results for orders placed during the hospital encounter of 04/23/24    Echo    Interpretation Summary    Left Ventricle: The left ventricle is normal in size. Normal wall thickness. There is normal systolic function with a visually estimated ejection fraction of 55 - 60%.    Right Ventricle: Right ventricle was not well visualized due to poor acoustic window. Systolic function is normal.    Aortic Valve: The aortic valve  is a trileaflet valve.    Mitral Valve: There is no stenosis. The mean pressure gradient across the mitral valve is 3 mmHg at a heart rate of  bpm.    Pulmonic Valve: There is mild regurgitation.    IVC/SVC: Normal venous pressure at 3 mmHg.      CURRENT/PREVIOUS VISIT EKG  Results for orders placed or performed in visit on 04/17/24   IN OFFICE EKG 12-LEAD (to [place])    Collection Time: 04/17/24  8:20 AM   Result Value Ref Range    QRS Duration 110 ms    [facility] QTC Calculation 471 ms    Narrative    Test Reason : I25.10,I10,I34.0,I48.0,    Vent. Rate : 073 BPM     Atrial Rate : 073 BPM     P-R Int : 130 ms          QRS Dur : 110 ms      QT Int : 428 ms       P-R-T Axes : 069 037 027 degrees     QTc Int : 471 ms    Normal sinus rhythm  Right ventricular conduction delay  Nonspecific ST and T wave abnormality  Prolonged QT  Abnormal ECG  When compared with ECG of 11-APR-2024 14:10,  RSR' pattern in V1 has replaced Incomplete right bundle branch block  Confirmed by Shandra PERRY, Gio POPE ([id number]) on 6/8/2024 8:35:14 AM    Referred By:             Confirmed By:Gio Devi MD     No valid procedures specified.   Results for orders placed in visit on 11/19/21    Nuclear Stress Test    Interpretation Summary    The EKG portion of this study is negative for ischemia.    The patient reported no chest pain during the stress test.    The nuclear portion of this study will be reported separately.    No valid procedures specified.    PHYSICAL EXAM  CONSTITUTIONAL: Well built, well nourished in no apparent distress  NECK: no carotid bruit, no JVD  LUNGS: CTA  CHEST WALL: no tenderness, loop recorder in the anterior chest wall in the left  HEART: regular rate and rhythm, S1, S2 normal, no murmur, click, rub or gallop   ABDOMEN: soft, non-tender; bowel sounds normal; no masses,  no organomegaly  EXTREMITIES: Extremities normal, trace pedal edema, no calf tenderness noted  NEURO: AAO X 3    I HAVE REVIEWED :    The vital signs, nurses  notes, and all the pertinent radiology and labs.        Current Outpatient Medications   Medication Instructions    ascorbic acid (vitamin C) (VITAMIN C) 1,000 mg, Oral, Daily,      b complex vitamins capsule 1 capsule, Oral, Daily    carvediloL (COREG) 6.25 MG tablet TAKE 1 TABLET BY MOUTH  TWICE DAILY WITH MEALS    cetirizine (ZYRTEC) 10 mg, Oral, Daily PRN, As needed    chlorthalidone (HYGROTEN) 25 mg, Oral, Daily    cholecalciferol, vitamin D3, 125 mcg (5,000 unit) Tab 1 tablet, Oral, Daily,      fluticasone propionate (FLONASE) 50 mcg/actuation nasal spray USE 1 SPRAY IN BOTH  NOSTRILS TWICE DAILY    glucosamine-chondroitin 500-400 mg tablet 1 tablet, Oral, 2 times daily    levothyroxine (SYNTHROID) 50 mcg, Oral, Before breakfast    loperamide HCl (IMODIUM A-D ORAL) As needed    losartan (COZAAR) 25 mg, Oral, Daily    melatonin 10 mg Tab Oral, As needed    multivitamin capsule 1 capsule, Oral, Daily,      potassium chloride (MICRO-K) 10 MEQ CpSR TAKE 1 CAPSULE BY MOUTH TWICE  DAILY    pravastatin (PRAVACHOL) 20 mg, Oral, Daily    vit A/vit C/vit E/zinc/copper (ICAPS AREDS ORAL) Oral          Assessment & Plan     1. Coronary artery disease involving native coronary artery of native heart without angina pectoris  Assessment & Plan:  Stable coronary artery disease patient has been experience some fullness in his chest and varying vague sensation.  Unclear etiology recommend to maintain on aggressive risk factor modification with a pravastatin, blood pressure control with Cozaar as well as Coreg at 6.25 mg p.o. b.i.d. also obtain a Lexiscan Myoview to evaluate for any coronary insufficiency contributing to her symptoms      2. Paroxysmal atrial fibrillation  Assessment & Plan:  Although paroxysmal at this time maintaining regular rhythm and continue on present therapy patient had intracranial bleed with adnexa edema and therefore she was taken off her anticoagulation therapy.  When cleared by Neurosurgery may  consider starting back on at least antiplatelet therapy.      3. Accelerated hypertension  Assessment & Plan:  Blood pressure has been stable on losartan 25 mg daily and Coreg at 6.25 mg p.o. b.i.d. maintain on low-salt diet      4. Type 2 diabetes mellitus without complication, without long-term current use of insulin  Assessment & Plan:  Continue on aggressive diabetic management as well with the current therapy.      5. Mixed hyperlipidemia  Assessment & Plan:  Mixed dyslipidemia continue on pravastatin 20 mg daily maintain low-fat low-cholesterol diet      6. Aortic atherosclerosis  Assessment & Plan:  Aortic arteriosclerosis continue on risk factor modification      7. Labyrinthine dysfunction, bilateral  Assessment & Plan:  Patient was severe labyrinth dysfunction.  Try meclizine encouraged her to seek evaluation with a ENT physician as well        Patient apparently has been clear to use Eliquis recommend to add Eliquis at 2.5 mg twice a day initially    Follow up in about 3 months (around 10/12/2024).

## 2024-07-12 NOTE — ASSESSMENT & PLAN NOTE
Stable coronary artery disease patient has been experience some fullness in his chest and varying vague sensation.  Unclear etiology recommend to maintain on aggressive risk factor modification with a pravastatin, blood pressure control with Cozaar as well as Coreg at 6.25 mg p.o. b.i.d. also obtain a Lexiscan Myoview to evaluate for any coronary insufficiency contributing to her symptoms

## 2024-07-12 NOTE — ASSESSMENT & PLAN NOTE
Patient was severe labyrinth dysfunction.  Try meclizine encouraged her to seek evaluation with a ENT physician as well

## 2024-07-12 NOTE — ASSESSMENT & PLAN NOTE
Although paroxysmal at this time maintaining regular rhythm and continue on present therapy patient had intracranial bleed with adnexa edema and therefore she was taken off her anticoagulation therapy.  When cleared by Neurosurgery may consider starting back on at least antiplatelet therapy.

## 2024-07-12 NOTE — ASSESSMENT & PLAN NOTE
Blood pressure has been stable on losartan 25 mg daily and Coreg at 6.25 mg p.o. b.i.d. maintain on low-salt diet

## 2024-07-17 ENCOUNTER — HOSPITAL ENCOUNTER (OUTPATIENT)
Dept: RADIOLOGY | Facility: HOSPITAL | Age: 84
Discharge: HOME | End: 2024-07-17
Attending: NEUROLOGICAL SURGERY
Payer: MEDICARE

## 2024-07-17 DIAGNOSIS — S06.33AA FOCAL HEMORRHAGIC CONTUSION OF CEREBRUM: ICD-10-CM

## 2024-07-17 PROCEDURE — 70551 MRI BRAIN STEM W/O DYE: CPT | Mod: TC,PO

## 2024-07-17 PROCEDURE — 70551 MRI BRAIN STEM W/O DYE: CPT | Mod: 26,,, | Performed by: RADIOLOGY

## 2024-07-18 ENCOUNTER — TELEPHONE (OUTPATIENT)
Dept: NEUROSURGERY | Facility: CLINIC | Age: 84
End: 2024-07-18
Payer: MEDICARE

## 2024-07-18 ENCOUNTER — TELEPHONE (OUTPATIENT)
Dept: OTOLARYNGOLOGY | Facility: CLINIC | Age: 84
End: 2024-07-18
Payer: MEDICARE

## 2024-07-18 DIAGNOSIS — R93.89 ABNORMAL MRI: ICD-10-CM

## 2024-07-18 DIAGNOSIS — M54.2 NECK PAIN: Primary | ICD-10-CM

## 2024-07-18 NOTE — TELEPHONE ENCOUNTER
Relayed pt's concerns to Dr. Horton. He recommends a CTA to further evaluate possible aneurysm. Made pt aware. Scheduled scan. Pt voiced understanding to appt details.

## 2024-07-18 NOTE — TELEPHONE ENCOUNTER
"Spoke w/pt per call back msg received for scheduling.  Pt was asking to see about getting in to be seen sooner by GLADYS Johnson for concerns of an "aneurysm" per recent MRI results.  Pt states that she is wanting to touch base w/"Dr. Johnson" to determine what she needs to do w/treating said "aneurysm."      Asked pt to clarify regarding her concerns and reason for visit.  Pt states that she had an MRI done yesterday and saw that report stated she had an "aneurysm," and was wanting to be seen by "Dr. Johnson" for treatment of said "aneurysm."  Informed pt that ENT does not typically handle/treat aneurysms and that she will need to f/u with her neurosurgeon, Dr. Horton or possibly cardiology.  Pt states that she is confused on who exactly she is needing to see, if she was referred to see ENT by her neurosurgereon for her "aneurysm."    Did clarify to the pt that it seems like she had spoken to Dr. Horton's staff regarding concerns of dizziness on 07/11, to which is why Dr. Horton had ordered an MRI, as well as advised pt to be seen by ENT for her dizziness.  Pt was confused to why she was needing to be seen by ENT, to which I had to re-explain to her that she was only referred/recommended to see ENT due to her dizziness that she was experiencing.      Did advise to pt as well that any sort of lab/imaging results will be released to the pt sooner than most providers have a chance to review and touch base w/pts w/said results.  Did advise pt that it's typically recommended to allow provider 24-48hrs to review results after they are released to pt.  Pt acknowledged and states that she doesn't know what to do then, if ENT can't treat her "aneurysm."  Once again, advised to pt to wait till tomorrow morning/afternoon to reach Dr. Horton's staff for MRI results r/t said "aneurysm" or she may reach out to his staff today, to see if someone can discuss results w/her.  Apologized to pt regarding confusion; did state that she " does have an ENT appt for next available in early August -- pt acknowledged.  Pt has no further ENT questions/concerns at this time.

## 2024-07-18 NOTE — TELEPHONE ENCOUNTER
Returned call to pt and informed that Dr. Horton reviewed MRI and it does not show anything new or emergent. Informed he recommends eval by ENT. Pt reports she has an appt scheduled with ENT. She voiced concern of the MRI report stating she could possibly have an aneurysm along the carotid artery. Informed pt I will relay this to Dr. Horton and call her back with his recommendations. Pt voiced understanding.

## 2024-07-18 NOTE — TELEPHONE ENCOUNTER
----- Message from Alicia Rojas sent at 7/18/2024 12:21 PM CDT -----  Type:  Test Results      Who Called:  pt    Name of Test (Lab/Mammo/Etc): MRI    Date of Test: 7/17    Ordering Provider: Clifford    Where the test was performed: [facility]    Would the patient rather a call back or a response via [facility]? Call back    Best Call Back Number: [phone]      Additional Information:  pt said she doesn't understand the results and is concerned      Please call Back to advise. Thanks!

## 2024-07-18 NOTE — TELEPHONE ENCOUNTER
----- Message from Stacey M Lefort sent at 7/18/2024  1:34 PM CDT -----  Pt is returning your call at  [phone]

## 2024-07-18 NOTE — TELEPHONE ENCOUNTER
----- Message from Claus Schofield sent at 7/18/2024  2:19 PM CDT -----  Type:  Sooner Appointment Request    Caller is requesting a sooner appointment.  Caller declined first available appointment listed below.  Caller will not accept being placed on the waitlist and is requesting a message be sent to doctor.    Name of Caller:  pt  When is the first available appointment?  8/5--said she need to be seen sooner--said she afraid she going to fall--please call and advise  Symptoms:  Dizziness  Would the patient rather a call back or a response via [facility]? call  Best Call Back Number:  [phone]  Additional Information:  thank you

## 2024-07-23 ENCOUNTER — HOSPITAL ENCOUNTER (OUTPATIENT)
Dept: RADIOLOGY | Facility: HOSPITAL | Age: 84
Discharge: HOME | End: 2024-07-23
Attending: NEUROLOGICAL SURGERY
Payer: MEDICARE

## 2024-07-23 DIAGNOSIS — R93.89 ABNORMAL MRI: ICD-10-CM

## 2024-07-23 LAB
CREAT SERPL-MCNC: 0.9 MG/DL (ref 0.5–1.4)
SAMPLE: NORMAL

## 2024-07-23 PROCEDURE — 70498 CT ANGIOGRAPHY NECK: CPT | Mod: 26,,, | Performed by: RADIOLOGY

## 2024-07-23 PROCEDURE — 70498 CT ANGIOGRAPHY NECK: CPT | Mod: TC

## 2024-07-23 PROCEDURE — 25500020 PHARM REV CODE 255

## 2024-07-23 RX ADMIN — IOHEXOL 75 ML: 350 INJECTION, SOLUTION INTRAVENOUS at 02:07

## 2024-07-24 ENCOUNTER — TELEPHONE (OUTPATIENT)
Dept: NEUROSURGERY | Facility: CLINIC | Age: 84
End: 2024-07-24
Payer: MEDICARE

## 2024-07-24 DIAGNOSIS — R93.89 ABNORMAL COMPUTED TOMOGRAPHY ANGIOGRAPHY (CTA) OF NECK: Primary | ICD-10-CM

## 2024-07-24 NOTE — TELEPHONE ENCOUNTER
Returned pt call, informed her that Dr. Horton is in surgery today and will be in office tomorrow, she should expect a phone call tomorrow from this office regarding next steps.

## 2024-07-24 NOTE — TELEPHONE ENCOUNTER
----- Message from Aric Horton DO sent at 7/23/2024  2:32 PM CDT -----  Please refer her to vascular Neurosurgery, Dr. Bunch  ----- Message -----  From: Interface, Rad Results In  Sent: 7/23/2024   2:22 PM CDT  To: Aric Horton DO

## 2024-07-24 NOTE — TELEPHONE ENCOUNTER
----- Message from Melissa Kahn sent at 7/24/2024 12:15 PM CDT -----  Contact: Patient  Type:  Test Results    Who Called:   Patient  Name of Test (Lab/Mammo/Etc):   CTA of left artery  Date of Test:   7/23  Ordering Provider:  Dr Aric Horton  Where the test was performed:    [facility]    Would the patient rather a call back or a response via [facility]?   Call back  Best Call Back Number:   [phone]    Additional Information:    States she would like to speak with Dr Horton regarding her test results and next streps - please call - thank you

## 2024-07-24 NOTE — TELEPHONE ENCOUNTER
Returned call to pt. Informed that Dr. Horton reviewed recent CTA. There were abnormalities noted and he recommends a referral to Dr. Bunch. Informed that his office staff will contact her to schedule the appt. Pt voiced understanding.

## 2024-07-26 ENCOUNTER — TELEPHONE (OUTPATIENT)
Dept: NEUROSURGERY | Facility: CLINIC | Age: 84
End: 2024-07-26
Payer: MEDICARE

## 2024-07-26 NOTE — TELEPHONE ENCOUNTER
LVM to let patient know that she needs to reschedule her appointment that she has scheduled with Gela Mora. She has imaging and need to see Dr Gomes

## 2024-08-01 ENCOUNTER — OFFICE VISIT (OUTPATIENT)
Dept: NEUROSURGERY | Facility: CLINIC | Age: 84
End: 2024-08-01
Payer: MEDICARE

## 2024-08-01 VITALS
HEIGHT: 66 IN | SYSTOLIC BLOOD PRESSURE: 181 MMHG | WEIGHT: 143.94 LBS | DIASTOLIC BLOOD PRESSURE: 81 MMHG | BODY MASS INDEX: 23.13 KG/M2 | RESPIRATION RATE: 18 BRPM | HEART RATE: 75 BPM

## 2024-08-01 DIAGNOSIS — R93.89 ABNORMAL COMPUTED TOMOGRAPHY ANGIOGRAPHY (CTA) OF NECK: ICD-10-CM

## 2024-08-01 DIAGNOSIS — R42 DIZZINESS: Primary | ICD-10-CM

## 2024-08-01 DIAGNOSIS — R42 VERTIGO: ICD-10-CM

## 2024-08-23 ENCOUNTER — OFFICE VISIT (OUTPATIENT)
Dept: OTOLARYNGOLOGY | Facility: CLINIC | Age: 84
End: 2024-08-23
Payer: MEDICARE

## 2024-08-23 VITALS
HEART RATE: 74 BPM | WEIGHT: 145.75 LBS | BODY MASS INDEX: 23.42 KG/M2 | HEIGHT: 66 IN | SYSTOLIC BLOOD PRESSURE: 204 MMHG | DIASTOLIC BLOOD PRESSURE: 92 MMHG

## 2024-08-23 DIAGNOSIS — R42 VERTIGO: ICD-10-CM

## 2024-08-23 DIAGNOSIS — R42 DIZZINESS: ICD-10-CM

## 2024-08-23 PROCEDURE — 99214 OFFICE O/P EST MOD 30 MIN: CPT | Mod: PBBFAC,PO | Performed by: OTOLARYNGOLOGY

## 2024-08-23 PROCEDURE — 99999 PR PBB SHADOW E&M-EST. PATIENT-LVL IV: CPT | Mod: PBBFAC,,, | Performed by: OTOLARYNGOLOGY

## 2024-08-23 NOTE — PROGRESS NOTES
Subjective:       Patient ID: Jacqueline Mathias is a 84 y.o. female.    Chief Complaint: Dizziness (Gets dizzy with head movements. States occurs when head is back and moves it forward. States that everything circles around. Has been going on since fall in April. Has been seen by Cardiology and Neurosurgery since the fall as well. Was referred to ENT to evaluate for vertigo. )      This patient presents for an evaluation of dizziness she describes it as vertigo, a spinning sensation, and it occurs very specifically when she sits up a winter head is back like having her hair done in pulls it up she does not notice it when she rolls over but she tells me she does not really sleep in bed she sleeps and a big recliner because of her CPAP and some cardiac monitors and oxygen supplementation that she is on.  Does not seem to affect her ambulation but her story is complicated by a head injury in April findings of intracranial aneurysm and hypertension that is sometimes that is hard for her to control on medications.  She thinks in general her hearing is very good          Objective:      ENT Physical Exam    She has a good bit of wax on each side , the right side was relatively easy to clean with a cerumen loop the left side was quite a lot more    Procedure:  On the left side the operative otoscope was used to use a combination of the day hook a cerumen loop an alligators to remove a large collection of wax that extended deep into her ear canal.  She was able to tolerate this quite well and eventually her eardrum was visible and appeared normal.    Her nasal exam is unremarkable her oropharynx is normal           Assessment:       1. Dizziness    2. Vertigo         Plan:          So the fact as she has a fairly long duration, and she also had this in April after her head injury, of vertigo and it lasts about 5 minutes and that is sounds fairly dramatic.  With her having intracranial aneurysms that probably are not related  to this at all but also significant issues with a blood pressure being on oxygen having a history of arrhythmias perhaps atrial fibrillation that that is intermittent I think we should clarify with inner ear testing if in fact she has positional vertigo.  It would not be typical for left or right-sided positional vertigo but to some extent that is because her usual movements are limited.    I have ordered vestibular testing and we will contact her when the results are available

## 2024-09-26 ENCOUNTER — CLINICAL SUPPORT (OUTPATIENT)
Dept: AUDIOLOGY | Facility: CLINIC | Age: 84
End: 2024-09-26
Payer: MEDICARE

## 2024-09-26 DIAGNOSIS — R42 VERTIGO: ICD-10-CM

## 2024-09-26 DIAGNOSIS — H81.12 BENIGN PAROXYSMAL POSITIONAL VERTIGO OF LEFT EAR: Primary | ICD-10-CM

## 2024-09-26 PROCEDURE — 92537 CALORIC VSTBLR TEST W/REC: CPT | Mod: PBBFAC | Performed by: AUDIOLOGIST

## 2024-09-26 PROCEDURE — 92537 CALORIC VSTBLR TEST W/REC: CPT | Mod: 26,S$PBB,, | Performed by: AUDIOLOGIST

## 2024-09-26 PROCEDURE — 92540 BASIC VESTIBULAR EVALUATION: CPT | Mod: 26,S$PBB,, | Performed by: AUDIOLOGIST

## 2024-09-26 PROCEDURE — 92540 BASIC VESTIBULAR EVALUATION: CPT | Mod: PBBFAC | Performed by: AUDIOLOGIST

## 2024-09-26 NOTE — Clinical Note
Demetrius Chandler,  Please let me know if you have any questions about the results of Mrs. Mathias' VNG. I recommend she see a PT for canalith repositioning.   Olga

## 2024-09-26 NOTE — PROGRESS NOTES
VNG EVALUATION    Referring physician:  Dr. Chandler    Jacqueline Mathias, an 84 y.o. female was seen today for a VNG evaluation. She complains of vertigo and dizziness for approximately the past 3 months. 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 Symptoms typically last for a few minutes. Mrs. Mathias had a bad fall in April and hit her head on the tile floor. 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 Mrs. Mathias denied taking any medications that may affect the results of today's evaluation.      Tympanometry revealed Type A tympanograms with normal ear canal volume bilaterally.    Gaze nystagmus was absent.    Oculomotor function was normal.    Spontaneous nystagmus was absent    The head-hanging left Hallpike revealed <clinically significant> nystagmus: 8 d/s right-beating, non-torsional  The head-hanging right Hallpike was negative    Static positional testing revealed <clinically significant> nystagmus: 6 d/s right-beating in the head left position and 4 d/s left-beating in the head right position.    The head roll test revealed 4 d/s left-beating nystagmus when the head was turned to the right and 5 d/s right-beating nystagmus when the head was turned to the left that fatigued over time.       Unilateral weakness: 17% (right)  Directional preponderance 6% (right- beating)  RW: 18 d/s  LW: 22 d/s  RC: 11 d/s   d/s  Fixation suppression was abnormal for the left cool irrigation.    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     Recommend:   Referral to physical therapy for canalith repositioning of lateral canal BPPV due to patient's limited mobility  Follow-up with Dr. Chandler to review the results of today's evaluation

## 2024-09-27 ENCOUNTER — TELEPHONE (OUTPATIENT)
Dept: OTOLARYNGOLOGY | Facility: CLINIC | Age: 84
End: 2024-09-27
Payer: MEDICARE

## 2024-09-27 NOTE — TELEPHONE ENCOUNTER
----- Message from Carolina Brewer sent at 9/27/2024 12:53 PM CDT -----  Contact: self  Type: Needs Medical Advice  Who Called:  Patient    Best Call Back Number: [phone]    Additional Information: Pt states she would like to speak with office regarding test results.Please call back

## 2024-09-29 DIAGNOSIS — E11.9 TYPE 2 DIABETES MELLITUS WITHOUT COMPLICATION, WITHOUT LONG-TERM CURRENT USE OF INSULIN: ICD-10-CM

## 2024-09-29 DIAGNOSIS — I25.10 CORONARY ARTERY DISEASE INVOLVING NATIVE CORONARY ARTERY OF NATIVE HEART WITHOUT ANGINA PECTORIS: Primary | ICD-10-CM

## 2024-09-30 RX ORDER — PRAVASTATIN SODIUM 20 MG/1
20 TABLET ORAL NIGHTLY
Qty: 90 TABLET | Refills: 3 | Status: SHIPPED | OUTPATIENT
Start: 2024-09-30

## 2024-09-30 NOTE — TELEPHONE ENCOUNTER
Refill Routing Note   Medication(s) are not appropriate for processing by [facility] for the following reason(s):        Required labs outdated  ED/Hospital Visit since last OV with provider  No active prescription written by provider    [facility] action(s):  Defer               Appointments  past 12m or future 3m with PCP    Date Provider   Last Visit   4/4/2024 Brice Weaver Jr., MD   Next Visit   11/25/2024 Brice Weaver Jr., MD   ED visits in past 90 days: 0        Note composed:11:15 AM 09/30/2024

## 2024-09-30 NOTE — TELEPHONE ENCOUNTER
No care due was identified.  [facility] Embedded Care Due Messages. Reference number: [id number].   9/29/2024 9:14:11 PM CDT

## 2024-10-07 ENCOUNTER — TELEPHONE (OUTPATIENT)
Dept: OTOLARYNGOLOGY | Facility: CLINIC | Age: 84
End: 2024-10-07
Payer: MEDICARE

## 2024-10-07 DIAGNOSIS — R42 DIZZINESS: ICD-10-CM

## 2024-10-07 DIAGNOSIS — R42 VERTIGO: Primary | ICD-10-CM

## 2024-10-07 NOTE — TELEPHONE ENCOUNTER
----- Message from Osmani Chandler MD sent at 10/4/2024  3:04 PM CDT -----  Regarding: Needs physical therapy for positional vertigo  [place] poor positional vertigo of longstanding.  She had a VNG that defined the problem  
Staff msg sent to Dr. Chandler for PT/OT order regarding VPT.    
6 years

## 2024-10-08 DIAGNOSIS — E03.4 HYPOTHYROIDISM DUE TO ACQUIRED ATROPHY OF THYROID: ICD-10-CM

## 2024-10-08 DIAGNOSIS — I10 ESSENTIAL HYPERTENSION: ICD-10-CM

## 2024-10-08 DIAGNOSIS — I25.10 CORONARY ARTERY DISEASE INVOLVING NATIVE CORONARY ARTERY OF NATIVE HEART WITHOUT ANGINA PECTORIS: ICD-10-CM

## 2024-10-08 DIAGNOSIS — I48.19 PERSISTENT ATRIAL FIBRILLATION: ICD-10-CM

## 2024-10-08 DIAGNOSIS — R53.83 FATIGUE, UNSPECIFIED TYPE: ICD-10-CM

## 2024-10-08 DIAGNOSIS — E11.9 TYPE 2 DIABETES MELLITUS WITHOUT COMPLICATION, WITHOUT LONG-TERM CURRENT USE OF INSULIN: ICD-10-CM

## 2024-10-08 DIAGNOSIS — R06.09 DYSPNEA ON EXERTION: ICD-10-CM

## 2024-10-08 DIAGNOSIS — R00.2 PALPITATIONS: ICD-10-CM

## 2024-10-08 DIAGNOSIS — M79.89 SYMPTOM OF LEG SWELLING: ICD-10-CM

## 2024-10-09 RX ORDER — LEVOTHYROXINE SODIUM 50 UG/1
50 TABLET ORAL
Qty: 90 TABLET | Refills: 1 | Status: SHIPPED | OUTPATIENT
Start: 2024-10-09

## 2024-10-09 RX ORDER — FLUTICASONE PROPIONATE 50 MCG
SPRAY, SUSPENSION (ML) NASAL
Qty: 48 G | Refills: 3 | Status: SHIPPED | OUTPATIENT
Start: 2024-10-09

## 2024-10-09 RX ORDER — PRAVASTATIN SODIUM 20 MG/1
20 TABLET ORAL NIGHTLY
Qty: 90 TABLET | Refills: 3 | Status: SHIPPED | OUTPATIENT
Start: 2024-10-09

## 2024-10-09 NOTE — TELEPHONE ENCOUNTER
Care Due:                  Date            Visit Type   Department     Provider  --------------------------------------------------------------------------------                                EP -                              PRIMARY      [facility]  Last Visit: 04-      CARE ([facility])   PRACTICE       Brice Weaver                              EP -                              PRIMARY      [facility]  Next Visit: 11-      CARE ([facility])   PRACTICE       Brice Weaver                                                            Last  Test          Frequency    Reason                     Performed    Due Date  --------------------------------------------------------------------------------    Lipid Panel.  12 months..  pravastatin..............  08- 08-    [facility] Embedded Care Due Messages. Reference number: [id number].   10/08/2024 7:30:58 PM CDT

## 2024-10-10 LAB
LEFT EYE DM RETINOPATHY: NEGATIVE
RIGHT EYE DM RETINOPATHY: NEGATIVE

## 2024-10-10 RX ORDER — CARVEDILOL 6.25 MG/1
6.25 TABLET ORAL 2 TIMES DAILY WITH MEALS
Qty: 180 TABLET | Refills: 3 | Status: SHIPPED | OUTPATIENT
Start: 2024-10-10

## 2024-10-11 ENCOUNTER — OFFICE VISIT (OUTPATIENT)
Dept: OTOLARYNGOLOGY | Facility: CLINIC | Age: 84
End: 2024-10-11
Payer: MEDICARE

## 2024-10-11 VITALS — WEIGHT: 148.38 LBS | BODY MASS INDEX: 23.84 KG/M2 | HEIGHT: 66 IN

## 2024-10-11 DIAGNOSIS — H81.10 BPPV (BENIGN PAROXYSMAL POSITIONAL VERTIGO), UNSPECIFIED LATERALITY: Primary | ICD-10-CM

## 2024-10-11 PROCEDURE — 99213 OFFICE O/P EST LOW 20 MIN: CPT | Mod: PBBFAC,PO | Performed by: OTOLARYNGOLOGY

## 2024-10-11 PROCEDURE — 99999 PR PBB SHADOW E&M-EST. PATIENT-LVL III: CPT | Mod: PBBFAC,,, | Performed by: OTOLARYNGOLOGY

## 2024-10-11 NOTE — PROGRESS NOTES
Subjective:       Patient ID: Jacqueline Mathias is a 84 y.o. female.    Chief Complaint: Follow-up (Follow up on VNG results. Is scheduled for VPT next week. )      This patient had a fall in April and has continued to have dizziness and has been evaluated in different [employer] she has a significant head injury and was in the hospital for a couple of days and that added to the uncertainty as to the underlying cause perhaps.    A VNG that was recently done showed left-sided lateral canal BPPV and she is scheduled for physical therapy next Wednesday in his here just simply to discuss the nature of the problem the underlying test and the expectations of treatment          Objective:      ENT Physical Exam    Her tympanic membranes and ear canals are normal and she walks normally without any obvious abnormalities of gait today        Assessment:       1. BPPV (benign paroxysmal positional vertigo), unspecified laterality         Plan:          So VNG revealed left lateral canal BPPV and she is scheduled for next Wednesday to see physical therapy to begin repositioning maneuvers.    The fact that this has been going on for so many months is a bit atypical but hopefully specific therapy guided by the VNG results and implemented by physical therapy we will be helpful

## 2024-10-14 RX ORDER — LOSARTAN POTASSIUM 25 MG/1
25 TABLET ORAL DAILY
Qty: 90 TABLET | Refills: 3 | Status: SHIPPED | OUTPATIENT
Start: 2024-10-14 | End: 2025-10-14

## 2024-10-14 RX ORDER — CHLORTHALIDONE 25 MG/1
25 TABLET ORAL DAILY
Qty: 90 TABLET | Refills: 3 | Status: SHIPPED | OUTPATIENT
Start: 2024-10-14 | End: 2025-10-14

## 2024-10-16 ENCOUNTER — CLINICAL SUPPORT (OUTPATIENT)
Dept: REHABILITATION | Facility: HOSPITAL | Age: 84
End: 2024-10-16
Attending: OTOLARYNGOLOGY
Payer: MEDICARE

## 2024-10-16 DIAGNOSIS — R42 VERTIGO: ICD-10-CM

## 2024-10-16 PROCEDURE — 97112 NEUROMUSCULAR REEDUCATION: CPT | Mod: PO

## 2024-10-16 PROCEDURE — 97161 PT EVAL LOW COMPLEX 20 MIN: CPT | Mod: PO

## 2024-10-16 NOTE — PLAN OF CARE
[facility]  Physical Therapy Neurological Rehabilitation Initial Evaluation    Name: Jacqueline Mathias  Clinic Number: [id number]    Therapy Diagnosis:   Encounter Diagnosis   Name Primary?    Vertigo      Physician: Osmani Chandler MD    Physician Orders: physical therapy evaluation and treat; vestibular rehab therapy   Medical Diagnosis from Referral: vertigo  Evaluation Date: 10/16/2024  Authorization Period Expiration: 10/07/2025  Plan of Care Expiration: 10/26/2024  Visit # / Visits authorized: 1/ 1    Time In: 0930  Time Out: 1015  Total Billable Time: 45 minutes    Precautions: Fall and history of a-fib      Subjective     Date of onset: 10/07/2024  History of Current Symptoms, Jacqueline reports: that she slipped on a wet floor back in April of this year and struck the back of her head on the floor; patient now reports intermittent vertigo during positional changes; exacerbating activities include head turns to the left, transferring from supine to sit and tilting her head back at the [employer] parlor; Jacqueline reports that her vertigo last seconds during head turns and minutes during transitional movements/posterior head tilts.     History of migraines: no     Medical History:   Past Medical History:   Diagnosis Date    A-fib     Allergy     latex    Allergy     seasonal    Arthritis     Blood transfusion     Cataract     removed    COPD (chronic obstructive pulmonary disease)     Focal hemorrhagic contusion of cerebrum     Hypertension     IBS (irritable bowel syndrome)     Myocardial bridge     Obstructive sleep apnea     On home O2     Nightly    SAH (subarachnoid hemorrhage)     Thyroid nodule 09/29/2016    Ulcer      Surgical History:   Jacqueline Mathias  has a past surgical history that includes Hysterectomy; Cholecystectomy; Eye surgery; Carotid angioplasty (Right, 09/30/2016); ANGIOGRAM, CORONARY, WITH LEFT HEART CATHETERIZATION (N/A, 12/14/2021); and Insertion of implantable loop recorder  (N/A, 6/19/2024).    Medications:   Jacqueline has a current medication list which includes the following prescription(s): apixaban, ascorbic acid (vitamin c), b complex vitamins, carvedilol, cetirizine, chlorthalidone, cholecalciferol (vitamin d3), fluticasone propionate, glucosamine-chondroitin, levothyroxine, loperamide hcl, losartan, meclizine, melatonin, multivitamin, potassium chloride, pravastatin, and vit a/vit c/vit e/zinc/copper.    Allergies:   Review of patient's allergies indicates:   Allergen Reactions    Latex, natural rubber      rash    Biaxin [clarithromycin] Diarrhea and Nausea Only    Codeine Itching    Oxycodone      Knocks pt out for 3 days    Metformin Diarrhea      Imaging (VNG on 09/26/2024): Abnormal VNG; The observed, ageotropic nystagmus during the head roll test is consistent with lateral canal BPPV, likely of the left ear.     Prior Therapy: none  Social History: lives with her spouse in 1-story raised home (3 steps with rail)  Falls: see history    Occupation: retired  Prior Level of Function: independent  Current Level of Function: minimal difficulty with activities of daily living     Pain:  Current 0/10, worst 2/10, best 0/10   Location: bilateral neck   Description: Aching and Dull  Aggravating Factors: end range of motion   Easing Factors: rest    Pts goals: decrease symptoms      Objective     - Follows commands: 100% of time   - Speech deficits: n/a    Functional Mobility & ADLs:   Bed mobility: stand by assist   Supine to sit: stand by assist  Sit to supine: stand by assist  Sit to stand: stand by assist     Mental status: alert, oriented to person, place, and time, normal mood, behavior, speech, dress, motor activity, and thought processes  Appearance: Casually dressed  Behavior:  calm and cooperative  Attention Span and Concentration:  Normal    Posture Alignment in sitting:   Head: forward head     Sensation: Light Touch: Intact          Proprioception: Intact, Kinesthesia  "Intact         Visual/Auditory:   Tracking/Smooth Pursuits:Intact  Saccades: Intact  Modified Dynamic Visual Acuity Test:Impaired: minimal blurred vision with head movement   Gaze Evoked: Intact  VOR: Intact    Coordination:   - fine motor: within functional limits   - UE coordination: within functional limits    - LE coordination:  within functional limits    ROM:   CERVICAL SPINE  Flexion 35 degrees (80-90 deg)  Extension 30 degrees (70-80 deg)  L side bend 25 degrees, R side bend 25 degrees (20-45 degrees)  L rotation 45 degrees, R rotation 45 degrees (70-90 degrees)  Are concurrent symptoms present with any of these movements = no    Modified VAS (Vertebral Artery Screen), in sitting (rotation, then extension):  R: NT  L: NT    RANGE OF MOTION--LOWER EXTREMITIES  (R) LE Hip: normal   Knee: normal   Ankle: normal    (L) LE: Hip: normal   Knee: normal   Ankle: normal    Strength: manual muscle test grades below     Lower Extremity Strength  Right LE  Left LE    Hip flexion:  4-/5 Hip flexion: 4-/5   Knee extension: 4-/5 Knee extension: 4-/5   Ankle dorsiflexion:  4-/5 Ankle dorsiflexion: 4-/5       MATT SENSORY TESTING:  (P= Pass, F= Fail; note any sway; hold each position for 30")  Condition 6: (soft surface/feet together/eyes closed) = minimal assist needed to prevent loss of balance; opened her eyes twice  Condition 7: (Fakuda step test), measure distance varied from center starting position, > 30 deg deviation to either side indicates hypofunction of biased side = 45 degree turn to the left after 50 steps    Gait Assessment:(if indicated)  - AD used: none  - Assistance: stand by assist   - Distance: 50 feet x 2    GAIT DEVIATIONS:  Jacqueline displays the following deviations with ambulation: mild path deviation    Impairments contributing to deviations: none     Endurance Deficit: minimal complaints of fatigue       POSITIONAL CANAL TESTING  Looking for nystagmus (slow phase followed by quick phase to the affected " side for BPPV)    Malina Hallpike (posterior / CL anterior)   Right : NT   Left: positive  Horizontal Canals   Right: negative   Left: negative  Treatment Performed: Epley maneuver for left posterior canal benign paroxysmal positional vertigo         Intake Outcome Measure for FOTO Vestibular Survey    Therapist reviewed FOTO scores for Jacqueline Mathias on 10/16/2024.   FOTO documents entered into [employer] - see Media section.    Intake Score: 34.3% disability         TREATMENT     Treatment Time In: 1015  Treatment Time Out: 1030  Total Treatment time separate from Evaluation: 15 minutes    Jacqueline participated in neuromuscular re-education activities to assess: Balance and Vestibular function for 15 minutes. The following activities were included:    X 5 each seated smooth pursuits = side to side, up and down  X 5 each seated saccades = side to side, up and down  X 5 each seated VOR1 = side to side, up and down  X 20 seconds stand on [employer] = eyes closed  X 50 stepping in place = eyes closed  Epley maneuver for left posterior canal benign paroxysmal positional vertigo       Home Exercises and Patient Education Provided    Education provided:   - proper therapeutic exercise technique  - treatment plan    Written Home Exercises Provided: to be provided at future appointment.      Assessment     Jacqueline is a 84 y.o. female referred to outpatient Physical Therapy with a medical diagnosis of vertigo. Pt presents to PT with the following impairments leading to her functional decline: vertigo. Patient's condition is likely due to left posterior canal benign paroxysmal positional vertigo with possible underlying unilateral vestibular weakness.    Pt prognosis is Fair.   Pt will benefit from skilled outpatient Physical Therapy to address the deficits stated above and in the chart below, provide pt/family education, and to maximize pt's level of independence.     Plan of care discussed with patient: Yes  Pt's spiritual, cultural and  educational needs considered and patient is agreeable to the plan of care and goals as stated below:     Anticipated Barriers for therapy: severity of symptoms    Medical Necessity is demonstrated by the following  History  Co-morbidities and personal factors that may impact the plan of care [] LOW: no personal factors / co-morbidities  [] MODERATE: 1-2 personal factors / co-morbidities  [x] HIGH: 3+ personal factors / co-morbidities    Moderate / High Support Documentation: history of a-fib, COPD, HTN, abdominal surgery     Examination  Body Structures and Functions, activity limitations and participation restrictions that may impact the plan of care [] LOW: addressing 1-2 elements  [x] MODERATE: 3+ elements  [] HIGH: 4+ elements (please support below)    Moderate / High Support Documentation: gait; balance; vestibular     Clinical Presentation [x] LOW: stable  [] MODERATE: Evolving  [] HIGH: Unstable     Decision Making/ Complexity Score: low       Goals:    Short Term Goals (2 visits):   Reassess for benign paroxysmal positional vertigo.  Achieve resolution of benign paroxysmal positional vertigo symptoms.     Long Term Goals (3 visits):   Reassess for unilateral vestibular weakness.  Provide patient with home Epley.      Plan     Plan of care Certification: 10/16/2024 to 10/26/2024.    Outpatient Physical Therapy evaluation, plus 2 times weekly for 1 week to include the following interventions (starting week of 10/21/24): canalith repositioning.     Unruly Nevarez, PT

## 2024-10-21 ENCOUNTER — PATIENT MESSAGE (OUTPATIENT)
Dept: ADMINISTRATIVE | Facility: HOSPITAL | Age: 84
End: 2024-10-21
Payer: MEDICARE

## 2024-10-21 ENCOUNTER — CLINICAL SUPPORT (OUTPATIENT)
Dept: REHABILITATION | Facility: HOSPITAL | Age: 84
End: 2024-10-21
Payer: MEDICARE

## 2024-10-21 DIAGNOSIS — R42 VERTIGO: Primary | ICD-10-CM

## 2024-10-21 PROCEDURE — 97112 NEUROMUSCULAR REEDUCATION: CPT | Mod: PO

## 2024-10-21 NOTE — PROGRESS NOTES
Interval History: No overnight events. Pt alert this morning, feeling well. Daughter at bedside. Had some questions regarding diagnosis of sepsis, education provided. No other concerns.     Review of Systems   Constitutional:  Negative for chills, fatigue and fever.   HENT:  Negative for trouble swallowing.    Respiratory:  Negative for cough and shortness of breath.    Cardiovascular:  Positive for leg swelling. Negative for chest pain.   Gastrointestinal:  Negative for abdominal distention, diarrhea, nausea and vomiting.   Genitourinary:  Negative for difficulty urinating and dysuria.   Neurological:  Negative for dizziness and light-headedness.     Objective:     Vital Signs (Most Recent):  Temp: 99 °F (37.2 °C) (07/11/24 1100)  Pulse: 85 (07/11/24 1100)  Resp: 15 (07/11/24 1100)  BP: (!) 152/88 (07/11/24 1100)  SpO2: (!) 94 % (07/11/24 1100) Vital Signs (24h Range):  Temp:  [99 °F (37.2 °C)-100.2 °F (37.9 °C)] 99 °F (37.2 °C)  Pulse:  [] 85  Resp:  [15-19] 15  SpO2:  [92 %-99 %] 94 %  BP: (114-153)/(60-88) 152/88     Weight: 100.7 kg (222 lb)  Body mass index is 41.95 kg/m².    Intake/Output Summary (Last 24 hours) at 7/11/2024 1353  Last data filed at 7/11/2024 1231  Gross per 24 hour   Intake --   Output 1300 ml   Net -1300 ml         Physical Exam  Constitutional:       General: She is not in acute distress.     Appearance: She is obese. She is not toxic-appearing.   HENT:      Head: Normocephalic and atraumatic.   Cardiovascular:      Rate and Rhythm: Normal rate and regular rhythm.      Heart sounds: Normal heart sounds.   Pulmonary:      Effort: Pulmonary effort is normal. No respiratory distress.      Breath sounds: Normal breath sounds.   Abdominal:      General: Bowel sounds are normal. There is no distension.      Palpations: Abdomen is soft.   Musculoskeletal:      Right lower leg: Edema (bilateral lymphedema with scaling) present.      Left lower leg: Edema (bilateral lymphedema with scaling)  [facility]   Physical Therapy Treatment Note      Name: Jacqueline Mathias  [facility] Number: [id number]    Therapy Diagnosis:   Encounter Diagnosis   Name Primary?    Vertigo Yes     Physician: Osmani Chandler MD    Visit Date: 10/21/2024    Physician Orders: physical therapy evaluation and treat; vestibular rehab therapy   Medical Diagnosis from Referral: vertigo  Evaluation Date: 10/16/2024  Authorization Period Expiration: 12/31/2024  Plan of Care Expiration: 10/26/2024  Visit # / Visits authorized: 1/ 20     Time In: 1123  Time Out: 1153  Total Billable Time: 30 minutes     Precautions: Fall and history of a-fib      Subjective     Patient reports: that she still gets short duration vertigo when she turns onto her left side to shut off her c-pap machine; endorses recent fall with left knee abrasion.    She  does not have a home exercise program.  Response to previous treatment: no changes  Functional change: none    Pain: 3/10  Location: left knee        Objective      POSITIONAL CANAL TESTING  Looking for nystagmus (slow phase followed by quick phase to the affected side for BPPV)     Malina Hallpike (posterior / CL anterior)              Right : NT              Left: positive  Horizontal Canals              Right: NT              Left: NT  Treatment Performed: Epley maneuver x 3 for left posterior canal benign paroxysmal positional vertigo      Treatment     Jacqueline received the treatments listed below:      neuromuscular re-education activities to improve: canalith positioning for 30 minutes. The following activities were included:    Epley maneuver x 3 for left posterior canal benign paroxysmal positional vertigo      Patient Education and Home Exercises       Education provided:   - maintain precautions for several days (head at 30 degrees, stay off left side)    Written Home Exercises Provided:  to be provided at future appointment .       Assessment     Unable to achieve resolution of  benign paroxysmal positional vertigo.    Jacqueline Is not progressing well towards her goals.   Patient prognosis is Fair.     Patient will continue to benefit from skilled outpatient physical therapy to address the deficits listed in the problem list box on initial evaluation, provide pt/family education and to maximize pt's level of independence in the home and community environment.     Patient's spiritual, cultural and educational needs considered and pt agreeable to plan of care and goals.     Anticipated barriers to physical therapy: severity of symptoms    Goals:     Short Term Goals (2 visits):   Reassess for benign paroxysmal positional vertigo. (PART MET)  Achieve resolution of benign paroxysmal positional vertigo symptoms. (NOT MET)     Long Term Goals (3 visits):   Reassess for unilateral vestibular weakness. (NOT MET)  Provide patient with home Epley. (NOT MET)      Plan     Attempt [place] maneuver at next appointment.      Unruly Nevarez, PT       present.   Skin:     General: Skin is warm and dry.      Capillary Refill: Capillary refill takes less than 2 seconds.      Coloration: Skin is not jaundiced.   Neurological:      Mental Status: She is alert and oriented to person, place, and time.             Significant Labs: All pertinent labs within the past 24 hours have been reviewed.  Blood Culture:   Recent Labs   Lab 07/09/24 1832 07/11/24 0506 07/11/24  0507   LABBLOO Gram stain aer bottle: Gram negative rods  Gram stain lemuel bottle: Gram negative rods  Results called to and read back by:Delmis Man RN 07/10/2024 09:42  GRAM NEGATIVE CAT  Identification pending  For susceptibility see order #[id number]  *  Gram stain lemuel bottle: Gram negative rods  Results called to and read back by:Delmis Man RN 07/10/2024 09:42  GRAM NEGATIVE CAT  Identification and susceptibility pending  *  GRAM NEGATIVE CAT  Identification and susceptibility pending  * No Growth to date No Growth to date     CBC:   Recent Labs   Lab 07/09/24  1832 07/10/24  0427 07/11/24  0507   WBC 8.94 7.05 6.41   HGB 8.9* 7.7* 7.6*   HCT 30.5* 27.0* 26.0*    184 212     CMP:   Recent Labs   Lab 07/09/24  1832 07/10/24  0427 07/11/24  0507    140 141   K 4.0 3.9 4.0    107 109   CO2 18* 24 25   * 109 114*   BUN 16 14 15   CREATININE 0.9 0.9 0.9   CALCIUM 9.1 8.8 8.6*   PROT 7.4 6.1 6.1   ALBUMIN 3.2* 2.8* 2.7*   BILITOT 0.6 0.4 0.3   ALKPHOS 66 55 54*   AST 18 15 9*   ALT 7* 7* 5*   ANIONGAP 13 9 7*     Troponin:   Recent Labs   Lab 07/09/24  1832 07/10/24  0427 07/10/24  0818   TROPONINI 0.059* 0.156* 0.121*     Urine Culture:   Recent Labs   Lab 07/09/24 1849   LABURIN GRAM NEGATIVE CAT  > 100,000 cfu/ml  Identification and susceptibility pending  *     Urine Studies:   Recent Labs   Lab 07/09/24 1849   COLORU Orange*   APPEARANCEUA Cloudy*   PHUR 6.0   SPECGRAV 1.015   PROTEINUA 2+*   GLUCUA Negative   KETONESU Negative   BILIRUBINUA Negative    OCCULTUA 2+*   NITRITE Negative   LEUKOCYTESUR 3+*   RBCUA 52*   WBCUA >100*   BACTERIA Many*   SQUAMEPITHEL 1   HYALINECASTS 0       Significant Imaging: I have reviewed all pertinent imaging results/findings within the past 24 hours.

## 2024-10-23 ENCOUNTER — CLINICAL SUPPORT (OUTPATIENT)
Dept: REHABILITATION | Facility: HOSPITAL | Age: 84
End: 2024-10-23
Payer: MEDICARE

## 2024-10-23 ENCOUNTER — OFFICE VISIT (OUTPATIENT)
Dept: PULMONOLOGY | Facility: CLINIC | Age: 84
End: 2024-10-23
Payer: MEDICARE

## 2024-10-23 VITALS
HEIGHT: 66 IN | HEART RATE: 71 BPM | OXYGEN SATURATION: 93 % | BODY MASS INDEX: 24.11 KG/M2 | DIASTOLIC BLOOD PRESSURE: 70 MMHG | SYSTOLIC BLOOD PRESSURE: 140 MMHG | WEIGHT: 150 LBS

## 2024-10-23 DIAGNOSIS — Q76.49: Primary | ICD-10-CM

## 2024-10-23 DIAGNOSIS — G47.33 OBSTRUCTIVE SLEEP APNEA (ADULT) (PEDIATRIC): ICD-10-CM

## 2024-10-23 DIAGNOSIS — G47.34 NOCTURNAL HYPOXEMIA: ICD-10-CM

## 2024-10-23 DIAGNOSIS — R94.2 DECREASED DIFFUSION CAPACITY: ICD-10-CM

## 2024-10-23 DIAGNOSIS — R42 VERTIGO: Primary | ICD-10-CM

## 2024-10-23 PROCEDURE — 97112 NEUROMUSCULAR REEDUCATION: CPT | Mod: PO

## 2024-10-23 PROCEDURE — 99999 PR PBB SHADOW E&M-EST. PATIENT-LVL IV: CPT | Mod: PBBFAC,,, | Performed by: INTERNAL MEDICINE

## 2024-10-23 PROCEDURE — 99999PBSHW PR PBB SHADOW TECHNICAL ONLY FILED TO HB: Mod: PBBFAC,,,

## 2024-10-23 PROCEDURE — 90653 IIV ADJUVANT VACCINE IM: CPT | Mod: PBBFAC,PN

## 2024-10-23 PROCEDURE — G0008 ADMIN INFLUENZA VIRUS VAC: HCPCS | Mod: PBBFAC,PN

## 2024-10-23 PROCEDURE — 99214 OFFICE O/P EST MOD 30 MIN: CPT | Mod: PBBFAC,PN | Performed by: INTERNAL MEDICINE

## 2024-10-23 RX ORDER — LISINOPRIL 20 MG/1
20 TABLET ORAL DAILY
COMMUNITY
Start: 2024-10-21

## 2024-10-23 RX ADMIN — INFLUENZA A VIRUS A/VICTORIA/4897/2022 IVR-238 (H1N1) ANTIGEN (FORMALDEHYDE INACTIVATED), INFLUENZA A VIRUS A/THAILAND/8/2022 IVR-237 (H3N2) ANTIGEN (FORMALDEHYDE INACTIVATED), INFLUENZA B VIRUS B/AUSTRIA/1359417/2021 BVR-26 ANTIGEN (FORMALDEHYDE INACTIVATED) 0.5 ML: 15; 15; 15 INJECTION, SUSPENSION INTRAMUSCULAR at 01:10

## 2024-10-23 NOTE — PROGRESS NOTES
SUBJECTIVE:    Patient ID: Jacqueline Mathias is a 84 y.o. female.    Chief Complaint: Apnea       The patient is here doing well. Her CPAP compliance is 97%.  Her AHI is 0.7.  She is sleeping on her concentrator at 4 liters and an autoPAP which maxes at 14.        Past Medical History:   Diagnosis Date    A-fib     Allergy     latex    Allergy     seasonal    Arthritis     Blood transfusion     Cataract     removed    COPD (chronic obstructive pulmonary disease)     Focal hemorrhagic contusion of cerebrum     Hypertension     IBS (irritable bowel syndrome)     Myocardial bridge     Obstructive sleep apnea     On home O2     Nightly    SAH (subarachnoid hemorrhage)     Thyroid nodule 09/29/2016    Ulcer      Past Surgical History:   Procedure Laterality Date    ANGIOGRAM, CORONARY, WITH LEFT HEART CATHETERIZATION N/A 12/14/2021    Procedure: Angiogram, Coronary, with Left Heart Cath;  Surgeon: Napoleon Dumas MD;  Location: [facility] CATH/EP LAB;  Service: Cardiology;  Laterality: N/A;    CAROTID ARTERY ANGIOPLASTY Right 09/30/2016    CHOLECYSTECTOMY      EYE SURGERY      cataract removal    HYSTERECTOMY      INSERTION OF IMPLANTABLE LOOP RECORDER N/A 6/19/2024    Procedure: Insertion, Implantable Loop Recorder;  Surgeon: Mario Dumas MD;  Location: [facility] CATH/EP LAB;  Service: Cardiology;  Laterality: N/A;     Family History   Problem Relation Name Age of Onset    Stroke Mother      Heart disease Mother      Heart disease Sister      Alzheimer's disease Sister      Cancer Brother      Cancer Brother      Kidney disease Brother      Breast cancer Maternal Aunt          Social History:   Marital Status:   Occupation: Data Unavailable  Alcohol History:  reports that she does not currently use alcohol.  Tobacco History:  reports that she has never smoked. She has never used smokeless tobacco.  Drug History:  reports no history of drug use.    Review of patient's allergies indicates:   Allergen Reactions     Latex, natural rubber      rash    Biaxin [clarithromycin] Diarrhea and Nausea Only    Codeine Itching    Oxycodone      Knocks pt out for 3 days    Metformin Diarrhea       Current Outpatient Medications   Medication Sig Dispense Refill    apixaban (ELIQUIS) 5 mg Tab Take 5 mg by mouth 2 (two) times daily.      ascorbic acid, vitamin C, (VITAMIN C) 1000 MG tablet Take 1,000 mg by mouth once daily.      b complex vitamins capsule Take 1 capsule by mouth once daily.      carvediloL (COREG) 6.25 MG tablet Take 1 tablet (6.25 mg total) by mouth 2 (two) times daily with meals. 180 tablet 3    cetirizine (ZYRTEC) 10 MG tablet Take 10 mg by mouth daily as needed. As needed      chlorthalidone (HYGROTEN) 25 MG Tab Take 1 tablet (25 mg total) by mouth once daily. 90 tablet 3    cholecalciferol, vitamin D3, 125 mcg (5,000 unit) Tab Take 1 tablet by mouth once daily.      fluticasone propionate (FLONASE) 50 mcg/actuation nasal spray USE 1 SPRAY IN BOTH  NOSTRILS TWICE DAILY Strength: 50 mcg/actuation 48 g 3    glucosamine-chondroitin 500-400 mg tablet Take 1 tablet by mouth 2 (two) times daily.      levothyroxine (SYNTHROID) 50 MCG tablet Take 1 tablet (50 mcg total) by mouth before breakfast. 90 tablet 1    lisinopriL (PRINIVIL,ZESTRIL) 20 MG tablet Take 20 mg by mouth once daily.      loperamide HCl (IMODIUM A-D ORAL) As needed      losartan (COZAAR) 25 MG tablet Take 1 tablet (25 mg total) by mouth once daily. 90 tablet 3    melatonin 10 mg Tab Take by mouth. As needed      multivitamin capsule Take 1 capsule by mouth once daily.      potassium chloride (MICRO-K) 10 MEQ CpSR TAKE 1 CAPSULE BY MOUTH TWICE  DAILY 180 capsule 2    pravastatin (PRAVACHOL) 20 MG tablet Take 1 tablet (20 mg total) by mouth every evening. 90 tablet 3    vit A/vit C/vit E/zinc/copper (ICAPS AREDS ORAL) Take by mouth.      meclizine (ANTIVERT) 12.5 mg tablet Take 1 tablet (12.5 mg total) by mouth 3 (three) times daily as needed for Dizziness. 90  "tablet 3     Current Facility-Administered Medications   Medication Dose Route Frequency Provider Last Rate Last Admin    influenza (adjuvanted) (Fluad) 45 mcg/0.5 mL IM vaccine (> or = 64 yo) 0.5 mL  0.5 mL Intramuscular 1 time in Clinic/HOD            Alpha-1 Antitrypsin:  Last PFT: 10/11/23 mildrestriction and a moderate diffusion defect, (straight back)  Last CT:9/8/23  Lungs: There are no pulmonary nodules, infiltrates or pleural effusions.  Trachea and bronchi are normal.  There is stable minimal subpleural reticulations within the lung bases. There is no bronchiectasis.    Her overnight pulse ox with CPAP no oxygen showed significant drops during the night without the oxygen.    General: Feeling good  Eyes: Vision is good.  ENT:  No rhinitis or sinusitis.   Heart:: Occasional palpatations, has A fib, seeing Dr. Young  Lungs: shortness of breath with exertion is stable, especially after walking around [employer] pushing a basket  GI: IBS sometimes  : No dysuria, hesitancy, or nocturia.  Musculoskeletal: some arthritis  Skin: No lesions or rashes.  Neuro: No headaches or neuropathy.  Lymph: ankles swell in the evening  Psych: nervousness, anxious  Endo: weight stable    OBJECTIVE:      BP (!) 140/70 (BP Location: Left arm, Patient Position: Sitting)   Pulse 71   Ht 5' 6" (1.676 m)   Wt 68 kg (150 lb)   SpO2 (!) 93%   BMI 24.21 kg/m²     Physical Exam  GENERAL: Older patient in no distress.  HEENT: Pupils equal and reactive. Extraocular movements intact. Nose intact.  Pharynx moist.  NECK: Supple.   HEART: Regular rate and rhythm. No murmur or gallop auscultated.  LUNGS: Clear to auscultation and percussion. Lung excursion symmetrical. No change in fremitus. No adventitial noises.  ABDOMEN: Bowel sounds present. Non-tender, no masses palpated.  EXTREMITIES: Normal muscle tone and joint movement, no cyanosis or clubbing.   LYMPHATICS: No adenopathy palpated, her ankles are puffy.  SKIN: Dry, intact, no lesions. "  Green ecchymosis to R buttock.  NEURO: Cranial nerves II-XII intact. Motor strength 5/5 bilaterally, upper and lower extremities.  PSYCH: Appropriate affect.    Assessment:       1. Straight back syndrome    2. Obstructive sleep apnea (adult) (pediatric)    3. Nocturnal hypoxemia    4. Decreased diffusion capacity      The patient continues to have some dyspnea on exertion.  She has straight back syndrome and a decreased diffusion defect.  She is also 84.  She is doing well overall.  She is very compliant with her CPAP and oxygen bled in.  She has significant nocturnal hypoxemia despite CPAP and requires 4 L of oxygen to be bled in.  We discussed the value of getting an RSV vaccine.  She knows we are not going to do two vaccines on the same day.  She will get her flu shot today.         Plan:       JACQUELINE (obstructive sleep apnea)    Straight back syndrome    Diffusion capacity of lung (dl), decreased    Nocturnal hypoxemia    Follow up in about 6 months (around 4/23/2025).    Continue CPAP with oxygen 4 liters  Continue current levels of humidity  Flu shot today  Consider RSV

## 2024-10-23 NOTE — PROGRESS NOTES
"[facility]   Physical Therapy Discharge Note      Name: Jacqueline Mathias  [facility] Number: [id number]    Therapy Diagnosis:   Encounter Diagnosis   Name Primary?    Vertigo Yes     Physician: Osmani Chandler MD    Visit Date: 10/23/2024    Physician Orders: physical therapy evaluation and treat; vestibular rehab therapy   Medical Diagnosis from Referral: vertigo  Evaluation Date: 10/16/2024    Date of Last visit: 10/23/2024  Total Visits Received: 3     Time In: 1048  Time Out: 1128  Total Billable Time: 40 minutes     Precautions: Fall and history of a-fib      Subjective     Patient reports: that her symptoms don't seem to last as long when she turns onto her left side to shut off her c-pap machine.    She  does not have a home exercise program.  Response to previous treatment: decreased symptoms  Functional change: none    Pain: 3/10  Location: left knee        Objective      Modified Dynamic Visual Acuity Test:Impaired: no blurred vision with head movement         MATT SENSORY TESTING:  (P= Pass, F= Fail; note any sway; hold each position for 30")  Condition 6: (soft surface/feet together/eyes closed) = contact guard assist/minimal assist needed to prevent loss of balance; opened her eyes once  Condition 7: (Fakuda step test), measure distance varied from center starting position, > 30 deg deviation to either side indicates hypofunction of biased side = 10 degree turn to the right after 50 steps      POSITIONAL CANAL TESTING  Looking for nystagmus (slow phase followed by quick phase to the affected side for BPPV)     Sidelying Hallpike (posterior / CL anterior)              Right : NT              Left: positive  Horizontal Canals              Right: NT              Left: NT  Treatment Performed: Semont maneuver x 3 for left posterior canal benign paroxysmal positional vertigo      Treatment     Jacqueline received the treatments listed below:      neuromuscular re-education activities to " improve: canalith positioning for 40 minutes. The following activities were included:    Semont maneuver x 3 for left posterior canal benign paroxysmal positional vertigo  X 20 seconds stand on [employer] = eyes closed  X 50 stepping in place = eyes closed  Provided patient with home Semont maneuver handout - instructed to perform twice a day for a week      Patient Education and Home Exercises       Education provided:   - perform home Semont maneuver twice a day for a week    Written Home Exercises Provided: Yes. Exercises were reviewed and Jacqueline was able to demonstrate them prior to the end of the session.  Jacqueline demonstrated fair understanding of the education provided. See Electronic Medical Record under Patient Instructions for exercises provided during therapy sessions.      Assessment     Subtle nystagmus still noted by end of treatment session - however, patient did not report vertigo; apparent resolution of symptoms achieved; no significant evidence of unilateral vestibular weakness; home Semont provided.    Jacqueline has progressed well towards her goals.     Patient's spiritual, cultural and educational needs considered and pt agreeable to plan of care and goals.     Goals:     Short Term Goals (2 visits):   Reassess for benign paroxysmal positional vertigo. (MET)  Achieve resolution of benign paroxysmal positional vertigo symptoms. (MET)     Long Term Goals (3 visits):   Reassess for unilateral vestibular weakness. (MET)  Provide patient with home Epley. (MET)    Discharge reason: Patient has met all of her goals.      PLAN     This patient is discharged from Physical Therapy.      Unruly Nevarez, PT

## 2024-10-24 ENCOUNTER — PATIENT OUTREACH (OUTPATIENT)
Dept: ADMINISTRATIVE | Facility: HOSPITAL | Age: 84
End: 2024-10-24
Payer: MEDICARE

## 2024-10-24 DIAGNOSIS — E11.9 TYPE 2 DIABETES MELLITUS WITHOUT COMPLICATION, WITHOUT LONG-TERM CURRENT USE OF INSULIN: Primary | ICD-10-CM

## 2024-10-24 NOTE — PROGRESS NOTES
Population Health Chart Review & Patient Outreach Details      Additional Pop Health Notes:      CAMPAIGN- Preventative Care Screening         Updates Requested / Reviewed:      Updated Care Coordination Note, Care Everywhere, , and Care Team Updated         Health Maintenance Topics Overdue:      VBHM Score: 5     Urine Screening  Eye Exam  Hemoglobin A1c  Lipid Panel  Uncontrolled BP    Shingles/Zoster Vaccine  RSV Vaccine                  Health Maintenance Topic(s) Outreach Outcomes & Actions Taken:    Eye Exam - Outreach Outcomes & Actions Taken  : External Records Requested & Care Team Updated if Applicable    Lab(s) - Outreach Outcomes & Actions Taken  : Overdue Lab(s) Ordered and Overdue Lab(s) Scheduled

## 2024-10-24 NOTE — LETTER
AUTHORIZATION FOR RELEASE OF   CONFIDENTIAL INFORMATION    Jhony Funez MD    We are seeing Jacqueline Mathias, date of birth 1940, in the clinic at [facility]. Brice Weaver Jr., MD is the patient's PCP. Jacqueline Mathias has an outstanding lab/procedure at the time we reviewed her chart. In order to help keep her health information updated, she has authorized us to request the following medical record(s):         Eye exam       Please fax to [phone]  Thank you so much!               If you have any questions, please contact Korina Lind, Care Coordinator   at [phone].              Patient Name: Jacqueline Mathias  : 1940  Patient Phone #: [phone]

## 2024-10-26 ENCOUNTER — PATIENT OUTREACH (OUTPATIENT)
Dept: ADMINISTRATIVE | Facility: HOSPITAL | Age: 84
End: 2024-10-26
Payer: MEDICARE

## 2024-10-28 ENCOUNTER — LAB VISIT (OUTPATIENT)
Dept: LAB | Facility: HOSPITAL | Age: 84
End: 2024-10-28
Attending: FAMILY MEDICINE
Payer: MEDICARE

## 2024-10-28 DIAGNOSIS — E11.9 TYPE 2 DIABETES MELLITUS WITHOUT COMPLICATION, WITHOUT LONG-TERM CURRENT USE OF INSULIN: ICD-10-CM

## 2024-10-28 LAB
ALBUMIN/CREAT UR: 14 UG/MG (ref 0–30)
CHOLEST SERPL-MCNC: 132 MG/DL (ref 120–199)
CHOLEST/HDLC SERPL: 3.1 {RATIO} (ref 2–5)
CREAT UR-MCNC: 57 MG/DL (ref 15–325)
ESTIMATED AVG GLUCOSE: 140 MG/DL (ref 68–131)
HBA1C MFR BLD: 6.5 % (ref 4–5.6)
HDLC SERPL-MCNC: 43 MG/DL (ref 40–75)
HDLC SERPL: 32.6 % (ref 20–50)
LDLC SERPL CALC-MCNC: 60.8 MG/DL (ref 63–159)
MICROALBUMIN UR DL<=1MG/L-MCNC: 8 UG/ML
NONHDLC SERPL-MCNC: 89 MG/DL
TRIGL SERPL-MCNC: 141 MG/DL (ref 30–150)

## 2024-10-28 PROCEDURE — 82043 UR ALBUMIN QUANTITATIVE: CPT | Performed by: FAMILY MEDICINE

## 2024-10-28 PROCEDURE — 82570 ASSAY OF URINE CREATININE: CPT | Performed by: FAMILY MEDICINE

## 2024-10-28 PROCEDURE — 80061 LIPID PANEL: CPT | Performed by: FAMILY MEDICINE

## 2024-10-28 PROCEDURE — 83036 HEMOGLOBIN GLYCOSYLATED A1C: CPT | Performed by: FAMILY MEDICINE

## 2024-10-28 PROCEDURE — 36415 COLL VENOUS BLD VENIPUNCTURE: CPT | Mod: PO | Performed by: FAMILY MEDICINE

## 2024-11-02 ENCOUNTER — HOSPITAL ENCOUNTER (OUTPATIENT)
Dept: CARDIOLOGY | Facility: CLINIC | Age: 84
Discharge: HOME | End: 2024-11-02
Attending: INTERNAL MEDICINE
Payer: MEDICARE

## 2024-11-02 DIAGNOSIS — I49.8 OTHER SPECIFIED CARDIAC ARRHYTHMIAS: ICD-10-CM

## 2024-11-02 PROCEDURE — 93298 REM INTERROG DEV EVAL SCRMS: CPT | Mod: PN | Performed by: INTERNAL MEDICINE

## 2024-11-03 ENCOUNTER — CLINICAL SUPPORT (OUTPATIENT)
Dept: CARDIOLOGY | Facility: CLINIC | Age: 84
End: 2024-11-03

## 2024-11-03 DIAGNOSIS — I49.8 OTHER SPECIFIED CARDIAC ARRHYTHMIAS: ICD-10-CM

## 2024-11-25 ENCOUNTER — OFFICE VISIT (OUTPATIENT)
Dept: FAMILY MEDICINE | Facility: CLINIC | Age: 84
End: 2024-11-25
Payer: MEDICARE

## 2024-11-25 DIAGNOSIS — E11.9 TYPE 2 DIABETES MELLITUS WITHOUT COMPLICATION, WITHOUT LONG-TERM CURRENT USE OF INSULIN: Primary | ICD-10-CM

## 2024-11-25 DIAGNOSIS — J44.9 CHRONIC OBSTRUCTIVE PULMONARY DISEASE, UNSPECIFIED COPD TYPE: ICD-10-CM

## 2024-11-25 DIAGNOSIS — E03.4 HYPOTHYROIDISM DUE TO ACQUIRED ATROPHY OF THYROID: ICD-10-CM

## 2024-11-25 DIAGNOSIS — G47.33 OBSTRUCTIVE SLEEP APNEA (ADULT) (PEDIATRIC): ICD-10-CM

## 2024-11-25 DIAGNOSIS — I25.10 CORONARY ARTERY DISEASE INVOLVING NATIVE CORONARY ARTERY OF NATIVE HEART WITHOUT ANGINA PECTORIS: ICD-10-CM

## 2024-11-25 DIAGNOSIS — E78.2 MIXED HYPERLIPIDEMIA: ICD-10-CM

## 2024-11-25 DIAGNOSIS — I48.19 OTHER PERSISTENT ATRIAL FIBRILLATION: ICD-10-CM

## 2024-11-25 DIAGNOSIS — I10 ESSENTIAL HYPERTENSION: ICD-10-CM

## 2024-11-25 DIAGNOSIS — I27.20 PULMONARY HYPERTENSION: ICD-10-CM

## 2024-11-25 DIAGNOSIS — R42 VERTIGO: ICD-10-CM

## 2024-11-25 PROCEDURE — 99999 PR PBB SHADOW E&M-EST. PATIENT-LVL III: CPT | Mod: PBBFAC,,, | Performed by: FAMILY MEDICINE

## 2024-11-25 PROCEDURE — 99214 OFFICE O/P EST MOD 30 MIN: CPT | Mod: S$PBB,,, | Performed by: FAMILY MEDICINE

## 2024-11-25 PROCEDURE — 99213 OFFICE O/P EST LOW 20 MIN: CPT | Mod: PBBFAC,PN | Performed by: FAMILY MEDICINE

## 2024-11-25 RX ORDER — MECLIZINE HYDROCHLORIDE 25 MG/1
25 TABLET ORAL 3 TIMES DAILY PRN
Qty: 270 TABLET | Refills: 3 | Status: SHIPPED | OUTPATIENT
Start: 2024-11-25

## 2024-11-27 VITALS
HEIGHT: 66 IN | OXYGEN SATURATION: 97 % | BODY MASS INDEX: 24.29 KG/M2 | DIASTOLIC BLOOD PRESSURE: 68 MMHG | TEMPERATURE: 98 F | HEART RATE: 76 BPM | SYSTOLIC BLOOD PRESSURE: 134 MMHG | WEIGHT: 151.13 LBS

## 2024-11-27 NOTE — PROGRESS NOTES
Subjective     Patient ID: Jacqueline Mathias is a 84 y.o. female.    Chief Complaint: Diabetes, Hypertension, Hyperlipidemia, Hypothyroidism, Coronary Artery Disease, Atrial Fibrillation, COPD, and Pulmonary Hypertension    Patient presents here for 6 month follow-up of diabetes, hypertension, hyperlipidemia, hypothyroidism, CAD, atrial fibrillation, COPD, and pulmonary hypertension.  Her weight has increased 9 lb over the last 6 months and her BMI is 24.39.  Her diabetes is well controlled with a hemoglobin A1c of 6.5% on 10/28/2024.  She is on diet control at this time.  She has no diabetic neuropathy, nephropathy, or retinopathy.  Her hypertension has been well controlled with her present medication.  Initial blood pressure was elevated here today but repeat blood pressure by me it came down to 134/68.  Her hyperlipidemia has been controlled with her present use of pravastatin 20 mg daily.  Her last lipid profile on 10/28/2024 showed a total cholesterol 132 and HDL of 60.  Her hypothyroidism is well controlled with her present use of levothyroxine 50 mcg daily.  Her last TSH in April 2024 was in the therapeutic range at 1.798.  She does have a history of coronary artery disease and is followed by Cardiology and their note was reviewed.  She also was followed by Cardiology for atrial fibrillation.  Her rate is controlled with her present use of carvedilol and she is on Eliquis for anticoagulation.  She has no problems with excessive bruising or bleeding.  She is also followed by pulmonology for her COPD and pulmonary hypertension.  Their last note was also reviewed.  She also has sleep apnea for which he uses CPAP with oxygen at night.  She does complain of fatigue but explain to her this is probably on the basis of her combination of coronary disease and lung disease.  As far as health maintenance, she is up-to-date with all of her recommended screening exams and immunizations with the exception of shingles  vaccine, RSV vaccine, and COVID booster.      Review of Systems   Constitutional:  Negative for chills, fatigue, fever and unexpected weight change.   HENT:  Negative for nasal congestion, ear pain, postnasal drip and sore throat.    Eyes:  Negative for pain and visual disturbance.   Respiratory:  Positive for shortness of breath (With mild-to-moderate exertion). Negative for cough.    Cardiovascular:  Positive for leg swelling (mild edema in the evening). Negative for chest pain and palpitations.   Gastrointestinal:  Negative for abdominal pain, diarrhea, nausea and vomiting.   Musculoskeletal:  Negative for arthralgias and back pain.   Neurological:  Negative for dizziness, light-headedness and headaches.   Hematological:  Negative for adenopathy. Bruises/bleeds easily.   Psychiatric/Behavioral:  Negative for sleep disturbance. The patient is not nervous/anxious.           Objective     Physical Exam  Vitals reviewed.   Constitutional:       General: She is not in acute distress.     Appearance: Normal appearance. She is well-developed and normal weight.   HENT:      Right Ear: Tympanic membrane and external ear normal.      Left Ear: Tympanic membrane and external ear normal.      Mouth/Throat:      Pharynx: Oropharynx is clear. No posterior oropharyngeal erythema.   Neck:      Thyroid: No thyromegaly.      Vascular: No carotid bruit.   Cardiovascular:      Rate and Rhythm: Normal rate. Rhythm irregular.      Pulses: Normal pulses.      Heart sounds: Normal heart sounds. No murmur heard.  Pulmonary:      Effort: Pulmonary effort is normal.      Breath sounds: Normal breath sounds. No wheezing or rales.   Musculoskeletal:         General: No tenderness. Normal range of motion.      Cervical back: Normal range of motion and neck supple.      Right lower leg: No edema.      Left lower leg: No edema.   Lymphadenopathy:      Cervical: No cervical adenopathy.   Neurological:      General: No focal deficit present.       Mental Status: She is alert and oriented to person, place, and time.      Cranial Nerves: No cranial nerve deficit.      Deep Tendon Reflexes: Reflexes are normal and symmetric.            Assessment and Plan     1. Type 2 diabetes mellitus without complication, without long-term current use of insulin    2. Mixed hyperlipidemia    3. Essential hypertension    4. Hypothyroidism due to acquired atrophy of thyroid    5. Coronary artery disease involving native coronary artery of native heart without angina pectoris    6. Other persistent atrial fibrillation  -     apixaban (ELIQUIS) 5 mg Tab; Take 1 tablet (5 mg total) by mouth 2 (two) times daily.  Dispense: 180 tablet; Refill: 3    7. Chronic obstructive pulmonary disease, unspecified COPD type    8. Pulmonary hypertension    9. Obstructive sleep apnea (adult) (pediatric)    10. Vertigo  -     meclizine (ANTIVERT) 25 mg tablet; Take 1 tablet (25 mg total) by mouth 3 (three) times daily as needed for Dizziness.  Dispense: 270 tablet; Refill: 3        1. Continue present medication as her diabetes, hypertension, hyperlipidemia, hypothyroidism, CAD, atrial fibrillation, COPD, and pulmonary hypertension are stable  2.  Continue diabetic, low-sodium, low-fat low-cholesterol diet and exercise.  BMI today is 24.39  3. Continue follow-up with Cardiology and Pulmonary as scheduled  4. Follow up with new physician in 6 months          Follow up in about 6 months (around 5/25/2025).

## 2024-12-04 ENCOUNTER — HOSPITAL ENCOUNTER (OUTPATIENT)
Dept: CARDIOLOGY | Facility: CLINIC | Age: 84
Discharge: HOME | End: 2024-12-04
Attending: INTERNAL MEDICINE
Payer: MEDICARE

## 2024-12-04 ENCOUNTER — CLINICAL SUPPORT (OUTPATIENT)
Dept: CARDIOLOGY | Facility: CLINIC | Age: 84
End: 2024-12-04

## 2024-12-04 DIAGNOSIS — I49.8 OTHER SPECIFIED CARDIAC ARRHYTHMIAS: ICD-10-CM

## 2024-12-04 PROCEDURE — 93298 REM INTERROG DEV EVAL SCRMS: CPT | Mod: PN | Performed by: INTERNAL MEDICINE

## 2025-01-04 ENCOUNTER — CLINICAL SUPPORT (OUTPATIENT)
Dept: CARDIOLOGY | Facility: CLINIC | Age: 85
End: 2025-01-04

## 2025-01-04 ENCOUNTER — HOSPITAL ENCOUNTER (OUTPATIENT)
Dept: CARDIOLOGY | Facility: CLINIC | Age: 85
Discharge: HOME | End: 2025-01-04
Attending: INTERNAL MEDICINE
Payer: MEDICARE

## 2025-01-04 DIAGNOSIS — I49.8 OTHER SPECIFIED CARDIAC ARRHYTHMIAS: ICD-10-CM

## 2025-01-04 PROCEDURE — 93298 REM INTERROG DEV EVAL SCRMS: CPT | Mod: PN | Performed by: INTERNAL MEDICINE

## 2025-01-14 ENCOUNTER — OFFICE VISIT (OUTPATIENT)
Dept: CARDIOLOGY | Facility: CLINIC | Age: 85
End: 2025-01-14
Payer: MEDICARE

## 2025-01-14 VITALS
BODY MASS INDEX: 23.95 KG/M2 | WEIGHT: 149 LBS | HEIGHT: 66 IN | OXYGEN SATURATION: 98 % | HEART RATE: 68 BPM | SYSTOLIC BLOOD PRESSURE: 134 MMHG | DIASTOLIC BLOOD PRESSURE: 78 MMHG | RESPIRATION RATE: 16 BRPM

## 2025-01-14 DIAGNOSIS — I25.10 CORONARY ARTERY DISEASE INVOLVING NATIVE CORONARY ARTERY OF NATIVE HEART WITHOUT ANGINA PECTORIS: ICD-10-CM

## 2025-01-14 DIAGNOSIS — I10 ESSENTIAL HYPERTENSION: ICD-10-CM

## 2025-01-14 DIAGNOSIS — R60.0 LOCALIZED EDEMA: ICD-10-CM

## 2025-01-14 DIAGNOSIS — I48.0 PAROXYSMAL ATRIAL FIBRILLATION: Primary | ICD-10-CM

## 2025-01-14 DIAGNOSIS — I70.0 AORTIC ATHEROSCLEROSIS: ICD-10-CM

## 2025-01-14 DIAGNOSIS — E78.2 MIXED HYPERLIPIDEMIA: ICD-10-CM

## 2025-01-14 DIAGNOSIS — I48.19 OTHER PERSISTENT ATRIAL FIBRILLATION: ICD-10-CM

## 2025-01-14 PROBLEM — R60.9 EDEMA: Status: ACTIVE | Noted: 2025-01-14

## 2025-01-14 PROBLEM — I27.20 PULMONARY HYPERTENSION: Status: RESOLVED | Noted: 2022-01-13 | Resolved: 2025-01-14

## 2025-01-14 PROBLEM — R60.9 EDEMA: Chronic | Status: ACTIVE | Noted: 2025-01-14

## 2025-01-14 PROCEDURE — 99214 OFFICE O/P EST MOD 30 MIN: CPT | Mod: PBBFAC,PN | Performed by: INTERNAL MEDICINE

## 2025-01-14 PROCEDURE — 99214 OFFICE O/P EST MOD 30 MIN: CPT | Mod: S$PBB,,, | Performed by: INTERNAL MEDICINE

## 2025-01-14 PROCEDURE — 99999 PR PBB SHADOW E&M-EST. PATIENT-LVL IV: CPT | Mod: PBBFAC,,, | Performed by: INTERNAL MEDICINE

## 2025-01-14 RX ORDER — SPIRONOLACTONE 25 MG/1
25 TABLET ORAL DAILY
Qty: 90 TABLET | Refills: 3 | Status: SHIPPED | OUTPATIENT
Start: 2025-01-14 | End: 2026-01-14

## 2025-01-14 NOTE — PROGRESS NOTES
Subjective:    Patient ID:  Jacqueline Mathias is a 84 y.o. female patient here for evaluation Follow-up (She has been having some edema in both feet)      History of Present Illness:   84-year-old lady with history of COPD, obstructive sleep apnea arterial hypertension has been experiencing some swelling in her ankles especially has a day progresses.  Denies having any significant shortness of breath she does have vertiginous symptoms with change of posture she has profound dizziness.  Denies having any chest discomfort no arm neck or jaw pain noted and no significant palpitations noted.  Denies having any cough or congestion he is sleeping in a recliner chair          Review of patient's allergies indicates:   Allergen Reactions    Latex, natural rubber      rash    Biaxin [clarithromycin] Diarrhea and Nausea Only    Codeine Itching    Oxycodone      Knocks pt out for 3 days    Metformin Diarrhea       Past Medical History:   Diagnosis Date    A-fib     Allergy     latex    Allergy     seasonal    Arthritis     Blood transfusion     Cataract     removed    COPD (chronic obstructive pulmonary disease)     Focal hemorrhagic contusion of cerebrum     Hypertension     IBS (irritable bowel syndrome)     Myocardial bridge     Obstructive sleep apnea     On home O2     Nightly    SAH (subarachnoid hemorrhage)     Thyroid nodule 09/29/2016    Ulcer      Past Surgical History:   Procedure Laterality Date    ANGIOGRAM, CORONARY, WITH LEFT HEART CATHETERIZATION N/A 12/14/2021    Procedure: Angiogram, Coronary, with Left Heart Cath;  Surgeon: Napoleon Dumas MD;  Location: [facility] CATH/EP LAB;  Service: Cardiology;  Laterality: N/A;    CAROTID ARTERY ANGIOPLASTY Right 09/30/2016    CHOLECYSTECTOMY      EYE SURGERY      cataract removal    HYSTERECTOMY      INSERTION OF IMPLANTABLE LOOP RECORDER N/A 6/19/2024    Procedure: Insertion, Implantable Loop Recorder;  Surgeon: Mario Dumas MD;  Location: [facility] CATH/EP LAB;  Service:  Cardiology;  Laterality: N/A;     Social History     Tobacco Use    Smoking status: Never    Smokeless tobacco: Never   Substance Use Topics    Alcohol use: Not Currently    Drug use: No        Review of Systems:    As noted in HPI in addition      REVIEW OF SYSTEMS  CARDIOVASCULAR: No recent chest pain, palpitations, arm, neck, or jaw pain  RESPIRATORY: No recent fever, cough chills, SOB or congestion  : No blood in the urine  GI: No Nausea, vomiting, constipation, diarrhea, blood, or reflux.  MUSCULOSKELETAL: No myalgias  NEURO: No lightheadedness or dizziness  EYES: No Double vision, blurry, vision or headache              Objective        Vitals:    01/14/25 1334   BP: 134/78   Pulse: 68   Resp: 16       LIPIDS - LAST 2   Lab Results   Component Value Date    CHOL 132 10/28/2024    CHOL 140 08/28/2023    HDL 43 10/28/2024    HDL 64 08/28/2023    LDLCALC 60.8 (L) 10/28/2024    LDLCALC 49.8 (L) 08/28/2023    TRIG 141 10/28/2024    TRIG 131 08/28/2023    CHOLHDL 32.6 10/28/2024    CHOLHDL 45.7 08/28/2023       CBC - LAST 2  Lab Results   Component Value Date    WBC 7.97 04/13/2024    WBC 7.80 04/12/2024    RBC 3.76 (L) 04/13/2024    RBC 4.32 04/12/2024    HGB 10.8 (L) 04/13/2024    HGB 12.2 04/12/2024    HCT 33.6 (L) 04/13/2024    HCT 37.7 04/12/2024    MCV 89 04/13/2024    MCV 87 04/12/2024    MCH 28.7 04/13/2024    MCH 28.2 04/12/2024    MCHC 32.1 04/13/2024    MCHC 32.4 04/12/2024    RDW 14.4 04/13/2024    RDW 13.9 04/12/2024     04/13/2024     04/12/2024    MPV 11.4 04/13/2024    MPV 11.8 04/12/2024    GRAN 5.0 04/13/2024    GRAN 63.1 04/13/2024    LYMPH 2.1 04/13/2024    LYMPH 26.9 04/13/2024    MONO 0.7 04/13/2024    MONO 8.5 04/13/2024    BASO 0.01 04/13/2024    BASO 0.02 04/12/2024    NRBC 0 04/13/2024    NRBC 0 04/12/2024       CHEMISTRY & LIVER FUNCTION - LAST 2  Lab Results   Component Value Date     05/01/2024     04/13/2024    K 4.7 05/01/2024    K 4.6 04/13/2024    CL 99  05/01/2024     04/13/2024    CO2 31 (H) 05/01/2024    CO2 33 (H) 04/13/2024    ANIONGAP 7 (L) 05/01/2024    ANIONGAP 5 (L) 04/13/2024    BUN 18 05/01/2024    BUN 25 (H) 04/13/2024    CREATININE 0.8 05/01/2024    CREATININE 0.8 04/13/2024     (H) 05/01/2024     (H) 04/13/2024    CALCIUM 9.7 05/01/2024    CALCIUM 9.5 04/13/2024    MG 2.0 04/13/2024    MG 1.8 04/12/2024    ALBUMIN 3.8 04/13/2024    ALBUMIN 4.2 04/12/2024    PROT 6.8 04/13/2024    PROT 7.7 04/12/2024    ALKPHOS 51 (L) 04/13/2024    ALKPHOS 64 04/12/2024    ALT 10 04/13/2024    ALT 7 (L) 04/12/2024    AST 14 04/13/2024    AST 17 04/12/2024    BILITOT 0.5 04/13/2024    BILITOT 0.5 04/12/2024        CARDIAC PROFILE - LAST 2  Lab Results   Component Value Date     (H) 05/01/2024     (H) 04/05/2024    TROPONINIHS 10.9 04/11/2024        COAGULATION - LAST 2  Lab Results   Component Value Date    LABPT 15.8 (H) 12/10/2021    LABPT 14.9 (H) 12/01/2021    INR 1.1 04/11/2024    INR 1.4 12/10/2021    APTT 29.5 04/11/2024    APTT 32.8 12/10/2021       ENDOCRINE & PSA - LAST 2  Lab Results   Component Value Date    HGBA1C 6.5 (H) 10/28/2024    HGBA1C 6.2 (H) 04/01/2024    TSH 1.798 04/01/2024    TSH 5.640 (H) 08/28/2023        ECHOCARDIOGRAM RESULTS  Results for orders placed during the hospital encounter of 04/23/24    Echo    Interpretation Summary    Left Ventricle: The left ventricle is normal in size. Normal wall thickness. There is normal systolic function with a visually estimated ejection fraction of 55 - 60%.    Right Ventricle: Right ventricle was not well visualized due to poor acoustic window. Systolic function is normal.    Aortic Valve: The aortic valve is a trileaflet valve.    Mitral Valve: There is no stenosis. The mean pressure gradient across the mitral valve is 3 mmHg at a heart rate of  bpm.    Pulmonic Valve: There is mild regurgitation.    IVC/SVC: Normal venous pressure at 3 mmHg.      CURRENT/PREVIOUS VISIT  EKG  Results for orders placed or performed in visit on 04/17/24   IN OFFICE EKG 12-LEAD (to [place])    Collection Time: 04/17/24  8:20 AM   Result Value Ref Range    QRS Duration 110 ms    OHS QTC Calculation 471 ms    Narrative    Test Reason : I25.10,I10,I34.0,I48.0,    Vent. Rate : 073 BPM     Atrial Rate : 073 BPM     P-R Int : 130 ms          QRS Dur : 110 ms      QT Int : 428 ms       P-R-T Axes : 069 037 027 degrees     QTc Int : 471 ms    Normal sinus rhythm  Right ventricular conduction delay  Nonspecific ST and T wave abnormality  Prolonged QT  Abnormal ECG  When compared with ECG of 11-APR-2024 14:10,  RSR' pattern in V1 has replaced Incomplete right bundle branch block  Confirmed by Shandra PERRY, Gio POPE ([id number]) on 6/8/2024 8:35:14 AM    Referred By:             Confirmed By:Gio Devi MD     No valid procedures specified.   Results for orders placed in visit on 11/19/21    Nuclear Stress Test    Interpretation Summary    The EKG portion of this study is negative for ischemia.    The patient reported no chest pain during the stress test.    The nuclear portion of this study will be reported separately.    No valid procedures specified.    PHYSICAL EXAM  CONSTITUTIONAL: Well built, well nourished in no apparent distress  NECK: no carotid bruit, no JVD  LUNGS: CTA  CHEST WALL: no tenderness  HEART: regular rate and rhythm, S1, S2 normal, no murmur, click, rub or gallop   ABDOMEN: soft, non-tender; bowel sounds normal; no masses,  no organomegaly  EXTREMITIES: Extremities normal, no edema, no calf tenderness noted  NEURO: AAO X 3    I HAVE REVIEWED :    The vital signs, nurses notes, and all the pertinent radiology and labs.        Current Outpatient Medications   Medication Instructions    apixaban (ELIQUIS) 5 mg, Oral, 2 times daily    ascorbic acid (vitamin C) (VITAMIN C) 1,000 mg, Daily    b complex vitamins capsule 1 capsule, Daily    carvediloL (COREG) 6.25 mg, Oral, 2 times daily with meals     cetirizine (ZYRTEC) 10 mg, Daily PRN    chlorthalidone (HYGROTEN) 25 mg, Oral, Daily    cholecalciferol, vitamin D3, 125 mcg (5,000 unit) Tab 1 tablet, Daily    fluticasone propionate (FLONASE) 50 mcg/actuation nasal spray USE 1 SPRAY IN BOTH  NOSTRILS TWICE DAILY Strength: 50 mcg/actuation    glucosamine-chondroitin 500-400 mg tablet 1 tablet, 2 times daily    levothyroxine (SYNTHROID) 50 mcg, Oral, Before breakfast    loperamide HCl (IMODIUM A-D ORAL) As needed    losartan (COZAAR) 25 mg, Oral, Daily    meclizine (ANTIVERT) 25 mg, Oral, 3 times daily PRN    melatonin 10 mg Tab Take by mouth. As needed    multivitamin capsule 1 capsule, Daily    potassium chloride (MICRO-K) 10 MEQ CpSR TAKE 1 CAPSULE BY MOUTH TWICE  DAILY    pravastatin (PRAVACHOL) 20 mg, Oral, Nightly    vit A/vit C/vit E/zinc/copper (ICAPS AREDS ORAL) Take by mouth.          Assessment & Plan     1. Paroxysmal atrial fibrillation  Assessment & Plan:  Lasts monitoring device showed sinus rhythm continue on same therapy to include anticoagulation with Eliquis.  Continue Coreg will add magnesium to her therapy.        2. Other persistent atrial fibrillation    3. Aortic atherosclerosis  Assessment & Plan:  Clinically stable vital signs good.  I have encouraged her to continue on present same therapy to include Eliquis 5 mg p.o. b.i.d. Coreg 6.25 mg p.o. b.i.d. maintain the same regimen      4. Coronary artery disease involving native coronary artery of native heart without angina pectoris  Assessment & Plan:  No active anginal symptoms are noted continue same therapy      5. Mixed hyperlipidemia  Assessment & Plan:  Her last LDL cholesterol is 61 encouraged her to continue on pravastatin 20 mg nightly low-fat low-cholesterol diet      6. Essential hypertension  Assessment & Plan:  His last blood pressure is 134/78 mm Hg continue on losartan 25 mg daily along with Coreg 6.25 mg p.o. b.i.d. maintain on low-salt diet      7. Localized edema  Assessment  & Plan:  History of localized edema stop the potassium supplements and add spironolactone 25 mg daily to her regimen and continue on diuretic therapy.      I have also encouraged her to do some flexion and extension exercises of her feet      No follow-ups on file.

## 2025-01-14 NOTE — ASSESSMENT & PLAN NOTE
Clinically stable vital signs good.  I have encouraged her to continue on present same therapy to include Eliquis 5 mg p.o. b.i.d. Coreg 6.25 mg p.o. b.i.d. maintain the same regimen

## 2025-01-14 NOTE — ASSESSMENT & PLAN NOTE
History of localized edema stop the potassium supplements and add spironolactone 25 mg daily to her regimen and continue on diuretic therapy.

## 2025-01-14 NOTE — ASSESSMENT & PLAN NOTE
Lasts monitoring device showed sinus rhythm continue on same therapy to include anticoagulation with Eliquis.  Continue Coreg will add magnesium to her therapy.

## 2025-01-14 NOTE — ASSESSMENT & PLAN NOTE
His last blood pressure is 134/78 mm Hg continue on losartan 25 mg daily along with Coreg 6.25 mg p.o. b.i.d. maintain on low-salt diet

## 2025-01-14 NOTE — ASSESSMENT & PLAN NOTE
Her last LDL cholesterol is 61 encouraged her to continue on pravastatin 20 mg nightly low-fat low-cholesterol diet

## 2025-01-15 DIAGNOSIS — R93.89 ABNORMAL COMPUTED TOMOGRAPHY ANGIOGRAPHY (CTA) OF NECK: Primary | ICD-10-CM

## 2025-01-23 ENCOUNTER — TELEPHONE (OUTPATIENT)
Dept: NEUROSURGERY | Facility: CLINIC | Age: 85
End: 2025-01-23
Payer: MEDICARE

## 2025-01-27 ENCOUNTER — TELEPHONE (OUTPATIENT)
Dept: NEUROSURGERY | Facility: CLINIC | Age: 85
End: 2025-01-27
Payer: MEDICARE

## 2025-01-27 NOTE — TELEPHONE ENCOUNTER
Returned patient's call.  No answer.  LVM      ----- Message from Arnold sent at 1/27/2025  9:27 AM CST -----  Type: Needs Medical Advice    Who Called:  Pt    Best Call Back Number: [phone]    Additional Information: Pt calling to schedule fabian. Please call back to advise, Thanks!

## 2025-01-28 ENCOUNTER — HOSPITAL ENCOUNTER (OUTPATIENT)
Dept: RADIOLOGY | Facility: HOSPITAL | Age: 85
Discharge: HOME | End: 2025-01-28
Attending: NEUROLOGICAL SURGERY
Payer: MEDICARE

## 2025-01-28 DIAGNOSIS — R93.89 ABNORMAL COMPUTED TOMOGRAPHY ANGIOGRAPHY (CTA) OF NECK: ICD-10-CM

## 2025-01-28 LAB
CREAT SERPL-MCNC: 1 MG/DL (ref 0.5–1.4)
SAMPLE: NORMAL

## 2025-01-28 PROCEDURE — 25500020 PHARM REV CODE 255: Mod: PO | Performed by: NEUROLOGICAL SURGERY

## 2025-01-28 PROCEDURE — 70498 CT ANGIOGRAPHY NECK: CPT | Mod: TC,PO

## 2025-01-28 PROCEDURE — 70496 CT ANGIOGRAPHY HEAD: CPT | Mod: 26,,, | Performed by: RADIOLOGY

## 2025-01-28 PROCEDURE — 70498 CT ANGIOGRAPHY NECK: CPT | Mod: 26,,, | Performed by: RADIOLOGY

## 2025-01-28 RX ADMIN — IOHEXOL 100 ML: 350 INJECTION, SOLUTION INTRAVENOUS at 08:01

## 2025-01-29 ENCOUNTER — OFFICE VISIT (OUTPATIENT)
Dept: NEUROSURGERY | Facility: CLINIC | Age: 85
End: 2025-01-29
Payer: MEDICARE

## 2025-01-29 VITALS
BODY MASS INDEX: 23.95 KG/M2 | SYSTOLIC BLOOD PRESSURE: 186 MMHG | HEIGHT: 66 IN | WEIGHT: 149.06 LBS | DIASTOLIC BLOOD PRESSURE: 72 MMHG | HEART RATE: 76 BPM | RESPIRATION RATE: 18 BRPM

## 2025-01-29 DIAGNOSIS — I67.1 CEREBRAL ANEURYSM, NONRUPTURED: Primary | ICD-10-CM

## 2025-01-29 PROCEDURE — 99214 OFFICE O/P EST MOD 30 MIN: CPT | Mod: S$PBB,,, | Performed by: NEUROLOGICAL SURGERY

## 2025-01-29 NOTE — PROGRESS NOTES
Neurosurgery History & Physical    Patient ID: Jacqueline Mathias is a 84 y.o. female.    No chief complaint on file.      Interval HPI 01/29/2025:  Ms. Mathias returns today for 6 month follow-up with image review.      Since her last visit, she reports having continued dizziness.    She went through 3 visits with physical therapy for vestibular rehab though these did not resolve her dizziness.  She has not been back to see ENT since her initial evaluation prior to PT.    She continues to struggle with high blood pressure.  Her cardiologist recently changed her medications due to high reading in the office.  She continues to follow with cardiology for blood pressure control.  She does not smoke.    HPI 08/01/2024:  Ms. Mathias is an  84-year-old female who was being evaluated for dizziness and had MRI of the brain was found to have a flow void suspicious for brain aneurysms.  A CT angiogram was obtained which did confirm a left cavernous segment aneurysms.     She has been referred for discussion of this lesion.  She denies smoking history.  She does have a history of hypertension.    Review of Systems   Constitutional:  Negative for activity change and fever.   Eyes:  Negative for visual disturbance.   Respiratory:  Negative for shortness of breath.    Cardiovascular:  Negative for chest pain.   Gastrointestinal:  Negative for nausea and vomiting.   Endocrine: Negative for cold intolerance, heat intolerance, polydipsia, polyphagia and polyuria.   Genitourinary:  Negative for decreased urine volume, difficulty urinating and frequency.   Musculoskeletal:  Negative for back pain, gait problem and neck pain.   Neurological:  Negative for dizziness, tremors, seizures, syncope, facial asymmetry, speech difficulty, weakness, light-headedness, numbness and headaches.   Psychiatric/Behavioral:  Negative for confusion.        Past Medical History:   Diagnosis Date    A-fib     Allergy     latex    Allergy     seasonal     Arthritis     Blood transfusion     Cataract     removed    COPD (chronic obstructive pulmonary disease)     Focal hemorrhagic contusion of cerebrum     Hypertension     IBS (irritable bowel syndrome)     Myocardial bridge     Obstructive sleep apnea     On home O2     Nightly    SAH (subarachnoid hemorrhage)     Thyroid nodule 09/29/2016    Ulcer      Social History     Socioeconomic History    Marital status:    Tobacco Use    Smoking status: Never    Smokeless tobacco: Never   Substance and Sexual Activity    Alcohol use: Not Currently    Drug use: No     Social Drivers of Health     Financial Resource Strain: Low Risk  (4/13/2024)    Overall Financial Resource Strain (CARDIA)     Difficulty of Paying Living Expenses: Not very hard   Food Insecurity: No Food Insecurity (4/13/2024)    Hunger Vital Sign     Worried About Running Out of Food in the Last Year: Never true     Ran Out of Food in the Last Year: Never true   Transportation Needs: No Transportation Needs (4/13/2024)    PRAPARE - Transportation     Lack of Transportation (Medical): No     Lack of Transportation (Non-Medical): No   Physical Activity: Unknown (4/4/2024)    Exercise Vital Sign     Days of Exercise per Week: 2 days   Stress: Stress Concern Present (4/13/2024)    [employer] - Occupational Stress Questionnaire     Feeling of Stress : To some extent   Housing Stability: Unknown (4/4/2024)    Housing Stability Vital Sign     Unable to Pay for Housing in the Last Year: No     Unstable Housing in the Last Year: No     Family History   Problem Relation Name Age of Onset    Stroke Mother      Heart disease Mother      Heart disease Sister      Alzheimer's disease Sister      Cancer Brother      Cancer Brother      Kidney disease Brother      Breast cancer Maternal Aunt       Review of patient's allergies indicates:   Allergen Reactions    Latex, natural rubber      rash    Biaxin [clarithromycin] Diarrhea and  Nausea Only    Codeine Itching    Oxycodone      Knocks pt out for 3 days    Metformin Diarrhea       Current Outpatient Medications:     apixaban (ELIQUIS) 5 mg Tab, Take 1 tablet (5 mg total) by mouth 2 (two) times daily., Disp: 180 tablet, Rfl: 3    ascorbic acid, vitamin C, (VITAMIN C) 1000 MG tablet, Take 1,000 mg by mouth once daily., Disp: , Rfl:     b complex vitamins capsule, Take 1 capsule by mouth once daily., Disp: , Rfl:     carvediloL (COREG) 6.25 MG tablet, Take 1 tablet (6.25 mg total) by mouth 2 (two) times daily with meals., Disp: 180 tablet, Rfl: 3    cetirizine (ZYRTEC) 10 MG tablet, Take 10 mg by mouth daily as needed. As needed, Disp: , Rfl:     chlorthalidone (HYGROTEN) 25 MG Tab, Take 1 tablet (25 mg total) by mouth once daily., Disp: 90 tablet, Rfl: 3    cholecalciferol, vitamin D3, 125 mcg (5,000 unit) Tab, Take 1 tablet by mouth once daily., Disp: , Rfl:     fluticasone propionate (FLONASE) 50 mcg/actuation nasal spray, USE 1 SPRAY IN BOTH  NOSTRILS TWICE DAILY Strength: 50 mcg/actuation, Disp: 48 g, Rfl: 3    glucosamine-chondroitin 500-400 mg tablet, Take 1 tablet by mouth 2 (two) times daily., Disp: , Rfl:     levothyroxine (SYNTHROID) 50 MCG tablet, Take 1 tablet (50 mcg total) by mouth before breakfast., Disp: 90 tablet, Rfl: 1    loperamide HCl (IMODIUM A-D ORAL), As needed, Disp: , Rfl:     losartan (COZAAR) 25 MG tablet, Take 1 tablet (25 mg total) by mouth once daily., Disp: 90 tablet, Rfl: 3    meclizine (ANTIVERT) 25 mg tablet, Take 1 tablet (25 mg total) by mouth 3 (three) times daily as needed for Dizziness., Disp: 270 tablet, Rfl: 3    melatonin 10 mg Tab, Take by mouth. As needed, Disp: , Rfl:     multivitamin capsule, Take 1 capsule by mouth once daily., Disp: , Rfl:     pravastatin (PRAVACHOL) 20 MG tablet, Take 1 tablet (20 mg total) by mouth every evening., Disp: 90 tablet, Rfl: 3    spironolactone (ALDACTONE) 25 MG tablet, Take 1 tablet (25 mg total) by mouth once  daily., Disp: 90 tablet, Rfl: 3    vit A/vit C/vit E/zinc/copper (ICAPS AREDS ORAL), Take by mouth., Disp: , Rfl:   There were no vitals taken for this visit.      Neurological Exam    Alert and oriented x4   Strength and sensation grossly intact   Gait within normal limits    Physical Exam    Imaging:  CTA head and neck dated 01/28/2025 personally reviewed and discussed with patient.    The left-sided cavernous segment aneurysms appears unchanged in size or shape.    Assessment/Plan:    Ms. Mathias is an 85yo female with incidentally found stable left cavernous segment aneurysms.  I discussed the natural history of cavernous carotid aneurysms.  I quoted a less than 0.5% annual risk of rupture in accordance with ISUIA data.  I discussed treatment options for intracranial aneurysms.  In the cavernous segment most commonly coil embolization versus flow diversion would be used.  I discussed technique for these and risks and benefits.  After thorough discussion the patient elected to follow with repeat imaging which I feel is very reasonable.  I discussed that the two biggest risk factors in aneurysm rupture are hypertension and tobacco use.  She should remain a non-smoker and should continue to focus on good blood pressure management.  We recommend a SBP of less than 130 mmHg.  We will plan to follow up in 1 year with repeat CTA of the head and neck.      I spent a total of 41 minutes on the day of the visit.This includes face to face time and non-face to face time preparing to see the patient (eg, review of tests), obtaining and/or reviewing separately obtained history, documenting clinical information in the electronic or other health record, independently interpreting results and communicating results to the patient/family/caregiver, or care coordinator.

## 2025-02-04 ENCOUNTER — HOSPITAL ENCOUNTER (OUTPATIENT)
Dept: CARDIOLOGY | Facility: CLINIC | Age: 85
Discharge: HOME | End: 2025-02-04
Attending: INTERNAL MEDICINE
Payer: MEDICARE

## 2025-02-04 ENCOUNTER — CLINICAL SUPPORT (OUTPATIENT)
Dept: CARDIOLOGY | Facility: CLINIC | Age: 85
End: 2025-02-04
Payer: MEDICARE

## 2025-02-04 DIAGNOSIS — I49.8 OTHER SPECIFIED CARDIAC ARRHYTHMIAS: ICD-10-CM

## 2025-02-04 PROCEDURE — 93298 REM INTERROG DEV EVAL SCRMS: CPT | Mod: PN | Performed by: INTERNAL MEDICINE

## 2025-02-11 LAB
OHS CV AF BURDEN PERCENT: < 1
OHS CV DC REMOTE DEVICE TYPE: NORMAL

## 2025-02-12 ENCOUNTER — TELEPHONE (OUTPATIENT)
Dept: FAMILY MEDICINE | Facility: CLINIC | Age: 85
End: 2025-02-12
Payer: MEDICARE

## 2025-02-12 NOTE — TELEPHONE ENCOUNTER
----- Message from Catina sent at 2/12/2025  9:05 AM CST -----  Type:  Same Day Appointment Request    Caller is requesting a same day appointment.  Caller declined first available appointment listed below.      Name of Caller:  pt   When is the first available appointment?  N/a  Symptoms:  foot swelling and peeling borderline diabetic  Best Call Back Number:  [phone] or  fadi [phone]  Additional Information:   Please call back to advise. Thanks!

## 2025-02-14 ENCOUNTER — OFFICE VISIT (OUTPATIENT)
Dept: OTOLARYNGOLOGY | Facility: CLINIC | Age: 85
End: 2025-02-14
Payer: MEDICARE

## 2025-02-14 VITALS — HEIGHT: 66 IN | WEIGHT: 151.44 LBS | BODY MASS INDEX: 24.34 KG/M2

## 2025-02-14 DIAGNOSIS — R42 DIZZINESS: Primary | ICD-10-CM

## 2025-02-14 PROCEDURE — 99212 OFFICE O/P EST SF 10 MIN: CPT | Mod: PBBFAC,PO | Performed by: OTOLARYNGOLOGY

## 2025-02-14 PROCEDURE — 99999 PR PBB SHADOW E&M-EST. PATIENT-LVL II: CPT | Mod: PBBFAC,,, | Performed by: OTOLARYNGOLOGY

## 2025-02-14 NOTE — PROGRESS NOTES
Subjective:       Patient ID: Jacqueline Mathias is a 84 y.o. female.    Chief Complaint: Dizziness (Constant ongoing dizzy spells from laying to sitting and sitting to standing. When she tilts her head forward and states the room spins. )      I have seen this patient couple of times initially with a prominent vertigo and she was sent for a VNG that showed left-sided lateral canal positional vertigo.  Currently she is still having some dizziness but that is perhaps a little different she gets it when she stands up from her recliner where she sleeps but that is brief she does not have it lying down and while she did at one point have it when she rolls to the left that is not the current most noticeable component of this.    She was not particularly impressed with the physical therapy treatments that she has already undergone she was limited as to how many visit she could make based on insurance I suspect she has tried the home Epley maneuvers.          Objective:      ENT Physical Exam    Her tympanic membranes and ear canals are normal        Assessment:       1. Dizziness         Plan:          So while she has a valid diagnosis of left-sided positional vertigo has been through the Epley maneuvers still has some dizziness the main experience she is currently describing is much more orthostatic.  She does mentioned they been changing her blood pressure medicines quite a bit.      So it maybe hard to ferret out how much of this is residual positional vertigo and how much of it is related to orthostasis which some of the story implies is also a problem.      I have recommended she continue to do the left-sided Epley maneuvers at home I have given her the sheet that describes those I have offered to send her to Dr. Stafford to discuss persisting positional vertigo but she is not inclined to go to [place] and I have encouraged her to also check her blood pressure when she stands up kind of doing an orthostatic test at  home with an automated cuff however if they are having struggles with a blood pressure and are adjusting her blood pressure medicines there may not be any options to reduce her blood pressure medicines especially given that her orthostatic hypotension is predictable in his long as she stands by the side of the chair for a brief period of time it passes and then she is fine.

## 2025-03-07 ENCOUNTER — HOSPITAL ENCOUNTER (OUTPATIENT)
Dept: CARDIOLOGY | Facility: CLINIC | Age: 85
Discharge: HOME | End: 2025-03-07
Attending: INTERNAL MEDICINE
Payer: MEDICARE

## 2025-03-07 ENCOUNTER — CLINICAL SUPPORT (OUTPATIENT)
Dept: CARDIOLOGY | Facility: CLINIC | Age: 85
End: 2025-03-07

## 2025-03-07 DIAGNOSIS — I49.8 OTHER SPECIFIED CARDIAC ARRHYTHMIAS: ICD-10-CM

## 2025-03-07 PROCEDURE — 93298 REM INTERROG DEV EVAL SCRMS: CPT | Mod: PN | Performed by: INTERNAL MEDICINE

## 2025-04-07 ENCOUNTER — HOSPITAL ENCOUNTER (OUTPATIENT)
Dept: CARDIOLOGY | Facility: CLINIC | Age: 85
Discharge: HOME | End: 2025-04-07
Attending: INTERNAL MEDICINE
Payer: MEDICARE

## 2025-04-07 ENCOUNTER — CLINICAL SUPPORT (OUTPATIENT)
Dept: CARDIOLOGY | Facility: CLINIC | Age: 85
End: 2025-04-07

## 2025-04-07 DIAGNOSIS — I49.8 OTHER SPECIFIED CARDIAC ARRHYTHMIAS: ICD-10-CM

## 2025-04-07 PROCEDURE — 93298 REM INTERROG DEV EVAL SCRMS: CPT | Mod: PN | Performed by: INTERNAL MEDICINE

## 2025-05-08 ENCOUNTER — HOSPITAL ENCOUNTER (OUTPATIENT)
Dept: CARDIOLOGY | Facility: CLINIC | Age: 85
Discharge: HOME | End: 2025-05-08
Attending: INTERNAL MEDICINE
Payer: MEDICARE

## 2025-05-08 ENCOUNTER — CLINICAL SUPPORT (OUTPATIENT)
Dept: CARDIOLOGY | Facility: CLINIC | Age: 85
End: 2025-05-08

## 2025-05-08 DIAGNOSIS — I49.8 OTHER SPECIFIED CARDIAC ARRHYTHMIAS: ICD-10-CM

## 2025-05-08 PROCEDURE — 93298 REM INTERROG DEV EVAL SCRMS: CPT | Mod: PN | Performed by: INTERNAL MEDICINE

## 2025-06-04 ENCOUNTER — CLINICAL SUPPORT (OUTPATIENT)
Dept: CARDIOLOGY | Facility: CLINIC | Age: 85
End: 2025-06-04

## 2025-06-04 ENCOUNTER — HOSPITAL ENCOUNTER (OUTPATIENT)
Dept: CARDIOLOGY | Facility: CLINIC | Age: 85
Discharge: HOME | End: 2025-06-04
Attending: INTERNAL MEDICINE
Payer: MEDICARE

## 2025-06-04 DIAGNOSIS — I49.8 OTHER SPECIFIED CARDIAC ARRHYTHMIAS: ICD-10-CM

## 2025-06-05 RX ORDER — CARVEDILOL 12.5 MG/1
12.5 TABLET ORAL 2 TIMES DAILY
Qty: 180 TABLET | Refills: 3 | Status: SHIPPED | OUTPATIENT
Start: 2025-06-05

## 2025-06-05 RX ORDER — LANOLIN ALCOHOL/MO/W.PET/CERES
400 CREAM (GRAM) TOPICAL DAILY
COMMUNITY
End: 2025-06-05 | Stop reason: SDUPTHER

## 2025-06-05 RX ORDER — CARVEDILOL 12.5 MG/1
12.5 TABLET ORAL 2 TIMES DAILY WITH MEALS
COMMUNITY
End: 2025-06-05 | Stop reason: SDUPTHER

## 2025-06-05 RX ORDER — LANOLIN ALCOHOL/MO/W.PET/CERES
400 CREAM (GRAM) TOPICAL DAILY
Qty: 90 TABLET | Refills: 3 | Status: SHIPPED | OUTPATIENT
Start: 2025-06-05

## 2025-06-05 NOTE — PROGRESS NOTES
[facility]  Office Visit Note     SUBJECTIVE:   HPI: Jacqueline Mathias  is a 85 y.o. female     Meds  Apixaban 5 mg BID, carvedilol 12.5 mg BID, chlorthalidone 25 mg daily, vit D, flonase, levothyroxine 50 mcg, losartan 25 mg daily, magnesium oxide 400 mg daily, meclizine 25 mg TID, melatonin, pravastatin 20 mg nightly, spironolactone 25 mg daily     Specialists:  - cardiology (Dr. Dumas)   - ENT (Dr. Chandler)     History of Present Illness    CHIEF COMPLAINT:  Patient presents today to [facility]     FATIGUE AND SLEEP:  She reports fatigue that has improved since her  returned home with support from home health care, nursing staff, physical therapy, and occupational therapy, as she is no longer providing care 24/7. She experiences poor sleep quality due to anxiety about her partner's wellbeing, frequently checking on his movements during the night.    VERTIGO:  She experiences dizziness when laying flat, which occurs very quickly, beginning after a fall in April of last year. Previous symptoms of dizziness with head turning have resolved, and she denies current dizziness with rapid head movements. Past vestibular therapy was not particularly beneficial. The dizziness is not causing significant problems at present.    CARDIOVASCULAR HISTORY:  Her history includes left heart catheterization and right carotid artery angioplasty.    OBSTETRIC/GYNECOLOGIC HISTORY:  She had a hysterectomy in her 30s due to heavy bleeding. She has a history of five pregnancies, including one twin pregnancy. All pregnancies resulted in premature births at approximately 7.5 to 8 months gestation. She has two living children and experienced four miscarriages, including the loss of twins.    CURRENT MEDICATIONS:  Cardiovascular: Eliquis for atrial fibrillation, Carvedilol, Chlorthalidone, Losartan, and Spironolactone for blood pressure management. Other medications: Zyrtec PRN for seasonal allergies, Levothyroxine for  hypothyroidism, Imodium PRN for diarrhea, Meclizine PRN for lightheadedness, Melatonin 10mg, and Pravastatin for hypercholesterolemia. Supplements: Vitamin D for deficiency with inconsistent compliance, recently started Magnesium.         Lab Visit on 06/06/2025   Component Date Value Ref Range Status    Sodium 06/06/2025 139  136 - 145 mmol/L Final    Potassium 06/06/2025 3.5  3.5 - 5.1 mmol/L Final    Chloride 06/06/2025 94 (L)  95 - 110 mmol/L Final    CO2 06/06/2025 31 (H)  23 - 29 mmol/L Final    Glucose 06/06/2025 227 (H)  70 - 110 mg/dL Final    BUN 06/06/2025 38 (H)  8 - 23 mg/dL Final    Creatinine 06/06/2025 1.1  0.5 - 1.4 mg/dL Final    Calcium 06/06/2025 9.6  8.7 - 10.5 mg/dL Final    Protein Total 06/06/2025 8.6 (H)  6.0 - 8.4 gm/dL Final    Albumin 06/06/2025 4.3  3.5 - 5.2 g/dL Final    Bilirubin Total 06/06/2025 0.5  0.1 - 1.0 mg/dL Final    ALP 06/06/2025 86  40 - 150 unit/L Final    AST 06/06/2025 23  11 - 45 unit/L Final    ALT 06/06/2025 17  10 - 44 unit/L Final    Anion Gap 06/06/2025 14  8 - 16 mmol/L Final    eGFR 06/06/2025 49 (L)  >60 mL/min/1.73/m2 Final    Hemoglobin A1c 06/06/2025 7.7 (H)  4.0 - 5.6 % Final    Estimated Average Glucose 06/06/2025 174 (H)  68 - 131 mg/dL Final    Cholesterol Total 06/06/2025 173  120 - 199 mg/dL Final    Triglyceride 06/06/2025 267 (H)  30 - 150 mg/dL Final    HDL Cholesterol 06/06/2025 51  40 - 75 mg/dL Final    LDL Cholesterol 06/06/2025 68.6  63.0 - 159.0 mg/dL Final    HDL/Cholesterol Ratio 06/06/2025 29.5  20.0 - 50.0 % Final    Cholesterol/HDL Ratio 06/06/2025 3.4  2.0 - 5.0 Final    Non HDL Cholesterol 06/06/2025 122  mg/dL Final    TSH 06/06/2025 1.448  0.400 - 4.000 uIU/mL Final    WBC 06/06/2025 6.54  3.90 - 12.70 K/uL Final    RBC 06/06/2025 4.33  4.00 - 5.40 M/uL Final    HGB 06/06/2025 12.1  12.0 - 16.0 gm/dL Final    HCT 06/06/2025 38.5  37.0 - 48.5 % Final    MCV 06/06/2025 89  82 - 98 fL Final    MCH 06/06/2025 27.9  27.0 - 31.0 pg Final     MCHC 06/06/2025 31.4 (L)  32.0 - 36.0 g/dL Final    RDW 06/06/2025 14.7 (H)  11.5 - 14.5 % Final    Platelet Count 06/06/2025 200  150 - 450 K/uL Final    MPV 06/06/2025 12.9  9.2 - 12.9 fL Final    Nucleated RBC 06/06/2025 0  <=0 /100 WBC Final    Neut % 06/06/2025 55.5  38 - 73 % Final    Lymph % 06/06/2025 32.7  18 - 48 % Final    Mono % 06/06/2025 9.3  4 - 15 % Final    Eos % 06/06/2025 1.4  <=8 % Final    Basophil % 06/06/2025 0.5  <=1.9 % Final    Imm Grans % 06/06/2025 0.6 (H)  0.0 - 0.5 % Final    Neut # 06/06/2025 3.63  1.8 - 7.7 K/uL Final    Lymph # 06/06/2025 2.14  1 - 4.8 K/uL Final    Mono # 06/06/2025 0.61  0.3 - 1 K/uL Final    Eos # 06/06/2025 0.09  <=0.5 K/uL Final    Baso # 06/06/2025 0.03  <=0.2 K/uL Final    Imm Grans # 06/06/2025 0.04  0.00 - 0.04 K/uL Final   Hospital Outpatient Visit on 02/04/2025   Component Date Value Ref Range Status    Device Type 02/04/2025 Loop   Final    AF [place] % 02/04/2025 < 1   Final   Hospital Outpatient Visit on 01/28/2025   Component Date Value Ref Range Status    POC Creatinine 01/28/2025 1.0  0.5 - 1.4 mg/dL Final    Sample 01/28/2025 VENOUS   Final   Hospital Outpatient Visit on 01/04/2025   Component Date Value Ref Range Status    Device Type 01/04/2025 Loop   Final    AF [place] % 01/04/2025 < 1   Final         Medications Ordered Prior to Encounter[1]  Past Medical History:   Diagnosis Date    A-fib     Allergy     latex    Allergy     seasonal    Arthritis     Blood transfusion     Cataract     removed    COPD (chronic obstructive pulmonary disease)     Focal hemorrhagic contusion of cerebrum     Hypertension     IBS (irritable bowel syndrome)     Myocardial bridge     Obstructive sleep apnea     On home O2     Nightly    SAH (subarachnoid hemorrhage)     Thyroid nodule 09/29/2016    Ulcer      Past Surgical History:   Procedure Laterality Date    ANGIOGRAM, CORONARY, WITH LEFT HEART CATHETERIZATION N/A 12/14/2021    Procedure: Angiogram, Coronary, with  Left Heart Cath;  Surgeon: Napoleon Dumas MD;  Location: [facility] CATH/EP LAB;  Service: Cardiology;  Laterality: N/A;    CAROTID ARTERY ANGIOPLASTY Right 09/30/2016    CHOLECYSTECTOMY      EYE SURGERY      cataract removal    HYSTERECTOMY      INSERTION OF IMPLANTABLE LOOP RECORDER N/A 6/19/2024    Procedure: Insertion, Implantable Loop Recorder;  Surgeon: Mario Dumas MD;  Location: [facility] CATH/EP LAB;  Service: Cardiology;  Laterality: N/A;     Past Surgical History:   Procedure Laterality Date    ANGIOGRAM, CORONARY, WITH LEFT HEART CATHETERIZATION N/A 12/14/2021    Procedure: Angiogram, Coronary, with Left Heart Cath;  Surgeon: Napoleon Dumas MD;  Location: [facility] CATH/EP LAB;  Service: Cardiology;  Laterality: N/A;    CAROTID ARTERY ANGIOPLASTY Right 09/30/2016    CHOLECYSTECTOMY      EYE SURGERY      cataract removal    HYSTERECTOMY      INSERTION OF IMPLANTABLE LOOP RECORDER N/A 6/19/2024    Procedure: Insertion, Implantable Loop Recorder;  Surgeon: Mario Dumas MD;  Location: [facility] CATH/EP LAB;  Service: Cardiology;  Laterality: N/A;     Family History   Problem Relation Name Age of Onset    Stroke Mother      Heart disease Mother      Heart disease Sister      Alzheimer's disease Sister      Cancer Brother      Cancer Brother      Kidney disease Brother      Breast cancer Maternal Aunt         Marital Status:   Alcohol History:  reports that she does not currently use alcohol.  Tobacco History:  reports that she has never smoked. She has never used smokeless tobacco.  Drug History:  reports no history of drug use.    Health Maintenance Topics with due status: Not Due       Topic Last Completion Date    TETANUS VACCINE 01/20/2017    DEXA Scan 02/03/2023    Diabetic Eye Exam 10/10/2024    Diabetes Urine Screening 10/28/2024    Lipid Panel 06/06/2025    Hemoglobin A1c 06/06/2025     Immunization History   Administered Date(s) Administered    COVID-19, MRNA, LN-S, PF (Pfizer) (Purple Cap)  "02/05/2021, 02/26/2021, 08/17/2021    Influenza 11/10/2008    Influenza (FLUAD) - Quadrivalent - Adjuvanted - PF *Preferred* (65+) 10/25/2023    Influenza - Quadrivalent - High Dose - PF (65 years and older) 12/22/2021, 10/06/2022    Influenza - Quadrivalent - PF *Preferred* (6 months and older) 09/22/2009, 09/29/2010, 09/22/2011, 10/01/2013    Influenza - Trivalent - Fluad - Adjuvanted - PF (65 years and older 10/23/2024    Influenza - Trivalent - Fluzone High Dose - PF (65 years and older) 09/17/2014, 09/15/2015, 11/07/2016, 11/10/2018, 11/10/2018, 09/12/2019, 09/26/2020    Influenza Split 10/01/2013    Pneumococcal Conjugate - 13 Valent 11/07/2016    Pneumococcal Polysaccharide - 23 Valent 10/30/2008, 10/01/2013    Tdap 01/20/2017       Review of patient's allergies indicates:   Allergen Reactions    Latex, natural rubber      rash    Biaxin [clarithromycin] Diarrhea and Nausea Only    Codeine Itching    Oxycodone      Knocks pt out for 3 days    Metformin Diarrhea     Current Medications[2]    Review of Systems   Constitutional:  Negative for chills and fever.   Respiratory:  Negative for shortness of breath.    Cardiovascular:  Negative for chest pain.   Gastrointestinal:  Negative for blood in stool, nausea and vomiting.   Genitourinary:  Negative for hematuria.   Neurological:         Positional vertigo       OBJECTIVE:      Vitals:    06/06/25 1303   BP: 138/60   BP Location: Right arm   Patient Position: Sitting   Pulse: 66   SpO2: 98%   Weight: 68.6 kg (151 lb 3.8 oz)   Height: 5' 6" (1.676 m)     Physical Exam  Constitutional:       General: She is not in acute distress.     Appearance: She is not ill-appearing or toxic-appearing.   HENT:      Head: Normocephalic and atraumatic.      Mouth/Throat:      Mouth: Mucous membranes are moist.      Pharynx: Uvula midline. No pharyngeal swelling.   Cardiovascular:      Rate and Rhythm: Normal rate and regular rhythm.   Pulmonary:      Effort: Pulmonary effort is " normal. No tachypnea, bradypnea, accessory muscle usage, prolonged expiration or respiratory distress.      Breath sounds: Normal breath sounds. No stridor. No wheezing, rhonchi or rales.   Abdominal:      General: Bowel sounds are normal. There is no distension.      Palpations: Abdomen is soft.      Tenderness: There is no abdominal tenderness. There is no guarding or rebound.   Neurological:      General: No focal deficit present.      Mental Status: She is alert.   Psychiatric:         Mood and Affect: Mood normal.         Behavior: Behavior normal.        Assessment:       1. Routine general medical examination at a health care facility    2. Chronic obstructive pulmonary disease, unspecified COPD type    3. Other persistent atrial fibrillation    4. Essential hypertension    5. Aortic atherosclerosis    6. Mixed hyperlipidemia    7. Hypothyroidism due to acquired atrophy of thyroid    8. Type 2 diabetes mellitus without complication, without long-term current use of insulin    9. Irritable bowel syndrome with diarrhea    10. Arthritis        Plan:       Routine general medical examination at a health care facility  -     CBC Auto Differential; Future; Expected date: 06/06/2025  -     Comprehensive Metabolic Panel; Future; Expected date: 06/06/2025    Other persistent atrial fibrillation  - Prescribed Eliquis and Coreg for management.    Essential hypertension  -     CBC Auto Differential; Future; Expected date: 06/06/2025  -     Comprehensive Metabolic Panel; Future; Expected date: 06/06/2025  -     TSH; Future; Expected date: 06/06/2025  - Prescribed Chlorthalidone, Losartan, Coreg, and Spironolactone for blood pressure management.    Aortic atherosclerosis  - Continue with pravastatin     Mixed hyperlipidemia  -     Lipid Panel; Future; Expected date: 06/06/2025  - Prescribed Pravastatin for cholesterol management.    Hypothyroidism due to acquired atrophy of thyroid  -     TSH; Future; Expected date:  06/06/2025    Type 2 diabetes mellitus without complication, without long-term current use of insulin  -     Hemoglobin A1C; Future; Expected date: 06/06/2025  -     Microalbumin/Creatinine Ratio, Urine; Future; Expected date: 06/06/2025    Irritable bowel syndrome with diarrhea  - Continue to monitor     Arthritis  - Continue to monitor    VITAMIN D DEFICIENCY:  - Recommend Vitamin D supplementation.    BENIGN PAROXYSMAL POSITIONAL VERTIGO (BPPV):  - Patient experiences dizziness when lying flat but not with quick head turns.  - Likely BPPV due to displaced inner ear crystals.  - Previous vestibular therapy provided minimal benefit.  - Patient advised to contact office if dizziness worsens or occurs in other circumstances.    CAREGIVER FATIGUE:  - Patient is primary caretaker for recently hospitalized , contributing to her fatigue.  - Pt's spouse is getting home health services including nursing staff, physical therapy, and occupational therapy and patient finds this helpful     ALLERGIC RHINITIS:  - Seasonal allergies adequately managed with current regimen.  - Prescribed Zyrtec to be taken as needed for symptom control.    FOLLOW-UP:  - Follow up in 6 months.     Counseled on age and gender appropriate medical preventative services, including cancer screenings, immunizations, overall nutritional health, need for a consistent exercise regimen and an overall push towards maintaining a vigorous and active lifestyle.        Ngozi Lopez M.D.  6/8/2025    This note was created using [employer] voice recognition software that occasionally misinterprets phrases or words            [1]   Current Outpatient Medications on File Prior to Visit   Medication Sig Dispense Refill    apixaban (ELIQUIS) 5 mg Tab Take 1 tablet (5 mg total) by mouth 2 (two) times daily. 180 tablet 3    ascorbic acid, vitamin C, (VITAMIN C) 1000 MG tablet Take 1,000 mg by mouth once daily.      b complex vitamins capsule Take 1 capsule by mouth  once daily.      carvediloL (COREG) 12.5 MG tablet Take 1 tablet (12.5 mg total) by mouth 2 (two) times daily. 180 tablet 3    cetirizine (ZYRTEC) 10 MG tablet Take 10 mg by mouth daily as needed. As needed      chlorthalidone (HYGROTEN) 25 MG Tab Take 1 tablet (25 mg total) by mouth once daily. 90 tablet 3    cholecalciferol, vitamin D3, 125 mcg (5,000 unit) Tab Take 1 tablet by mouth once daily.      fluticasone propionate (FLONASE) 50 mcg/actuation nasal spray USE 1 SPRAY IN BOTH  NOSTRILS TWICE DAILY Strength: 50 mcg/actuation 48 g 3    glucosamine-chondroitin 500-400 mg tablet Take 1 tablet by mouth 2 (two) times daily.      levothyroxine (SYNTHROID) 50 MCG tablet Take 1 tablet (50 mcg total) by mouth before breakfast. 90 tablet 1    loperamide HCl (IMODIUM A-D ORAL) As needed      losartan (COZAAR) 25 MG tablet Take 1 tablet (25 mg total) by mouth once daily. 90 tablet 3    magnesium oxide (MAG-OX) 400 mg (241.3 mg magnesium) tablet Take 1 tablet (400 mg total) by mouth once daily. 90 tablet 3    meclizine (ANTIVERT) 25 mg tablet Take 1 tablet (25 mg total) by mouth 3 (three) times daily as needed for Dizziness. 270 tablet 3    melatonin 10 mg Tab Take by mouth. As needed      multivitamin capsule Take 1 capsule by mouth once daily.      pravastatin (PRAVACHOL) 20 MG tablet Take 1 tablet (20 mg total) by mouth every evening. 90 tablet 3    spironolactone (ALDACTONE) 25 MG tablet Take 1 tablet (25 mg total) by mouth once daily. 90 tablet 3    vit A/vit C/vit E/zinc/copper (ICAPS AREDS ORAL) Take by mouth.       No current facility-administered medications on file prior to visit.   [2]   Current Outpatient Medications:     apixaban (ELIQUIS) 5 mg Tab, Take 1 tablet (5 mg total) by mouth 2 (two) times daily., Disp: 180 tablet, Rfl: 3    ascorbic acid, vitamin C, (VITAMIN C) 1000 MG tablet, Take 1,000 mg by mouth once daily., Disp: , Rfl:     b complex vitamins capsule, Take 1 capsule by mouth once daily., Disp: ,  Rfl:     carvediloL (COREG) 12.5 MG tablet, Take 1 tablet (12.5 mg total) by mouth 2 (two) times daily., Disp: 180 tablet, Rfl: 3    cetirizine (ZYRTEC) 10 MG tablet, Take 10 mg by mouth daily as needed. As needed, Disp: , Rfl:     chlorthalidone (HYGROTEN) 25 MG Tab, Take 1 tablet (25 mg total) by mouth once daily., Disp: 90 tablet, Rfl: 3    cholecalciferol, vitamin D3, 125 mcg (5,000 unit) Tab, Take 1 tablet by mouth once daily., Disp: , Rfl:     fluticasone propionate (FLONASE) 50 mcg/actuation nasal spray, USE 1 SPRAY IN BOTH  NOSTRILS TWICE DAILY Strength: 50 mcg/actuation, Disp: 48 g, Rfl: 3    glucosamine-chondroitin 500-400 mg tablet, Take 1 tablet by mouth 2 (two) times daily., Disp: , Rfl:     levothyroxine (SYNTHROID) 50 MCG tablet, Take 1 tablet (50 mcg total) by mouth before breakfast., Disp: 90 tablet, Rfl: 1    loperamide HCl (IMODIUM A-D ORAL), As needed, Disp: , Rfl:     losartan (COZAAR) 25 MG tablet, Take 1 tablet (25 mg total) by mouth once daily., Disp: 90 tablet, Rfl: 3    magnesium oxide (MAG-OX) 400 mg (241.3 mg magnesium) tablet, Take 1 tablet (400 mg total) by mouth once daily., Disp: 90 tablet, Rfl: 3    meclizine (ANTIVERT) 25 mg tablet, Take 1 tablet (25 mg total) by mouth 3 (three) times daily as needed for Dizziness., Disp: 270 tablet, Rfl: 3    melatonin 10 mg Tab, Take by mouth. As needed, Disp: , Rfl:     multivitamin capsule, Take 1 capsule by mouth once daily., Disp: , Rfl:     pravastatin (PRAVACHOL) 20 MG tablet, Take 1 tablet (20 mg total) by mouth every evening., Disp: 90 tablet, Rfl: 3    spironolactone (ALDACTONE) 25 MG tablet, Take 1 tablet (25 mg total) by mouth once daily., Disp: 90 tablet, Rfl: 3    vit A/vit C/vit E/zinc/copper (ICAPS AREDS ORAL), Take by mouth., Disp: , Rfl:

## 2025-06-06 ENCOUNTER — OFFICE VISIT (OUTPATIENT)
Dept: FAMILY MEDICINE | Facility: CLINIC | Age: 85
End: 2025-06-06
Payer: MEDICARE

## 2025-06-06 ENCOUNTER — LAB VISIT (OUTPATIENT)
Dept: LAB | Facility: HOSPITAL | Age: 85
End: 2025-06-06
Attending: STUDENT IN AN ORGANIZED HEALTH CARE EDUCATION/TRAINING PROGRAM
Payer: MEDICARE

## 2025-06-06 VITALS
HEIGHT: 66 IN | OXYGEN SATURATION: 98 % | DIASTOLIC BLOOD PRESSURE: 60 MMHG | WEIGHT: 151.25 LBS | HEART RATE: 66 BPM | SYSTOLIC BLOOD PRESSURE: 138 MMHG | BODY MASS INDEX: 24.31 KG/M2

## 2025-06-06 DIAGNOSIS — I70.0 AORTIC ATHEROSCLEROSIS: ICD-10-CM

## 2025-06-06 DIAGNOSIS — Z00.00 ROUTINE GENERAL MEDICAL EXAMINATION AT A HEALTH CARE FACILITY: ICD-10-CM

## 2025-06-06 DIAGNOSIS — E03.4 HYPOTHYROIDISM DUE TO ACQUIRED ATROPHY OF THYROID: ICD-10-CM

## 2025-06-06 DIAGNOSIS — E11.9 TYPE 2 DIABETES MELLITUS WITHOUT COMPLICATION, WITHOUT LONG-TERM CURRENT USE OF INSULIN: ICD-10-CM

## 2025-06-06 DIAGNOSIS — K58.0 IRRITABLE BOWEL SYNDROME WITH DIARRHEA: ICD-10-CM

## 2025-06-06 DIAGNOSIS — E78.2 MIXED HYPERLIPIDEMIA: ICD-10-CM

## 2025-06-06 DIAGNOSIS — I10 ESSENTIAL HYPERTENSION: ICD-10-CM

## 2025-06-06 DIAGNOSIS — M19.90 ARTHRITIS: ICD-10-CM

## 2025-06-06 DIAGNOSIS — Z00.00 ROUTINE GENERAL MEDICAL EXAMINATION AT A HEALTH CARE FACILITY: Primary | ICD-10-CM

## 2025-06-06 DIAGNOSIS — I48.19 OTHER PERSISTENT ATRIAL FIBRILLATION: ICD-10-CM

## 2025-06-06 LAB
ABSOLUTE EOSINOPHIL (OHS): 0.09 K/UL
ABSOLUTE MONOCYTE (OHS): 0.61 K/UL (ref 0.3–1)
ABSOLUTE NEUTROPHIL COUNT (OHS): 3.63 K/UL (ref 1.8–7.7)
ALBUMIN SERPL BCP-MCNC: 4.3 G/DL (ref 3.5–5.2)
ALP SERPL-CCNC: 86 UNIT/L (ref 40–150)
ALT SERPL W/O P-5'-P-CCNC: 17 UNIT/L (ref 10–44)
ANION GAP (OHS): 14 MMOL/L (ref 8–16)
AST SERPL-CCNC: 23 UNIT/L (ref 11–45)
BASOPHILS # BLD AUTO: 0.03 K/UL
BASOPHILS NFR BLD AUTO: 0.5 %
BILIRUB SERPL-MCNC: 0.5 MG/DL (ref 0.1–1)
BUN SERPL-MCNC: 38 MG/DL (ref 8–23)
CALCIUM SERPL-MCNC: 9.6 MG/DL (ref 8.7–10.5)
CHLORIDE SERPL-SCNC: 94 MMOL/L (ref 95–110)
CHOLEST SERPL-MCNC: 173 MG/DL (ref 120–199)
CHOLEST/HDLC SERPL: 3.4 {RATIO} (ref 2–5)
CO2 SERPL-SCNC: 31 MMOL/L (ref 23–29)
CREAT SERPL-MCNC: 1.1 MG/DL (ref 0.5–1.4)
EAG (OHS): 174 MG/DL (ref 68–131)
ERYTHROCYTE [DISTWIDTH] IN BLOOD BY AUTOMATED COUNT: 14.7 % (ref 11.5–14.5)
GFR SERPLBLD CREATININE-BSD FMLA CKD-EPI: 49 ML/MIN/1.73/M2
GLUCOSE SERPL-MCNC: 227 MG/DL (ref 70–110)
HBA1C MFR BLD: 7.7 % (ref 4–5.6)
HCT VFR BLD AUTO: 38.5 % (ref 37–48.5)
HDLC SERPL-MCNC: 51 MG/DL (ref 40–75)
HDLC SERPL: 29.5 % (ref 20–50)
HGB BLD-MCNC: 12.1 GM/DL (ref 12–16)
IMM GRANULOCYTES # BLD AUTO: 0.04 K/UL (ref 0–0.04)
IMM GRANULOCYTES NFR BLD AUTO: 0.6 % (ref 0–0.5)
LDLC SERPL CALC-MCNC: 68.6 MG/DL (ref 63–159)
LYMPHOCYTES # BLD AUTO: 2.14 K/UL (ref 1–4.8)
MCH RBC QN AUTO: 27.9 PG (ref 27–31)
MCHC RBC AUTO-ENTMCNC: 31.4 G/DL (ref 32–36)
MCV RBC AUTO: 89 FL (ref 82–98)
NONHDLC SERPL-MCNC: 122 MG/DL
NUCLEATED RBC (/100WBC) (OHS): 0 /100 WBC
PLATELET # BLD AUTO: 200 K/UL (ref 150–450)
PMV BLD AUTO: 12.9 FL (ref 9.2–12.9)
POTASSIUM SERPL-SCNC: 3.5 MMOL/L (ref 3.5–5.1)
PROT SERPL-MCNC: 8.6 GM/DL (ref 6–8.4)
RBC # BLD AUTO: 4.33 M/UL (ref 4–5.4)
RELATIVE EOSINOPHIL (OHS): 1.4 %
RELATIVE LYMPHOCYTE (OHS): 32.7 % (ref 18–48)
RELATIVE MONOCYTE (OHS): 9.3 % (ref 4–15)
RELATIVE NEUTROPHIL (OHS): 55.5 % (ref 38–73)
SODIUM SERPL-SCNC: 139 MMOL/L (ref 136–145)
TRIGL SERPL-MCNC: 267 MG/DL (ref 30–150)
TSH SERPL-ACNC: 1.45 UIU/ML (ref 0.4–4)
WBC # BLD AUTO: 6.54 K/UL (ref 3.9–12.7)

## 2025-06-06 PROCEDURE — 99214 OFFICE O/P EST MOD 30 MIN: CPT | Mod: S$PBB,,, | Performed by: STUDENT IN AN ORGANIZED HEALTH CARE EDUCATION/TRAINING PROGRAM

## 2025-06-06 PROCEDURE — 85025 COMPLETE CBC W/AUTO DIFF WBC: CPT

## 2025-06-06 PROCEDURE — 99999 PR PBB SHADOW E&M-EST. PATIENT-LVL IV: CPT | Mod: PBBFAC,,, | Performed by: STUDENT IN AN ORGANIZED HEALTH CARE EDUCATION/TRAINING PROGRAM

## 2025-06-06 PROCEDURE — 36415 COLL VENOUS BLD VENIPUNCTURE: CPT | Mod: PO

## 2025-06-06 PROCEDURE — 80053 COMPREHEN METABOLIC PANEL: CPT

## 2025-06-06 PROCEDURE — 99214 OFFICE O/P EST MOD 30 MIN: CPT | Mod: PBBFAC,PO | Performed by: STUDENT IN AN ORGANIZED HEALTH CARE EDUCATION/TRAINING PROGRAM

## 2025-06-06 PROCEDURE — 80061 LIPID PANEL: CPT

## 2025-06-06 PROCEDURE — G2211 COMPLEX E/M VISIT ADD ON: HCPCS | Mod: ,,, | Performed by: STUDENT IN AN ORGANIZED HEALTH CARE EDUCATION/TRAINING PROGRAM

## 2025-06-06 PROCEDURE — 84443 ASSAY THYROID STIM HORMONE: CPT

## 2025-06-06 PROCEDURE — 83036 HEMOGLOBIN GLYCOSYLATED A1C: CPT

## 2025-06-08 ENCOUNTER — HOSPITAL ENCOUNTER (OUTPATIENT)
Dept: CARDIOLOGY | Facility: CLINIC | Age: 85
Discharge: HOME | End: 2025-06-08
Attending: INTERNAL MEDICINE
Payer: MEDICARE

## 2025-06-08 ENCOUNTER — RESULTS FOLLOW-UP (OUTPATIENT)
Dept: FAMILY MEDICINE | Facility: CLINIC | Age: 85
End: 2025-06-08

## 2025-06-08 ENCOUNTER — CLINICAL SUPPORT (OUTPATIENT)
Dept: CARDIOLOGY | Facility: CLINIC | Age: 85
End: 2025-06-08

## 2025-06-08 DIAGNOSIS — I49.8 OTHER SPECIFIED CARDIAC ARRHYTHMIAS: ICD-10-CM

## 2025-06-08 DIAGNOSIS — R94.4 DECREASED GFR: Primary | ICD-10-CM

## 2025-06-08 PROCEDURE — 93298 REM INTERROG DEV EVAL SCRMS: CPT | Mod: PN | Performed by: INTERNAL MEDICINE

## 2025-06-09 ENCOUNTER — TELEPHONE (OUTPATIENT)
Dept: FAMILY MEDICINE | Facility: CLINIC | Age: 85
End: 2025-06-09
Payer: MEDICARE

## 2025-06-09 NOTE — TELEPHONE ENCOUNTER
----- Message from Ngozi Lopez MD sent at 6/8/2025  8:35 PM CDT -----  Please have her come see Marianne for new onset diabetes this week.     Also, her labs are abnormal -- her kidney function is down.  Have her drink plenty of water and have her repeat her lab work by this Wednesday.     ----- Message -----  From: Lab, Background User  Sent: 6/6/2025   8:43 PM CDT  To: Ngozi Lopez MD

## 2025-06-12 ENCOUNTER — TELEPHONE (OUTPATIENT)
Dept: FAMILY MEDICINE | Facility: CLINIC | Age: 85
End: 2025-06-12
Payer: MEDICARE

## 2025-06-12 ENCOUNTER — LAB VISIT (OUTPATIENT)
Dept: LAB | Facility: HOSPITAL | Age: 85
End: 2025-06-12
Attending: STUDENT IN AN ORGANIZED HEALTH CARE EDUCATION/TRAINING PROGRAM
Payer: MEDICARE

## 2025-06-12 DIAGNOSIS — R94.4 DECREASED GFR: ICD-10-CM

## 2025-06-12 DIAGNOSIS — E11.9 TYPE 2 DIABETES MELLITUS WITHOUT COMPLICATION, WITHOUT LONG-TERM CURRENT USE OF INSULIN: ICD-10-CM

## 2025-06-12 LAB
ALBUMIN/CREAT UR: NORMAL
ANION GAP (OHS): 12 MMOL/L (ref 8–16)
BUN SERPL-MCNC: 29 MG/DL (ref 8–23)
CALCIUM SERPL-MCNC: 10.1 MG/DL (ref 8.7–10.5)
CHLORIDE SERPL-SCNC: 92 MMOL/L (ref 95–110)
CO2 SERPL-SCNC: 32 MMOL/L (ref 23–29)
CREAT SERPL-MCNC: 1.1 MG/DL (ref 0.5–1.4)
CREAT UR-MCNC: 40 MG/DL (ref 15–325)
GFR SERPLBLD CREATININE-BSD FMLA CKD-EPI: 49 ML/MIN/1.73/M2
GLUCOSE SERPL-MCNC: 183 MG/DL (ref 70–110)
MICROALBUMIN UR-MCNC: <5 UG/ML (ref ?–5000)
POTASSIUM SERPL-SCNC: 4.5 MMOL/L (ref 3.5–5.1)
SODIUM SERPL-SCNC: 136 MMOL/L (ref 136–145)

## 2025-06-12 PROCEDURE — 80048 BASIC METABOLIC PNL TOTAL CA: CPT

## 2025-06-12 PROCEDURE — 36415 COLL VENOUS BLD VENIPUNCTURE: CPT | Mod: PO

## 2025-06-12 PROCEDURE — 82043 UR ALBUMIN QUANTITATIVE: CPT

## 2025-06-12 NOTE — TELEPHONE ENCOUNTER
Copied from CRM #[id number]. Topic: General Inquiry - Patient Advice  >> Jun 12, 2025 10:05 AM Mary wrote:  Type:  Needs results    Who Called: pt     Would the patient rather a call back or a response via [facility]? Call     Best Call Back Number: [phone]      Additional Information: pt is wanting to speak with someone in office today if possible about her results. She says they are all abnormal and is very concerned.

## 2025-06-12 NOTE — TELEPHONE ENCOUNTER
Copied from CRM #[id number]. Topic: General Inquiry - Patient Advice  >> Jun 12, 2025  9:54 AM Melissa wrote:  Type:  Needs Medical Advice    Who Called:  Patient    Would the patient rather a call back or a response via [facility]?  Call back  Best Call Back Number:   [phone]    Additional Information:   States she would like to speak with someone about the results of her blood tests - states they all came back abnormal - please call - thank you

## 2025-06-17 ENCOUNTER — OFFICE VISIT (OUTPATIENT)
Dept: FAMILY MEDICINE | Facility: CLINIC | Age: 85
End: 2025-06-17
Payer: MEDICARE

## 2025-06-17 VITALS
RESPIRATION RATE: 12 BRPM | SYSTOLIC BLOOD PRESSURE: 128 MMHG | WEIGHT: 148.56 LBS | TEMPERATURE: 98 F | DIASTOLIC BLOOD PRESSURE: 68 MMHG | HEART RATE: 64 BPM | OXYGEN SATURATION: 96 % | HEIGHT: 66 IN | BODY MASS INDEX: 23.88 KG/M2

## 2025-06-17 DIAGNOSIS — E11.9 TYPE 2 DIABETES MELLITUS WITHOUT COMPLICATION, WITHOUT LONG-TERM CURRENT USE OF INSULIN: Primary | ICD-10-CM

## 2025-06-17 DIAGNOSIS — I10 ESSENTIAL HYPERTENSION: ICD-10-CM

## 2025-06-17 DIAGNOSIS — R94.4 DECREASED GFR: ICD-10-CM

## 2025-06-17 DIAGNOSIS — E78.2 MIXED HYPERLIPIDEMIA: ICD-10-CM

## 2025-06-17 DIAGNOSIS — K58.0 IRRITABLE BOWEL SYNDROME WITH DIARRHEA: ICD-10-CM

## 2025-06-17 PROCEDURE — 99215 OFFICE O/P EST HI 40 MIN: CPT | Mod: PBBFAC,PO

## 2025-06-17 PROCEDURE — 99214 OFFICE O/P EST MOD 30 MIN: CPT | Mod: S$PBB,,,

## 2025-06-17 PROCEDURE — G2211 COMPLEX E/M VISIT ADD ON: HCPCS | Mod: ,,,

## 2025-06-17 PROCEDURE — 99999 PR PBB SHADOW E&M-EST. PATIENT-LVL V: CPT | Mod: PBBFAC,,,

## 2025-06-17 NOTE — PROGRESS NOTES
Subjective:       Patient ID:  [id number]     Chief Complaint: Follow-up (Follow up on lab results)      History of Present Illness    Ms. Mathias presents today for IBS problems and to discuss lab work. She reports IBS symptoms that flare up with nervousness, experiencing urgency with occasional incontinence occurring more than once per week. She takes Imodium as needed which typically helps control symptoms, but expresses concern about preemptive morning dosing. She has a family history of IBS, with her mother having similar symptoms throughout life. She skips breakfast due to lack of morning appetite. She regularly consumes diet sodas and acknowledges inadequate daily water intake. A1C was 7.7. She prefers to manage through dietary changes rather than medications. Triglycerides were elevated at 267, increased from 141 seven months ago. Kidney function tests showed recent slight decline. No other concerns.          Past Medical History:   Diagnosis Date    A-fib     Allergy     latex    Allergy     seasonal    Arthritis     Blood transfusion     Cataract     removed    COPD (chronic obstructive pulmonary disease)     Focal hemorrhagic contusion of cerebrum     Hypertension     IBS (irritable bowel syndrome)     Myocardial bridge     Obstructive sleep apnea     On home O2     Nightly    SAH (subarachnoid hemorrhage)     Thyroid nodule 09/29/2016    Ulcer       Active Problem List with Overview Notes    Diagnosis Date Noted    Edema 01/14/2025    COPD (chronic obstructive pulmonary disease)     Vertigo 10/16/2024    Labyrinthine dysfunction, bilateral 07/12/2024    SAH (subarachnoid hemorrhage) 04/11/2024    Focal hemorrhagic contusion of cerebrum 04/11/2024    Fall 04/11/2024    Scab 04/11/2024    Atrial fibrillation 04/11/2024    Nocturnal hypoxemia 08/22/2023    Decreased diffusion capacity 08/22/2023    Type 2 diabetes mellitus without complication, without long-term current use of insulin 04/04/2023    Aortic  atherosclerosis 02/17/2023    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    Diverticular disease of colon 03/22/2022    Straight back syndrome 03/16/2022    Nonrheumatic mitral valve regurgitation 01/13/2022    Obstructive sleep apnea (adult) (pediatric) 12/28/2021    Musculoskeletal pain 11/10/2021    Essential hypertension 11/10/2021    Shortness of breath 11/10/2021    Coronary artery disease involving native coronary artery of native heart without angina pectoris 11/08/2020    Other persistent atrial fibrillation 03/02/2020    Mixed hyperlipidemia 06/18/2017    Hypothyroidism due to acquired atrophy of thyroid 11/07/2016    IBS (irritable bowel syndrome) 12/12/2011    Peptic ulcer disease 12/12/2011    ALLERGIC RHINITIS 12/12/2011    Arthritis 12/12/2011      Review of patient's allergies indicates:   Allergen Reactions    Latex, natural rubber      rash    Biaxin [clarithromycin] Diarrhea and Nausea Only    Codeine Itching    Oxycodone      Knocks pt out for 3 days    Metformin Diarrhea       Current Medications[1]    Lab Results   Component Value Date    WBC 6.54 06/06/2025    HGB 12.1 06/06/2025    HCT 38.5 06/06/2025     06/06/2025    CHOL 173 06/06/2025    TRIG 267 (H) 06/06/2025    HDL 51 06/06/2025    ALT 17 06/06/2025    AST 23 06/06/2025     06/12/2025    K 4.5 06/12/2025    CL 92 (L) 06/12/2025    CREATININE 1.1 06/12/2025    BUN 29 (H) 06/12/2025    CO2 32 (H) 06/12/2025    TSH 1.448 06/06/2025    INR 1.1 04/11/2024    GLUF 121 (H) 03/22/2019    HGBA1C 7.7 (H) 06/06/2025       Review of Systems   Constitutional:  Negative for fatigue and fever.   HENT: Negative.  Negative for congestion, sneezing and sore throat.    Eyes: Negative.    Respiratory:  Negative for cough, shortness of breath and wheezing.    Cardiovascular:  Negative for chest pain, palpitations and leg swelling.   Gastrointestinal:  Positive for diarrhea. Negative for abdominal pain, nausea and vomiting.   Genitourinary: Negative.     Musculoskeletal: Negative.  Negative for arthralgias.   Skin: Negative.  Negative for rash.   Neurological:  Negative for dizziness, weakness, light-headedness, numbness and headaches.   Hematological: Negative.    Psychiatric/Behavioral: Negative.         Objective:      Physical Exam  Constitutional:       Appearance: Normal appearance.   HENT:      Head: Normocephalic and atraumatic.      Nose: Nose normal.   Eyes:      Extraocular Movements: Extraocular movements intact.   Cardiovascular:      Rate and Rhythm: Normal rate and regular rhythm.   Pulmonary:      Effort: Pulmonary effort is normal.      Breath sounds: Normal breath sounds.   Musculoskeletal:         General: Normal range of motion.      Cervical back: Normal range of motion.   Skin:     General: Skin is warm and dry.   Neurological:      General: No focal deficit present.      Mental Status: She is alert and oriented to person, place, and time.   Psychiatric:         Mood and Affect: Mood normal.         Assessment:       1. Type 2 diabetes mellitus without complication, without long-term current use of insulin    2. Essential hypertension    3. Mixed hyperlipidemia    4. Irritable bowel syndrome with diarrhea    5. Decreased GFR        Plan:       Assessment & Plan    IMPRESSION:  - Assessed IBS symptoms, noting stress-related flare-ups.  - A1C levels at 7.7, indicating pre-diabetic range.  - Decided against initiating diabetic medications at this time.  - Elevated triglycerides (267, up from 141 7 months ago).  - Decline in renal function.  - Reviewed diet and fluid intake.       Jacqueline was seen today for follow-up.    Diagnoses and all orders for this visit:    Type 2 diabetes mellitus without complication, without long-term current use of insulin  -     Basic Metabolic Panel; Future  -     Hemoglobin A1C; Future  -     Lipid Panel; Future  - Most recent A1C: 7.7  - CMP : UTD  - Micro: WNL   - discussed medication compliance   - yearly eye exam  recommended  - LDL goal <70  - discussed importance of frequent foot exam     Essential hypertension  - well controlled   - discussed dash diet, exercise/weight loss, and increased cardiovascular exercise   - monitor BP at home w/ goal <130/80     Mixed hyperlipidemia  - continue current regimen  - low fat/high fiber diet recommended   - yearly fasting lipid panel recommended      Irritable bowel syndrome with diarrhea  Advised patient take Imodium PRN.  However, if frequency increases of use I do recommend that she follow up with GI.  Patient declines updated referral.  Also discussed diet sodas and aspartame possibly exacerbating her symptoms.  Discussed diet modification.    Decreased GFR  - repeat in 3 months, increase fluids             Future Appointments       Date Provider Specialty Appt Notes    6/25/2025 Ashia García MD Pulmonology 6 mo f/u  r/s from 4/23 KIT -pr    9/17/2025  Lab .    12/8/2025 Ngozi Lopez MD Family Medicine 6mth f/u    6/8/2026 Ngozi Lopez MD Family Medicine annual exam               Portions of this note may have been dictated using voice recognition software and may contain dictation related errors in spelling / grammar / syntax not discovered on text review.     This note was generated with the assistance of ambient listening technology. Verbal consent was obtained by the patient and accompanying visitor(s) for the recording of patient appointment to facilitate this note. I attest to having reviewed and edited the generated note for accuracy, though some syntax or spelling errors may persist. Please contact the author of this note for any clarification.       Marianne Lr PA-C         [1]   Current Outpatient Medications:     apixaban (ELIQUIS) 5 mg Tab, Take 1 tablet (5 mg total) by mouth 2 (two) times daily., Disp: 180 tablet, Rfl: 3    ascorbic acid, vitamin C, (VITAMIN C) 1000 MG tablet, Take 1,000 mg by mouth once daily., Disp: , Rfl:     b complex vitamins  capsule, Take 1 capsule by mouth once daily., Disp: , Rfl:     carvediloL (COREG) 12.5 MG tablet, Take 1 tablet (12.5 mg total) by mouth 2 (two) times daily., Disp: 180 tablet, Rfl: 3    cetirizine (ZYRTEC) 10 MG tablet, Take 10 mg by mouth daily as needed. As needed, Disp: , Rfl:     chlorthalidone (HYGROTEN) 25 MG Tab, Take 1 tablet (25 mg total) by mouth once daily., Disp: 90 tablet, Rfl: 3    cholecalciferol, vitamin D3, 125 mcg (5,000 unit) Tab, Take 1 tablet by mouth once daily., Disp: , Rfl:     fluticasone propionate (FLONASE) 50 mcg/actuation nasal spray, USE 1 SPRAY IN BOTH  NOSTRILS TWICE DAILY Strength: 50 mcg/actuation, Disp: 48 g, Rfl: 3    glucosamine-chondroitin 500-400 mg tablet, Take 1 tablet by mouth 2 (two) times daily., Disp: , Rfl:     levothyroxine (SYNTHROID) 50 MCG tablet, Take 1 tablet (50 mcg total) by mouth before breakfast., Disp: 90 tablet, Rfl: 1    loperamide HCl (IMODIUM A-D ORAL), As needed, Disp: , Rfl:     losartan (COZAAR) 25 MG tablet, Take 1 tablet (25 mg total) by mouth once daily., Disp: 90 tablet, Rfl: 3    magnesium oxide (MAG-OX) 400 mg (241.3 mg magnesium) tablet, Take 1 tablet (400 mg total) by mouth once daily., Disp: 90 tablet, Rfl: 3    meclizine (ANTIVERT) 25 mg tablet, Take 1 tablet (25 mg total) by mouth 3 (three) times daily as needed for Dizziness., Disp: 270 tablet, Rfl: 3    melatonin 10 mg Tab, Take by mouth. As needed, Disp: , Rfl:     multivitamin capsule, Take 1 capsule by mouth once daily., Disp: , Rfl:     pravastatin (PRAVACHOL) 20 MG tablet, Take 1 tablet (20 mg total) by mouth every evening., Disp: 90 tablet, Rfl: 3    spironolactone (ALDACTONE) 25 MG tablet, Take 1 tablet (25 mg total) by mouth once daily., Disp: 90 tablet, Rfl: 3    vit A/vit C/vit E/zinc/copper (ICAPS AREDS ORAL), Take by mouth., Disp: , Rfl:

## 2025-06-25 ENCOUNTER — OFFICE VISIT (OUTPATIENT)
Dept: PULMONOLOGY | Facility: CLINIC | Age: 85
End: 2025-06-25
Payer: MEDICARE

## 2025-06-25 VITALS
HEIGHT: 66 IN | BODY MASS INDEX: 23.84 KG/M2 | WEIGHT: 148.38 LBS | SYSTOLIC BLOOD PRESSURE: 136 MMHG | DIASTOLIC BLOOD PRESSURE: 72 MMHG | OXYGEN SATURATION: 95 % | HEART RATE: 59 BPM

## 2025-06-25 DIAGNOSIS — R06.02 SHORTNESS OF BREATH: ICD-10-CM

## 2025-06-25 DIAGNOSIS — R94.2 DECREASED DIFFUSION CAPACITY: ICD-10-CM

## 2025-06-25 DIAGNOSIS — Q76.49: ICD-10-CM

## 2025-06-25 DIAGNOSIS — G47.33 OSA (OBSTRUCTIVE SLEEP APNEA): Primary | ICD-10-CM

## 2025-06-25 DIAGNOSIS — G47.34 NOCTURNAL HYPOXEMIA: ICD-10-CM

## 2025-06-25 PROCEDURE — 99215 OFFICE O/P EST HI 40 MIN: CPT | Mod: PBBFAC,PN | Performed by: INTERNAL MEDICINE

## 2025-06-25 PROCEDURE — 99999 PR PBB SHADOW E&M-EST. PATIENT-LVL V: CPT | Mod: PBBFAC,,, | Performed by: INTERNAL MEDICINE

## 2025-06-25 PROCEDURE — 99214 OFFICE O/P EST MOD 30 MIN: CPT | Mod: S$PBB,,, | Performed by: INTERNAL MEDICINE

## 2025-06-25 NOTE — PROGRESS NOTES
SUBJECTIVE:    Patient ID: Jacqueline Mathias is a 85 y.o. female.    Chief Complaint: Follow-up (6 month follow up JACQUELINE)       The patient is here doing well. Her CPAP compliance is 97%.  Her AHI is 0.7.  She is sleeping on her concentrator at 4 liters and an autoPAP which maxes at 14.        Past Medical History:   Diagnosis Date    A-fib     Allergy     latex    Allergy     seasonal    Arthritis     Blood transfusion     Cataract     removed    COPD (chronic obstructive pulmonary disease)     Focal hemorrhagic contusion of cerebrum     Hypertension     IBS (irritable bowel syndrome)     Myocardial bridge     Obstructive sleep apnea     On home O2     Nightly    SAH (subarachnoid hemorrhage)     Thyroid nodule 09/29/2016    Ulcer      Past Surgical History:   Procedure Laterality Date    ANGIOGRAM, CORONARY, WITH LEFT HEART CATHETERIZATION N/A 12/14/2021    Procedure: Angiogram, Coronary, with Left Heart Cath;  Surgeon: Napoleon Dumas MD;  Location: [facility] CATH/EP LAB;  Service: Cardiology;  Laterality: N/A;    CAROTID ARTERY ANGIOPLASTY Right 09/30/2016    CHOLECYSTECTOMY      EYE SURGERY      cataract removal    HYSTERECTOMY      INSERTION OF IMPLANTABLE LOOP RECORDER N/A 6/19/2024    Procedure: Insertion, Implantable Loop Recorder;  Surgeon: Mario Dumas MD;  Location: [facility] CATH/EP LAB;  Service: Cardiology;  Laterality: N/A;     Family History   Problem Relation Name Age of Onset    Stroke Mother      Heart disease Mother      Heart disease Sister      Alzheimer's disease Sister      Cancer Brother      Cancer Brother      Kidney disease Brother      Breast cancer Maternal Aunt          Social History:   Marital Status:   Occupation: Data Unavailable  Alcohol History:  reports that she does not currently use alcohol.  Tobacco History:  reports that she has never smoked. She has never used smokeless tobacco.  Drug History:  reports no history of drug use.    Review of patient's allergies indicates:    Allergen Reactions    Latex, natural rubber      rash    Biaxin [clarithromycin] Diarrhea and Nausea Only    Codeine Itching    Oxycodone      Knocks pt out for 3 days    Metformin Diarrhea       Current Outpatient Medications   Medication Sig Dispense Refill    apixaban (ELIQUIS) 5 mg Tab Take 1 tablet (5 mg total) by mouth 2 (two) times daily. 180 tablet 3    ascorbic acid, vitamin C, (VITAMIN C) 1000 MG tablet Take 1,000 mg by mouth once daily.      b complex vitamins capsule Take 1 capsule by mouth once daily.      carvediloL (COREG) 12.5 MG tablet Take 1 tablet (12.5 mg total) by mouth 2 (two) times daily. 180 tablet 3    cetirizine (ZYRTEC) 10 MG tablet Take 10 mg by mouth daily as needed. As needed      chlorthalidone (HYGROTEN) 25 MG Tab Take 1 tablet (25 mg total) by mouth once daily. 90 tablet 3    cholecalciferol, vitamin D3, 125 mcg (5,000 unit) Tab Take 1 tablet by mouth once daily.      fluticasone propionate (FLONASE) 50 mcg/actuation nasal spray USE 1 SPRAY IN BOTH  NOSTRILS TWICE DAILY Strength: 50 mcg/actuation 48 g 3    glucosamine-chondroitin 500-400 mg tablet Take 1 tablet by mouth 2 (two) times daily.      levothyroxine (SYNTHROID) 50 MCG tablet Take 1 tablet (50 mcg total) by mouth before breakfast. 90 tablet 1    loperamide HCl (IMODIUM A-D ORAL) As needed      losartan (COZAAR) 25 MG tablet Take 1 tablet (25 mg total) by mouth once daily. 90 tablet 3    magnesium oxide (MAG-OX) 400 mg (241.3 mg magnesium) tablet Take 1 tablet (400 mg total) by mouth once daily. 90 tablet 3    meclizine (ANTIVERT) 25 mg tablet Take 1 tablet (25 mg total) by mouth 3 (three) times daily as needed for Dizziness. 270 tablet 3    melatonin 10 mg Tab Take by mouth. As needed      multivitamin capsule Take 1 capsule by mouth once daily.      pravastatin (PRAVACHOL) 20 MG tablet Take 1 tablet (20 mg total) by mouth every evening. 90 tablet 3    spironolactone (ALDACTONE) 25 MG tablet Take 1 tablet (25 mg total) by  "mouth once daily. 90 tablet 3    vit A/vit C/vit E/zinc/copper (ICAPS AREDS ORAL) Take by mouth.       No current facility-administered medications for this visit.       Alpha-1 Antitrypsin:  Last PFT: 10/11/23 mildrestriction and a moderate diffusion defect, (straight back)  Last CT:9/8/23  Lungs: There are no pulmonary nodules, infiltrates or pleural effusions.  Trachea and bronchi are normal.  There is stable minimal subpleural reticulations within the lung bases. There is no bronchiectasis.    Her overnight pulse ox with CPAP no oxygen showed significant drops during the night without the oxygen.    General: Feeling good  Eyes: Vision is good.  ENT:  No rhinitis or sinusitis.   Heart:: Occasional palpatations, has A fib, seeing Dr. Young  Lungs: shortness of breath with exertion is stable  GI: IBS sometimes  : No dysuria, hesitancy, or nocturia.  Musculoskeletal: some arthritis  Skin: No lesions or rashes.  Neuro: No headaches or neuropathy.  Lymph: ankles swell in the evening  Psych: anxious person  Endo: weight stable    OBJECTIVE:      /72   Pulse (!) 59   Ht 5' 6" (1.676 m)   Wt 67.3 kg (148 lb 6.4 oz)   SpO2 95%   BMI 23.95 kg/m²     Physical Exam  GENERAL: Older patient in no distress.  HEENT: Pupils equal and reactive. Extraocular movements intact. Nose intact.  Pharynx moist.  NECK: Supple.   HEART: Regular rate and rhythm. No murmur or gallop auscultated.  LUNGS: Clear to auscultation and percussion. Lung excursion symmetrical. No change in fremitus. No adventitial noises.  ABDOMEN: Bowel sounds present. Non-tender, no masses palpated.  EXTREMITIES: Normal muscle tone and joint movement, no cyanosis or clubbing.   LYMPHATICS: No adenopathy palpated, her ankles are puffy.  SKIN: Dry, intact, no lesions.  Green ecchymosis to R buttock.  NEURO: Cranial nerves II-XII intact. Motor strength 5/5 bilaterally, upper and lower extremities.  PSYCH: Appropriate affect.    Assessment:       1. JACQUELINE " (obstructive sleep apnea)    2. Nocturnal hypoxemia    3. Straight back syndrome    4. Decreased diffusion capacity    5. Shortness of breath        The patient continues to have some dyspnea on exertion.  She has straight back syndrome and a decreased diffusion defect.  She is also 85.  She is doing well overall.  She is very compliant with her CPAP and oxygen bled in.  She has significant nocturnal hypoxemia despite CPAP and requires 4 L of oxygen to be bled in. Suggested the RSV vaccine again.       Plan:       JACQUELINE (obstructive sleep apnea)    Straight back syndrome    Diffusion capacity of lung (dl), decreased    Nocturnal hypoxemia    Follow up in about 6 months (around 12/25/2025).    Continue CPAP with oxygen 4 liters  Continue current levels of humidity  Call for any problems.

## 2025-07-09 ENCOUNTER — CLINICAL SUPPORT (OUTPATIENT)
Dept: CARDIOLOGY | Facility: CLINIC | Age: 85
End: 2025-07-09
Payer: MEDICARE

## 2025-07-09 ENCOUNTER — HOSPITAL ENCOUNTER (OUTPATIENT)
Dept: CARDIOLOGY | Facility: CLINIC | Age: 85
Discharge: HOME | End: 2025-07-09
Attending: INTERNAL MEDICINE
Payer: MEDICARE

## 2025-07-09 DIAGNOSIS — I49.8 OTHER SPECIFIED CARDIAC ARRHYTHMIAS: ICD-10-CM

## 2025-07-09 PROCEDURE — 93298 REM INTERROG DEV EVAL SCRMS: CPT | Mod: PN | Performed by: INTERNAL MEDICINE

## 2025-08-01 LAB
OHS CV AF BURDEN PERCENT: < 1
OHS CV DC REMOTE DEVICE TYPE: NORMAL
OHS CV ICD SHOCK: NO

## 2025-08-09 ENCOUNTER — CLINICAL SUPPORT (OUTPATIENT)
Dept: CARDIOLOGY | Facility: CLINIC | Age: 85
End: 2025-08-09
Payer: MEDICARE

## 2025-08-09 ENCOUNTER — HOSPITAL ENCOUNTER (OUTPATIENT)
Dept: CARDIOLOGY | Facility: CLINIC | Age: 85
Discharge: HOME | End: 2025-08-09
Attending: INTERNAL MEDICINE
Payer: MEDICARE

## 2025-08-09 DIAGNOSIS — I49.8 OTHER SPECIFIED CARDIAC ARRHYTHMIAS: ICD-10-CM

## 2025-08-09 PROCEDURE — 93298 REM INTERROG DEV EVAL SCRMS: CPT | Mod: PN | Performed by: INTERNAL MEDICINE

## 2025-08-20 DIAGNOSIS — I10 ESSENTIAL HYPERTENSION: ICD-10-CM

## 2025-08-20 DIAGNOSIS — R60.0 LOCALIZED EDEMA: Primary | ICD-10-CM

## 2025-08-22 LAB
OHS CV AF BURDEN PERCENT: < 1
OHS CV DC REMOTE DEVICE TYPE: NORMAL

## 2025-08-22 RX ORDER — CHLORTHALIDONE 25 MG/1
25 TABLET ORAL DAILY
Qty: 90 TABLET | Refills: 3 | Status: SHIPPED | OUTPATIENT
Start: 2025-08-22

## (undated) DEVICE — DIAG. CATHETER STRAIGHT PIG 5 FR.

## (undated) DEVICE — GUIDEWIRE DOUBLE ENDED .035 DIA. 150CML

## (undated) DEVICE — CATHETER DIAGNOSTIC DXTERITY 5FR JR4.0

## (undated) DEVICE — SHEATH PINNACLE 5FRX10CM W/GUIDEWIRE

## (undated) DEVICE — CATHETER DIAGNOSTIC DXTERITY 5FR JL4.0